# Patient Record
Sex: FEMALE | Race: WHITE | NOT HISPANIC OR LATINO | Employment: OTHER | URBAN - NONMETROPOLITAN AREA
[De-identification: names, ages, dates, MRNs, and addresses within clinical notes are randomized per-mention and may not be internally consistent; named-entity substitution may affect disease eponyms.]

---

## 2023-11-09 DIAGNOSIS — I48.0 PAROXYSMAL ATRIAL FIBRILLATION (MULTI): ICD-10-CM

## 2023-11-09 PROBLEM — E78.5 HYPERLIPIDEMIA: Status: ACTIVE | Noted: 2023-11-09

## 2023-11-09 PROBLEM — I42.9 CARDIOMYOPATHY (MULTI): Status: ACTIVE | Noted: 2023-11-09

## 2023-11-09 PROBLEM — E11.9 DIABETES MELLITUS (MULTI): Status: ACTIVE | Noted: 2023-11-09

## 2023-11-09 PROBLEM — N18.9 CHRONIC KIDNEY DISEASE: Status: ACTIVE | Noted: 2023-11-09

## 2023-11-09 PROBLEM — I10 BENIGN ESSENTIAL HYPERTENSION: Status: ACTIVE | Noted: 2023-11-09

## 2023-11-09 PROBLEM — I35.0 AORTIC STENOSIS, MODERATE: Status: ACTIVE | Noted: 2023-11-09

## 2023-11-09 RX ORDER — VIT C/E/ZN/COPPR/LUTEIN/ZEAXAN 250MG-90MG
25 CAPSULE ORAL DAILY
COMMUNITY

## 2023-11-09 RX ORDER — AMIODARONE HYDROCHLORIDE 200 MG/1
200 TABLET ORAL DAILY
Qty: 90 TABLET | Refills: 0 | Status: SHIPPED | OUTPATIENT
Start: 2023-11-09 | End: 2024-01-04

## 2023-11-09 RX ORDER — AMLODIPINE BESYLATE 5 MG/1
1 TABLET ORAL DAILY
COMMUNITY
Start: 2021-04-08

## 2023-11-09 RX ORDER — FERROUS SULFATE 325(65) MG
1 TABLET ORAL DAILY
COMMUNITY

## 2023-11-09 RX ORDER — AMIODARONE HYDROCHLORIDE 200 MG/1
1 TABLET ORAL DAILY
COMMUNITY
Start: 2021-06-08 | End: 2023-11-09 | Stop reason: SDUPTHER

## 2023-11-09 RX ORDER — ZOLPIDEM TARTRATE 10 MG/1
1 TABLET ORAL NIGHTLY PRN
COMMUNITY
Start: 2021-05-17

## 2023-11-09 RX ORDER — LEVOTHYROXINE SODIUM 75 UG/1
1 TABLET ORAL DAILY
COMMUNITY
Start: 2020-11-25

## 2023-11-09 RX ORDER — BUTALB/ACETAMINOPHEN/CAFFEINE 50-325-40
1 TABLET ORAL DAILY
COMMUNITY

## 2023-11-09 RX ORDER — FUROSEMIDE 20 MG/1
1 TABLET ORAL DAILY
COMMUNITY
Start: 2021-04-29

## 2023-11-09 RX ORDER — ASCORBIC ACID 500 MG
1 TABLET ORAL DAILY
COMMUNITY

## 2023-11-09 RX ORDER — PAROXETINE HYDROCHLORIDE 20 MG/1
1 TABLET, FILM COATED ORAL DAILY
COMMUNITY
Start: 2021-08-10

## 2023-11-09 RX ORDER — METFORMIN HYDROCHLORIDE 500 MG/1
1 TABLET ORAL 2 TIMES DAILY
COMMUNITY
End: 2024-01-08 | Stop reason: WASHOUT

## 2023-11-09 RX ORDER — HYDROGEN PEROXIDE 3 %
1 SOLUTION, NON-ORAL MISCELLANEOUS DAILY
COMMUNITY

## 2023-12-27 LAB
NON-UH HIE ALANINE AMINOTRANSFERASE: 10 U/L (ref 7–52)
NON-UH HIE ANION GAP: 11 (ref 6–15)
NON-UH HIE ASPARTATE AMINO TRANSFERASE: 21 U/L (ref 13–39)
NON-UH HIE BASOPHILS # (AUTO): 0.1 10*3/UL (ref 0–0.2)
NON-UH HIE BASOPHILS % (AUTO): 0.8 %
NON-UH HIE BLOOD UREA NITROGEN: 18 MG/DL (ref 7–25)
NON-UH HIE CALCIUM: 9 MG/DL (ref 8.6–10.3)
NON-UH HIE CARBON DIOXIDE: 27.1 MMOL/L (ref 21–31)
NON-UH HIE CHLORIDE: 104 MMOL/L (ref 98–107)
NON-UH HIE CHOL/HDL RATIO: 4.2
NON-UH HIE CHOLESTEROL: 255 MG/DL (ref 140–200)
NON-UH HIE CREATININE: 1.67 MG/DL (ref 0.6–1.2)
NON-UH HIE EOSINOPHILS # (AUTO): 0.2 10*3/UL (ref 0–0.45)
NON-UH HIE EOSINOPHILS % (AUTO): 2.3 %
NON-UH HIE ESTIMATED GFR: 30.59
NON-UH HIE GLUCOSE: 143 MG/DL (ref 70–100)
NON-UH HIE HDL CHOLESTEROL: 61 MG/DL (ref 23–92)
NON-UH HIE HEMATOCRIT: 33.7 % (ref 34–46.4)
NON-UH HIE HEMOGLOBIN: 11.1 G/DL (ref 11.8–15.4)
NON-UH HIE LDL CHOLESTEROL,CALCULATED: 160 MG/DL (ref 0–100)
NON-UH HIE LYMPHOCYTES # (AUTO): 1.9 10*3/UL (ref 1–4.8)
NON-UH HIE LYMPHOCYTES % (AUTO): 19.3 %
NON-UH HIE MEAN CORPUSCULAR HEMOGLOBIN: 28.4 PG (ref 24.7–34.3)
NON-UH HIE MEAN CORPUSCULAR HGB CONC: 32.8 G/DL (ref 32–35)
NON-UH HIE MEAN CORPUSCULAR VOLUME: 86.5 FL (ref 80–100)
NON-UH HIE MEAN PLATELET VOLUME: 8.1 FL (ref 6.3–10.7)
NON-UH HIE MONOCYTES # (AUTO): 0.6 10*3/UL (ref 0–0.8)
NON-UH HIE MONOCYTES % (AUTO): 5.7 %
NON-UH HIE NEUTROPHILS # (AUTO): 7.1 10*3/UL (ref 1.8–7.7)
NON-UH HIE NEUTROPHILS % (AUTO): 71.9 %
NON-UH HIE NRBC%: 0.1 /100{WBC} (ref 0–0.5)
NON-UH HIE PLATELET COUNT: 322 10*3/UL (ref 150–450)
NON-UH HIE POTASSIUM: 4.1 MMOL/L (ref 3.5–5.1)
NON-UH HIE RED BLOOD COUNT: 3.9 (ref 3.6–5)
NON-UH HIE RED CELL DISTRIBUTION WIDTH: 15.1 % (ref 11.9–15.3)
NON-UH HIE SODIUM: 138 MMOL/L (ref 136–145)
NON-UH HIE TRIGLYCERIDE W/REFLEX: 169 MG/DL (ref 0–149)
NON-UH HIE UNCORRECTED WBC: 9.9 10*3/UL (ref 3.8–11.6)
NON-UH HIE VLDL CHOLESTEROL: 33 MG/DL
NON-UH HIE WHITE BLOOD COUNT: 9.9 10*3/UL (ref 3.8–11.6)

## 2023-12-29 ENCOUNTER — TELEPHONE (OUTPATIENT)
Dept: CARDIOLOGY | Facility: CLINIC | Age: 83
End: 2023-12-29
Payer: MEDICARE

## 2023-12-29 DIAGNOSIS — I48.0 PAROXYSMAL ATRIAL FIBRILLATION (MULTI): ICD-10-CM

## 2023-12-29 DIAGNOSIS — Z79.899 HIGH RISK MEDICATION USE: ICD-10-CM

## 2023-12-29 NOTE — TELEPHONE ENCOUNTER
[place] [facility] [place]  PFT paper order also completed.

## 2024-01-08 ENCOUNTER — OFFICE VISIT (OUTPATIENT)
Dept: CARDIOLOGY | Facility: CLINIC | Age: 84
End: 2024-01-08
Payer: MEDICARE

## 2024-01-08 VITALS
SYSTOLIC BLOOD PRESSURE: 138 MMHG | BODY MASS INDEX: 28.93 KG/M2 | HEIGHT: 66 IN | WEIGHT: 180 LBS | DIASTOLIC BLOOD PRESSURE: 70 MMHG | HEART RATE: 50 BPM

## 2024-01-08 DIAGNOSIS — I48.0 PAROXYSMAL ATRIAL FIBRILLATION (MULTI): ICD-10-CM

## 2024-01-08 DIAGNOSIS — I10 BENIGN ESSENTIAL HYPERTENSION: ICD-10-CM

## 2024-01-08 DIAGNOSIS — I35.0 AORTIC STENOSIS, MODERATE: ICD-10-CM

## 2024-01-08 DIAGNOSIS — E78.5 HYPERLIPIDEMIA, UNSPECIFIED HYPERLIPIDEMIA TYPE: ICD-10-CM

## 2024-01-08 PROCEDURE — 1159F MED LIST DOCD IN RCRD: CPT | Performed by: INTERNAL MEDICINE

## 2024-01-08 PROCEDURE — 3075F SYST BP GE 130 - 139MM HG: CPT | Performed by: INTERNAL MEDICINE

## 2024-01-08 PROCEDURE — 1160F RVW MEDS BY RX/DR IN RCRD: CPT | Performed by: INTERNAL MEDICINE

## 2024-01-08 PROCEDURE — 99214 OFFICE O/P EST MOD 30 MIN: CPT | Performed by: INTERNAL MEDICINE

## 2024-01-08 PROCEDURE — 1036F TOBACCO NON-USER: CPT | Performed by: INTERNAL MEDICINE

## 2024-01-08 PROCEDURE — 3078F DIAST BP <80 MM HG: CPT | Performed by: INTERNAL MEDICINE

## 2024-01-08 PROCEDURE — 93000 ELECTROCARDIOGRAM COMPLETE: CPT | Performed by: INTERNAL MEDICINE

## 2024-01-08 RX ORDER — ATORVASTATIN CALCIUM 20 MG/1
20 TABLET, FILM COATED ORAL DAILY
Qty: 30 TABLET | Refills: 11 | Status: SHIPPED | OUTPATIENT
Start: 2024-01-08 | End: 2025-01-07

## 2024-01-08 RX ORDER — CARVEDILOL 12.5 MG/1
12.5 TABLET ORAL
COMMUNITY
End: 2024-03-22 | Stop reason: DRUGHIGH

## 2024-01-08 RX ORDER — GABAPENTIN 300 MG/1
300 CAPSULE ORAL DAILY
COMMUNITY

## 2024-01-08 RX ORDER — HYDROCODONE BITARTRATE AND ACETAMINOPHEN 5; 325 MG/1; MG/1
1 TABLET ORAL EVERY 4 HOURS PRN
COMMUNITY

## 2024-01-08 NOTE — LETTER
January 8, 2024     Bubba Guo MD  [address] [place] [id number]    Patient: Radha Torres   YOB: 1940   Date of Visit: 1/8/2024       Dear Dr. Bubba Guo MD:    Thank you for referring Radha Torres to me for evaluation. Below are my notes for this consultation.  If you have questions, please do not hesitate to call me. I look forward to following your patient along with you.       Sincerely,     Love Moreno MD      CC: No Recipients  ______________________________________________________________________________________    This is a patient with atrial fibrillation currently in normal sinus rhythm with at least moderate calcific aortic stenosis with a dimensionless aortic valve index of 0.3, [id number] score of 5, high risk for falls, high risk for injury related to fall, who is maintaining sinus rhythm on high risk medication amiodarone, surveillance per protocol. At this time no obvious evidence of amiodarone toxicity.  Accompanied by , patient has a sedentary lifestyle, and uses walker for ambulatory aid.  Has not had any major falls in the last 6 months.    Subjective :     Interval review of systems is negative for chest discomfort pressure tightness heaviness palpitations lightheadedness orthopnea paroxysmal nocturnal dyspnea dependent edema or claudication TIA or CVA type symptoms or bleeding diathesis      History so Far :      Objective      Wt Readings from Last 3 Encounters:   01/08/24 81.6 kg (180 lb)   08/07/23 80.7 kg (178 lb)   02/20/23 76.7 kg (169 lb)            Physical Exam:  Awake alert oriented x 3 answers simple questions.  Grossly nonfocal.  Grade 2/6 to 3/6 high-pitched crescendo decrescendo murmur of aortic stenosis along the left sternal border lungs are clear heart sounds are regular extremities show no edema    Meds:  Current Outpatient Medications   Medication Instructions   • amiodarone (PACERONE) 200 mg, oral, Daily   •  amLODIPine (Norvasc) 5 mg tablet 1 tablet, oral, Daily   • apixaban (Eliquis) 2.5 mg tablet 1 tablet, oral, 2 times daily   • ascorbic acid (Vitamin C) 500 mg tablet 1 tablet, oral, Daily   • atorvastatin (LIPITOR) 20 mg, oral, Daily   • calcium citrate-vitamin D3 (Citracal+D) 315 mg-5 mcg (200 unit) tablet 1 tablet, oral, Daily   • carvedilol (COREG) 12.5 mg, oral, 2 times daily with meals   • cholecalciferol (VITAMIN D-3) 25 mcg, oral, Daily   • esomeprazole (NexIUM) 20 mg DR capsule 1 capsule, oral, Daily   • ferrous sulfate 325 (65 Fe) MG tablet 1 tablet, oral, Daily   • furosemide (Lasix) 20 mg tablet 1 tablet, oral, Daily   • gabapentin (NEURONTIN) 300 mg, oral, Daily   • HYDROcodone-acetaminophen (Norco) 5-325 mg tablet 1 tablet, oral, Every 4 hours PRN   • levothyroxine (Synthroid, Levoxyl) 75 mcg tablet 1 tablet, oral, Daily   • metFORMIN (Glucophage) 500 mg tablet 1 tablet, oral, 2 times daily   • PARoxetine (Paxil) 20 mg tablet 1 tablet, oral, Daily   • zolpidem (Ambien) 10 mg tablet 1 tablet, oral, Nightly PRN      No Known Allergies    LABS:    Lab Results   Component Value Date    TSH 0.59 06/12/2019                   Problem List:    Patient Active Problem List    Diagnosis Date Noted   • Aortic stenosis, moderate 11/09/2023   • Benign essential hypertension 11/09/2023   • Cardiomyopathy (CMS/HCC) 11/09/2023   • Chronic kidney disease 11/09/2023   • Diabetes mellitus (CMS/HCC) 11/09/2023   • Hyperlipidemia 11/09/2023   • Paroxysmal atrial fibrillation (CMS/HCC) 11/09/2023                Assessment:    1. Benign essential hypertension  Follow Up In Cardiology    Follow Up In Cardiology    Lipid Panel    Aspartate Aminotransferase    Alanine Aminotransferase    Lipid Panel    Aspartate Aminotransferase    Alanine Aminotransferase      2. Paroxysmal atrial fibrillation (CMS/HCC)  Follow Up In Cardiology    ECG 12 Lead    Follow Up In Cardiology    Lipid Panel    Aspartate Aminotransferase    Alanine  Aminotransferase    Lipid Panel    Aspartate Aminotransferase    Alanine Aminotransferase      3. Aortic stenosis, moderate        4. Hyperlipidemia, unspecified hyperlipidemia type  atorvastatin (Lipitor) 20 mg tablet    Lipid Panel    Aspartate Aminotransferase    Alanine Aminotransferase    Lipid Panel    Aspartate Aminotransferase    Alanine Aminotransferase      Clinical discussion:  Patient with moderate to severe calcific aortic stenosis, no symptoms of angina or decompensated heart failure, remains on high risk medication amiodarone, has first-degree AV block, TSH is normal, liver enzymes are normal, lipid profile shows LDL of 150.  Blood pressure is at target, amlodipine was uptitrated at last visit  Sinus bradycardia and first-degree AV block.  Has diffuse ST-T abnormality which is unchanged compared to prior EKG.  She has chronic kidney disease stage III.  She remains on high risk medication Eliquis, I have discussed with her referral for Watchman device, but she has consistently wanted to do that.  Remains on low-dose diuretics, which are prescribed by Neophrology ?    Orders Placed This Encounter   Procedures   • Lipid Panel   • Aspartate Aminotransferase   • Alanine Aminotransferase   • ECG 12 Lead          Follow up : 6 months with Virgen Grimes NP, 1 year with me.  Scribe Attestation  By signing my name below, IDarleen LPN  , Nini   attest that this documentation has been prepared under the direction and in the presence of Love Moreno MD.   Provider Attestation - Scribe documentation    All medical record entries made by the Scribe were at my direction and personally dictated by me. I have reviewed the chart and agree that the record accurately reflects my personal performance of the history, physical exam, discussion and plan.

## 2024-01-08 NOTE — PROGRESS NOTES
This is a patient with atrial fibrillation currently in normal sinus rhythm with at least moderate calcific aortic stenosis with a dimensionless aortic valve index of 0.3, [id number] score of 5, high risk for falls, high risk for injury related to fall, who is maintaining sinus rhythm on high risk medication amiodarone, surveillance per protocol. At this time no obvious evidence of amiodarone toxicity.  Accompanied by , patient has a sedentary lifestyle, and uses walker for ambulatory aid.  Has not had any major falls in the last 6 months.    Subjective :     Interval review of systems is negative for chest discomfort pressure tightness heaviness palpitations lightheadedness orthopnea paroxysmal nocturnal dyspnea dependent edema or claudication TIA or CVA type symptoms or bleeding diathesis      History so Far :      Objective      Wt Readings from Last 3 Encounters:   01/08/24 81.6 kg (180 lb)   08/07/23 80.7 kg (178 lb)   02/20/23 76.7 kg (169 lb)            Physical Exam:  Awake alert oriented x 3 answers simple questions.  Grossly nonfocal.  Grade 2/6 to 3/6 high-pitched crescendo decrescendo murmur of aortic stenosis along the left sternal border lungs are clear heart sounds are regular extremities show no edema    Meds:  Current Outpatient Medications   Medication Instructions    amiodarone (PACERONE) 200 mg, oral, Daily    amLODIPine (Norvasc) 5 mg tablet 1 tablet, oral, Daily    apixaban (Eliquis) 2.5 mg tablet 1 tablet, oral, 2 times daily    ascorbic acid (Vitamin C) 500 mg tablet 1 tablet, oral, Daily    atorvastatin (LIPITOR) 20 mg, oral, Daily    calcium citrate-vitamin D3 (Citracal+D) 315 mg-5 mcg (200 unit) tablet 1 tablet, oral, Daily    carvedilol (COREG) 12.5 mg, oral, 2 times daily with meals    cholecalciferol (VITAMIN D-3) 25 mcg, oral, Daily    esomeprazole (NexIUM) 20 mg DR capsule 1 capsule, oral, Daily    ferrous sulfate 325 (65 Fe) MG tablet 1 tablet, oral, Daily    furosemide  (Lasix) 20 mg tablet 1 tablet, oral, Daily    gabapentin (NEURONTIN) 300 mg, oral, Daily    HYDROcodone-acetaminophen (Norco) 5-325 mg tablet 1 tablet, oral, Every 4 hours PRN    levothyroxine (Synthroid, Levoxyl) 75 mcg tablet 1 tablet, oral, Daily    metFORMIN (Glucophage) 500 mg tablet 1 tablet, oral, 2 times daily    PARoxetine (Paxil) 20 mg tablet 1 tablet, oral, Daily    zolpidem (Ambien) 10 mg tablet 1 tablet, oral, Nightly PRN      No Known Allergies    LABS:    Lab Results   Component Value Date    TSH 0.59 06/12/2019                   Problem List:    Patient Active Problem List    Diagnosis Date Noted    Aortic stenosis, moderate 11/09/2023    Benign essential hypertension 11/09/2023    Cardiomyopathy (CMS/[facility]) 11/09/2023    Chronic kidney disease 11/09/2023    Diabetes mellitus (CMS/[facility]) 11/09/2023    Hyperlipidemia 11/09/2023    Paroxysmal atrial fibrillation (CMS/[facility]) 11/09/2023                Assessment:    1. Benign essential hypertension  Follow Up In Cardiology    Follow Up In Cardiology    Lipid Panel    Aspartate Aminotransferase    Alanine Aminotransferase    Lipid Panel    Aspartate Aminotransferase    Alanine Aminotransferase      2. Paroxysmal atrial fibrillation (CMS/HCC)  Follow Up In Cardiology    ECG 12 Lead    Follow Up In Cardiology    Lipid Panel    Aspartate Aminotransferase    Alanine Aminotransferase    Lipid Panel    Aspartate Aminotransferase    Alanine Aminotransferase      3. Aortic stenosis, moderate        4. Hyperlipidemia, unspecified hyperlipidemia type  atorvastatin (Lipitor) 20 mg tablet    Lipid Panel    Aspartate Aminotransferase    Alanine Aminotransferase    Lipid Panel    Aspartate Aminotransferase    Alanine Aminotransferase      Clinical discussion:  Patient with moderate to severe calcific aortic stenosis, no symptoms of angina or decompensated heart failure, remains on high risk medication amiodarone, has first-degree AV block, TSH is normal, liver enzymes are  normal, lipid profile shows LDL of 150.  Blood pressure is at target, amlodipine was uptitrated at last visit  Sinus bradycardia and first-degree AV block.  Has diffuse ST-T abnormality which is unchanged compared to prior EKG.  She has chronic kidney disease stage III.  She remains on high risk medication Eliquis, I have discussed with her referral for Watchman device, but she has consistently wanted to do that.  Remains on low-dose diuretics, which are prescribed by Neophrology ?    Orders Placed This Encounter   Procedures    Lipid Panel    Aspartate Aminotransferase    Alanine Aminotransferase    ECG 12 Lead          Follow up : 6 months with Virgen Grimes NP, 1 year with me.  Scribe Attestation  By signing my name below, I, Nini Santos LPN   attest that this documentation has been prepared under the direction and in the presence of Love Moreno MD.   Provider Attestation - Scribe documentation    All medical record entries made by the Scribe were at my direction and personally dictated by me. I have reviewed the chart and agree that the record accurately reflects my personal performance of the history, physical exam, discussion and plan.

## 2024-01-08 NOTE — PATIENT INSTRUCTIONS
Please bring all medicines, vitamins, and herbal supplements with you when you come to the office.    Prescriptions will not be filled unless you are compliant with your follow up appointments or have a follow up appointment scheduled as per instruction of your physician. Refills should be requested at the time of your visit.     EKG done in office today     Amiodarone follow up per routine

## 2024-01-23 LAB
NON-UH HIE ANION GAP: 13.1 (ref 6–15)
NON-UH HIE ASPARTATE AMINO TRANSFERASE: 22 U/L (ref 13–39)
NON-UH HIE BLOOD UREA NITROGEN: 23 MG/DL (ref 7–25)
NON-UH HIE CALCIUM: 8.9 MG/DL (ref 8.6–10.3)
NON-UH HIE CARBON DIOXIDE: 29.1 MMOL/L (ref 21–31)
NON-UH HIE CHLORIDE: 98 MMOL/L (ref 98–107)
NON-UH HIE CREATININE: 1.94 MG/DL (ref 0.6–1.2)
NON-UH HIE ESTIMATED GFR: 25.39
NON-UH HIE GLUCOSE: 144 MG/DL (ref 70–100)
NON-UH HIE POTASSIUM: 4.2 MMOL/L (ref 3.5–5.1)
NON-UH HIE SODIUM: 136 MMOL/L (ref 136–145)
NON-UH HIE THYROID STIMULATING HORMONE: 2.26 U[IU]/ML (ref 0.45–5.33)

## 2024-01-26 ENCOUNTER — TELEPHONE (OUTPATIENT)
Dept: CARDIOLOGY | Facility: CLINIC | Age: 84
End: 2024-01-26
Payer: MEDICARE

## 2024-01-26 NOTE — TELEPHONE ENCOUNTER
----- Message from Love Moreno MD sent at 1/25/2024  6:13 PM EST -----  Compared to prior numbers, there has been a slight decline in her kidney function, she should address this with her primary care or her kidney doctor if she has one.

## 2024-01-26 NOTE — TELEPHONE ENCOUNTER
Result Communication    Resulted Orders   [facility] Basic Metabolic Panel   Result Value Ref Range    [facility] Glucose 144 (H) 70 - 100 mg/dL      Comment:         Random Glucose Reference Range is dependent on time and   content of last meal. Glucose of more than 200 mg/dL in a   nonstressed, ambulatory subject supports the diagnosis of   Diabetes Mellitus.   ADA recommended reference range    [facility] Blood Urea Nitrogen 23 7 - 25 mg/dL    [facility] Creatinine 1.94 (H) 0.60 - 1.20 mg/dL    [facility] ESTIMATED GFR 25.395     [facility] Sodium 136 136 - 145 mmol/L    [facility] Potassium 4.2 3.5 - 5.1 mmol/L    [facility] Chloride 98 98 - 107 mmol/L    [facility] Carbon Dioxide 29.1 21.0 - 31.0 mmol/L    [facility] Anion Gap 13.1 6.0 - 15.0    [facility] Calcium 8.9 8.6 - 10.3 mg/dL   [facility] Aspartate Amino Transferase   Result Value Ref Range    [facility] Aspartate Amino Transferase 22 13 - 39 U/L   [facility] Thyroid Stimulating Hormone   Result Value Ref Range    [facility] Thyroid Stimulating Hormone 2.26 0.45 - 5.33 u[iU]/mL      Comment:      PERFORMED BY:[facility] [place] [address] [place], [place] [id number] MEDICAL DIRECTORTERRY FLORES M.D.       10:31 AM    I attempted to phone patient, left detailed message to return call. Transferred to [facility] for follow up.

## 2024-01-30 NOTE — TELEPHONE ENCOUNTER
Attempted to phone patient. No answer.  Left detailed message on machine to call office. To [facility]

## 2024-01-31 NOTE — TELEPHONE ENCOUNTER
came into office, advised, verbalized understanding. States patient has been know for decreased kidney function she follows with Dr. Palmer, results forwarded.

## 2024-03-20 DIAGNOSIS — I10 BENIGN ESSENTIAL HYPERTENSION: ICD-10-CM

## 2024-03-20 DIAGNOSIS — I42.9 CARDIOMYOPATHY, UNSPECIFIED TYPE (MULTI): ICD-10-CM

## 2024-03-20 NOTE — TELEPHONE ENCOUNTER
Pharmacy requesting Carvedilol 25 mg BID. Current EHR states 12.5 mg BID.  Upon review of previous EHR patient medication was adjusted 8/7/2023 up to 25 mg BID HOWEVER when patient presented to office within this system list was updated to 12.5 BID.     I did attempt to contact patient to verify what she is taking at the home. No answer. Left detailed message on machine to call office back    To [facility]

## 2024-03-21 NOTE — TELEPHONE ENCOUNTER
Spouse called back into office to discuss. He reviewed her bottles and she has 25 mg BID. Thinks that at last visit in January she had an old list that listed 12.5 mg Bid because she is actively taking the 25 mg BId    To Dr. Love Moreno MD to verify RX so it can be updated.

## 2024-03-22 RX ORDER — CARVEDILOL 25 MG/1
25 TABLET ORAL 2 TIMES DAILY
Qty: 180 TABLET | Refills: 3 | Status: SHIPPED | OUTPATIENT
Start: 2024-03-22

## 2024-06-24 LAB
NON-UH HIE ALANINE AMINOTRANSFERASE: 11 U/L (ref 7–52)
NON-UH HIE ASPARTATE AMINO TRANSFERASE: 21 U/L (ref 13–39)
NON-UH HIE CHOL/HDL RATIO: 3
NON-UH HIE CHOLESTEROL: 158 MG/DL (ref 140–200)
NON-UH HIE HDL CHOLESTEROL: 52 MG/DL (ref 23–92)
NON-UH HIE LDL CHOLESTEROL,CALCULATED: 83 MG/DL (ref 0–100)
NON-UH HIE TRIGLYCERIDE W/REFLEX: 116 MG/DL (ref 0–149)
NON-UH HIE VLDL CHOLESTEROL: 23 MG/DL

## 2024-07-02 ENCOUNTER — TELEPHONE (OUTPATIENT)
Dept: CARDIOLOGY | Facility: CLINIC | Age: 84
End: 2024-07-02
Payer: MEDICARE

## 2024-07-02 NOTE — TELEPHONE ENCOUNTER
----- Message from LINDSEY Stewart-CNP sent at 7/1/2024  3:53 PM EDT -----  Lipids well controlled - no changes  ----- Message -----  From: Kanwal Kay - Lab Results In  Sent: 6/24/2024   1:53 PM EDT  To: Love Moreno MD

## 2024-07-08 ENCOUNTER — APPOINTMENT (OUTPATIENT)
Dept: CARDIOLOGY | Facility: CLINIC | Age: 84
End: 2024-07-08
Payer: MEDICARE

## 2024-07-15 ENCOUNTER — APPOINTMENT (OUTPATIENT)
Dept: CARDIOLOGY | Facility: CLINIC | Age: 84
End: 2024-07-15
Payer: MEDICARE

## 2024-07-19 ENCOUNTER — APPOINTMENT (OUTPATIENT)
Dept: CARDIOLOGY | Facility: CLINIC | Age: 84
End: 2024-07-19
Payer: MEDICARE

## 2024-08-12 ENCOUNTER — APPOINTMENT (OUTPATIENT)
Dept: CARDIOLOGY | Facility: CLINIC | Age: 84
End: 2024-08-12
Payer: MEDICARE

## 2024-08-12 VITALS
WEIGHT: 181 LBS | SYSTOLIC BLOOD PRESSURE: 154 MMHG | HEIGHT: 66 IN | BODY MASS INDEX: 29.09 KG/M2 | HEART RATE: 59 BPM | DIASTOLIC BLOOD PRESSURE: 62 MMHG

## 2024-08-12 DIAGNOSIS — Z79.01 LONG TERM CURRENT USE OF ANTICOAGULANT THERAPY: ICD-10-CM

## 2024-08-12 DIAGNOSIS — N18.32 STAGE 3B CHRONIC KIDNEY DISEASE (MULTI): ICD-10-CM

## 2024-08-12 DIAGNOSIS — Z79.899 HIGH RISK MEDICATION USE: ICD-10-CM

## 2024-08-12 DIAGNOSIS — Z91.81 AT MAXIMUM RISK FOR FALL: ICD-10-CM

## 2024-08-12 DIAGNOSIS — I10 BENIGN ESSENTIAL HYPERTENSION: ICD-10-CM

## 2024-08-12 DIAGNOSIS — R00.1 SINUS BRADYCARDIA: ICD-10-CM

## 2024-08-12 DIAGNOSIS — I48.0 PAROXYSMAL ATRIAL FIBRILLATION (MULTI): ICD-10-CM

## 2024-08-12 DIAGNOSIS — Z79.899 MEDICATION COURSE CHANGED: ICD-10-CM

## 2024-08-12 DIAGNOSIS — Z78.9 NEVER SMOKED CIGARETTES: ICD-10-CM

## 2024-08-12 DIAGNOSIS — I35.0 AORTIC STENOSIS, MODERATE: ICD-10-CM

## 2024-08-12 PROCEDURE — G2211 COMPLEX E/M VISIT ADD ON: HCPCS | Performed by: INTERNAL MEDICINE

## 2024-08-12 PROCEDURE — 3078F DIAST BP <80 MM HG: CPT | Performed by: INTERNAL MEDICINE

## 2024-08-12 PROCEDURE — 99214 OFFICE O/P EST MOD 30 MIN: CPT | Performed by: INTERNAL MEDICINE

## 2024-08-12 PROCEDURE — 1160F RVW MEDS BY RX/DR IN RCRD: CPT | Performed by: INTERNAL MEDICINE

## 2024-08-12 PROCEDURE — 3077F SYST BP >= 140 MM HG: CPT | Performed by: INTERNAL MEDICINE

## 2024-08-12 PROCEDURE — 1159F MED LIST DOCD IN RCRD: CPT | Performed by: INTERNAL MEDICINE

## 2024-08-12 PROCEDURE — 93000 ELECTROCARDIOGRAM COMPLETE: CPT | Performed by: INTERNAL MEDICINE

## 2024-08-12 RX ORDER — AMIODARONE HYDROCHLORIDE 200 MG/1
200 TABLET ORAL
Start: 2024-08-13

## 2024-08-12 NOTE — PROGRESS NOTES
"This is a patient with atrial fibrillation currently in normal sinus rhythm with at least moderate calcific aortic stenosis with a dimensionless aortic valve index of 0.3, [id number] score of 5, high risk for falls, high risk for injury related to fall, who is maintaining sinus rhythm on high risk medication amiodarone, surveillance per protocol. At this time no obvious evidence of amiodarone toxicity.  Accompanied by , patient has a sedentary lifestyle, and uses walker for ambulatory aid.  Has not had any major falls in the last 6 months.   6-month follow-up visit.  Subjective :     Denies chest pressure tightness heaviness palpitations lightheadedness, has had mechanical fall.  Quite frustrated at having multiple doctor appointments to include primary nephrology cardiology and pain management.  Quality of life is primarily affected by musculoskeletal pain.  No bleeding diathesis.  History so far :  1. Persistent atrial fibrillation-on high risk medication amiodarone, maintaining sinus rhythm  2. No evidence of amiodarone toxicity  3. Chronic kidney disease stage IIIb.  4. Diabetes mellitus with hypoglycemia  5. Carotid bruit nonflow-limiting disease  6. Mild to moderate calcific aortic stenosis. Valve area estimated at 1.3 cmÂ², dimensionless index reported at 0.3, peak and mean gradients 46 and 27 respectively. Compared to the echo report from October 2018, LVEF improved from 30% to 65%. Previously noted mitral regurgitation resolved  7.  Fall risk  Objective   Wt Readings from Last 3 Encounters:   08/12/24 82.1 kg (181 lb)   01/08/24 81.6 kg (180 lb)   08/07/23 80.7 kg (178 lb)            Vitals:    08/12/24 1031   BP: 154/62   BP Location: Left arm   Patient Position: Sitting   Pulse: 59   Weight: 82.1 kg (181 lb)   Height: 1.676 m (5' 6\")                Physical Exam:  Awake alert oriented x 3 answers simple questions.  Grossly nonfocal.  Grade 2/6 to 3/6 high-pitched crescendo decrescendo murmur of " aortic stenosis along the left sternal border lungs are clear heart sounds are regular extremities show no edema     Affect is flat.  Uses cane as ambulatory aid.  Meds:  Current Outpatient Medications   Medication Instructions    amiodarone (PACERONE) 200 mg, oral, Daily    amLODIPine (Norvasc) 5 mg tablet 1 tablet, oral, Daily    apixaban (ELIQUIS) 2.5 mg, oral, 2 times daily    ascorbic acid (Vitamin C) 500 mg tablet 1 tablet, oral, Daily    atorvastatin (LIPITOR) 20 mg, oral, Daily    calcium citrate-vitamin D3 (Citracal+D) 315 mg-5 mcg (200 unit) tablet 1 tablet, oral, Daily    carvedilol (COREG) 25 mg, oral, 2 times daily    cholecalciferol (VITAMIN D-3) 25 mcg, oral, Daily    esomeprazole (NexIUM) 20 mg DR capsule 1 capsule, oral, Daily    ferrous sulfate 325 (65 Fe) MG tablet 1 tablet, oral, Daily    furosemide (Lasix) 20 mg tablet 1 tablet, oral, Daily    gabapentin (NEURONTIN) 300 mg, oral, Daily    HYDROcodone-acetaminophen (Norco) 5-325 mg tablet 1 tablet, oral, Every 4 hours PRN    levothyroxine (Synthroid, Levoxyl) 75 mcg tablet 1 tablet, oral, Daily    PARoxetine (Paxil) 20 mg tablet 1 tablet, oral, Daily    zolpidem (Ambien) 10 mg tablet 1 tablet, oral, Nightly PRN          No Known Allergies          LABS:    Lab Results   Component Value Date    TSH 0.59 06/12/2019                 7/10/2024-hemoglobin 11.2 hematocrit 34.3 MCV 87 platelet count 308,000 sodium 134 potassium 4.2 BUN 17 creatinine 1.55 GFR 33 glucose 115 magnesium 1.8 iron 43 TIBC 312% saturation 13.8 ferritin 109.5 albumin 3.9    Patient Active Problem List    Diagnosis Date Noted    Never smoked cigarettes 08/12/2024    Long term current use of anticoagulant therapy 08/12/2024    High risk medication use 08/12/2024    At maximum risk for fall 08/12/2024    Sinus bradycardia 08/12/2024    Medication course changed 08/12/2024    Aortic stenosis, moderate 11/09/2023    Benign essential hypertension 11/09/2023    Cardiomyopathy (Multi)  11/09/2023    Chronic kidney disease 11/09/2023    Diabetes mellitus (Multi) 11/09/2023    Hyperlipidemia 11/09/2023    Paroxysmal atrial fibrillation (Multi) 11/09/2023                 Assessment:    1. Benign essential hypertension  Follow Up In Cardiology      2. Paroxysmal atrial fibrillation (Multi)  Follow Up In Cardiology      3. Never smoked cigarettes        4. Long term current use of anticoagulant therapy        5. High risk medication use        6. At maximum risk for fall        7. Stage 3b chronic kidney disease (Multi)        8. Sinus bradycardia        9. Medication course changed        10. Aortic stenosis, moderate           11.  Personal history of COVID May 2024  12.  Diuretics to be addressed by nephrology  13.   brought up that her heart rate stays in the 50s all the time.  She is quite sedentary.  She is not having any symptoms of bradycardia.  We can reduce her amiodarone to 200 mg 5 days a week.  14.  Extensive discussion about fall risk, she has had multiple rib fractures from falling, she had a fall since her last office visit, lost balance, most of the falls are mechanical, she had both shoulders, was seen in the emergency room and released.  Very hesitant to proceed with Watchman device.  Procedure risk benefits alternatives discussed.  Very hesitant to go off anticoagulation.  She understands her stroke risk, and says she will not quit blood thinners.  Towards the end of the visit she said she would think about Watchman device, and get back to us.  She will keep her previously scheduled appointment January 2024.  15.  CBC and basic metabolic profile prior to next visit  Follow up : 6 months        Provider Attestation - Scribe documentation    All medical record entries made by the Scribe were at my direction and personally dictated by me. I have reviewed the chart and agree that the record accurately reflects my personal performance of the history, physical exam, discussion and  plan.

## 2024-08-12 NOTE — LETTER
August 15, 2024     Bubba Guo MD  [address] [place] [id number]    Patient: Radha Torres   YOB: 1940   Date of Visit: 8/12/2024       Dear Dr. Bubba Guo MD:    Thank you for referring Radha Trores to me for evaluation. Below are my notes for this consultation.  If you have questions, please do not hesitate to call me. I look forward to following your patient along with you.       Sincerely,     Love Moreno MD      CC: No Recipients  ______________________________________________________________________________________    This is a patient with atrial fibrillation currently in normal sinus rhythm with at least moderate calcific aortic stenosis with a dimensionless aortic valve index of 0.3, [id number] score of 5, high risk for falls, high risk for injury related to fall, who is maintaining sinus rhythm on high risk medication amiodarone, surveillance per protocol. At this time no obvious evidence of amiodarone toxicity.  Accompanied by , patient has a sedentary lifestyle, and uses walker for ambulatory aid.  Has not had any major falls in the last 6 months.   6-month follow-up visit.  Subjective :     Denies chest pressure tightness heaviness palpitations lightheadedness, has had mechanical fall.  Quite frustrated at having multiple doctor appointments to include primary nephrology cardiology and pain management.  Quality of life is primarily affected by musculoskeletal pain.  No bleeding diathesis.  History so far :  1. Persistent atrial fibrillation-on high risk medication amiodarone, maintaining sinus rhythm  2. No evidence of amiodarone toxicity  3. Chronic kidney disease stage IIIb.  4. Diabetes mellitus with hypoglycemia  5. Carotid bruit nonflow-limiting disease  6. Mild to moderate calcific aortic stenosis. Valve area estimated at 1.3 cmÂ², dimensionless index reported at 0.3, peak and mean gradients 46 and 27 respectively. Compared to the echo report from  "October 2018, LVEF improved from 30% to 65%. Previously noted mitral regurgitation resolved  7.  Fall risk  Objective   Wt Readings from Last 3 Encounters:   08/12/24 82.1 kg (181 lb)   01/08/24 81.6 kg (180 lb)   08/07/23 80.7 kg (178 lb)            Vitals:    08/12/24 1031   BP: 154/62   BP Location: Left arm   Patient Position: Sitting   Pulse: 59   Weight: 82.1 kg (181 lb)   Height: 1.676 m (5' 6\")                Physical Exam:  Awake alert oriented x 3 answers simple questions.  Grossly nonfocal.  Grade 2/6 to 3/6 high-pitched crescendo decrescendo murmur of aortic stenosis along the left sternal border lungs are clear heart sounds are regular extremities show no edema     Affect is flat.  Uses cane as ambulatory aid.  Meds:  Current Outpatient Medications   Medication Instructions   • amiodarone (PACERONE) 200 mg, oral, Daily   • amLODIPine (Norvasc) 5 mg tablet 1 tablet, oral, Daily   • apixaban (ELIQUIS) 2.5 mg, oral, 2 times daily   • ascorbic acid (Vitamin C) 500 mg tablet 1 tablet, oral, Daily   • atorvastatin (LIPITOR) 20 mg, oral, Daily   • calcium citrate-vitamin D3 (Citracal+D) 315 mg-5 mcg (200 unit) tablet 1 tablet, oral, Daily   • carvedilol (COREG) 25 mg, oral, 2 times daily   • cholecalciferol (VITAMIN D-3) 25 mcg, oral, Daily   • esomeprazole (NexIUM) 20 mg DR capsule 1 capsule, oral, Daily   • ferrous sulfate 325 (65 Fe) MG tablet 1 tablet, oral, Daily   • furosemide (Lasix) 20 mg tablet 1 tablet, oral, Daily   • gabapentin (NEURONTIN) 300 mg, oral, Daily   • HYDROcodone-acetaminophen (Norco) 5-325 mg tablet 1 tablet, oral, Every 4 hours PRN   • levothyroxine (Synthroid, Levoxyl) 75 mcg tablet 1 tablet, oral, Daily   • PARoxetine (Paxil) 20 mg tablet 1 tablet, oral, Daily   • zolpidem (Ambien) 10 mg tablet 1 tablet, oral, Nightly PRN          No Known Allergies          LABS:    Lab Results   Component Value Date    TSH 0.59 06/12/2019                 7/10/2024-hemoglobin 11.2 hematocrit 34.3 " MCV 87 platelet count 308,000 sodium 134 potassium 4.2 BUN 17 creatinine 1.55 GFR 33 glucose 115 magnesium 1.8 iron 43 TIBC 312% saturation 13.8 ferritin 109.5 albumin 3.9    Patient Active Problem List    Diagnosis Date Noted   • Never smoked cigarettes 08/12/2024   • Long term current use of anticoagulant therapy 08/12/2024   • High risk medication use 08/12/2024   • At maximum risk for fall 08/12/2024   • Sinus bradycardia 08/12/2024   • Medication course changed 08/12/2024   • Aortic stenosis, moderate 11/09/2023   • Benign essential hypertension 11/09/2023   • Cardiomyopathy (Multi) 11/09/2023   • Chronic kidney disease 11/09/2023   • Diabetes mellitus (Multi) 11/09/2023   • Hyperlipidemia 11/09/2023   • Paroxysmal atrial fibrillation (Multi) 11/09/2023                 Assessment:    1. Benign essential hypertension  Follow Up In Cardiology      2. Paroxysmal atrial fibrillation (Multi)  Follow Up In Cardiology      3. Never smoked cigarettes        4. Long term current use of anticoagulant therapy        5. High risk medication use        6. At maximum risk for fall        7. Stage 3b chronic kidney disease (Multi)        8. Sinus bradycardia        9. Medication course changed        10. Aortic stenosis, moderate           11.  Personal history of COVID May 2024  12.  Diuretics to be addressed by nephrology  13.   brought up that her heart rate stays in the 50s all the time.  She is quite sedentary.  She is not having any symptoms of bradycardia.  We can reduce her amiodarone to 200 mg 5 days a week.  14.  Extensive discussion about fall risk, she has had multiple rib fractures from falling, she had a fall since her last office visit, lost balance, most of the falls are mechanical, she had both shoulders, was seen in the emergency room and released.  Very hesitant to proceed with Watchman device.  Procedure risk benefits alternatives discussed.  Very hesitant to go off anticoagulation.  She understands  her stroke risk, and says she will not quit blood thinners.  Towards the end of the visit she said she would think about Watchman device, and get back to us.  She will keep her previously scheduled appointment January 2024.  15.  CBC and basic metabolic profile prior to next visit  Follow up : 6 months        Provider Attestation - Scribe documentation    All medical record entries made by the Scribe were at my direction and personally dictated by me. I have reviewed the chart and agree that the record accurately reflects my personal performance of the history, physical exam, discussion and plan.

## 2024-09-09 ENCOUNTER — TELEPHONE (OUTPATIENT)
Dept: CARDIOLOGY | Facility: CLINIC | Age: 84
End: 2024-09-09
Payer: MEDICARE

## 2024-09-09 NOTE — TELEPHONE ENCOUNTER
Patient's daughter called in regards to patient's eyesight. Daughter wants to know if patient can take a different antiarrythmic. States she is not sure if it is the Amiodarone causing vision loss or a different medication patient is taking. Patient cannot be seen until next month for vision loss at [facility].   Tried to reach patient's daughter for more information. Left detailed message.    Daughter # [phone]    To clinical for follow up

## 2024-09-09 NOTE — TELEPHONE ENCOUNTER
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 Daughter is thinking that some of her eyesight issues could be related to amiodarone. She is wondering if there is a different anti-arrhythmic that she could try. She is waiting to get into an eye specialist at the [facility]. Please advise

## 2024-09-10 NOTE — TELEPHONE ENCOUNTER
"Phoned daughter back with recommendations. Verbalized understanding. She is very concerned with her mom losing her vision. States is seeing an eye specialist at [facility] 9/30/2024. States she had a recent \"eye stroke\" in her right eye and now having these issues with her left eye. Appt. Made with Dr. Love Moreno MD 10/25/2024.   "

## 2024-10-25 ENCOUNTER — TELEPHONE (OUTPATIENT)
Dept: CARDIOLOGY | Facility: CLINIC | Age: 84
End: 2024-10-25

## 2024-10-25 ENCOUNTER — APPOINTMENT (OUTPATIENT)
Dept: CARDIOLOGY | Facility: CLINIC | Age: 84
End: 2024-10-25
Payer: MEDICARE

## 2024-10-25 VITALS
DIASTOLIC BLOOD PRESSURE: 74 MMHG | HEART RATE: 64 BPM | SYSTOLIC BLOOD PRESSURE: 162 MMHG | HEIGHT: 66 IN | WEIGHT: 173 LBS | BODY MASS INDEX: 27.8 KG/M2

## 2024-10-25 DIAGNOSIS — I35.0 AORTIC STENOSIS, MODERATE: ICD-10-CM

## 2024-10-25 DIAGNOSIS — E11.69 TYPE 2 DIABETES MELLITUS WITH OTHER SPECIFIED COMPLICATION, UNSPECIFIED WHETHER LONG TERM INSULIN USE (MULTI): ICD-10-CM

## 2024-10-25 DIAGNOSIS — Z79.899 HIGH RISK MEDICATION USE: Primary | ICD-10-CM

## 2024-10-25 DIAGNOSIS — Z91.81 AT MAXIMUM RISK FOR FALL: ICD-10-CM

## 2024-10-25 DIAGNOSIS — Z79.01 LONG TERM CURRENT USE OF ANTICOAGULANT THERAPY: ICD-10-CM

## 2024-10-25 DIAGNOSIS — I10 BENIGN ESSENTIAL HYPERTENSION: ICD-10-CM

## 2024-10-25 DIAGNOSIS — Z78.9 NEVER SMOKED CIGARETTES: ICD-10-CM

## 2024-10-25 DIAGNOSIS — I48.0 PAROXYSMAL ATRIAL FIBRILLATION (MULTI): ICD-10-CM

## 2024-10-25 DIAGNOSIS — N18.32 STAGE 3B CHRONIC KIDNEY DISEASE (MULTI): ICD-10-CM

## 2024-10-25 DIAGNOSIS — E78.5 HYPERLIPIDEMIA, UNSPECIFIED HYPERLIPIDEMIA TYPE: ICD-10-CM

## 2024-10-25 DIAGNOSIS — Z91.81 AT HIGH RISK FOR INJURY RELATED TO FALL: ICD-10-CM

## 2024-10-25 PROCEDURE — 3078F DIAST BP <80 MM HG: CPT | Performed by: INTERNAL MEDICINE

## 2024-10-25 PROCEDURE — 3077F SYST BP >= 140 MM HG: CPT | Performed by: INTERNAL MEDICINE

## 2024-10-25 PROCEDURE — 93000 ELECTROCARDIOGRAM COMPLETE: CPT | Performed by: INTERNAL MEDICINE

## 2024-10-25 PROCEDURE — 1159F MED LIST DOCD IN RCRD: CPT | Performed by: INTERNAL MEDICINE

## 2024-10-25 PROCEDURE — G2211 COMPLEX E/M VISIT ADD ON: HCPCS | Performed by: INTERNAL MEDICINE

## 2024-10-25 PROCEDURE — 1160F RVW MEDS BY RX/DR IN RCRD: CPT | Performed by: INTERNAL MEDICINE

## 2024-10-25 PROCEDURE — 99214 OFFICE O/P EST MOD 30 MIN: CPT | Performed by: INTERNAL MEDICINE

## 2024-10-25 RX ORDER — FUROSEMIDE 20 MG/1
20 TABLET ORAL DAILY
Qty: 90 TABLET | Refills: 3 | Status: SHIPPED | OUTPATIENT
Start: 2024-10-25 | End: 2025-10-25

## 2024-10-25 RX ORDER — ATORVASTATIN CALCIUM 20 MG/1
20 TABLET, FILM COATED ORAL DAILY
Qty: 30 TABLET | Refills: 11 | Status: SHIPPED | OUTPATIENT
Start: 2024-10-25 | End: 2025-10-25

## 2024-10-25 RX ORDER — CARVEDILOL 25 MG/1
12.5 TABLET ORAL 2 TIMES DAILY
Qty: 90 TABLET | Refills: 3 | Status: SHIPPED | OUTPATIENT
Start: 2024-10-25 | End: 2025-10-25

## 2024-10-25 RX ORDER — AMLODIPINE BESYLATE 5 MG/1
5 TABLET ORAL DAILY
Qty: 90 TABLET | Refills: 0 | Status: SHIPPED | OUTPATIENT
Start: 2024-10-25

## 2024-10-25 NOTE — LETTER
October 27, 2024     Bubba Guo MD  [address] [place] [id number]    Patient: Radha Torres   YOB: 1940   Date of Visit: 10/25/2024       Dear Dr. Bubba Guo MD:    Thank you for referring Radha Torres to me for evaluation. Below are my notes for this consultation.  If you have questions, please do not hesitate to call me. I look forward to following your patient along with you.       Sincerely,     Love Moreno MD      CC: No Recipients  ______________________________________________________________________________________    Expand All Collapse All    This is a patient with atrial fibrillation currently in normal sinus rhythm with at least moderate calcific aortic stenosis with a dimensionless aortic valve index of 0.3, [id number] score of 5, high risk for falls, high risk for injury related to fall, who is maintaining sinus rhythm on high risk medication amiodarone, surveillance per protocol. At this time no obvious evidence of amiodarone toxicity.  Accompanied by , patient has a sedentary lifestyle, and uses walker for ambulatory aid.  Has not had any major falls in the last 6 months.    Last seen 8/2024 :    Subjective :   Accompanied by daughter from [place].  She is very involved in her care.  Had blurred vision, saw ophthalmology, reportedly a vessel had ruptured, did not think it was amiodarone related.  Multiple family members and daughter requesting the use of amiodarone with its side effects.  I explained to them that she has so many comorbidities and stage IV kidney disease that her choice of antiarrhythmic therapy is very limited.  She has not been taking amiodarone now for several weeks.  She has not noticed any palpitations.  She has not fallen.  No bleeding diathesis.  Blood pressure is elevated at 162/74.  Has fatigue.  Activity level is limited.  Has dysphagia.  Food gets stuck in the middle of the chest.  No nausea or vomiting.  Persistent  fatigue.  Limited activity level  High fall risk  12 point review of systems otherwise negative or noncontributory.    History so Far :  1. Persistent atrial fibrillation-previously on  amiodarone, maintaining sinus rhythm  2. No evidence of amiodarone toxicity  3. Chronic kidney disease stage IIIb/4.  4. Diabetes mellitus with hypoglycemia  5. Carotid bruit nonflow-limiting disease  6. Mild to moderate calcific aortic stenosis. Valve area estimated at 1.3 cmÂ², dimensionless index reported at 0.3, peak and mean gradients 46 and 27 respectively. Compared to the echo report from October 2018, LVEF improved from 30% to 65%. Previously noted mitral regurgitation resolved  7.  Fall risk  8  Personal history of COVID May 2024  9.  Diuretics to be addressed by nephrology     10.Extensive discussion about Watchman device:  she has had multiple rib fractures from falling, most of the falls are mechanical,   Very hesitant to proceed with Watchman device.  Procedure risk benefits alternatives discussed.  Very hesitant to go off anticoagulation.  She understands her stroke risk, and says she will not quit blood thinners.    11.  Pulmonary function test January 2024-evidence of moderately severe obstruction and mild restriction without clear air trapping moderately reduced diffusion capacity which normalizes when adjusted for alveolar volume and could represent ventilation/perfusion mismatch as the primary reason.  No significant change compared to PFTs of July 2023.  No clear evidence to suggest a trend of declining diffusion capacity dating back to October 2021.    Objective   Wt Readings from Last 3 Encounters:   10/25/24 78.5 kg (173 lb)   08/12/24 82.1 kg (181 lb)   01/08/24 81.6 kg (180 lb)            Vitals:    10/25/24 1019 10/25/24 1023 10/25/24 1048   BP: 162/84 160/78 162/74   BP Location: Left arm Right arm Right arm   Patient Position: Sitting Sitting Sitting   Pulse: 64     Weight: 78.5 kg (173 lb)     Height:  "1.676 m (5' 6\")                  Physical Exam:  Examined in the wheelchair.  GENERAL APPEARANCE: in no acute distress.  CHEST: Symmetric and non-tender.  INTEGUMENT: Skin warm and dry  HEENT: No gross abnormalities identified.No pallor or scleral icterus.  NECK: Supple, no JVD, no bruit.   NEURO/PSHCY: Alert and oriented x3; appropriate behavior and responses and responses  LUNGS: Clear to auscultation bilaterally; normal respiratory effort.  HEART: Rate and rhythm regular with grade 1/6 to 2/6 crescendo decrescendo murmur along the left sternal border; no gallop appreciated.   ABDOMEN: Soft, non tender.  EXTREMITIES: Warm  There is no edema noted.    Meds:  Current Outpatient Medications   Medication Instructions   • amLODIPine (NORVASC) 5 mg, oral, Daily   • apixaban (ELIQUIS) 2.5 mg, oral, 2 times daily   • ascorbic acid (Vitamin C) 500 mg tablet 1 tablet, Daily   • atorvastatin (LIPITOR) 20 mg, oral, Daily   • calcium citrate-vitamin D3 (Citracal+D) 315 mg-5 mcg (200 unit) tablet 1 tablet, Daily   • carvedilol (COREG) 12.5 mg, oral, 2 times daily   • cholecalciferol (VITAMIN D-3) 25 mcg, Daily   • esomeprazole (NexIUM) 20 mg DR capsule 1 capsule, Daily   • ferrous sulfate 325 (65 Fe) MG tablet 1 tablet, Daily   • furosemide (LASIX) 20 mg, oral, Daily   • gabapentin (NEURONTIN) 300 mg, Daily   • HYDROcodone-acetaminophen (Norco) 5-325 mg tablet 1 tablet, Every 4 hours PRN   • levothyroxine (Synthroid, Levoxyl) 75 mcg tablet 1 tablet, Daily   • PARoxetine (Paxil) 20 mg tablet 1 tablet, Daily   • zolpidem (Ambien) 10 mg tablet 1 tablet, Nightly PRN          No Known Allergies          LABS:    Lab Results   Component Value Date    TSH 0.59 06/12/2019            In June 2024 total cholesterol was 158 HDL 52 triglycerides 116 LDL 23 total cholesterol to HDL ratio 3.0.  Liver enzymes normal           Patient Active Problem List    Diagnosis Date Noted   • BMI 27.0-27.9,adult 10/25/2024   • Never smoked cigarettes " 08/12/2024   • Long term current use of anticoagulant therapy 08/12/2024   • High risk medication use 08/12/2024   • At maximum risk for fall 08/12/2024   • Sinus bradycardia 08/12/2024   • Medication course changed 08/12/2024   • Aortic stenosis, moderate 11/09/2023   • Benign essential hypertension 11/09/2023   • Cardiomyopathy 11/09/2023   • Chronic kidney disease 11/09/2023   • Diabetes mellitus (Multi) 11/09/2023   • Hyperlipidemia 11/09/2023   • Paroxysmal atrial fibrillation (Multi) 11/09/2023                 Assessment:    1. High risk medication use  Referral to Cardiac Electrophysiology      2. Paroxysmal atrial fibrillation (Multi)  ECG 12 Lead    apixaban (Eliquis) 2.5 mg tablet    Referral to Cardiac Electrophysiology    Follow Up In Cardiology      3. Hyperlipidemia, unspecified hyperlipidemia type  atorvastatin (Lipitor) 20 mg tablet      4. Benign essential hypertension  amLODIPine (Norvasc) 5 mg tablet    carvedilol (Coreg) 25 mg tablet    furosemide (Lasix) 20 mg tablet      5. Aortic stenosis, moderate        6. Long term current use of anticoagulant therapy        7. Type 2 diabetes mellitus with other specified complication, unspecified whether long term insulin use (Multi)        8. Stage 3b chronic kidney disease (Multi)        9. At maximum risk for fall        10. BMI 27.0-27.9,adult        11. Never smoked cigarettes        12. At high risk for injury related to fall        Clinical decision making:  High risk sedentary patient with history of multiple falls, [id number] score of  6, family very concerned about potential side effects of amiodarone, although currently patient does not have amiodarone toxicity.  Options are to reduce the dose of amiodarone 200 mg daily, or even every other day, understanding that atrial fibrillation may recur at the lower dose.  Currently she has been off amiodarone for a few weeks.  No clinical recurrence of atrial fibrillation at this time.    Discussed  the rationale for choosing amiodarone as her antiarrhythmic agent.    I do not see too many other choices for her with respect to antiarrhythmic therapy    She may benefit from electrophysiology input.  Consult placed, family in agreement.    Fall precautions were reiterated.    Follow up : 6 months        Provider Attestation - Scribe documentation    All medical record entries made by the Scribe were at my direction and personally dictated by me. I have reviewed the chart and agree that the record accurately reflects my personal performance of the history, physical exam, discussion and plan.   Scribe Attestation  By signing my name below, I, Veronica PINEDA LPN  , [employer]   attest that this documentation has been prepared under the direction and in the presence of Love Moreno MD.

## 2024-10-25 NOTE — TELEPHONE ENCOUNTER
Dr. Love Moreno MD is referring this patient over to Dr Ortiz. Can someone please reach out to schedule? Referral Order is in the chart.    You might have better luck contacting the daughter Stefanie at [phone]

## 2024-10-25 NOTE — PATIENT INSTRUCTIONS
Please bring all medicines, vitamins, and herbal supplements with you when you come to the office.    Prescriptions will not be filled unless you are compliant with your follow up appointments or have a follow up appointment scheduled as per instruction of your physician. Refills should be requested at the time of your visit.     Fall Prevention Education Given   BMI was above normal measurement. Current weight: 78.5 kg (173 lb)  Weight change since last visit (-) denotes wt loss -8 lbs   Weight loss needed to achieve BMI 25: 18.4 Lbs  Weight loss needed to achieve BMI 30: -12.5 Lbs  Provided instructions on dietary changes.

## 2024-10-25 NOTE — TELEPHONE ENCOUNTER
Called to schedule new pt appointment with  per the referral received and did not get an answer when I called the patient. Tried calling the daughter as well per message received with referral and did not get an answer. I left voicemail with the daughter informing her we had referral or her mother to see  and we were calling to schedule. I left the call back number in the voicemail asking her to call back and schedule.

## 2024-10-25 NOTE — PROGRESS NOTES
Expand All Collapse All    This is a patient with atrial fibrillation currently in normal sinus rhythm with at least moderate calcific aortic stenosis with a dimensionless aortic valve index of 0.3, [id number] score of 5, high risk for falls, high risk for injury related to fall, who is maintaining sinus rhythm on high risk medication amiodarone, surveillance per protocol. At this time no obvious evidence of amiodarone toxicity.  Accompanied by , patient has a sedentary lifestyle, and uses walker for ambulatory aid.  Has not had any major falls in the last 6 months.    Last seen 8/2024 :    Subjective :   Accompanied by daughter from [place].  She is very involved in her care.  Had blurred vision, saw ophthalmology, reportedly a vessel had ruptured, did not think it was amiodarone related.  Multiple family members and daughter requesting the use of amiodarone with its side effects.  I explained to them that she has so many comorbidities and stage IV kidney disease that her choice of antiarrhythmic therapy is very limited.  She has not been taking amiodarone now for several weeks.  She has not noticed any palpitations.  She has not fallen.  No bleeding diathesis.  Blood pressure is elevated at 162/74.  Has fatigue.  Activity level is limited.  Has dysphagia.  Food gets stuck in the middle of the chest.  No nausea or vomiting.  Persistent fatigue.  Limited activity level  High fall risk  12 point review of systems otherwise negative or noncontributory.    History so Far :  1. Persistent atrial fibrillation-previously on  amiodarone, maintaining sinus rhythm  2. No evidence of amiodarone toxicity  3. Chronic kidney disease stage IIIb/4.  4. Diabetes mellitus with hypoglycemia  5. Carotid bruit nonflow-limiting disease  6. Mild to moderate calcific aortic stenosis. Valve area estimated at 1.3 cmÂ², dimensionless index reported at 0.3, peak and mean gradients 46 and 27 respectively. Compared to the echo  "report from October 2018, LVEF improved from 30% to 65%. Previously noted mitral regurgitation resolved  7.  Fall risk  8  Personal history of COVID May 2024  9.  Diuretics to be addressed by nephrology     10.Extensive discussion about Watchman device:  she has had multiple rib fractures from falling, most of the falls are mechanical,   Very hesitant to proceed with Watchman device.  Procedure risk benefits alternatives discussed.  Very hesitant to go off anticoagulation.  She understands her stroke risk, and says she will not quit blood thinners.    11.  Pulmonary function test January 2024-evidence of moderately severe obstruction and mild restriction without clear air trapping moderately reduced diffusion capacity which normalizes when adjusted for alveolar volume and could represent ventilation/perfusion mismatch as the primary reason.  No significant change compared to PFTs of July 2023.  No clear evidence to suggest a trend of declining diffusion capacity dating back to October 2021.    Objective   Wt Readings from Last 3 Encounters:   10/25/24 78.5 kg (173 lb)   08/12/24 82.1 kg (181 lb)   01/08/24 81.6 kg (180 lb)            Vitals:    10/25/24 1019 10/25/24 1023 10/25/24 1048   BP: 162/84 160/78 162/74   BP Location: Left arm Right arm Right arm   Patient Position: Sitting Sitting Sitting   Pulse: 64     Weight: 78.5 kg (173 lb)     Height: 1.676 m (5' 6\")                  Physical Exam:  Examined in the wheelchair.  GENERAL APPEARANCE: in no acute distress.  CHEST: Symmetric and non-tender.  INTEGUMENT: Skin warm and dry  HEENT: No gross abnormalities identified.No pallor or scleral icterus.  NECK: Supple, no JVD, no bruit.   NEURO/PSHCY: Alert and oriented x3; appropriate behavior and responses and responses  LUNGS: Clear to auscultation bilaterally; normal respiratory effort.  HEART: Rate and rhythm regular with grade 1/6 to 2/6 crescendo decrescendo murmur along the left sternal border; no gallop " appreciated.   ABDOMEN: Soft, non tender.  EXTREMITIES: Warm  There is no edema noted.    Meds:  Current Outpatient Medications   Medication Instructions    amLODIPine (NORVASC) 5 mg, oral, Daily    apixaban (ELIQUIS) 2.5 mg, oral, 2 times daily    ascorbic acid (Vitamin C) 500 mg tablet 1 tablet, Daily    atorvastatin (LIPITOR) 20 mg, oral, Daily    calcium citrate-vitamin D3 (Citracal+D) 315 mg-5 mcg (200 unit) tablet 1 tablet, Daily    carvedilol (COREG) 12.5 mg, oral, 2 times daily    cholecalciferol (VITAMIN D-3) 25 mcg, Daily    esomeprazole (NexIUM) 20 mg DR capsule 1 capsule, Daily    ferrous sulfate 325 (65 Fe) MG tablet 1 tablet, Daily    furosemide (LASIX) 20 mg, oral, Daily    gabapentin (NEURONTIN) 300 mg, Daily    HYDROcodone-acetaminophen (Norco) 5-325 mg tablet 1 tablet, Every 4 hours PRN    levothyroxine (Synthroid, Levoxyl) 75 mcg tablet 1 tablet, Daily    PARoxetine (Paxil) 20 mg tablet 1 tablet, Daily    zolpidem (Ambien) 10 mg tablet 1 tablet, Nightly PRN          No Known Allergies          LABS:    Lab Results   Component Value Date    TSH 0.59 06/12/2019            In June 2024 total cholesterol was 158 HDL 52 triglycerides 116 LDL 23 total cholesterol to HDL ratio 3.0.  Liver enzymes normal           Patient Active Problem List    Diagnosis Date Noted    BMI 27.0-27.9,adult 10/25/2024    Never smoked cigarettes 08/12/2024    Long term current use of anticoagulant therapy 08/12/2024    High risk medication use 08/12/2024    At maximum risk for fall 08/12/2024    Sinus bradycardia 08/12/2024    Medication course changed 08/12/2024    Aortic stenosis, moderate 11/09/2023    Benign essential hypertension 11/09/2023    Cardiomyopathy 11/09/2023    Chronic kidney disease 11/09/2023    Diabetes mellitus (Multi) 11/09/2023    Hyperlipidemia 11/09/2023    Paroxysmal atrial fibrillation (Multi) 11/09/2023                 Assessment:    1. High risk medication use  Referral to Cardiac Electrophysiology       2. Paroxysmal atrial fibrillation (Multi)  ECG 12 Lead    apixaban (Eliquis) 2.5 mg tablet    Referral to Cardiac Electrophysiology    Follow Up In Cardiology      3. Hyperlipidemia, unspecified hyperlipidemia type  atorvastatin (Lipitor) 20 mg tablet      4. Benign essential hypertension  amLODIPine (Norvasc) 5 mg tablet    carvedilol (Coreg) 25 mg tablet    furosemide (Lasix) 20 mg tablet      5. Aortic stenosis, moderate        6. Long term current use of anticoagulant therapy        7. Type 2 diabetes mellitus with other specified complication, unspecified whether long term insulin use (Multi)        8. Stage 3b chronic kidney disease (Multi)        9. At maximum risk for fall        10. BMI 27.0-27.9,adult        11. Never smoked cigarettes        12. At high risk for injury related to fall        Clinical decision making:  High risk sedentary patient with history of multiple falls, [id number] score of  6, family very concerned about potential side effects of amiodarone, although currently patient does not have amiodarone toxicity.  Options are to reduce the dose of amiodarone 200 mg daily, or even every other day, understanding that atrial fibrillation may recur at the lower dose.  Currently she has been off amiodarone for a few weeks.  No clinical recurrence of atrial fibrillation at this time.    Discussed the rationale for choosing amiodarone as her antiarrhythmic agent.    I do not see too many other choices for her with respect to antiarrhythmic therapy    She may benefit from electrophysiology input.  Consult placed, family in agreement.    Fall precautions were reiterated.    Follow up : 6 months        Provider Attestation - Scribe documentation    All medical record entries made by the Scribe were at my direction and personally dictated by me. I have reviewed the chart and agree that the record accurately reflects my personal performance of the history, physical exam, discussion and plan.    Scribe Attestation  By signing my name below, I, Veronica PINEDA LPN  , Scribe   attest that this documentation has been prepared under the direction and in the presence of Love Moreno MD.

## 2024-12-03 ENCOUNTER — APPOINTMENT (OUTPATIENT)
Dept: CARDIOLOGY | Facility: CLINIC | Age: 84
End: 2024-12-03
Payer: MEDICARE

## 2024-12-18 ENCOUNTER — OFFICE VISIT (OUTPATIENT)
Dept: CARDIOLOGY | Facility: CLINIC | Age: 84
End: 2024-12-18
Payer: MEDICARE

## 2024-12-18 VITALS
SYSTOLIC BLOOD PRESSURE: 159 MMHG | OXYGEN SATURATION: 98 % | BODY MASS INDEX: 27.99 KG/M2 | TEMPERATURE: 96.4 F | HEIGHT: 65 IN | HEART RATE: 73 BPM | WEIGHT: 168 LBS | DIASTOLIC BLOOD PRESSURE: 77 MMHG

## 2024-12-18 DIAGNOSIS — Z79.899 HIGH RISK MEDICATION USE: ICD-10-CM

## 2024-12-18 DIAGNOSIS — I48.0 PAROXYSMAL ATRIAL FIBRILLATION (MULTI): ICD-10-CM

## 2024-12-18 PROCEDURE — 3077F SYST BP >= 140 MM HG: CPT | Performed by: INTERNAL MEDICINE

## 2024-12-18 PROCEDURE — 99215 OFFICE O/P EST HI 40 MIN: CPT | Mod: 25 | Performed by: INTERNAL MEDICINE

## 2024-12-18 PROCEDURE — 1159F MED LIST DOCD IN RCRD: CPT | Performed by: INTERNAL MEDICINE

## 2024-12-18 PROCEDURE — 1036F TOBACCO NON-USER: CPT | Performed by: INTERNAL MEDICINE

## 2024-12-18 PROCEDURE — 93010 ELECTROCARDIOGRAM REPORT: CPT | Performed by: INTERNAL MEDICINE

## 2024-12-18 PROCEDURE — 93005 ELECTROCARDIOGRAM TRACING: CPT | Performed by: INTERNAL MEDICINE

## 2024-12-18 PROCEDURE — 99205 OFFICE O/P NEW HI 60 MIN: CPT | Performed by: INTERNAL MEDICINE

## 2024-12-18 PROCEDURE — 3078F DIAST BP <80 MM HG: CPT | Performed by: INTERNAL MEDICINE

## 2024-12-18 PROCEDURE — G2211 COMPLEX E/M VISIT ADD ON: HCPCS | Performed by: INTERNAL MEDICINE

## 2024-12-18 ASSESSMENT — ENCOUNTER SYMPTOMS
LOSS OF SENSATION IN FEET: 1
DEPRESSION: 1
OCCASIONAL FEELINGS OF UNSTEADINESS: 1

## 2024-12-18 ASSESSMENT — PATIENT HEALTH QUESTIONNAIRE - PHQ9
1. LITTLE INTEREST OR PLEASURE IN DOING THINGS: NOT AT ALL
SUM OF ALL RESPONSES TO PHQ9 QUESTIONS 1 AND 2: 0
2. FEELING DOWN, DEPRESSED OR HOPELESS: NOT AT ALL

## 2024-12-18 NOTE — PROGRESS NOTES
Referring Provider: Love Moreno MD  Reason for Consult: Atrial fibrillation    History of Present Illness:      Radha Torres is a 84 y.o. year old female patient with a history significant for persistent atrial fibrillation, tachycardia induced cardiomyopathy s/p recovery, moderate aortic stenosis, type 2 diabetes, CKD stage IIIb, and frequent falls who is referred by Dr. Moreno for A-fib management.    She was originally diagnosed with atrial fibrillation in 2018, when she presented with congestive heart failure in the setting of atrial fibrillation with rapid ventricular response.  Her ejection fraction was depressed in the 25 to 30% range.  She was rhythm controlled with amiodarone and eventually had recovery of her ejection fraction.  She then remained on amiodarone.  While on amiodarone, she developed multiple side effects including ocular issues, thyroid issues, and a host of other issues.  Recently in September she stopped amiodarone, and states she feels much better off medication.  She has not had recurrent atrial fibrillation since stopping the amiodarone.    In terms of her symptoms, she denies palpitations, chest pain, lightheadedness, dizziness.  She does have shortness of breath with exertion.  She is accompanied by her daughter and .  They note that she has frequent falls.  Left atrial appendage occlusion has been brought up to her but she has declined.    Focused Cardiovascular Problem List:  Persistent atrial fibrillation: On apixaban and carvedilol. [id number] = 5.   Previous tachcyardia induced cardiomyopathy, s/p recovery  Type 2 DM  Moderate aortic stenosis  Hypertension  CKD stage IIIB  Frequent falls  Moderately severe obstruction and mild restriction on PFTs      Past Medical and Surgical History:  Ms. Torres  has no past medical history on file.    has a past surgical history that includes Other surgical history (09/13/2021); Other surgical history (09/13/2021); Other  "surgical history (09/13/2021); Other surgical history (09/13/2021); and Other surgical history (09/13/2021).    Social History:  Social History     Tobacco Use    Smoking status: Never    Smokeless tobacco: Never   Substance Use Topics    Alcohol use: Never      Tobacco: Denies  Alcohol: Denies  Drug use:  Denies      Relevant Family History:   Family History   Problem Relation Name Age of Onset    No Known Problems Mother      No Known Problems Father          Allergies:  No Known Allergies     Medications:  Current Outpatient Medications   Medication Instructions    amLODIPine (NORVASC) 5 mg, oral, Daily    apixaban (ELIQUIS) 2.5 mg, oral, 2 times daily    ascorbic acid (Vitamin C) 500 mg tablet 1 tablet, Daily    atorvastatin (LIPITOR) 20 mg, oral, Daily    calcium citrate-vitamin D3 (Citracal+D) 315 mg-5 mcg (200 unit) tablet 1 tablet, Daily    carvedilol (COREG) 12.5 mg, oral, 2 times daily    cholecalciferol (VITAMIN D-3) 25 mcg, Daily    esomeprazole (NexIUM) 20 mg DR capsule 1 capsule, Daily    ferrous sulfate 325 (65 Fe) MG tablet 1 tablet, Daily    furosemide (LASIX) 20 mg, oral, Daily    gabapentin (NEURONTIN) 300 mg, Daily    HYDROcodone-acetaminophen (Norco) 5-325 mg tablet 1 tablet, Every 4 hours PRN    levothyroxine (Synthroid, Levoxyl) 75 mcg tablet 1 tablet, Daily    PARoxetine (Paxil) 20 mg tablet 1 tablet, Daily    zolpidem (Ambien) 10 mg tablet 1 tablet, Nightly PRN         Objective   Physical Exam:  Last Recorded Vitals:      8/7/2023     1:43 PM 1/8/2024     2:02 PM 8/12/2024    10:31 AM 10/25/2024    10:19 AM 10/25/2024    10:23 AM 10/25/2024    10:48 AM 12/18/2024    11:11 AM   Vitals   Systolic 160 138 154 162 160 162 159   Diastolic 70 70 62 84 78 74 77   BP Location  Left arm Left arm Left arm Right arm Right arm    Heart Rate 61 50 59 64   73   Temp       35.8 °C (96.4 °F)   Height 1.676 m (5' 6\") 1.676 m (5' 6\") 1.676 m (5' 6\") 1.676 m (5' 6\")   1.651 m (5' 5\")   Weight (lb) 178 180 181 " "173   168   BMI 28.73 kg/m2 29.05 kg/m2 29.21 kg/m2 27.92 kg/m2   27.96 kg/m2   BSA (m2) 1.94 m2 1.95 m2 1.96 m2 1.91 m2   1.87 m2   Visit Report  Report Report Report Report Report Report    Visit Vitals  /77   Pulse 73   Temp 35.8 °C (96.4 °F)   Ht 1.651 m (5' 5\")   Wt 76.2 kg (168 lb)   SpO2 98%   BMI 27.96 kg/m²   Smoking Status Never   BSA 1.87 m²      Gen: NAD, sitting comfortably  HEENT: NC/AT  Card: RRR, 3 out of 6 systolic ejection murmur  Pulm: Clear to auscultation bilaterally  Ext: No LE edema  Neuro: No focal deficits    Diagnostic Results      My Interpretation of Reviewed Study(s):  Prior ECGs (reviewed and my interpretation):   12/18/2024: Normal sinus rhythm, possible left atrial enlargement, LVH, heart rate 65 bpm    Echocardiography:  12/10/2022   1. Left ventricular systolic function is normal with a 65% estimated ejection fraction.   2. Mild concentric left ventricular hypertrophy.   3. Spectral Doppler shows an impaired relaxation pattern of left ventricular diastolic filling.   4. There is an elevated mean left atrial pressure.   5. Peak velocity across the aortic valve measured 340 cm/s corresponding to a peak pressure gradient 46 mmHg and a mean pressure gradient 27 mmHg. The aortic valve area measured 1.2 cm2 consistent with moderate aortic stenosis.   6. There is moderate to severe aortic valve cusp calcification.   7. When compared to a study from 10/5/2018 ejection fraction has improved from 30% up to 65%. The aortic stenosis has progressed from mild to moderate, the moderate mitral regurgitation reported previously has nearly resolved, The pleural effusion has resolved.      Relevant Labs:  No results found for: \"CREATININE\", \"CCL\", \"K\", \"HGBA1C\", \"HGB\", \"INR\", \"AST\", \"ALT\"    Assessment/Plan   Assessment and Plan:  Radha Torres is a 84 y.o. year old female patient who is referred for management and evaluation of persistent atrial fibrillation.  She has had many side effects to " amiodarone and feels much better off the medication.  I think the remaining off the medication is likely for the best given her intolerance of it and desire to not be on it.    Clearly, she does not tolerate rapid ventricular response well, as evidenced by the fact that she developed tachycardia induced cardiomyopathy in the setting of A-fib with RVR in 2018.  I think she probably has some amiodarone effect still lingering.  She has remained in normal sinus rhythm since coming off the amiodarone.  If atrial fibrillation does recur, I discussed the possible treatment options with the family.  1 option would be to move forward with catheter ablation for her atrial fibrillation.  This could be done in combination with left atrial appendage occlusion which would also address her frequent falls and risk for intracranial hemorrhage.  However, the patient is not interested in any invasive therapy.    Since catheter ablation is not an option per the patient, I think waiting until she has recurrence of atrial fibrillation would be the best step.  She is already on a decent dose of Coreg.  Hopefully this will be enough to rate control her and avoid any tachycardia induced cardiomyopathy.  We can always go up on the rate control agents if needed.  If rate control agents do not suffice, then AV node ablation and pacemaker placement could always be considered.    Recommendations:  # Persistent atrial fibrillation  -Patient declined catheter ablation and concomitant LAAO  - Continue apixaban 2.5 mg twice daily  - Continue Coreg 12.5 mg twice daily  - Continue off amiodarone  - Repeat TTE, last 1 was in 2022, does have a significant murmur of aortic stenosis  - Follow-up in 4 months.  If recurrent atrial fibrillation would uptitrate rate control agents, and could always consider AV node ablation plus pacemaker         Return to Clinic: Patient should return to the EP Clinic 4 months     Thank you very much for allowing me to  participate in the care of this patient. Please do not hesitate to contact me with any further questions or concerns.    Marija Ortiz MD  Clinical Cardiac Electrophysiologist, [facility] [place]    of Medicine, [facility] [employer]  Director of Atrial Fibrillation Ablation, [facility]  Director of Ventricular Arrhythmias Research, [facility]  Office Phone Number: [phone]

## 2025-01-09 ENCOUNTER — APPOINTMENT (OUTPATIENT)
Dept: CARDIOLOGY | Facility: CLINIC | Age: 85
End: 2025-01-09
Payer: MEDICARE

## 2025-01-17 PROCEDURE — 93306 TTE W/DOPPLER COMPLETE: CPT | Performed by: INTERNAL MEDICINE

## 2025-02-03 DIAGNOSIS — I10 BENIGN ESSENTIAL HYPERTENSION: ICD-10-CM

## 2025-02-04 RX ORDER — AMLODIPINE BESYLATE 5 MG/1
5 TABLET ORAL DAILY
Qty: 60 TABLET | Refills: 0 | Status: SHIPPED | OUTPATIENT
Start: 2025-02-04 | End: 2025-04-05

## 2025-03-10 DIAGNOSIS — I10 BENIGN ESSENTIAL HYPERTENSION: ICD-10-CM

## 2025-03-12 RX ORDER — AMLODIPINE BESYLATE 5 MG/1
5 TABLET ORAL DAILY
Qty: 90 TABLET | Refills: 0 | Status: SHIPPED | OUTPATIENT
Start: 2025-03-12 | End: 2025-06-10

## 2025-03-26 ENCOUNTER — TELEPHONE (OUTPATIENT)
Dept: CARDIOLOGY | Facility: CLINIC | Age: 85
End: 2025-03-26
Payer: MEDICARE

## 2025-03-26 LAB
NON-UH HIE ANION GAP: 15 MEQ/L (ref 6–15)
NON-UH HIE BASOPHILS # (AUTO): 0 10*3/UL (ref 0–0.2)
NON-UH HIE BASOPHILS % (AUTO): 0.4 %
NON-UH HIE BLOOD UREA NITROGEN: 14 MG/DL (ref 7–25)
NON-UH HIE CALCIUM: 9.5 MG/DL (ref 8.6–10.3)
NON-UH HIE CARBON DIOXIDE: 25.9 MMOL/L (ref 21–31)
NON-UH HIE CHLORIDE: 94 MMOL/L (ref 98–107)
NON-UH HIE CREATININE: 1.51 MG/DL (ref 0.6–1.2)
NON-UH HIE EOSINOPHILS # (AUTO): 0.1 10*3/UL (ref 0–0.45)
NON-UH HIE EOSINOPHILS % (AUTO): 1 %
NON-UH HIE ESTIMATED GFR: 34.09 ML/MIN
NON-UH HIE GLUCOSE: 145 MG/DL (ref 70–100)
NON-UH HIE HEMATOCRIT: 32.9 % (ref 34–46.4)
NON-UH HIE HEMOGLOBIN: 10.9 G/DL (ref 11.8–15.4)
NON-UH HIE LYMPHOCYTES # (AUTO): 2.3 10*3/UL (ref 1–4.8)
NON-UH HIE LYMPHOCYTES % (AUTO): 19.3 %
NON-UH HIE MEAN CORPUSCULAR HEMOGLOBIN: 28.8 PG (ref 24.7–34.3)
NON-UH HIE MEAN CORPUSCULAR HGB CONC: 33.1 G/DL (ref 32–35)
NON-UH HIE MEAN CORPUSCULAR VOLUME: 86.9 FL (ref 80–100)
NON-UH HIE MEAN PLATELET VOLUME: 8.2 FL (ref 6.3–10.7)
NON-UH HIE MONOCYTES # (AUTO): 0.8 10*3/UL (ref 0–0.8)
NON-UH HIE MONOCYTES % (AUTO): 6.7 %
NON-UH HIE NEUTROPHILS # (AUTO): 8.7 10*3/UL (ref 1.8–7.7)
NON-UH HIE NEUTROPHILS % (AUTO): 72.6 %
NON-UH HIE NRBC%: 0 /100{WBC} (ref 0–0.5)
NON-UH HIE PLATELET COUNT: 395 10*3/UL (ref 150–450)
NON-UH HIE POTASSIUM: 3.9 MMOL/L (ref 3.5–5.1)
NON-UH HIE RED BLOOD COUNT: 3.78 10*6/UL (ref 3.6–5)
NON-UH HIE RED CELL DISTRIBUTION WIDTH: 14.5 % (ref 11.9–15.3)
NON-UH HIE SODIUM: 131 MMOL/L (ref 136–145)
NON-UH HIE UNCORRECTED WBC: 12 10*3/UL (ref 3.8–11.6)
NON-UH HIE WHITE BLOOD COUNT: 12 10*3/UL (ref 3.8–11.6)

## 2025-03-26 NOTE — TELEPHONE ENCOUNTER
Result Communication    Resulted Orders   [facility] Basic Metabolic Panel   Result Value Ref Range    [facility] Glucose 145 (H) 70 - 100 mg/dL      Comment:         Random Glucose Reference Range is dependent on time and   content of last meal. Glucose of more than 200 mg/dL in a   nonstressed, ambulatory subject supports the diagnosis of   Diabetes Mellitus.   ADA recommended reference range    [facility] Blood Urea Nitrogen 14 7 - 25 mg/dL    [facility] Creatinine 1.51 (H) 0.60 - 1.20 mg/dL    [facility] ESTIMATED GFR 34.090 mL/Min    [facility] Sodium 131 (L) 136 - 145 mmol/L    [facility] Potassium 3.9 3.5 - 5.1 mmol/L    [facility] Chloride 94 (L) 98 - 107 mmol/L    [facility] Carbon Dioxide 25.9 21.0 - 31.0 mmol/L    [facility] Anion Gap 15.0 6.0 - 15.0 meq/L    [facility] Calcium 9.5 8.6 - 10.3 mg/dL      Comment:      PERFORMED BY:[facility] [id number] [address] [place], [place] [id number] MEDICAL DIRECTORMICHELE BARNES M.D.   [facility] Complete Blood Count Auto Diff   Result Value Ref Range    [facility] White Blood Count 12.0 (H) 3.8 - 11.6 10*3/uL    [facility] Uncorrected WBC 12.0 (H) 3.8 - 11.6 10*3/uL    [facility] Red Blood Count 3.78 3.60 - 5.00 10*6/uL    [facility] Hemoglobin 10.9 (L) 11.8 - 15.4 g/dL    [facility] Hematocrit 32.9 (L) 34.0 - 46.4 %    [facility] Mean Corpuscular Volume 86.9 80 - 100 fL    [facility] Mean Corpuscular Hemoglobin 28.8 24.7 - 34.3 pg    [facility] Mean Corpuscular HGB Conc 33.1 32.0 - 35.0 g/dL    [facility] Red Cell Distribution Width 14.5 11.9 - 15.3 %    [facility] Platelet Count 395 150 - 450 10*3/uL    [facility] Mean Platelet Volume 8.2 6.3 - 10.7 fL    [facility] Neutrophils % (Auto) 72.6 . %    [facility] Lymphocytes % (Auto) 19.3 . %    [facility] Monocytes % (Auto) 6.7 . %    [facility] Eosinophils % (Auto) 1.0 . %    [facility] Basophils % (Auto) 0.4 . %    [facility] NRBC% 0.0 0 - 0.5 /100[WBC]    [facility]  Neutrophils # (Auto) 8.7 (H) 1.8 - 7.7 10*3/uL    [facility] Lymphocytes # (Auto) 2.3 1.00 - 4.8 10*3/uL    [facility] Monocytes # (Auto) 0.8 0.0 - 0.8 10*3/uL    [facility] Eosinophils # (Auto) 0.1 0.0 - 0.45 10*3/uL    [facility] Basophils # (Auto) 0.0 0.0 - 0.2 10*3/uL      Comment:      PERFORMED BY:[facility] [place], [place] [id number] MEDICAL DIRECTORMICHELE BARNES M.D.       2:30 PM      Detailed VM left that these will be discussed at [facility] 3/31/2025.

## 2025-03-31 ENCOUNTER — APPOINTMENT (OUTPATIENT)
Dept: CARDIOLOGY | Facility: CLINIC | Age: 85
End: 2025-03-31
Payer: MEDICARE

## 2025-03-31 VITALS
HEIGHT: 65 IN | DIASTOLIC BLOOD PRESSURE: 60 MMHG | SYSTOLIC BLOOD PRESSURE: 140 MMHG | WEIGHT: 171 LBS | BODY MASS INDEX: 28.49 KG/M2 | HEART RATE: 60 BPM

## 2025-03-31 DIAGNOSIS — Z79.899 MEDICATION COURSE CHANGED: ICD-10-CM

## 2025-03-31 DIAGNOSIS — Z91.81 AT MAXIMUM RISK FOR FALL: ICD-10-CM

## 2025-03-31 DIAGNOSIS — I35.0 AORTIC STENOSIS, SEVERE: ICD-10-CM

## 2025-03-31 DIAGNOSIS — N18.32 STAGE 3B CHRONIC KIDNEY DISEASE (MULTI): ICD-10-CM

## 2025-03-31 DIAGNOSIS — I10 BENIGN ESSENTIAL HYPERTENSION: ICD-10-CM

## 2025-03-31 DIAGNOSIS — E78.5 HYPERLIPIDEMIA, UNSPECIFIED HYPERLIPIDEMIA TYPE: ICD-10-CM

## 2025-03-31 DIAGNOSIS — Z79.01 LONG TERM CURRENT USE OF ANTICOAGULANT THERAPY: ICD-10-CM

## 2025-03-31 DIAGNOSIS — Z91.81 AT HIGH RISK FOR INJURY RELATED TO FALL: Primary | ICD-10-CM

## 2025-03-31 DIAGNOSIS — E11.69 TYPE 2 DIABETES MELLITUS WITH OTHER SPECIFIED COMPLICATION, UNSPECIFIED WHETHER LONG TERM INSULIN USE (MULTI): ICD-10-CM

## 2025-03-31 DIAGNOSIS — I48.0 PAROXYSMAL ATRIAL FIBRILLATION (MULTI): ICD-10-CM

## 2025-03-31 DIAGNOSIS — Z78.9 NEVER SMOKED CIGARETTES: ICD-10-CM

## 2025-03-31 PROBLEM — T46.2X5A ADVERSE EFFECT OF AMIODARONE: Status: RESOLVED | Noted: 2025-03-31 | Resolved: 2025-03-31

## 2025-03-31 PROBLEM — G62.0: Status: ACTIVE | Noted: 2025-03-31

## 2025-03-31 PROBLEM — T46.2X5A ADVERSE EFFECT OF AMIODARONE: Status: ACTIVE | Noted: 2025-03-31

## 2025-03-31 PROBLEM — N18.9 CHRONIC KIDNEY DISEASE: Status: RESOLVED | Noted: 2023-11-09 | Resolved: 2025-03-31

## 2025-03-31 PROBLEM — T46.2X5A: Status: ACTIVE | Noted: 2025-03-31

## 2025-03-31 PROCEDURE — 1036F TOBACCO NON-USER: CPT | Performed by: INTERNAL MEDICINE

## 2025-03-31 PROCEDURE — 3078F DIAST BP <80 MM HG: CPT | Performed by: INTERNAL MEDICINE

## 2025-03-31 PROCEDURE — 99214 OFFICE O/P EST MOD 30 MIN: CPT | Performed by: INTERNAL MEDICINE

## 2025-03-31 PROCEDURE — 1159F MED LIST DOCD IN RCRD: CPT | Performed by: INTERNAL MEDICINE

## 2025-03-31 PROCEDURE — 3077F SYST BP >= 140 MM HG: CPT | Performed by: INTERNAL MEDICINE

## 2025-03-31 PROCEDURE — 1160F RVW MEDS BY RX/DR IN RCRD: CPT | Performed by: INTERNAL MEDICINE

## 2025-03-31 PROCEDURE — 93000 ELECTROCARDIOGRAM COMPLETE: CPT | Performed by: INTERNAL MEDICINE

## 2025-03-31 RX ORDER — ATORVASTATIN CALCIUM 20 MG/1
20 TABLET, FILM COATED ORAL DAILY
Qty: 90 TABLET | Refills: 1 | Status: SHIPPED | OUTPATIENT
Start: 2025-03-31 | End: 2026-03-31

## 2025-03-31 RX ORDER — METOPROLOL TARTRATE 50 MG/1
50 TABLET ORAL 2 TIMES DAILY
Qty: 180 TABLET | Refills: 1 | Status: SHIPPED | OUTPATIENT
Start: 2025-03-31 | End: 2026-03-31

## 2025-03-31 RX ORDER — AMLODIPINE BESYLATE 2.5 MG/1
2.5 TABLET ORAL DAILY
Qty: 90 TABLET | Refills: 1 | Status: SHIPPED | OUTPATIENT
Start: 2025-03-31 | End: 2026-03-31

## 2025-03-31 RX ORDER — FUROSEMIDE 20 MG/1
20 TABLET ORAL DAILY
Qty: 90 TABLET | Refills: 1 | Status: SHIPPED | OUTPATIENT
Start: 2025-03-31 | End: 2026-03-31

## 2025-03-31 NOTE — PROGRESS NOTES
Chief Complaint:    Chief Complaint   Patient presents with    Follow-up     4 month Follow up for Atrial Fibrillation     Reviewed EP recommendations, reviewed recent echo.  We discussed fall precaution and Watchman device  This is a patient with atrial fibrillation currently in normal sinus rhythm with at least moderate calcific aortic stenosis with a dimensionless aortic valve index of 0.3, [id number] score of 5, high risk for falls, high risk for injury related to fall, who is maintaining sinus rhythm on high risk medication amiodarone, surveillance per protocol. At this time no obvious evidence of amiodarone toxicity.  Accompanied by , patient has a sedentary lifestyle, and uses walker for ambulatory aid.  Has not had any major falls in the last 6 months.    Last seen 10/2024 :    Patient has seen Dr. Ortiz on 12/18/2024 and I have reviewed his notes.  Patient remains off amiodarone at this time.  Options were to reassess when atrial fibrillation recurs, and based on heart rate control and symptoms may need AV emiliano ablation and permanent pacemaker.  Patient very reluctant to have any type of invasive procedures.  She is very afraid of complications.      Subjective :   Accompanied by  to the office  Review of Systems interval review of systems is negative for chest discomfort pressure tightness heaviness palpitations lightheadedness orthopnea paroxysmal nocturnal dyspnea dependent edema or claudication TIA or CVA type symptoms or bleeding diathesis    Continues to remain at fall risk.  Since last visit had another fall, slipped out of bed, hit chin, there was excessive bruising    History so Far :    1. Persistent atrial fibrillation-previously on  amiodarone, maintaining sinus rhythm  2. No evidence of amiodarone toxicity  3. Chronic kidney disease stage IIIb/4.  4. Diabetes mellitus with hypoglycemia  5. Carotid bruit nonflow-limiting disease  6. Mild to moderate calcific aortic  stenosis. Valve area estimated at 1.3 cmÂ², dimensionless index reported at 0.3, peak and mean gradients 46 and 27 respectively. Compared to the echo report from October 2018, LVEF improved from 30% to 65%. Previously noted mitral regurgitation resolved  Echocardiogram January 2025-LVEF 60 to 65% grade 1 diastolic dysfunction peak velocity across aortic valve 3.68 m/s peak and mean gradients of 54 and 36 respectively aortic valve area 0.98 cm² trace tricuspid regurgitation no mitral regurgitation left atrial diameter 3.8 cm trivial pericardial effusion normal aortic root size RV systolic pressure 24 mmHg aortic valve area is now estimated at 0.8 cm² did not see dimensionless index of the aortic valve reported    7.  Fall risk  8  Personal history of COVID May 2024  9.  Diuretics to be addressed by nephrology     10.Extensive discussion about Watchman device:  she has had multiple rib fractures from falling, most of the falls are mechanical,   Very hesitant to proceed with Watchman device.  Procedure risk benefits alternatives discussed.  Very hesitant to go off anticoagulation.  She understands her stroke risk, and says she will not quit blood thinners.     11.  Pulmonary function test January 2024-evidence of moderately severe obstruction and mild restriction without clear air trapping moderately reduced diffusion capacity which normalizes when adjusted for alveolar volume and could represent ventilation/perfusion mismatch as the primary reason.  No significant change compared to PFTs of July 2023.  No clear evidence to suggest a trend of declining diffusion capacity dating back to October 2021.           Objective   Wt Readings from Last 3 Encounters:   03/31/25 77.6 kg (171 lb)   12/18/24 76.2 kg (168 lb)   10/25/24 78.5 kg (173 lb)        Vitals:    03/31/25 1000 03/31/25 1031   BP: 148/62 140/60   BP Location: Left arm Left arm   Patient Position: Sitting Sitting   Pulse: 60    Weight: 77.6 kg (171 lb)   "  Height: 1.651 m (5' 5\")         Physical Exam:  Examined in the wheelchair.  GENERAL APPEARANCE: in no acute distress.  CHEST: Symmetric and non-tender.  INTEGUMENT: Skin warm and dry  HEENT: No gross abnormalities identified.No pallor or scleral icterus.  NECK: Supple, no JVD, no bruit.   NEURO/PSHCY: Alert and oriented x3; appropriate behavior and responses and responses  LUNGS: Clear to auscultation bilaterally; normal respiratory effort.  HEART: Rate and rhythm regular with grade 1/6 to 2/6 crescendo decrescendo murmur along the left sternal border; no gallop appreciated.   ABDOMEN: Soft, non tender.  EXTREMITIES: Warm  There is no edema noted.  Meds:  Current Outpatient Medications   Medication Instructions    amLODIPine (NORVASC) 2.5 mg, oral, Daily    apixaban (ELIQUIS) 2.5 mg, oral, 2 times daily    ascorbic acid (Vitamin C) 500 mg tablet 1 tablet, Daily    atorvastatin (LIPITOR) 20 mg, oral, Daily    calcium citrate-vitamin D3 (Citracal+D) 315 mg-5 mcg (200 unit) tablet 1 tablet, Daily    cholecalciferol (VITAMIN D-3) 25 mcg, Daily    esomeprazole (NexIUM) 20 mg DR capsule 1 capsule, Daily    ferrous sulfate 325 (65 Fe) MG tablet 1 tablet, Daily    furosemide (LASIX) 20 mg, oral, Daily    gabapentin (NEURONTIN) 300 mg, Daily    HYDROcodone-acetaminophen (Norco) 5-325 mg tablet 1 tablet, Every 4 hours PRN    levothyroxine (Synthroid, Levoxyl) 75 mcg tablet 1 tablet, Daily    metoprolol tartrate (LOPRESSOR) 50 mg, oral, 2 times daily    PARoxetine (Paxil) 20 mg tablet 1 tablet, Daily    zolpidem (Ambien) 10 mg tablet 1 tablet, Nightly PRN          No Known Allergies      LABS: March 2025-hemoglobin 10.9 hematocrit 32.9 platelets 3 95,000 sodium 131 potassium 3.9 BUN 14 creatinine 1.5 GFR 34    Testing Reviewed  Labs      Reviewed all available pertinent laboratory data and diagnostic testing results that occurred after the last office visit with me                Assessment:    1. At high risk for injury " related to fall        2. Paroxysmal atrial fibrillation (Multi)  Follow Up In Cardiology    Follow Up In Cardiology    apixaban (Eliquis) 2.5 mg tablet    metoprolol tartrate (Lopressor) 50 mg tablet    ECG 12 Lead      3. Aortic stenosis, severe        4. At maximum risk for fall        5. Stage 3b chronic kidney disease (Multi)        6. Long term current use of anticoagulant therapy        7. Benign essential hypertension  furosemide (Lasix) 20 mg tablet    amLODIPine (Norvasc) 2.5 mg tablet      8. Hyperlipidemia, unspecified hyperlipidemia type  atorvastatin (Lipitor) 20 mg tablet      9. Type 2 diabetes mellitus with other specified complication, unspecified whether long term insulin use (Multi)        10. BMI 28.0-28.9,adult        11. Never smoked cigarettes        12. Medication course changed             Clinical Decision Making:  Multiple comorbidities as noted above.  There is progression of aortic stenosis, currently moderate to severe.  Continues to fall  Really discussed Watchman device, she has to think about it, most likely she will not do it because she is very afraid of invasive procedures  Echo results reviewed  Has upcoming appointment with EP service, Dr. Ortiz  For now, we will discontinue her carvedilol and switch to metoprolol to tartrate 50 mg p.o. twice daily, will be better for rate control.  Decrease amlodipine to 2.5 mg daily  High risk of decompensation needs close follow-up.    Follow up : 3 months    IVeronica LPN  am scribing for, and in the presence of Dr. Love Moreno MD, FACC.     I, Dr. Love Moreno MD, FACC, personally performed the services described in the documentation as scribed by Veronica PINEDA LPN  in my presence, and confirm it is both accurate and complete.

## 2025-03-31 NOTE — PATIENT INSTRUCTIONS
Please bring all medicines, vitamins, and herbal supplements with you when you come to the office.    Prescriptions will not be filled unless you are compliant with your follow up appointments or have a follow up appointment scheduled as per instruction of your physician. Refills should be requested at the time of your visit.     Fall Prevention Education Given   BMI was above normal measurement. Current weight: 77.6 kg (171 lb)  Weight change since last visit (-) denotes wt loss 3 lbs   Weight loss needed to achieve BMI 25: 21.1 Lbs  Weight loss needed to achieve BMI 30: -8.9 Lbs  Provided instructions on dietary changes  Provided instructions on exercise.

## 2025-03-31 NOTE — LETTER
March 31, 2025     Bubba Guo MD  [address] [place] [id number]    Patient: Radha Torres   YOB: 1940   Date of Visit: 3/31/2025       Dear Dr. Bubba Guo MD:    Thank you for referring Radha Torres to me for evaluation. Below are my notes for this consultation.  If you have questions, please do not hesitate to call me. I look forward to following your patient along with you.       Sincerely,     Love Moreno MD      CC: No Recipients  ______________________________________________________________________________________        Chief Complaint:    Chief Complaint   Patient presents with   • Follow-up     4 month Follow up for Atrial Fibrillation     Reviewed EP recommendations, reviewed recent echo.  We discussed fall precaution and Watchman device  This is a patient with atrial fibrillation currently in normal sinus rhythm with at least moderate calcific aortic stenosis with a dimensionless aortic valve index of 0.3, [id number] score of 5, high risk for falls, high risk for injury related to fall, who is maintaining sinus rhythm on high risk medication amiodarone, surveillance per protocol. At this time no obvious evidence of amiodarone toxicity.  Accompanied by , patient has a sedentary lifestyle, and uses walker for ambulatory aid.  Has not had any major falls in the last 6 months.    Last seen 10/2024 :    Patient has seen Dr. Ortiz on 12/18/2024 and I have reviewed his notes.  Patient remains off amiodarone at this time.  Options were to reassess when atrial fibrillation recurs, and based on heart rate control and symptoms may need AV emiliano ablation and permanent pacemaker.  Patient very reluctant to have any type of invasive procedures.  She is very afraid of complications.      Subjective :   Accompanied by  to the office  Review of Systems interval review of systems is negative for chest discomfort pressure tightness heaviness palpitations lightheadedness  orthopnea paroxysmal nocturnal dyspnea dependent edema or claudication TIA or CVA type symptoms or bleeding diathesis    Continues to remain at fall risk.  Since last visit had another fall, slipped out of bed, hit chin, there was excessive bruising    History so Far :    1. Persistent atrial fibrillation-previously on  amiodarone, maintaining sinus rhythm  2. No evidence of amiodarone toxicity  3. Chronic kidney disease stage IIIb/4.  4. Diabetes mellitus with hypoglycemia  5. Carotid bruit nonflow-limiting disease  6. Mild to moderate calcific aortic stenosis. Valve area estimated at 1.3 cmÂ², dimensionless index reported at 0.3, peak and mean gradients 46 and 27 respectively. Compared to the echo report from October 2018, LVEF improved from 30% to 65%. Previously noted mitral regurgitation resolved  Echocardiogram January 2025-LVEF 60 to 65% grade 1 diastolic dysfunction peak velocity across aortic valve 3.68 m/s peak and mean gradients of 54 and 36 respectively aortic valve area 0.98 cm² trace tricuspid regurgitation no mitral regurgitation left atrial diameter 3.8 cm trivial pericardial effusion normal aortic root size RV systolic pressure 24 mmHg aortic valve area is now estimated at 0.8 cm² did not see dimensionless index of the aortic valve reported    7.  Fall risk  8  Personal history of COVID May 2024  9.  Diuretics to be addressed by nephrology     10.Extensive discussion about Watchman device:  she has had multiple rib fractures from falling, most of the falls are mechanical,   Very hesitant to proceed with Watchman device.  Procedure risk benefits alternatives discussed.  Very hesitant to go off anticoagulation.  She understands her stroke risk, and says she will not quit blood thinners.     11.  Pulmonary function test January 2024-evidence of moderately severe obstruction and mild restriction without clear air trapping moderately reduced diffusion capacity which normalizes when adjusted for alveolar  "volume and could represent ventilation/perfusion mismatch as the primary reason.  No significant change compared to PFTs of July 2023.  No clear evidence to suggest a trend of declining diffusion capacity dating back to October 2021.           Objective   Wt Readings from Last 3 Encounters:   03/31/25 77.6 kg (171 lb)   12/18/24 76.2 kg (168 lb)   10/25/24 78.5 kg (173 lb)        Vitals:    03/31/25 1000 03/31/25 1031   BP: 148/62 140/60   BP Location: Left arm Left arm   Patient Position: Sitting Sitting   Pulse: 60    Weight: 77.6 kg (171 lb)    Height: 1.651 m (5' 5\")         Physical Exam:  Examined in the wheelchair.  GENERAL APPEARANCE: in no acute distress.  CHEST: Symmetric and non-tender.  INTEGUMENT: Skin warm and dry  HEENT: No gross abnormalities identified.No pallor or scleral icterus.  NECK: Supple, no JVD, no bruit.   NEURO/PSHCY: Alert and oriented x3; appropriate behavior and responses and responses  LUNGS: Clear to auscultation bilaterally; normal respiratory effort.  HEART: Rate and rhythm regular with grade 1/6 to 2/6 crescendo decrescendo murmur along the left sternal border; no gallop appreciated.   ABDOMEN: Soft, non tender.  EXTREMITIES: Warm  There is no edema noted.  Meds:  Current Outpatient Medications   Medication Instructions   • amLODIPine (NORVASC) 2.5 mg, oral, Daily   • apixaban (ELIQUIS) 2.5 mg, oral, 2 times daily   • ascorbic acid (Vitamin C) 500 mg tablet 1 tablet, Daily   • atorvastatin (LIPITOR) 20 mg, oral, Daily   • calcium citrate-vitamin D3 (Citracal+D) 315 mg-5 mcg (200 unit) tablet 1 tablet, Daily   • cholecalciferol (VITAMIN D-3) 25 mcg, Daily   • esomeprazole (NexIUM) 20 mg DR capsule 1 capsule, Daily   • ferrous sulfate 325 (65 Fe) MG tablet 1 tablet, Daily   • furosemide (LASIX) 20 mg, oral, Daily   • gabapentin (NEURONTIN) 300 mg, Daily   • HYDROcodone-acetaminophen (Norco) 5-325 mg tablet 1 tablet, Every 4 hours PRN   • levothyroxine (Synthroid, Levoxyl) 75 mcg " tablet 1 tablet, Daily   • metoprolol tartrate (LOPRESSOR) 50 mg, oral, 2 times daily   • PARoxetine (Paxil) 20 mg tablet 1 tablet, Daily   • zolpidem (Ambien) 10 mg tablet 1 tablet, Nightly PRN          No Known Allergies      LABS: March 2025-hemoglobin 10.9 hematocrit 32.9 platelets 3 95,000 sodium 131 potassium 3.9 BUN 14 creatinine 1.5 GFR 34    Testing Reviewed  Labs      Reviewed all available pertinent laboratory data and diagnostic testing results that occurred after the last office visit with me                Assessment:    1. At high risk for injury related to fall        2. Paroxysmal atrial fibrillation (Multi)  Follow Up In Cardiology    Follow Up In Cardiology    apixaban (Eliquis) 2.5 mg tablet    metoprolol tartrate (Lopressor) 50 mg tablet    ECG 12 Lead      3. Aortic stenosis, severe        4. At maximum risk for fall        5. Stage 3b chronic kidney disease (Multi)        6. Long term current use of anticoagulant therapy        7. Benign essential hypertension  furosemide (Lasix) 20 mg tablet    amLODIPine (Norvasc) 2.5 mg tablet      8. Hyperlipidemia, unspecified hyperlipidemia type  atorvastatin (Lipitor) 20 mg tablet      9. Type 2 diabetes mellitus with other specified complication, unspecified whether long term insulin use (Multi)        10. BMI 28.0-28.9,adult        11. Never smoked cigarettes        12. Medication course changed             Clinical Decision Making:  Multiple comorbidities as noted above.  There is progression of aortic stenosis, currently moderate to severe.  Continues to fall  Really discussed Watchman device, she has to think about it, most likely she will not do it because she is very afraid of invasive procedures  Echo results reviewed  Has upcoming appointment with EP service, Dr. Ortiz  For now, we will discontinue her carvedilol and switch to metoprolol to tartrate 50 mg p.o. twice daily, will be better for rate control.  Decrease amlodipine to 2.5 mg  daily  High risk of decompensation needs close follow-up.    Follow up : 3 months    IVeronica LPN  am scribing for, and in the presence of Dr. Love Moreno MD, FACC.     I, Dr. Love Moreno MD, FACC, personally performed the services described in the documentation as scribed by Veronica PINEDA LPN  in my presence, and confirm it is both accurate and complete.

## 2025-04-16 ENCOUNTER — APPOINTMENT (OUTPATIENT)
Dept: CARDIOLOGY | Facility: CLINIC | Age: 85
End: 2025-04-16
Payer: MEDICARE

## 2025-04-16 VITALS
HEART RATE: 105 BPM | SYSTOLIC BLOOD PRESSURE: 130 MMHG | WEIGHT: 170 LBS | DIASTOLIC BLOOD PRESSURE: 80 MMHG | BODY MASS INDEX: 28.32 KG/M2 | HEIGHT: 65 IN

## 2025-04-16 DIAGNOSIS — I48.0 PAROXYSMAL ATRIAL FIBRILLATION (MULTI): ICD-10-CM

## 2025-04-16 PROCEDURE — 93000 ELECTROCARDIOGRAM COMPLETE: CPT | Performed by: INTERNAL MEDICINE

## 2025-04-16 PROCEDURE — G2211 COMPLEX E/M VISIT ADD ON: HCPCS | Performed by: INTERNAL MEDICINE

## 2025-04-16 PROCEDURE — 3079F DIAST BP 80-89 MM HG: CPT | Performed by: INTERNAL MEDICINE

## 2025-04-16 PROCEDURE — 1159F MED LIST DOCD IN RCRD: CPT | Performed by: INTERNAL MEDICINE

## 2025-04-16 PROCEDURE — 3075F SYST BP GE 130 - 139MM HG: CPT | Performed by: INTERNAL MEDICINE

## 2025-04-16 PROCEDURE — 99215 OFFICE O/P EST HI 40 MIN: CPT | Performed by: INTERNAL MEDICINE

## 2025-04-16 NOTE — H&P (VIEW-ONLY)
Referring Provider: Marija Ortiz MD  Reason for Consult: Atrial fibrillation    History of Present Illness:      Radha Torres is a 85 y.o. year old female patient with a history significant for persistent atrial fibrillation, tachycardia induced cardiomyopathy s/p recovery, moderate aortic stenosis, type 2 diabetes, CKD stage IIIb, and frequent falls who is referred by Dr. Moreno for A-fib management.    She was originally diagnosed with atrial fibrillation in 2018, when she presented with congestive heart failure in the setting of atrial fibrillation with rapid ventricular response.  Her ejection fraction was depressed in the 25 to 30% range.  She was rhythm controlled with amiodarone and eventually had recovery of her ejection fraction.  She then remained on amiodarone.  While on amiodarone, she developed multiple side effects including ocular issues, thyroid issues, and a host of other issues.  Recently in September she stopped amiodarone, and states she feels much better off medication.  She has not had recurrent atrial fibrillation since stopping the amiodarone.    In terms of her symptoms, she denies palpitations, chest pain, lightheadedness, dizziness.  She does have shortness of breath with exertion.  She is accompanied by her daughter and .  They note that she has frequent falls.  Left atrial appendage occlusion has been brought up to her but she has declined.    After last visit she declined any therapies. However, she went back into atrial fibrillation and now continues to have rapid ventricular rates and associated symptoms of AF with RVR, as well as falls. She has had previous tachycardia induced cardiomyopathy.     Focused Cardiovascular Problem List:  Persistent atrial fibrillation: On apixaban and carvedilol. [id number] = 5. Significant side effects to amiodarone  Previous tachcyardia induced cardiomyopathy, s/p recovery  Type 2 DM  Moderate to severe aortic stenosis  Hypertension  CKD  stage IIIB  Frequent falls  Moderately severe obstruction and mild restriction on PFTs      Past Medical and Surgical History:  Ms. Torres  has no past medical history on file.    has a past surgical history that includes Other surgical history (09/13/2021); Other surgical history (09/13/2021); Other surgical history (09/13/2021); Other surgical history (09/13/2021); and Other surgical history (09/13/2021).    Social History:  Social History     Tobacco Use    Smoking status: Never    Smokeless tobacco: Never   Substance Use Topics    Alcohol use: Never      Tobacco: Denies  Alcohol: Denies  Drug use:  Denies      Relevant Family History:   Family History   Problem Relation Name Age of Onset    No Known Problems Mother      No Known Problems Father          Allergies:  No Known Allergies     Medications:  Current Outpatient Medications   Medication Instructions    amLODIPine (NORVASC) 2.5 mg, oral, Daily    apixaban (ELIQUIS) 2.5 mg, oral, 2 times daily    ascorbic acid (Vitamin C) 500 mg tablet 1 tablet, Daily    atorvastatin (LIPITOR) 20 mg, oral, Daily    calcium citrate-vitamin D3 (Citracal+D) 315 mg-5 mcg (200 unit) tablet 1 tablet, Daily    cholecalciferol (VITAMIN D-3) 25 mcg, Daily    esomeprazole (NexIUM) 20 mg DR capsule 1 capsule, Daily    ferrous sulfate 325 (65 Fe) MG tablet 1 tablet, Daily    furosemide (LASIX) 20 mg, oral, Daily    gabapentin (NEURONTIN) 300 mg, Daily    HYDROcodone-acetaminophen (Norco) 5-325 mg tablet 1 tablet, Every 4 hours PRN    levothyroxine (Synthroid, Levoxyl) 75 mcg tablet 1 tablet, Daily    metoprolol tartrate (LOPRESSOR) 50 mg, oral, 2 times daily    PARoxetine (Paxil) 20 mg tablet 1 tablet, Daily    zolpidem (Ambien) 10 mg tablet 1 tablet, Nightly PRN         Objective   Physical Exam:  Last Recorded Vitals:      10/25/2024    10:19 AM 10/25/2024    10:23 AM 10/25/2024    10:48 AM 12/18/2024    11:11 AM 3/31/2025    10:00 AM 3/31/2025    10:31 AM 4/16/2025     "10:14 AM   Vitals   Systolic 162 160 162 159 148 140 130   Diastolic 84 78 74 77 62 60 80   BP Location Left arm Right arm Right arm  Left arm Left arm Right arm   Heart Rate 64   73 60  105   Temp    35.8 °C (96.4 °F)      Height 1.676 m (5' 6\")   1.651 m (5' 5\") 1.651 m (5' 5\")  1.651 m (5' 5\")   Weight (lb) 173   168 171  170   BMI 27.92 kg/m2   27.96 kg/m2 28.46 kg/m2  28.29 kg/m2   BSA (m2) 1.91 m2   1.87 m2 1.89 m2  1.88 m2   Visit Report Report Report Report Report Report Report Report    Visit Vitals  /80 (BP Location: Right arm, Patient Position: Sitting)   Pulse 105   Ht 1.651 m (5' 5\")   Wt 77.1 kg (170 lb)   BMI 28.29 kg/m²   Smoking Status Never   BSA 1.88 m²      Gen: NAD, sitting comfortably  HEENT: NC/AT  Card: RRR, 3 out of 6 systolic ejection murmur  Pulm: Clear to auscultation bilaterally  Ext: No LE edema  Neuro: No focal deficits    Diagnostic Results      My Interpretation of Reviewed Study(s):  Prior ECGs (reviewed and my interpretation):   4/16/2025: Atrial fibrillation with rapid ventricular response, heart rate 105 bpm  12/18/2024: Normal sinus rhythm, possible left atrial enlargement, LVH, heart rate 65 bpm    Echocardiography:  1/17/2025: LVEF 60-65%, moderate to severe aortic stenosis, normal left atrial size  12/10/2022   1. Left ventricular systolic function is normal with a 65% estimated ejection fraction.   2. Mild concentric left ventricular hypertrophy.   3. Spectral Doppler shows an impaired relaxation pattern of left ventricular diastolic filling.   4. There is an elevated mean left atrial pressure.   5. Peak velocity across the aortic valve measured 340 cm/s corresponding to a peak pressure gradient 46 mmHg and a mean pressure gradient 27 mmHg. The aortic valve area measured 1.2 cm2 consistent with moderate aortic stenosis.   6. There is moderate to severe aortic valve cusp calcification.   7. When compared to a study from 10/5/2018 ejection fraction has improved from 30% " "up to 65%. The aortic stenosis has progressed from mild to moderate, the moderate mitral regurgitation reported previously has nearly resolved, The pleural effusion has resolved.      Relevant Labs:  No results found for: \"CREATININE\", \"CCL\", \"K\", \"HGBA1C\", \"HGB\", \"INR\", \"AST\", \"ALT\"    Assessment/Plan   Assessment and Plan:  Radha Torres is a 85 y.o. year old female patient who is referred for management and evaluation of persistent atrial fibrillation.  She has had many side effects to amiodarone and feels much better off the medication. However, off amiodarone, she has had recurrence of atrial fibrillation.     She is in atrial fibrillation with rapid ventricular response today. She continues to have shortness of breath with exertion. Her aortic stenosis is worsening. I am afraid she is not tolerating AF with RVR well. Further titration of rate control agents is limited by her orthostatic hypotension and frequent falls.     At this point, I think AV node ablation along with pacemaker implantation is ideal. She does not want a traditional dual chamber pacemaker, but is interested in a leadless pacemaker. Therefore, we will perform a leadless pacemaker implantation followed by AV node ablation. The risks of the procedure were discussed in detail, including but not limited to bleeding, infection, lead dislodgement,   cardiac perforation, need for cardiac or vascular surgery, myocardial infarction, stroke, and a small chance of death. Additionally, we discussed possible long-term complications including -initiated device recalls, lead integrity issues, and late infections. The patient understands these risks and wishes to proceed with device implantation.       Recommendations:  # Persistent atrial fibrillation  -Patient declined catheter ablation and concomitant LAAO  - Continue apixaban 2.5 mg twice daily  - Continue metoprolol 50mg twice daily for now, stop after AV node ablation  - Plan for AV emiliano " ablation and leadless pacemaker implantation (Aveir VR)    Name: Radha Torres  MRN: [id number]  Attending: Marija Ortiz MD  Procedure: AV n ode ablation, leadless  pacemaker implant  Date desired: ASAP  Diagnosis: Persistent atrial fibrillation, frequent falls  Vendor if applicable: Abbott for device and Ensite for ablation  Expected anesthesia: RN sedation   Isolation/precautions?: None  Other comments/med instructions: Hold apixaban for 3 days prior to the procedure. Hold all medications on the morning of the procedure.            Return to Clinic: Patient should return to the EP Clinic 4 months     Thank you very much for allowing me to participate in the care of this patient. Please do not hesitate to contact me with any further questions or concerns.    Marija Ortiz MD  Clinical Cardiac Electrophysiologist, [facility] [place]    of Medicine, [facility] [employer]  Director of Atrial Fibrillation Ablation, [facility]  Director of Ventricular Arrhythmias Research, [facility]  Office Phone Number: [phone]

## 2025-04-16 NOTE — PROGRESS NOTES
"  Referring Provider: Marija Ortiz MD  Reason for Consult: ***    History of Present Illness:      Radha Torres is a 85 y.o. year old female patient with a history significant for ***  who is referred by *** for ***.    Focused Cardiovascular Problem List:  ***  ***  ***      Past Medical and Surgical History:  Ms. Torres  has no past medical history on file.    has a past surgical history that includes Other surgical history (09/13/2021); Other surgical history (09/13/2021); Other surgical history (09/13/2021); Other surgical history (09/13/2021); and Other surgical history (09/13/2021).    Social History:  Social History     Tobacco Use    Smoking status: Never    Smokeless tobacco: Never   Substance Use Topics    Alcohol use: Never      Tobacco: {SJTobacco:05717::\"Denies\"}  Alcohol: {ESAlcohol:15249}  Drug use:  {SJDRUGuse:36976::\"Denies\"}  Occupational History: ***  Other: Lives at ***    Relevant Family History:   Family History[1]  Family History of Sudden Death: {Yes/No:84230}***  Family History of Arrhythmia: {Yes/No:71015}***     Allergies:  RX Allergies[2]     Medications:  Current Outpatient Medications   Medication Instructions    amLODIPine (NORVASC) 2.5 mg, oral, Daily    apixaban (ELIQUIS) 2.5 mg, oral, 2 times daily    ascorbic acid (Vitamin C) 500 mg tablet 1 tablet, Daily    atorvastatin (LIPITOR) 20 mg, oral, Daily    calcium citrate-vitamin D3 (Citracal+D) 315 mg-5 mcg (200 unit) tablet 1 tablet, Daily    cholecalciferol (VITAMIN D-3) 25 mcg, Daily    esomeprazole (NexIUM) 20 mg DR capsule 1 capsule, Daily    ferrous sulfate 325 (65 Fe) MG tablet 1 tablet, Daily    furosemide (LASIX) 20 mg, oral, Daily    gabapentin (NEURONTIN) 300 mg, Daily    HYDROcodone-acetaminophen (Norco) 5-325 mg tablet 1 tablet, Every 4 hours PRN    levothyroxine (Synthroid, Levoxyl) 75 mcg tablet 1 tablet, Daily    metoprolol tartrate (LOPRESSOR) 50 mg, oral, 2 times daily    PARoxetine (Paxil) 20 mg tablet 1 " "tablet, Daily    zolpidem (Ambien) 10 mg tablet 1 tablet, Nightly PRN         Objective   Physical Exam:  Last Recorded Vitals:      10/25/2024    10:19 AM 10/25/2024    10:23 AM 10/25/2024    10:48 AM 12/18/2024    11:11 AM 3/31/2025    10:00 AM 3/31/2025    10:31 AM 4/16/2025    10:14 AM   Vitals   Systolic 162 160 162 159 148 140 130   Diastolic 84 78 74 77 62 60 80   BP Location Left arm Right arm Right arm  Left arm Left arm Right arm   Heart Rate 64   73 60  105   Temp    35.8 °C (96.4 °F)      Height 1.676 m (5' 6\")   1.651 m (5' 5\") 1.651 m (5' 5\")  1.651 m (5' 5\")   Weight (lb) 173   168 171  170   BMI 27.92 kg/m2   27.96 kg/m2 28.46 kg/m2  28.29 kg/m2   BSA (m2) 1.91 m2   1.87 m2 1.89 m2  1.88 m2   Visit Report Report Report Report Report Report Report Report    Visit Vitals  /80 (BP Location: Right arm, Patient Position: Sitting)   Pulse 105   Ht 1.651 m (5' 5\")   Wt 77.1 kg (170 lb)   BMI 28.29 kg/m²   Smoking Status Never   BSA 1.88 m²      Gen: NAD, sitting comfortably  HEENT: NC/AT  Chest: ***Device in situ in left upper chest, no overlying erythema or tenderness  Card: RRR, no m/r/g  Pulm: Clear to auscultation bilaterally  Ext: No LE edema  Neuro: No focal deficits    Diagnostic Results      My Independent nterpretation of Reviewed Study(s):  Prior ECGs (reviewed and my interpretation):   ***: ***    Cardiac Rhythm Monitors:  ***    Prior EP studies:  ***    Echocardiography:  ***  No echocardiogram results found for the past 12 months     Cardiac Catheterization:   ***    Stress Test:   ***    Other Relevant Imaging:  ***      Relevant Labs:  No results found for: \"CREATININE\", \"CCL\", \"K\", \"HGBA1C\", \"HGB\", \"INR\", \"AST\", \"ALT\"    Assessment/Plan   Assessment and Plan:  Radha Torres is a 85 y.o. year old female patient who is referred for management and evaluation of ***.     #***  - ***    #***  -***  -***    Name: Radha Torres  MRN: [id number]  Attending: Marija Ortiz MD  Procedure: ***  Date " desired: ***  Diagnosis: ***  Vendor if applicable: ***  Expected anesthesia: ***  Isolation/precautions?: ***  Other comments/med instructions: Hold blood thinners for 48 hours prior to the procedure. On the mornign of the procedure, hold all medications except metoprolol         Return to Clinic: {[facility]:75893}    Thank you very much for allowing me to participate in the care of this patient. Please do not hesitate to contact me with any further questions or concerns.    Marija Ortiz MD  Clinical Cardiac Electrophysiologist, [facility] [place]    of Medicine, [facility] [employer]  Director of Atrial Fibrillation Ablation, [facility]  Director of Ventricular Arrhythmias Research, [facility]  Office Phone Number: [phone]              [1]   Family History  Problem Relation Name Age of Onset    No Known Problems Mother      No Known Problems Father     [2] No Known Allergies     "up to 65%. The aortic stenosis has progressed from mild to moderate, the moderate mitral regurgitation reported previously has nearly resolved, The pleural effusion has resolved.      Relevant Labs:  No results found for: \"CREATININE\", \"CCL\", \"K\", \"HGBA1C\", \"HGB\", \"INR\", \"AST\", \"ALT\"    Assessment/Plan   Assessment and Plan:  Radha Torres is a 85 y.o. year old female patient who is referred for management and evaluation of persistent atrial fibrillation.  She has had many side effects to amiodarone and feels much better off the medication. However, off amiodarone, she has had recurrence of atrial fibrillation.     She is in atrial fibrillation with rapid ventricular response today. She continues to have shortness of breath with exertion. Her aortic stenosis is worsening. I am afraid she is not tolerating AF with RVR well. Further titration of rate control agents is limited by her orthostatic hypotension and frequent falls.     At this point, I think AV node ablation along with pacemaker implantation is ideal. She does not want a traditional dual chamber pacemaker, but is interested in a leadless pacemaker. Therefore, we will perform a leadless pacemaker implantation followed by AV node ablation. The risks of the procedure were discussed in detail, including but not limited to bleeding, infection, lead dislodgement,   cardiac perforation, need for cardiac or vascular surgery, myocardial infarction, stroke, and a small chance of death. Additionally, we discussed possible long-term complications including -initiated device recalls, lead integrity issues, and late infections. The patient understands these risks and wishes to proceed with device implantation.       Recommendations:  # Persistent atrial fibrillation  -Patient declined catheter ablation and concomitant LAAO  - Continue apixaban 2.5 mg twice daily  - Continue metoprolol 50mg twice daily for now, stop after AV node ablation  - Plan for AV emiliano " ablation and leadless pacemaker implantation (Aveir VR)    Name: Radha Torres  MRN: [id number]  Attending: Marija rOtiz MD  Procedure: AV n ode ablation, leadless  pacemaker implant  Date desired: ASAP  Diagnosis: Persistent atrial fibrillation, frequent falls  Vendor if applicable: Abbott for device and Ensite for ablation  Expected anesthesia: RN sedation   Isolation/precautions?: None  Other comments/med instructions: Hold apixaban for 3 days prior to the procedure. Hold all medications on the morning of the procedure.            Return to Clinic: Patient should return to the EP Clinic 4 months     Thank you very much for allowing me to participate in the care of this patient. Please do not hesitate to contact me with any further questions or concerns.    Marija Ortiz MD  Clinical Cardiac Electrophysiologist, [facility] [place]    of Medicine, [facility] [employer]  Director of Atrial Fibrillation Ablation, [facility]  Director of Ventricular Arrhythmias Research, [facility]  Office Phone Number: [phone]

## 2025-05-06 ENCOUNTER — SURGERY (OUTPATIENT)
Age: 85
End: 2025-05-06
Payer: MEDICARE

## 2025-05-06 ENCOUNTER — HOSPITAL ENCOUNTER (OUTPATIENT)
Facility: HOSPITAL | Age: 85
Discharge: HOME | End: 2025-05-07
Attending: INTERNAL MEDICINE | Admitting: INTERNAL MEDICINE
Payer: MEDICARE

## 2025-05-06 ENCOUNTER — APPOINTMENT (OUTPATIENT)
Dept: CARDIOLOGY | Facility: HOSPITAL | Age: 85
End: 2025-05-06
Payer: MEDICARE

## 2025-05-06 ENCOUNTER — ANESTHESIA (OUTPATIENT)
Dept: CARDIOLOGY | Facility: HOSPITAL | Age: 85
End: 2025-05-06
Payer: MEDICARE

## 2025-05-06 ENCOUNTER — ANESTHESIA EVENT (OUTPATIENT)
Dept: CARDIOLOGY | Facility: HOSPITAL | Age: 85
End: 2025-05-06
Payer: MEDICARE

## 2025-05-06 DIAGNOSIS — Z95.0 PACEMAKER: ICD-10-CM

## 2025-05-06 DIAGNOSIS — I48.0 PAROXYSMAL ATRIAL FIBRILLATION (MULTI): Primary | ICD-10-CM

## 2025-05-06 DIAGNOSIS — I10 BENIGN ESSENTIAL HYPERTENSION: ICD-10-CM

## 2025-05-06 PROBLEM — D64.9 ANEMIA: Status: ACTIVE | Noted: 2025-05-06

## 2025-05-06 PROBLEM — I48.19 ATRIAL FIBRILLATION, PERSISTENT (MULTI): Status: ACTIVE | Noted: 2025-05-06

## 2025-05-06 LAB
ABO GROUP (TYPE) IN BLOOD: NORMAL
ABO GROUP (TYPE) IN BLOOD: NORMAL
ANION GAP SERPL CALC-SCNC: 12 MMOL/L (ref 10–20)
ANTIBODY SCREEN: NORMAL
APTT PPP: 31 SECONDS (ref 26–36)
ATRIAL RATE: 57 BPM
BUN SERPL-MCNC: 18 MG/DL (ref 6–23)
CALCIUM SERPL-MCNC: 9.3 MG/DL (ref 8.6–10.3)
CHLORIDE SERPL-SCNC: 100 MMOL/L (ref 98–107)
CO2 SERPL-SCNC: 28 MMOL/L (ref 21–32)
CREAT SERPL-MCNC: 1.28 MG/DL (ref 0.5–1.05)
EGFRCR SERPLBLD CKD-EPI 2021: 42 ML/MIN/1.73M*2
ERYTHROCYTE [DISTWIDTH] IN BLOOD BY AUTOMATED COUNT: 14.1 % (ref 11.5–14.5)
GLUCOSE SERPL-MCNC: 151 MG/DL (ref 74–99)
HCT VFR BLD AUTO: 33.1 % (ref 36–46)
HGB BLD-MCNC: 10.8 G/DL (ref 12–16)
INR PPP: 1 (ref 0.9–1.1)
MCH RBC QN AUTO: 28.6 PG (ref 26–34)
MCHC RBC AUTO-ENTMCNC: 32.6 G/DL (ref 32–36)
MCV RBC AUTO: 88 FL (ref 80–100)
NRBC BLD-RTO: 0 /100 WBCS (ref 0–0)
P AXIS: 73 DEGREES
P OFFSET: 191 MS
P ONSET: 125 MS
PLATELET # BLD AUTO: 308 X10*3/UL (ref 150–450)
POTASSIUM SERPL-SCNC: 3.9 MMOL/L (ref 3.5–5.3)
PR INTERVAL: 190 MS
PROTHROMBIN TIME: 11 SECONDS (ref 9.8–12.4)
Q ONSET: 220 MS
QRS COUNT: 9 BEATS
QRS DURATION: 96 MS
QT INTERVAL: 450 MS
QTC CALCULATION(BAZETT): 438 MS
QTC FREDERICIA: 442 MS
R AXIS: 25 DEGREES
RBC # BLD AUTO: 3.77 X10*6/UL (ref 4–5.2)
RH FACTOR (ANTIGEN D): NORMAL
RH FACTOR (ANTIGEN D): NORMAL
SODIUM SERPL-SCNC: 136 MMOL/L (ref 136–145)
T AXIS: 24 DEGREES
T OFFSET: 445 MS
VENTRICULAR RATE: 57 BPM
WBC # BLD AUTO: 11.6 X10*3/UL (ref 4.4–11.3)

## 2025-05-06 PROCEDURE — C1769 GUIDE WIRE: HCPCS | Performed by: INTERNAL MEDICINE

## 2025-05-06 PROCEDURE — 2750000001 HC OR 275 NO HCPCS: Performed by: INTERNAL MEDICINE

## 2025-05-06 PROCEDURE — 0795T TCAT INS 2CHMBR LDLS PM CMPL: CPT | Performed by: INTERNAL MEDICINE

## 2025-05-06 PROCEDURE — 86850 RBC ANTIBODY SCREEN: CPT | Performed by: NURSE PRACTITIONER

## 2025-05-06 PROCEDURE — 2500000004 HC RX 250 GENERAL PHARMACY W/ HCPCS (ALT 636 FOR OP/ED): Performed by: INTERNAL MEDICINE

## 2025-05-06 PROCEDURE — 99152 MOD SED SAME PHYS/QHP 5/>YRS: CPT | Performed by: INTERNAL MEDICINE

## 2025-05-06 PROCEDURE — C1605 HC OR 275 NO HCPCS: HCPCS | Performed by: INTERNAL MEDICINE

## 2025-05-06 PROCEDURE — 2550000001 HC RX 255 CONTRASTS: Performed by: INTERNAL MEDICINE

## 2025-05-06 PROCEDURE — 85027 COMPLETE CBC AUTOMATED: CPT | Performed by: NURSE PRACTITIONER

## 2025-05-06 PROCEDURE — C1730 CATH, EP, 19 OR FEW ELECT: HCPCS | Performed by: INTERNAL MEDICINE

## 2025-05-06 PROCEDURE — 2500000004 HC RX 250 GENERAL PHARMACY W/ HCPCS (ALT 636 FOR OP/ED): Mod: JZ | Performed by: NURSE ANESTHETIST, CERTIFIED REGISTERED

## 2025-05-06 PROCEDURE — 2780000003 HC OR 278 NO HCPCS: Performed by: INTERNAL MEDICINE

## 2025-05-06 PROCEDURE — 93005 ELECTROCARDIOGRAM TRACING: CPT | Mod: 59

## 2025-05-06 PROCEDURE — 2500000001 HC RX 250 WO HCPCS SELF ADMINISTERED DRUGS (ALT 637 FOR MEDICARE OP): Performed by: NURSE PRACTITIONER

## 2025-05-06 PROCEDURE — 86900 BLOOD TYPING SEROLOGIC ABO: CPT | Performed by: NURSE PRACTITIONER

## 2025-05-06 PROCEDURE — 2720000007 HC OR 272 NO HCPCS: Performed by: INTERNAL MEDICINE

## 2025-05-06 PROCEDURE — 2500000004 HC RX 250 GENERAL PHARMACY W/ HCPCS (ALT 636 FOR OP/ED): Mod: JZ | Performed by: NURSE PRACTITIONER

## 2025-05-06 PROCEDURE — 7100000010 HC PHASE TWO TIME - EACH INCREMENTAL 1 MINUTE: Performed by: INTERNAL MEDICINE

## 2025-05-06 PROCEDURE — 7100000002 HC RECOVERY ROOM TIME - EACH INCREMENTAL 1 MINUTE: Performed by: INTERNAL MEDICINE

## 2025-05-06 PROCEDURE — 2500000005 HC RX 250 GENERAL PHARMACY W/O HCPCS: Performed by: NURSE PRACTITIONER

## 2025-05-06 PROCEDURE — 7100000011 HC EXTENDED STAY RECOVERY HOURLY - NURSING UNIT

## 2025-05-06 PROCEDURE — 2500000001 HC RX 250 WO HCPCS SELF ADMINISTERED DRUGS (ALT 637 FOR MEDICARE OP): Performed by: INTERNAL MEDICINE

## 2025-05-06 PROCEDURE — 86923 COMPATIBILITY TEST ELECTRIC: CPT

## 2025-05-06 PROCEDURE — 7100000009 HC PHASE TWO TIME - INITIAL BASE CHARGE: Performed by: INTERNAL MEDICINE

## 2025-05-06 PROCEDURE — C1760 CLOSURE DEV, VASC: HCPCS | Performed by: INTERNAL MEDICINE

## 2025-05-06 PROCEDURE — 93010 ELECTROCARDIOGRAM REPORT: CPT | Performed by: INTERNAL MEDICINE

## 2025-05-06 PROCEDURE — 80048 BASIC METABOLIC PNL TOTAL CA: CPT | Performed by: NURSE PRACTITIONER

## 2025-05-06 PROCEDURE — 99153 MOD SED SAME PHYS/QHP EA: CPT | Performed by: INTERNAL MEDICINE

## 2025-05-06 PROCEDURE — G0269 OCCLUSIVE DEVICE IN VEIN ART: HCPCS | Performed by: INTERNAL MEDICINE

## 2025-05-06 PROCEDURE — 93650 ICAR CATH ABLTJ AV NODE FUNC: CPT | Performed by: INTERNAL MEDICINE

## 2025-05-06 PROCEDURE — C1786 PMKR, SINGLE, RATE-RESP: HCPCS | Performed by: INTERNAL MEDICINE

## 2025-05-06 PROCEDURE — 3700000013 HC SEDATION LEVEL 5+ TIME - EACH ADDITIONAL 15 MINUTES: Performed by: INTERNAL MEDICINE

## 2025-05-06 PROCEDURE — 76937 US GUIDE VASCULAR ACCESS: CPT | Performed by: INTERNAL MEDICINE

## 2025-05-06 PROCEDURE — C1893 INTRO/SHEATH, FIXED,NON-PEEL: HCPCS | Performed by: INTERNAL MEDICINE

## 2025-05-06 PROCEDURE — 36415 COLL VENOUS BLD VENIPUNCTURE: CPT | Performed by: NURSE PRACTITIONER

## 2025-05-06 PROCEDURE — 85610 PROTHROMBIN TIME: CPT | Performed by: NURSE PRACTITIONER

## 2025-05-06 PROCEDURE — 7100000001 HC RECOVERY ROOM TIME - INITIAL BASE CHARGE: Performed by: INTERNAL MEDICINE

## 2025-05-06 PROCEDURE — 36415 COLL VENOUS BLD VENIPUNCTURE: CPT | Performed by: INTERNAL MEDICINE

## 2025-05-06 PROCEDURE — 3700000002 HC GENERAL ANESTHESIA TIME - EACH INCREMENTAL 1 MINUTE: Performed by: INTERNAL MEDICINE

## 2025-05-06 PROCEDURE — 3700000012 HC SEDATION LEVEL 5+ TIME - INITIAL 15 MINUTES 5/> YEARS: Performed by: INTERNAL MEDICINE

## 2025-05-06 PROCEDURE — C1894 INTRO/SHEATH, NON-LASER: HCPCS | Performed by: INTERNAL MEDICINE

## 2025-05-06 PROCEDURE — C1732 CATH, EP, DIAG/ABL, 3D/VECT: HCPCS | Performed by: INTERNAL MEDICINE

## 2025-05-06 PROCEDURE — 93005 ELECTROCARDIOGRAM TRACING: CPT

## 2025-05-06 PROCEDURE — 3700000001 HC GENERAL ANESTHESIA TIME - INITIAL BASE CHARGE: Performed by: INTERNAL MEDICINE

## 2025-05-06 DEVICE — DELIVERY SYSTEM, PACEMAKER LEADLESS, AVEIR DR: Type: IMPLANTABLE DEVICE | Site: HEART | Status: FUNCTIONAL

## 2025-05-06 DEVICE — PACEMAKER, AVEIR DR PM LEADLESS, 19.5F 38.0MM RV: Type: IMPLANTABLE DEVICE | Site: HEART | Status: FUNCTIONAL

## 2025-05-06 RX ORDER — ACETAMINOPHEN 325 MG/1
650 TABLET ORAL EVERY 6 HOURS PRN
Status: DISCONTINUED | OUTPATIENT
Start: 2025-05-06 | End: 2025-05-06

## 2025-05-06 RX ORDER — ONDANSETRON HYDROCHLORIDE 2 MG/ML
4 INJECTION, SOLUTION INTRAVENOUS EVERY 8 HOURS PRN
Status: DISCONTINUED | OUTPATIENT
Start: 2025-05-06 | End: 2025-05-07 | Stop reason: HOSPADM

## 2025-05-06 RX ORDER — ONDANSETRON 4 MG/1
4 TABLET, FILM COATED ORAL EVERY 8 HOURS PRN
Status: DISCONTINUED | OUTPATIENT
Start: 2025-05-06 | End: 2025-05-07 | Stop reason: HOSPADM

## 2025-05-06 RX ORDER — CEFAZOLIN SODIUM 1 G/50ML
1 SOLUTION INTRAVENOUS ONCE
Status: COMPLETED | OUTPATIENT
Start: 2025-05-06 | End: 2025-05-06

## 2025-05-06 RX ORDER — METOPROLOL SUCCINATE 25 MG/1
25 TABLET, EXTENDED RELEASE ORAL NIGHTLY
Status: DISCONTINUED | OUTPATIENT
Start: 2025-05-06 | End: 2025-05-07

## 2025-05-06 RX ORDER — HYDRALAZINE HYDROCHLORIDE 20 MG/ML
INJECTION INTRAMUSCULAR; INTRAVENOUS AS NEEDED
Status: DISCONTINUED | OUTPATIENT
Start: 2025-05-06 | End: 2025-05-06 | Stop reason: HOSPADM

## 2025-05-06 RX ORDER — CHLORHEXIDINE GLUCONATE 40 MG/ML
SOLUTION TOPICAL ONCE
Status: COMPLETED | OUTPATIENT
Start: 2025-05-06 | End: 2025-05-06

## 2025-05-06 RX ORDER — CHLORHEXIDINE GLUCONATE 40 MG/ML
SOLUTION TOPICAL ONCE
Status: DISCONTINUED | OUTPATIENT
Start: 2025-05-06 | End: 2025-05-06 | Stop reason: SDUPTHER

## 2025-05-06 RX ORDER — PAROXETINE HYDROCHLORIDE 20 MG/1
20 TABLET, FILM COATED ORAL DAILY
Status: DISCONTINUED | OUTPATIENT
Start: 2025-05-07 | End: 2025-05-07 | Stop reason: HOSPADM

## 2025-05-06 RX ORDER — AMLODIPINE BESYLATE 5 MG/1
2.5 TABLET ORAL DAILY
Status: DISCONTINUED | OUTPATIENT
Start: 2025-05-07 | End: 2025-05-07 | Stop reason: HOSPADM

## 2025-05-06 RX ORDER — ACETAMINOPHEN 325 MG/1
650 TABLET ORAL EVERY 4 HOURS PRN
Status: DISCONTINUED | OUTPATIENT
Start: 2025-05-06 | End: 2025-05-07 | Stop reason: HOSPADM

## 2025-05-06 RX ORDER — ACETAMINOPHEN 650 MG/1
650 SUPPOSITORY RECTAL EVERY 4 HOURS PRN
Status: DISCONTINUED | OUTPATIENT
Start: 2025-05-06 | End: 2025-05-07 | Stop reason: HOSPADM

## 2025-05-06 RX ORDER — GABAPENTIN 300 MG/1
300 CAPSULE ORAL NIGHTLY
Status: DISCONTINUED | OUTPATIENT
Start: 2025-05-06 | End: 2025-05-07 | Stop reason: HOSPADM

## 2025-05-06 RX ORDER — ACETAMINOPHEN 160 MG/5ML
650 SOLUTION ORAL EVERY 4 HOURS PRN
Status: DISCONTINUED | OUTPATIENT
Start: 2025-05-06 | End: 2025-05-07 | Stop reason: HOSPADM

## 2025-05-06 RX ORDER — ATORVASTATIN CALCIUM 20 MG/1
20 TABLET, FILM COATED ORAL DAILY
Status: DISCONTINUED | OUTPATIENT
Start: 2025-05-06 | End: 2025-05-06

## 2025-05-06 RX ORDER — LEVOTHYROXINE SODIUM 75 UG/1
75 TABLET ORAL DAILY
Status: DISCONTINUED | OUTPATIENT
Start: 2025-05-07 | End: 2025-05-07 | Stop reason: HOSPADM

## 2025-05-06 RX ORDER — HYDROCODONE BITARTRATE AND ACETAMINOPHEN 5; 325 MG/1; MG/1
1 TABLET ORAL EVERY 4 HOURS PRN
Status: DISCONTINUED | OUTPATIENT
Start: 2025-05-06 | End: 2025-05-07 | Stop reason: HOSPADM

## 2025-05-06 RX ORDER — IODIXANOL 320 MG/ML
INJECTION, SOLUTION INTRAVASCULAR AS NEEDED
Status: DISCONTINUED | OUTPATIENT
Start: 2025-05-06 | End: 2025-05-06 | Stop reason: HOSPADM

## 2025-05-06 RX ORDER — MUPIROCIN 20 MG/G
1 OINTMENT TOPICAL ONCE
Status: COMPLETED | OUTPATIENT
Start: 2025-05-06 | End: 2025-05-06

## 2025-05-06 RX ORDER — AMLODIPINE BESYLATE 5 MG/1
5 TABLET ORAL DAILY
Status: DISCONTINUED | OUTPATIENT
Start: 2025-05-06 | End: 2025-05-06

## 2025-05-06 RX ORDER — FUROSEMIDE 40 MG/1
20 TABLET ORAL DAILY
Status: DISCONTINUED | OUTPATIENT
Start: 2025-05-07 | End: 2025-05-07 | Stop reason: HOSPADM

## 2025-05-06 RX ORDER — MIDAZOLAM HYDROCHLORIDE 1 MG/ML
INJECTION, SOLUTION INTRAMUSCULAR; INTRAVENOUS AS NEEDED
Status: DISCONTINUED | OUTPATIENT
Start: 2025-05-06 | End: 2025-05-06 | Stop reason: HOSPADM

## 2025-05-06 RX ORDER — LIDOCAINE HYDROCHLORIDE AND EPINEPHRINE 10; 10 UG/ML; MG/ML
INJECTION, SOLUTION INFILTRATION; PERINEURAL AS NEEDED
Status: DISCONTINUED | OUTPATIENT
Start: 2025-05-06 | End: 2025-05-06 | Stop reason: HOSPADM

## 2025-05-06 RX ORDER — HYDRALAZINE HYDROCHLORIDE 20 MG/ML
10 INJECTION INTRAMUSCULAR; INTRAVENOUS EVERY 6 HOURS PRN
Status: DISCONTINUED | OUTPATIENT
Start: 2025-05-06 | End: 2025-05-07 | Stop reason: HOSPADM

## 2025-05-06 RX ORDER — ZOLPIDEM TARTRATE 5 MG/1
10 TABLET ORAL NIGHTLY PRN
Status: DISCONTINUED | OUTPATIENT
Start: 2025-05-06 | End: 2025-05-07 | Stop reason: HOSPADM

## 2025-05-06 RX ORDER — PROPOFOL 10 MG/ML
INJECTION, EMULSION INTRAVENOUS AS NEEDED
Status: DISCONTINUED | OUTPATIENT
Start: 2025-05-06 | End: 2025-05-06

## 2025-05-06 RX ORDER — HEPARIN SODIUM 1000 [USP'U]/ML
INJECTION, SOLUTION INTRAVENOUS; SUBCUTANEOUS AS NEEDED
Status: DISCONTINUED | OUTPATIENT
Start: 2025-05-06 | End: 2025-05-06 | Stop reason: HOSPADM

## 2025-05-06 RX ORDER — ATORVASTATIN CALCIUM 20 MG/1
20 TABLET, FILM COATED ORAL EVERY EVENING
Status: DISCONTINUED | OUTPATIENT
Start: 2025-05-06 | End: 2025-05-07 | Stop reason: HOSPADM

## 2025-05-06 RX ORDER — FENTANYL CITRATE 50 UG/ML
INJECTION, SOLUTION INTRAMUSCULAR; INTRAVENOUS AS NEEDED
Status: DISCONTINUED | OUTPATIENT
Start: 2025-05-06 | End: 2025-05-06 | Stop reason: HOSPADM

## 2025-05-06 RX ORDER — DIPHENHYDRAMINE HYDROCHLORIDE 50 MG/ML
INJECTION, SOLUTION INTRAMUSCULAR; INTRAVENOUS AS NEEDED
Status: DISCONTINUED | OUTPATIENT
Start: 2025-05-06 | End: 2025-05-06 | Stop reason: HOSPADM

## 2025-05-06 RX ADMIN — PROPOFOL 50 MG: 10 INJECTION, EMULSION INTRAVENOUS at 15:55

## 2025-05-06 RX ADMIN — PROPOFOL 100 MG: 10 INJECTION, EMULSION INTRAVENOUS at 15:27

## 2025-05-06 RX ADMIN — FENTANYL CITRATE 25 MCG: 50 INJECTION, SOLUTION INTRAMUSCULAR; INTRAVENOUS at 14:11

## 2025-05-06 RX ADMIN — GABAPENTIN 300 MG: 300 CAPSULE ORAL at 21:29

## 2025-05-06 RX ADMIN — DIPHENHYDRAMINE HYDROCHLORIDE 25 MG: 50 INJECTION INTRAMUSCULAR; INTRAVENOUS at 15:08

## 2025-05-06 RX ADMIN — FENTANYL CITRATE 25 MCG: 50 INJECTION, SOLUTION INTRAMUSCULAR; INTRAVENOUS at 14:50

## 2025-05-06 RX ADMIN — FENTANYL CITRATE 25 MCG: 50 INJECTION, SOLUTION INTRAMUSCULAR; INTRAVENOUS at 14:23

## 2025-05-06 RX ADMIN — IODIXANOL 10 ML: 320 INJECTION, SOLUTION INTRAVASCULAR at 14:57

## 2025-05-06 RX ADMIN — HEPARIN SODIUM 3000 UNITS: 1000 INJECTION INTRAVENOUS; SUBCUTANEOUS at 14:31

## 2025-05-06 RX ADMIN — FENTANYL CITRATE 25 MCG: 50 INJECTION, SOLUTION INTRAMUSCULAR; INTRAVENOUS at 14:04

## 2025-05-06 RX ADMIN — PROPOFOL 50 MG: 10 INJECTION, EMULSION INTRAVENOUS at 15:34

## 2025-05-06 RX ADMIN — PROPOFOL 50 MG: 10 INJECTION, EMULSION INTRAVENOUS at 15:45

## 2025-05-06 RX ADMIN — LIDOCAINE HYDROCHLORIDE,EPINEPHRINE BITARTRATE 9 ML: 10; .01 INJECTION, SOLUTION INFILTRATION; PERINEURAL at 14:12

## 2025-05-06 RX ADMIN — HYDROCODONE BITARTRATE AND ACETAMINOPHEN 1 TABLET: 5; 325 TABLET ORAL at 21:54

## 2025-05-06 RX ADMIN — AMLODIPINE BESYLATE 5 MG: 5 TABLET ORAL at 13:21

## 2025-05-06 RX ADMIN — MIDAZOLAM HYDROCHLORIDE 1 MG: 1 INJECTION, SOLUTION INTRAMUSCULAR; INTRAVENOUS at 15:04

## 2025-05-06 RX ADMIN — FENTANYL CITRATE 25 MCG: 50 INJECTION, SOLUTION INTRAMUSCULAR; INTRAVENOUS at 15:04

## 2025-05-06 RX ADMIN — HYDRALAZINE HYDROCHLORIDE 10 MG: 20 INJECTION INTRAMUSCULAR; INTRAVENOUS at 14:25

## 2025-05-06 RX ADMIN — APIXABAN 2.5 MG: 5 TABLET, FILM COATED ORAL at 21:29

## 2025-05-06 RX ADMIN — Medication: at 11:43

## 2025-05-06 RX ADMIN — MIDAZOLAM HYDROCHLORIDE 1 MG: 1 INJECTION, SOLUTION INTRAMUSCULAR; INTRAVENOUS at 14:36

## 2025-05-06 RX ADMIN — IODIXANOL 7 ML: 320 INJECTION, SOLUTION INTRAVASCULAR at 14:59

## 2025-05-06 RX ADMIN — IODIXANOL 10 ML: 320 INJECTION, SOLUTION INTRAVASCULAR at 15:39

## 2025-05-06 RX ADMIN — ACETAMINOPHEN 650 MG: 325 TABLET ORAL at 12:18

## 2025-05-06 RX ADMIN — MUPIROCIN 1 APPLICATION: 20 OINTMENT TOPICAL at 11:43

## 2025-05-06 RX ADMIN — HYDROCODONE BITARTRATE AND ACETAMINOPHEN 1 TABLET: 5; 325 TABLET ORAL at 17:47

## 2025-05-06 RX ADMIN — FENTANYL CITRATE 25 MCG: 50 INJECTION, SOLUTION INTRAMUSCULAR; INTRAVENOUS at 14:01

## 2025-05-06 RX ADMIN — METOPROLOL SUCCINATE 25 MG: 25 TABLET, EXTENDED RELEASE ORAL at 21:29

## 2025-05-06 RX ADMIN — FENTANYL CITRATE 25 MCG: 50 INJECTION, SOLUTION INTRAMUSCULAR; INTRAVENOUS at 14:07

## 2025-05-06 RX ADMIN — ATORVASTATIN CALCIUM 20 MG: 20 TABLET, FILM COATED ORAL at 21:29

## 2025-05-06 RX ADMIN — PROPOFOL 50 MG: 10 INJECTION, EMULSION INTRAVENOUS at 15:43

## 2025-05-06 RX ADMIN — HYDRALAZINE HYDROCHLORIDE 10 MG: 20 INJECTION INTRAMUSCULAR; INTRAVENOUS at 12:14

## 2025-05-06 RX ADMIN — CEFAZOLIN SODIUM 1 G: 1 SOLUTION INTRAVENOUS at 13:53

## 2025-05-06 RX ADMIN — PROPOFOL 50 MG: 10 INJECTION, EMULSION INTRAVENOUS at 15:51

## 2025-05-06 RX ADMIN — LIDOCAINE HYDROCHLORIDE,EPINEPHRINE BITARTRATE 10 ML: 10; .01 INJECTION, SOLUTION INFILTRATION; PERINEURAL at 14:10

## 2025-05-06 RX ADMIN — FENTANYL CITRATE 25 MCG: 50 INJECTION, SOLUTION INTRAMUSCULAR; INTRAVENOUS at 14:46

## 2025-05-06 RX ADMIN — MIDAZOLAM HYDROCHLORIDE 1 MG: 1 INJECTION, SOLUTION INTRAMUSCULAR; INTRAVENOUS at 14:04

## 2025-05-06 RX ADMIN — IODIXANOL 10 ML: 320 INJECTION, SOLUTION INTRAVASCULAR at 15:26

## 2025-05-06 RX ADMIN — MIDAZOLAM HYDROCHLORIDE 1 MG: 1 INJECTION, SOLUTION INTRAMUSCULAR; INTRAVENOUS at 14:22

## 2025-05-06 RX ADMIN — FENTANYL CITRATE 25 MCG: 50 INJECTION, SOLUTION INTRAMUSCULAR; INTRAVENOUS at 14:35

## 2025-05-06 RX ADMIN — MIDAZOLAM HYDROCHLORIDE 1 MG: 1 INJECTION, SOLUTION INTRAMUSCULAR; INTRAVENOUS at 14:01

## 2025-05-06 RX ADMIN — PROPOFOL 50 MG: 10 INJECTION, EMULSION INTRAVENOUS at 15:38

## 2025-05-06 ASSESSMENT — PAIN - FUNCTIONAL ASSESSMENT
PAIN_FUNCTIONAL_ASSESSMENT: 0-10

## 2025-05-06 ASSESSMENT — PAIN SCALES - GENERAL
PAINLEVEL_OUTOF10: 5 - MODERATE PAIN
PAINLEVEL_OUTOF10: 0 - NO PAIN
PAINLEVEL_OUTOF10: 4
PAINLEVEL_OUTOF10: 10 - WORST POSSIBLE PAIN
PAINLEVEL_OUTOF10: 3
PAIN_LEVEL: 0

## 2025-05-06 ASSESSMENT — COLUMBIA-SUICIDE SEVERITY RATING SCALE - C-SSRS
1. IN THE PAST MONTH, HAVE YOU WISHED YOU WERE DEAD OR WISHED YOU COULD GO TO SLEEP AND NOT WAKE UP?: NO
6. HAVE YOU EVER DONE ANYTHING, STARTED TO DO ANYTHING, OR PREPARED TO DO ANYTHING TO END YOUR LIFE?: NO
2. HAVE YOU ACTUALLY HAD ANY THOUGHTS OF KILLING YOURSELF?: NO

## 2025-05-06 ASSESSMENT — PAIN DESCRIPTION - LOCATION
LOCATION: HEAD
LOCATION: BACK

## 2025-05-06 ASSESSMENT — PAIN DESCRIPTION - DESCRIPTORS
DESCRIPTORS: ACHING
DESCRIPTORS: ACHING

## 2025-05-06 NOTE — Clinical Note
Dr Ortiz spoke to patient's daughter Stefanie to update on the case. Cheiloplasty (Less Than 50%) Text: A decision was made to reconstruct the defect with a  cheiloplasty.  The defect was undermined extensively.  Additional orbicularis oris muscle was excised with a 15 blade scalpel.  The defect was converted into a full thickness wedge, of less than 50% of the vertical height of the lip, to facilite a better cosmetic result.  Small vessels were then tied off with 5-0 monocyrl. The orbicularis oris, superficial fascia, adipose and dermis were then reapproximated.  After the deeper layers were approximated the epidermis was reapproximated with particular care given to realign the vermilion border.

## 2025-05-06 NOTE — ANESTHESIA POSTPROCEDURE EVALUATION
Patient: Radha Torres    Procedure Summary       Date: 05/06/25 Room / Location: [facility] / Virtual [facility]    Anesthesia Start: 1526 Anesthesia Stop:     Procedures:       Ablation AV Node (Right: Groin)      PPM Leadless Implant (Right: Groin) Diagnosis: Paroxysmal atrial fibrillation (Multi)    Providers: Marija Ortiz MD Responsible Provider: Eugenia Zaidi MD    Anesthesia Type: MAC ASA Status: 3 - Emergent            Anesthesia Type: MAC    Vitals Value Taken Time   BP 99/53 05/06/25 16:03   Temp 36.5 05/06/25 16:03   Pulse 60 05/06/25 16:03   Resp 18 05/06/25 16:03   SpO2 97 05/06/25 16:03       Anesthesia Post Evaluation    Patient location during evaluation: bedside  Patient participation: complete - patient participated  Level of consciousness: awake and alert  Pain score: 0  Pain management: adequate  Airway patency: patent  Cardiovascular status: acceptable and stable  Respiratory status: acceptable and nasal cannula  Hydration status: acceptable  Postoperative Nausea and Vomiting: none        No notable events documented.

## 2025-05-06 NOTE — POST-PROCEDURE NOTE
Physician Transition of Care Summary  Invasive Cardiovascular Lab    Procedure Date: 5/6/2025  Attending:    * Marija Ortiz - Primary  Resident/Fellow/Other Assistant: Surgeons and Role:  * No surgeons found with a matching role *    Indications:   Pre-op Diagnosis      * Paroxysmal atrial fibrillation (Multi) [I48.0]    Post-procedure diagnosis:   Post-op Diagnosis     * Paroxysmal atrial fibrillation (Multi) [I48.0]    Procedure(s):   Ablation AV Node  28782 - SC ICAR CATHETER ABLATION ATRIOVENTR NODE FUNCTION    PPM Leadless Implant  0795T - SC TCAT INSJ PERM DUAL CHAMBER LDLS PM COMPL SYS      Leadless pacemaker implantation    Procedures:  Leadless dual chamber PPM Implant: Joycelyn TERAN   (2531T). Analysis and reprogramming of pacemaker. AV emiliano ablation (48885), HIS bundle recording (25116), 3D mapping (20917), Ultrasound Guided vascular access (78909), Pre/Post procedure device programming,    Patient history:  Please refer to the detailed history and physical on the patient's medical chart.  The patient presented today in normal sinus rhythm off all antiarrhythmic drugs.  Therefore, a dual-chamber leadless pacemaker was placed along with AV node ablation.    Procedure narrative:  The risks, benefits, and alternatives to the procedure and sedation were explained to the patient, and informed consent was obtained. The patient was in the fasting state. A baseline ECG was recorded. RF grounding pads were placed. Self-adhesive anterior-posterior defibrillation pads were applied. A defibrillator was used for monitoring and the defibrillator waveform was set to biphasic. The patient was set up for continuous monitoring of surface 12 lead ECG and pulse oximetry. Blood pressure was monitored with automatic cuff measurements. The procedure was performed under monitored anesthesia care (administered and monitored by anesthesia attending and CRNA). Bilateral groins were clipped, prepped, and draped in the usual sterile  fashion.  The bilateral femoral vein was accessed using the Seldinger technique with 18G Cook needle under ultrasound and fluoroscopic guidance. A 8F sheath was advanced into the vein. This access site was Preclosed with 2 Perclose devices. A long stiff Amplatz wire was advanced through the existing sheath upto the SVC. The existing sheath was removed. Using a milady dilator, the venotomy was dilated upto 22F.   The 27Fr Aveir cathter sheath was advanced over a stiff Amplatz wire.  The sheath was then connected to a flush line of heparinized saline running at 60 gtt/min.   Through the other femoral vein, a 6 Bermudian sheath was placed in the left femoral vein.  A 5 Bermudian Clint pacing catheter was placed in the RV and advance for temporary pacing.  A ST/SF RF catheter was advanced to the heart. The catheter was advanced over the TV. The His bundle recording was obtained, and the His bundle area was mapped with 3D mapping eletroanatomical mapping. Ablation was performed. Ventricular escape rhythm with complete heart block occurred.  Ventricular pacing was then performed through the Clint catheter.  The ablation catheter was then removed from the body.  The Aveir leadless pacemaker was then loaded onto the Aveir delivery system. The tethers were locked onto the docking port of the device. The device was then brought to the patient. Flush lines B, C, and D were hooked onto their respective ports and set to a rate of 60 gtt/min with heparinized saline.   3000 units of IV heparin were administered  Under fluoroscopic guidance, the leadless pacemaker was advanced to the heart. Telemetry connection was established with the device by slowly withdrawing the protective sleeve, exposing the pacemaker to the blood pool to allow for conductive telemetry. Once telemetry was established, the protective sleeve was re-advanced over the device. With SUE and FERDINAND fluoroscopy, the pacemaker was advanced to the apical septum.  "  Contrast venography was also perfromed through the sheath with 75/25 contrast to confirm SUE location and enough \"space\" to ensure the pacemaker was not against the anterior or inferior walls. This image was then saved and stored as a reference.   Similarly, contrast venography was performed in steep Armenian to ensure septal placement.   Once adequate positioning of the device was confirmed, electrical parameters were tested. This showed good passive threshold and a small current of injury.   The device was then advanced a few millimeters, and the protective sleeve was withdrawn. The device was then deployed by rotating the deployment knob until one and a half turns were achieved.   Once the device was fully deployed, repeat electrical measurements were assessed in \"tether mode\", showing a large current of injury with a rise in impedance and threshold, indicating excellent tissue contact.   The delivery system was then aligned coaxillay with the pacemaker in \"tether mode\",  the tethers were purposefully misaligned. With gentle manipulation, the tethers were detached from the device.   The delivery system was gently removed from the body.   A pigtail was advanced to the RA over an 0.35 J wire.  A contrast injection performed in the RA in the Armenian position.  The Aveir delivery sheath was advanced to the RA and the Aveir AR positioned in the RA at the bases of the RAA appendage .  Injury was assessed as well as sensing.  The Aveir AR was affixed.  The patient went into atrial fibrillation and thus atrial thresholds could not be tested.  Pacing and sensing parameters were assessed.  A deflection test was performed to verify fixation.   I-2-I communication was established. The Aveir AR was released.   Cine documented appropriate deployment of the both components of the pacing system.  Electrical measurements were assessed and were appropriate.  The 27Fr sheath was then also removed. Pre-Closes were deployed at the sheath " site and at the left 6 Turkmen femoral sheath site.         Summary:  Successful leadless pacemaker implant Abbot Joycelyn TERAN device  The patient is participating in a CMS-approved GRACY study JOYCELYN TERAN: [id number]  Successful AV node ablation resulting in complete heart block with ventricular escape rhythm    Recommendations:  Bedrest for 2 hours post-sheath removal  A PA-lateral chest X-ray should be performed and telemetry monitoring continued for 24 hours.   A 12 lead ECG should be performed prior to discharge from the hospital.   The patient should continue with the present medications.     Discharge:   The patient was in stable condition after procedure completed    Follow up:   Tentatively patient will be discharged Tomorrow after chest xray and device interrogation are done and provided the recovery parameters are appropriate.   The patient should be alert for bleeding, swelling, or signs of infection. The patient should call the electrophysiologist immediately if symptoms recur, or for any problems.   The patient and family with HIPAA consent have been instructed accordingly.      See complete procedural log and parameters.          Electronically signed by: Marija Ortiz MD, 5/6/2025 6:11 PM

## 2025-05-06 NOTE — NURSING NOTE
Patient returned from EP lab, s/p AVN ablation and dual chamber leadless implant. Bilateral groin sites closed with a pre/perclose, dressings are CDI and soft with palpable pedal pulses. Patient is drowsy but arousable. Tele monitor applied and VSS.  Patient to remain flat post procedure. Family at bedside, will continue to monitor.

## 2025-05-06 NOTE — Clinical Note
Patient ID band present and verified. Patient contact is in the lobby. Contact(s) present: daughter. Contact name: Cass

## 2025-05-06 NOTE — ANESTHESIA PREPROCEDURE EVALUATION
Patient: Radha Torres    Procedure Information       Anesthesia Start Date/Time: 05/06/25 1526    Procedures:       Ablation AV Node (Right: Groin)      PPM Leadless Implant (Right: Groin) - Abbot Joycelyn    [facility]    Providers: Marija Ortiz MD            Relevant Problems   Cardiac   (+) Aortic stenosis, severe   (+) Benign essential hypertension   (+) Hyperlipidemia   (+) Paroxysmal atrial fibrillation (Multi)   (+) Sinus bradycardia      Neuro   (+) Amiodarone induced neuropathy (Multi)      Endocrine   (+) Diabetes mellitus (Multi)      Hematology   (+) Anemia   (+) Long term current use of anticoagulant therapy      Circulatory   (+) Cardiomyopathy      Genitourinary   (+) Stage 3b chronic kidney disease (Multi)       Clinical information reviewed:   Tobacco  Allergies  Meds   Med Hx  Surg Hx   Fam Hx  Soc Hx        NPO Detail:  NPO/Void Status  Carbohydrate Drink Given Prior to Surgery? : N  Date of Last Liquid: 05/05/25  Time of Last Liquid: 0800  Date of Last Solid: 05/05/25  Time of Last Solid: 1700  Last Intake Type: Clear fluids  Time of Last Void: 0800         Physical Exam    Airway  Mallampati: unable to assess     Cardiovascular   Rhythm: regular  Rate: normal     Dental    Pulmonary - normal exam   Abdominal - normal exam           Anesthesia Plan    History of general anesthesia?: unknown/emergency  History of complications of general anesthesia?: unknown/emergency    ASA 3 - emergent     MAC   (Anesthesia called to assist with procedure due to patient being uncooperative under moderate sedation given by cardiologist. Further sedation required. )  intravenous induction     Plan discussed with CRNA.

## 2025-05-07 ENCOUNTER — APPOINTMENT (OUTPATIENT)
Dept: CARDIOLOGY | Facility: HOSPITAL | Age: 85
End: 2025-05-07
Payer: MEDICARE

## 2025-05-07 ENCOUNTER — APPOINTMENT (OUTPATIENT)
Dept: RADIOLOGY | Facility: HOSPITAL | Age: 85
End: 2025-05-07
Payer: MEDICARE

## 2025-05-07 VITALS
SYSTOLIC BLOOD PRESSURE: 122 MMHG | DIASTOLIC BLOOD PRESSURE: 61 MMHG | HEIGHT: 65 IN | HEART RATE: 71 BPM | TEMPERATURE: 96.6 F | OXYGEN SATURATION: 94 % | WEIGHT: 163.58 LBS | BODY MASS INDEX: 27.25 KG/M2 | RESPIRATION RATE: 18 BRPM

## 2025-05-07 PROCEDURE — 2500000001 HC RX 250 WO HCPCS SELF ADMINISTERED DRUGS (ALT 637 FOR MEDICARE OP): Performed by: NURSE PRACTITIONER

## 2025-05-07 PROCEDURE — 7100000011 HC EXTENDED STAY RECOVERY HOURLY - NURSING UNIT

## 2025-05-07 PROCEDURE — 99239 HOSP IP/OBS DSCHRG MGMT >30: CPT | Performed by: NURSE PRACTITIONER

## 2025-05-07 PROCEDURE — 71046 X-RAY EXAM CHEST 2 VIEWS: CPT

## 2025-05-07 PROCEDURE — 2500000002 HC RX 250 W HCPCS SELF ADMINISTERED DRUGS (ALT 637 FOR MEDICARE OP, ALT 636 FOR OP/ED): Performed by: NURSE PRACTITIONER

## 2025-05-07 PROCEDURE — 93280 PM DEVICE PROGR EVAL DUAL: CPT | Performed by: INTERNAL MEDICINE

## 2025-05-07 PROCEDURE — 71046 X-RAY EXAM CHEST 2 VIEWS: CPT | Performed by: RADIOLOGY

## 2025-05-07 PROCEDURE — 93280 PM DEVICE PROGR EVAL DUAL: CPT

## 2025-05-07 PROCEDURE — 2500000004 HC RX 250 GENERAL PHARMACY W/ HCPCS (ALT 636 FOR OP/ED): Mod: JZ | Performed by: NURSE PRACTITIONER

## 2025-05-07 RX ORDER — CARVEDILOL 6.25 MG/1
6.25 TABLET ORAL ONCE
Status: DISCONTINUED | OUTPATIENT
Start: 2025-05-07 | End: 2025-05-07

## 2025-05-07 RX ORDER — CARVEDILOL 6.25 MG/1
6.25 TABLET ORAL 2 TIMES DAILY
Qty: 60 TABLET | Refills: 5 | Status: SHIPPED | OUTPATIENT
Start: 2025-05-07 | End: 2025-11-03

## 2025-05-07 RX ORDER — CARVEDILOL 6.25 MG/1
6.25 TABLET ORAL ONCE
Status: DISCONTINUED | OUTPATIENT
Start: 2025-05-07 | End: 2025-05-07 | Stop reason: HOSPADM

## 2025-05-07 RX ADMIN — PAROXETINE HYDROCHLORIDE 20 MG: 20 TABLET, FILM COATED ORAL at 09:19

## 2025-05-07 RX ADMIN — HYDROCODONE BITARTRATE AND ACETAMINOPHEN 1 TABLET: 5; 325 TABLET ORAL at 04:22

## 2025-05-07 RX ADMIN — AMLODIPINE BESYLATE 2.5 MG: 5 TABLET ORAL at 09:19

## 2025-05-07 RX ADMIN — HYDRALAZINE HYDROCHLORIDE 10 MG: 20 INJECTION INTRAMUSCULAR; INTRAVENOUS at 11:56

## 2025-05-07 RX ADMIN — HYDRALAZINE HYDROCHLORIDE 10 MG: 20 INJECTION INTRAMUSCULAR; INTRAVENOUS at 04:22

## 2025-05-07 RX ADMIN — LEVOTHYROXINE SODIUM 75 MCG: 75 TABLET ORAL at 09:19

## 2025-05-07 RX ADMIN — HYDROCODONE BITARTRATE AND ACETAMINOPHEN 1 TABLET: 5; 325 TABLET ORAL at 14:30

## 2025-05-07 RX ADMIN — HYDROCODONE BITARTRATE AND ACETAMINOPHEN 1 TABLET: 5; 325 TABLET ORAL at 09:18

## 2025-05-07 RX ADMIN — FUROSEMIDE 20 MG: 40 TABLET ORAL at 09:19

## 2025-05-07 RX ADMIN — APIXABAN 2.5 MG: 5 TABLET, FILM COATED ORAL at 09:19

## 2025-05-07 ASSESSMENT — PAIN DESCRIPTION - DESCRIPTORS
DESCRIPTORS: ACHING
DESCRIPTORS: ACHING

## 2025-05-07 ASSESSMENT — PAIN SCALES - GENERAL
PAINLEVEL_OUTOF10: 7
PAINLEVEL_OUTOF10: 8
PAINLEVEL_OUTOF10: 2
PAINLEVEL_OUTOF10: 3
PAINLEVEL_OUTOF10: 7

## 2025-05-07 ASSESSMENT — COGNITIVE AND FUNCTIONAL STATUS - GENERAL
MOBILITY SCORE: 19
MOVING TO AND FROM BED TO CHAIR: A LITTLE
STANDING UP FROM CHAIR USING ARMS: A LITTLE
WALKING IN HOSPITAL ROOM: A LITTLE
DRESSING REGULAR LOWER BODY CLOTHING: A LITTLE
CLIMB 3 TO 5 STEPS WITH RAILING: A LOT
TOILETING: A LITTLE
DAILY ACTIVITIY SCORE: 21
HELP NEEDED FOR BATHING: A LITTLE

## 2025-05-07 ASSESSMENT — PAIN - FUNCTIONAL ASSESSMENT
PAIN_FUNCTIONAL_ASSESSMENT: 0-10

## 2025-05-07 ASSESSMENT — ACTIVITIES OF DAILY LIVING (ADL): LACK_OF_TRANSPORTATION: NO

## 2025-05-07 ASSESSMENT — PAIN DESCRIPTION - LOCATION
LOCATION: BACK
LOCATION: LEG
LOCATION: BACK

## 2025-05-07 ASSESSMENT — PAIN DESCRIPTION - ORIENTATION: ORIENTATION: RIGHT;LEFT

## 2025-05-07 NOTE — DISCHARGE INSTRUCTIONS
Home going instructions after a leadless Pacemaker Implant    After a procedure using sedation  You should return home and rest for the remainder of the day and evening.   It is recommended a responsible adult be with you for the first 24 hours after the procedure.  Do not make any legal decisions for 24 hours after your procedure.  Do not drink alcoholic beverages for 24 hours after your procedure.    Remote monitoring/ Device ID card  You have been instructed by the device company representative regarding remote home monitoring.   There are multiple types of home monitoring units, please follow the instructions given  If you have questions please contact the device clinic for further instruction  After implant you will receive a temporary card.    Permanent card will come in the mail in the next few weeks.  It is important that you carry your pacemaker ID card with you at all times.    Inform all doctors and healthcare providers that you have a pacemaker.      Electromagnetic Interference:    Microwave ovens are safe to use.    Please inform any physicians of your pacemaker implant prior to MRI for appropriate scheduling.    You should be prepared to show your pacemaker identification card to the security guards at metal detectors.    Hand wand detector should be kept away from the pacemaker.    Read the patient booklet for more information.      Call 911 or seek medical attention for:  Severe chest pain  Change in mental status or weakness in extremities.  Dizziness, light headedness, or feeling faint.  If there is a large amount of bleeding or spurting of blood.  DO NOT drive yourself to the hospital.    Activity/ After care  Avoid heavy lifting (over 10 pounds), strenuous activity/exercise, squatting or excessive bending for 7 days.  Avoid sexual activity for 3 days and until any groin discomfort has ceased.  No driving for 48 hours after procedure.    Groin site care  The transparent dressing should be removed  from the site 24 hours after the procedure.    It is ok to shower after transparent dressing is removed  Wash the site gently with soap and water.   Rinse well and pat dry.  You may apply a Band-Aid to the site.   Avoid lotions, ointments, or powders until fully healed.  No submerged bathing, swimming, or hot tubs for the next 7 days, or until fully healed.  You may take acetaminophen (Tylenol) as directed for discomfort.      Notify your provider  If you develop a large area of bruising or a large lump.  It is normal to notice a small bruise around the puncture site and/or a small lump.   If bleeding should occur, lay down and apply pressure to the affected area for 15 minutes.    If groin pain is not relieved with acetaminophen (Tylenol).  If you develop tingling, numbness, pain, or coolness in the leg/arm beyond the site where the procedure was performed.  If post procedure chest discomfort unrelieved with acetaminophen (Tylenol)    Follow up appointments  Incision (wound) check in 1 week.  Appointment for  device check, and provider in 3 months.  These appointments will be scheduled and appear on your After Visit Summary.     Monitor Blood pressure at home and recorder - report to primary physician Dr Guo if blood pressure consistently above 160.

## 2025-05-07 NOTE — PROGRESS NOTES
05/07/25 1252   Discharge Planning   Living Arrangements Spouse/significant other   Support Systems Spouse/significant other   Assistance Needed pt uses Rollator or wheelchair at times for assistance.  Spouse assists with showering patient.   Type of Residence Private residence   Who is requesting discharge planning? Provider   Home or Post Acute Services None   Expected Discharge Disposition Home   Housing Stability   In the last 12 months, was there a time when you were not able to pay the mortgage or rent on time? N   At any time in the past 12 months, were you homeless or living in a shelter (including now)? N   Transportation Needs   In the past 12 months, has lack of transportation kept you from medical appointments or from getting medications? no   In the past 12 months, has lack of transportation kept you from meetings, work, or from getting things needed for daily living? No   Patient Choice   Patient / Family choosing to utilize agency / facility established prior to hospitalization No   Stroke Family Assessment   Stroke Family Assessment Needed No   Intensity of Service   Intensity of Service 0-30 min     Per review,  pt underwent elective Av node Ablation and PPM- leadless implant- Dr Ortiz.   Met with patient and family at bedside.  No discharge needs identified.  Plan is home with family support.  Will continue to follow if needs should arise.

## 2025-05-07 NOTE — CARE PLAN
"The patient's goals for the shift include  \"To go home\"     The clinical goals for the shift include Patient will remain free of falls throughout shift        "

## 2025-05-07 NOTE — DISCHARGE SUMMARY
Discharge Diagnosis  Paroxysmal atrial fibrillation (Multi)    Issues Requiring Follow-Up  AV leadless PPM placement and AV node ablation    Test Results Pending At Discharge  Pending Labs       No current pending labs.            Hospital Course   85-year-old female with history of medication refractory persistent atrial fibrillation with RVR for elective leadless pacemaker implant and AV node ablation.  On admission patient was noted to be in sinus bradycardia with recommendations for atrial/ventricular leadless pacemaker placement and AV node ablation.  See full operative report per Dr. Ortiz.  During procedure patient had episodes of atrial fibrillation as well as sinus rhythm.  Currently patient is in atrial fibrillation V paced with heart rate in the 70s.  She denies palpitations, dizziness, lightheadedness.  She has been out of bed with assist of 1 person and her walker.  Bilateral femoral vein sites dressings removed.  Both areas are well-approximated with no hematoma or bleeding.  Device interrogation shows normal lead and device function.  Chest x-ray shows normal device placement.  Post procedure potential complication, activity restriction, medication, and follow-up reviewed with patient.  Before and after procedure patient is noted to have blood pressure readings up to 200 systolic.  Patient has been treated with hydralazine IV and change of medication with discontinuation of metoprolol and changed to carvedilol 6.25 mg twice daily.  Patient to monitor blood pressure intermittently as outpatient and report to primary service if blood pressure readings are consistently above 160 systolic.  Patient to follow-up with primary service in the next 1 to 2 weeks, wound check in 1 week with PA in the office, then 3-month device check and follow-up with Dr. Ortiz.  Patient to continue all current medications and discharged plus carvedilol as above.    Pertinent Physical Exam At Time of Discharge  Physical  Exam  Constitutional:       Appearance: Normal appearance.   HENT:      Head: Normocephalic and atraumatic.      Nose: Nose normal.      Mouth/Throat:      Mouth: Mucous membranes are moist.   Eyes:      Conjunctiva/sclera: Conjunctivae normal.   Cardiovascular:      Rate and Rhythm: Normal rate and regular rhythm.      Pulses: Normal pulses.      Heart sounds: Normal heart sounds.   Pulmonary:      Effort: Pulmonary effort is normal.      Breath sounds: Normal breath sounds.   Musculoskeletal:         General: Normal range of motion.   Skin:     General: Skin is warm and dry.   Neurological:      General: No focal deficit present.      Mental Status: She is alert and oriented to person, place, and time.   Psychiatric:         Mood and Affect: Mood normal.         Behavior: Behavior normal.         Home Medications     Medication List      START taking these medications     carvedilol 6.25 mg tablet; Commonly known as: Coreg; Take 1 tablet (6.25   mg) by mouth 2 times a day.     CONTINUE taking these medications     amLODIPine 2.5 mg tablet; Commonly known as: Norvasc; Take 1 tablet (2.5   mg) by mouth once daily.   apixaban 2.5 mg tablet; Commonly known as: Eliquis; Take 1 tablet (2.5   mg) by mouth 2 times a day.   ascorbic acid 500 mg tablet; Commonly known as: Vitamin C   atorvastatin 20 mg tablet; Commonly known as: Lipitor; Take 1 tablet (20   mg) by mouth once daily.   calcium citrate-vitamin D3 315 mg-5 mcg (200 unit) tablet; Commonly   known as: Citracal+D   cholecalciferol 25 mcg (1,000 units) capsule; Commonly known as: Vitamin   D-3   ferrous sulfate 325 mg (65 mg elemental) tablet   furosemide 20 mg tablet; Commonly known as: Lasix; Take 1 tablet (20 mg)   by mouth once daily.   gabapentin 300 mg capsule; Commonly known as: Neurontin   HYDROcodone-acetaminophen 5-325 mg tablet; Commonly known as: Norco   levothyroxine 75 mcg tablet; Commonly known as: Synthroid, Levoxyl   NexIUM 20 mg DR capsule;  Generic drug: esomeprazole   PARoxetine 20 mg tablet; Commonly known as: Paxil   zolpidem 10 mg tablet; Commonly known as: Ambien     STOP taking these medications     metoprolol tartrate 50 mg tablet; Commonly known as: Lopressor       Outpatient Follow-Up  Future Appointments   Date Time Provider Department Center   5/13/2025 11:30 AM Rema Ruiz PA-C [id number] [place]   6/30/2025  2:15 PM Love Moreno MD [id number] [place]   8/12/2025 10:20 AM [facility] [id number] [place]   8/12/2025 11:15 AM Marija Ortiz MD [id number] [place]   Total for discharge time 45 minutes    Angle Zapata, APRN-CNP

## 2025-05-08 LAB
ATRIAL RATE: 70 BPM
BLOOD EXPIRATION DATE: NORMAL
BLOOD EXPIRATION DATE: NORMAL
DISPENSE STATUS: NORMAL
DISPENSE STATUS: NORMAL
PRODUCT BLOOD TYPE: 6200
PRODUCT BLOOD TYPE: 6200
PRODUCT CODE: NORMAL
PRODUCT CODE: NORMAL
Q ONSET: 198 MS
QRS COUNT: 12 BEATS
QRS DURATION: 184 MS
QT INTERVAL: 498 MS
QTC CALCULATION(BAZETT): 537 MS
QTC FREDERICIA: 524 MS
R AXIS: 263 DEGREES
T AXIS: 50 DEGREES
T OFFSET: 447 MS
UNIT ABO: NORMAL
UNIT ABO: NORMAL
UNIT NUMBER: NORMAL
UNIT NUMBER: NORMAL
UNIT RH: NORMAL
UNIT RH: NORMAL
UNIT VOLUME: 350
UNIT VOLUME: 350
VENTRICULAR RATE: 70 BPM
XM INTEP: NORMAL
XM INTEP: NORMAL

## 2025-05-13 ENCOUNTER — APPOINTMENT (OUTPATIENT)
Dept: CARDIOLOGY | Facility: CLINIC | Age: 85
End: 2025-05-13
Payer: MEDICARE

## 2025-05-13 VITALS
HEIGHT: 65 IN | WEIGHT: 168 LBS | BODY MASS INDEX: 27.99 KG/M2 | DIASTOLIC BLOOD PRESSURE: 64 MMHG | SYSTOLIC BLOOD PRESSURE: 116 MMHG | HEART RATE: 64 BPM

## 2025-05-13 DIAGNOSIS — I48.19 ATRIAL FIBRILLATION, PERSISTENT (MULTI): ICD-10-CM

## 2025-05-13 DIAGNOSIS — Z48.89 ENCOUNTER FOR POSTOPERATIVE WOUND CHECK: Primary | ICD-10-CM

## 2025-05-13 DIAGNOSIS — Z98.890 HX OF ATRIOVENTRICULAR NODE ABLATION: ICD-10-CM

## 2025-05-13 DIAGNOSIS — Z79.01 CHRONIC ANTICOAGULATION: ICD-10-CM

## 2025-05-13 DIAGNOSIS — Z95.0 PRESENCE OF LEADLESS CARDIAC PACEMAKER: ICD-10-CM

## 2025-05-13 PROCEDURE — 1036F TOBACCO NON-USER: CPT | Performed by: PHYSICIAN ASSISTANT

## 2025-05-13 PROCEDURE — 99024 POSTOP FOLLOW-UP VISIT: CPT | Performed by: PHYSICIAN ASSISTANT

## 2025-05-13 PROCEDURE — 3078F DIAST BP <80 MM HG: CPT | Performed by: PHYSICIAN ASSISTANT

## 2025-05-13 PROCEDURE — 3074F SYST BP LT 130 MM HG: CPT | Performed by: PHYSICIAN ASSISTANT

## 2025-05-13 PROCEDURE — 1159F MED LIST DOCD IN RCRD: CPT | Performed by: PHYSICIAN ASSISTANT

## 2025-05-13 NOTE — PROGRESS NOTES
Electrophysiology Office Visit     CHIEF COMPLAINT  Chief Complaint   Patient presents with    Follow-up     1 week wound check s/p ablation      Primary EP doctor: Dr Ortiz  Primary Cardiologist: Dr Moreno    EP History: AF (dx 2018). 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 Dx w/ AF and HF in the setting of AV w/ RVR and pleural effusion s/p thoracentesis. EGD w/ superficial ulcerations. Incidental R renal mass, suspect to be malignant. Started on Amiodarone and OAC.  Later: EF improved. 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 Amiodarone discontinued in Sept 2024, however had recurrent AF, referred to Dr Ortiz by Dr Moreno.   5/6/2025 - 5/7/2025 Admit w/ persistent AF w/ RVR. Found to be bradycardic. Underwent elective leadless PPM and AV node ablation. Pt declines LAAO closure.   Maintained on carvedilol 6.25 mg twice daily, apixaban 2.5 mg twice daily    HPI: 5/13/2025  85 year old female is s/p AV node ablation w/ leadless PPM. She was admitted May 6th - 7th for persistent AF. She then became bradycardic requiring a PPM after her ablation for her AF.   She reports doing well overall.     1 week wound check of B/L groin:   Any patient's complaints? B/L lower breast/chest pain goes from right to the left. No leg soreness  Constitutional signs of infection such as fever, chills, body aches? NO  Local signs of infection such as cellulitis or drainage?  NO  Signs of hematoma?  NO  Severity of ecchymosis? NO, very trace  Pulses present?  Yes, bilaterally  Bruit present?  NO    Next appt w/ Dr Ortiz is 8/12. Includes device check.   Next appt w/ Dr Moreno 6/30.     VITALS  Vitals:    05/13/25 1217   BP: 116/64   Pulse: 64     Wt Readings from Last 4 Encounters:    05/13/25 76.2 kg (168 lb)   05/06/25 74.2 kg (163 lb 9.3 oz)   04/16/25 77.1 kg (170 lb)   03/31/25 77.6 kg (171 lb)     PHYSICAL EXAM:  GENERAL:  Well developed, well nourished, in no acute distress.  NEURO/PSYCH:  No focal deficits. A&O x 3   LUNGS:  Clear to auscultation bilaterally. No wheezing, rhonci, or crackles  HEART:  RRR, no M/R/G.   EXTREMITIES:  WELL HEALING GROIN BL GROIN INCISIONS. NO DRAINAGE. NO SIGNS OF INFECTION. + PULSES BILATERALLY. NO FEMORAL BRUITS. TRACE BRUISING.     Medical History[1]  Surgical History[2]  Social History[3]  Family History[4]  RX Allergies[5]   Current Outpatient Medications   Medication Instructions    amLODIPine (NORVASC) 2.5 mg, oral, Daily    apixaban (ELIQUIS) 2.5 mg, oral, 2 times daily    ascorbic acid (Vitamin C) 500 mg tablet 1 tablet, Daily    atorvastatin (LIPITOR) 20 mg, oral, Daily    calcium citrate-vitamin D3 (Citracal+D) 315 mg-5 mcg (200 unit) tablet 1 tablet, Daily    carvedilol (COREG) 6.25 mg, oral, 2 times daily    cholecalciferol (VITAMIN D-3) 25 mcg, Daily    esomeprazole (NexIUM) 20 mg DR capsule 1 capsule, Daily    ferrous sulfate 325 (65 Fe) MG tablet 1 tablet, Daily    furosemide (LASIX) 20 mg, oral, Daily    gabapentin (NEURONTIN) 300 mg, Daily    HYDROcodone-acetaminophen (Norco) 5-325 mg tablet 1 tablet, Every 4 hours PRN    levothyroxine (Synthroid, Levoxyl) 75 mcg tablet 1 tablet, Daily    PARoxetine (Paxil) 20 mg tablet 1 tablet, Daily    zolpidem (Ambien) 10 mg tablet 1 tablet, Nightly PRN      Relevant reports:   1/17/2025 ECHO  Moderate concentric left ventricular hypertrophy, EF 60-65%  Grade 1/4 or mild diastolic dysfunction  Moderate to severe valvular aortic stenosis    12/8/2022 ECHO  1. Left ventricular systolic function is normal with a 65% estimated ejection fraction.   2. Mild concentric left ventricular hypertrophy.   3. Spectral Doppler shows an impaired relaxation pattern of left ventricular diastolic filling.   4. There is  an elevated mean left atrial pressure.   5. Peak velocity across the aortic valve measured 340 cm/s corresponding to a peak pressure gradient 46 mmHg and a mean pressure gradient 27 mmHg. The aortic valve area measured 1.2 cm2 consistent with moderate aortic stenosis.   6. There is moderate to severe aortic valve cusp calcification.   7. When compared to a study from 10/5/2018 ejection fraction has improved from 30% up to 65%. The aortic stenosis has progressed from mild to moderate, the moderate mitral regurgitation reported previously has nearly resolved, The pleural effusion has resolved.    10/5/2018 ECHO  Mild concentric LVH  EF 25-30%  Moderate to severe global hypokinesis of the left ventricle  Left atrium mildly dilated  Mild valvular aortic stenosis  Moderate MR  Mild TR  Mild pulmonary hypertension  Moderate size right pleural effusion    2/23/2017 ECHO  EF 55-60%  Mild MR and TR    4/13/2023 Carotid duplex: BL ICA <50%    5/6/2025 EP procedure w/ Leadless PPM implant and AV node Ablation  Successful leadless pacemaker implant [employer] Joycelyn TERAN device  The patient is participating in a CMS-approved GRACY study JOYCELYN TERAN: [id number]  Successful AV node ablation resulting in complete heart block with ventricular escape rhythm    1/23/2024 PFT-evidence of moderately severe obstruction and mild restriction without clear air trapping moderately reduced diffusion capacity which normalizes when adjusted for alveolar volume and could represent ventilation/perfusion mismatch as the primary reason.  No significant change compared to PFTs of July 2023.  No clear evidence to suggest a trend of declining diffusion capacity dating back to October 2021.    Problem List[6]     ASSESSMENT AND PLAN  Problem List Items Addressed This Visit       Chronic anticoagulation for AF  Continue reduced dose Eliquis 2.5 mg twice daily    Atrial fibrillation, persistent (Multi)  Patient cardiac exam is regular rate and rhythm    Hx of  atrioventricular node ablation  Performed on 5/6/2025 for persistent atrial fibrillation    Encounter for postoperative wound check - for AV node ablation  Bilateral leg groin incisions healing nicely    Leadless PPM for bradycardia/sinus node dysfunction  You have an in clinic device check in August with an office visit with Dr. Ortiz.  Continue with these office appointments        JILL Pederson PA-C             [1]   Past Medical History:  Diagnosis Date    Arrhythmia     afib    Cardiomyopathy     CHF (congestive heart failure)     Chronic kidney disease     Hyperlipidemia     Hypertension    [2]   Past Surgical History:  Procedure Laterality Date    CARDIAC ELECTROPHYSIOLOGY PROCEDURE Right 5/6/2025    Procedure: Ablation AV Node;  Surgeon: Marija Ortiz MD;  Location: [facility];  Service: Electrophysiology;  Laterality: Right;    CARDIAC ELECTROPHYSIOLOGY PROCEDURE Right 5/6/2025    Procedure: PPM Leadless Implant;  Surgeon: Marija Ortiz MD;  Location: [facility];  Service: Electrophysiology;  Laterality: Right;  Aveir, Abbot    OTHER SURGICAL HISTORY  09/13/2021    Hysterectomy    OTHER SURGICAL HISTORY  09/13/2021    Cholecystectomy    OTHER SURGICAL HISTORY  09/13/2021    Complete colonoscopy    OTHER SURGICAL HISTORY  09/13/2021    Kidney surgery    OTHER SURGICAL HISTORY  09/13/2021    Leg surgery   [3]   Social History  Tobacco Use    Smoking status: Never    Smokeless tobacco: Never   Vaping Use    Vaping status: Never Used   Substance Use Topics    Alcohol use: Never    Drug use: Never   [4]   Family History  Problem Relation Name Age of Onset    No Known Problems Mother      No Known Problems Father     [5] No Known Allergies  [6]   Patient Active Problem List  Diagnosis    Aortic stenosis, severe    Benign essential hypertension    Cardiomyopathy    Diabetes mellitus (Multi)    Hyperlipidemia    Paroxysmal atrial fibrillation (Multi)    Never smoked cigarettes     Chronic anticoagulation    High risk medication use    At high risk for injury related to fall    Sinus bradycardia    Medication course changed    BMI 28.0-28.9,adult    Stage 3b chronic kidney disease (Multi)    Amiodarone induced neuropathy (Multi)    Anemia    Atrial fibrillation, persistent (Multi)    Hx of atrioventricular node ablation    Encounter for postoperative wound check    Presence of leadless cardiac pacemaker

## 2025-05-13 NOTE — PATIENT INSTRUCTIONS
Great to see you today.   Please take your medications and or do life style behavior modifications as discussed.   Follow up with Dr Ortiz in Aug. Your appointment is already made. You also have an in-clinic pacemaker device check scheduled that same day. Please go to the device clinic first and then head to Dr Ortiz's office.  Please call if you have any questions or concerns.  Please go to emergency department if you have abrupt onset of chest, shortness of breath, light headedness or dizziness.

## 2025-06-09 ENCOUNTER — APPOINTMENT (OUTPATIENT)
Dept: CARDIOLOGY | Facility: HOSPITAL | Age: 85
End: 2025-06-09
Payer: MEDICARE

## 2025-06-12 ENCOUNTER — APPOINTMENT (OUTPATIENT)
Dept: CARDIOLOGY | Facility: CLINIC | Age: 85
End: 2025-06-12
Payer: MEDICARE

## 2025-06-30 ENCOUNTER — APPOINTMENT (OUTPATIENT)
Dept: CARDIOLOGY | Facility: CLINIC | Age: 85
End: 2025-06-30
Payer: MEDICARE

## 2025-06-30 VITALS
SYSTOLIC BLOOD PRESSURE: 148 MMHG | HEIGHT: 65 IN | WEIGHT: 165.6 LBS | DIASTOLIC BLOOD PRESSURE: 74 MMHG | HEART RATE: 96 BPM | BODY MASS INDEX: 27.59 KG/M2

## 2025-06-30 DIAGNOSIS — I35.0 AORTIC STENOSIS, SEVERE: ICD-10-CM

## 2025-06-30 DIAGNOSIS — E78.5 HYPERLIPIDEMIA, UNSPECIFIED HYPERLIPIDEMIA TYPE: ICD-10-CM

## 2025-06-30 DIAGNOSIS — Z95.0 PRESENCE OF LEADLESS CARDIAC PACEMAKER: ICD-10-CM

## 2025-06-30 DIAGNOSIS — I10 BENIGN ESSENTIAL HYPERTENSION: ICD-10-CM

## 2025-06-30 DIAGNOSIS — I48.19 ATRIAL FIBRILLATION, PERSISTENT (MULTI): ICD-10-CM

## 2025-06-30 DIAGNOSIS — N18.32 STAGE 3B CHRONIC KIDNEY DISEASE (MULTI): ICD-10-CM

## 2025-06-30 DIAGNOSIS — E11.69 TYPE 2 DIABETES MELLITUS WITH OTHER SPECIFIED COMPLICATION, UNSPECIFIED WHETHER LONG TERM INSULIN USE (MULTI): ICD-10-CM

## 2025-06-30 DIAGNOSIS — Z78.9 NEVER SMOKED CIGARETTES: ICD-10-CM

## 2025-06-30 DIAGNOSIS — I48.0 PAROXYSMAL ATRIAL FIBRILLATION (MULTI): ICD-10-CM

## 2025-06-30 DIAGNOSIS — Z95.0 PACEMAKER: ICD-10-CM

## 2025-06-30 PROCEDURE — 1159F MED LIST DOCD IN RCRD: CPT | Performed by: INTERNAL MEDICINE

## 2025-06-30 PROCEDURE — 3077F SYST BP >= 140 MM HG: CPT | Performed by: INTERNAL MEDICINE

## 2025-06-30 PROCEDURE — 99214 OFFICE O/P EST MOD 30 MIN: CPT | Performed by: INTERNAL MEDICINE

## 2025-06-30 PROCEDURE — 3078F DIAST BP <80 MM HG: CPT | Performed by: INTERNAL MEDICINE

## 2025-06-30 PROCEDURE — G2211 COMPLEX E/M VISIT ADD ON: HCPCS | Performed by: INTERNAL MEDICINE

## 2025-06-30 RX ORDER — CARVEDILOL 6.25 MG/1
6.25 TABLET ORAL 2 TIMES DAILY
Qty: 60 TABLET | Refills: 5 | Status: SHIPPED | OUTPATIENT
Start: 2025-06-30 | End: 2025-12-27

## 2025-06-30 RX ORDER — ATORVASTATIN CALCIUM 20 MG/1
20 TABLET, FILM COATED ORAL DAILY
Qty: 90 TABLET | Refills: 1 | Status: SHIPPED | OUTPATIENT
Start: 2025-06-30 | End: 2026-06-30

## 2025-06-30 RX ORDER — FUROSEMIDE 20 MG/1
20 TABLET ORAL DAILY
Qty: 90 TABLET | Refills: 1 | Status: CANCELLED | OUTPATIENT
Start: 2025-06-30 | End: 2026-06-30

## 2025-06-30 RX ORDER — AMLODIPINE BESYLATE 2.5 MG/1
2.5 TABLET ORAL DAILY
Qty: 90 TABLET | Refills: 1 | Status: SHIPPED | OUTPATIENT
Start: 2025-06-30 | End: 2026-06-30

## 2025-06-30 NOTE — LETTER
July 1, 2025     Bubba Guo MD  [address] [place] [id number]    Patient: Radha Torres   YOB: 1940   Date of Visit: 6/30/2025       Dear Dr. Bubba Guo MD:    Thank you for referring Radha Torres to me for evaluation. Below are my notes for this consultation.  If you have questions, please do not hesitate to call me. I look forward to following your patient along with you.       Sincerely,     Love Moreno MD      CC: No Recipients  ______________________________________________________________________________________        Chief Complaint:    Chief Complaint   Patient presents with   • Follow-up     3 month, paroxysmal atrial fibrillation     May 2025, patient underwent AV emiliano ablation and leadless permanent pacemaker implantation.  It is documented that she declined Watchman device.  She is accompanied by her daughter and her  to the office.  Subjective :     Review of Systems family reports that she is sleeping much better, that she is much less short of breath and she has no palpitations or lightheadedness.  She denies chest pressure tightness or heaviness.  Activity level still less than 4 METS.  Has not had any falls.    History so Far :    1. Persistent atrial fibrillation-previously on  amiodarone, maintaining sinus rhythm  2. No evidence of amiodarone toxicity  3. Chronic kidney disease stage IIIb/4.  4. Diabetes mellitus with hypoglycemia  5. Carotid bruit nonflow-limiting disease  6. Mild to moderate calcific aortic stenosis. Valve area estimated at 1.3 cmÂ², dimensionless index reported at 0.3, peak and mean gradients 46 and 27 respectively. Compared to the echo report from October 2018, LVEF improved from 30% to 65%. Previously noted mitral regurgitation resolved  7.  Fall risk  8  Personal history of COVID May 2024  9.  Diuretics to be addressed by nephrology     10.Extensive discussion about Watchman device:  she has had multiple rib fractures from  "falling, most of the falls are mechanical,   Very hesitant to proceed with Watchman device.  Procedure risk benefits alternatives discussed.  Very hesitant to go off anticoagulation.  She understands her stroke risk, and says she will not quit blood thinners.     11.  Pulmonary function test January 2024-evidence of moderately severe obstruction and mild restriction without clear air trapping moderately reduced diffusion capacity which normalizes when adjusted for alveolar volume and could represent ventilation/perfusion mismatch as the primary reason.  No significant change compared to PFTs of July 2023.  No clear evidence to suggest a trend of declining diffusion capacity dating back to October 2021.    12.5/6/2025 EP procedure w/ Leadless PPM implant and AV node Ablation  Successful leadless pacemaker implant [employer] Joycelyn TERAN device  The patient is participating in a CMS-approved GRACY study JOYCELYN TERAN: [id number]  Successful AV node ablation resulting in complete heart block with ventricular escape rhythm       Objective   Wt Readings from Last 3 Encounters:   06/30/25 75.1 kg (165 lb 9.6 oz)   05/13/25 76.2 kg (168 lb)   05/06/25 74.2 kg (163 lb 9.3 oz)        Vitals:    06/30/25 1409 06/30/25 1528   BP: 146/86 148/74   BP Location: Left arm Left arm   Patient Position: Sitting Standing   Pulse: 96    Weight: 75.1 kg (165 lb 9.6 oz)    Height: 1.651 m (5' 5\")            Physical Exam:    GENERAL APPEARANCE: in no acute distress.  CHEST: Symmetric and non-tender.  INTEGUMENT: Skin warm and dry  HEENT: No gross abnormalities identified.No pallor or scleral icterus.  NECK: Supple, no JVD, no bruit.   NEURO/PSHCY: Alert and oriented x3; appropriate behavior and responses and responses  LUNGS: Clear to auscultation bilaterally; normal respiratory effort.  HEART: Rate and rhythm regular with grade 1/6 to 2/6 crescendo decrescendo murmur along the left sternal border no gallop appreciated.   ABDOMEN: Soft, non " tender.  MUSCULOSKELETAL: No gross deformities.  EXTREMITIES: Warm  There is no edema noted.    Meds:  Current Outpatient Medications   Medication Instructions   • amLODIPine (NORVASC) 2.5 mg, oral, Daily   • apixaban (ELIQUIS) 2.5 mg, oral, 2 times daily   • ascorbic acid (Vitamin C) 500 mg tablet 1 tablet, Daily   • atorvastatin (LIPITOR) 20 mg, oral, Daily   • calcium citrate-vitamin D3 (Citracal+D) 315 mg-5 mcg (200 unit) tablet 1 tablet, Daily   • carvedilol (COREG) 6.25 mg, oral, 2 times daily   • cholecalciferol (VITAMIN D-3) 25 mcg, Daily   • esomeprazole (NexIUM) 20 mg DR capsule 1 capsule, Daily   • ferrous sulfate 325 (65 Fe) MG tablet 1 tablet, Daily   • furosemide (LASIX) 20 mg, oral, Daily   • gabapentin (NEURONTIN) 300 mg, Daily   • HYDROcodone-acetaminophen (Norco) 5-325 mg tablet 1 tablet, Every 4 hours PRN   • levothyroxine (Synthroid, Levoxyl) 75 mcg tablet 1 tablet, Daily   • PARoxetine (Paxil) 20 mg tablet 1 tablet, Daily   • zolpidem (Ambien) 10 mg tablet 1 tablet, Nightly PRN        Allergies:  Patient has no known allergies.      LABS:      Testing Reviewed  Labs    CBC:   Lab Results   Component Value Date    WBC 11.6 (H) 05/06/2025    RBC 3.77 (L) 05/06/2025    HGB 10.8 (L) 05/06/2025    HCT 33.1 (L) 05/06/2025     05/06/2025        CMP:    Lab Results   Component Value Date     05/06/2025    K 3.9 05/06/2025     05/06/2025    CO2 28 05/06/2025    BUN 18 05/06/2025    CREATININE 1.28 (H) 05/06/2025    GLUCOSE 151 (H) 05/06/2025    CALCIUM 9.3 05/06/2025   GFR 42  FREE T4:    Lab Results   Component Value Date    FREET4 1.50 (H) 06/12/2019       TSH:    Lab Results   Component Value Date    TSH 0.59 06/12/2019     Device interrogation 5/7/2025-atrial fibrillation ventricular paced with appropriate mode switching.    Reviewed all available pertinent laboratory data and diagnostic testing results that occurred after the last office visit with me                 Assessment:    1. Aortic stenosis, severe  Follow Up In Cardiology    Follow Up In Cardiology    Transthoracic Echo Complete      2. Paroxysmal atrial fibrillation (Multi)  apixaban (Eliquis) 2.5 mg tablet    Follow Up In Cardiology    Transthoracic Echo Complete      3. Benign essential hypertension  amLODIPine (Norvasc) 2.5 mg tablet    carvedilol (Coreg) 6.25 mg tablet    Follow Up In Cardiology    Transthoracic Echo Complete      4. Hyperlipidemia, unspecified hyperlipidemia type  atorvastatin (Lipitor) 20 mg tablet    Follow Up In Cardiology    Transthoracic Echo Complete      5. Pacemaker  carvedilol (Coreg) 6.25 mg tablet    Follow Up In Cardiology    Transthoracic Echo Complete      6. Atrial fibrillation, persistent (Multi)  Follow Up In Cardiology    Transthoracic Echo Complete      7. Presence of leadless cardiac pacemaker  Follow Up In Cardiology    Transthoracic Echo Complete      8. Stage 3b chronic kidney disease (Multi)  Follow Up In Cardiology    Transthoracic Echo Complete      9. Type 2 diabetes mellitus with other specified complication, unspecified whether long term insulin use (Multi)  Follow Up In Cardiology    Transthoracic Echo Complete      10. Body mass index (BMI) of 27.0 to 27.9 in adult  Follow Up In Cardiology    Transthoracic Echo Complete      11. Never smoked cigarettes  Follow Up In Cardiology    Transthoracic Echo Complete           Clinical Decision Making:    After the AV emiliano ablation and permanent pacemaker implantation, patient has been able to come off of the metoprolol.  She reports significant benefit and that she is no longer cold all the time, previously she could not get comfortable.  My understanding is that she is not in atrial fibrillation all the time but is intermittently paced in the atrium and ventricle?  Primary goal is patient comfort.  She does have aortic stenosis but it is not critical.  Her LV function is preserved.  Primary goal here is managing  symptoms.  Having said that, I do believe that a Watchman device would be the next step if she agrees.  As far as the aortic stenosis goes, we will recheck another echo prior to next visit.  Blood pressure medications are to be titrated to a prior blood pressure.  Will accept today's blood pressure.    Follow up : 6 months        ITaya LPN am scribing for, and in the presence of Dr. Love Moreno MD, FACC.       I, Dr. Love Moreno MD, FACC, personally performed the services described in the documentation as scribed by Taya GOODEN LPN in my presence, and confirm it is both accurate and complete.

## 2025-06-30 NOTE — PATIENT INSTRUCTIONS
Please bring all medicines, vitamins, and herbal supplements with you when you come to the office.    Prescriptions will not be filled unless you are compliant with your follow up appointments or have a follow up appointment scheduled as per instruction of your physician. Refills should be requested at the time of your visit.     BMI was above normal measurement. Current weight: 75.1 kg (165 lb 9.6 oz)  Weight change since last visit (-) denotes wt loss -2.4 lbs   Weight loss needed to achieve BMI 25: 15.7 Lbs  Weight loss needed to achieve BMI 30: -14.3 Lbs  Provided instructions on dietary changes  Provided instructions on exercise.    Pacemaker/Defibrillator follow up per routine

## 2025-06-30 NOTE — PROGRESS NOTES
Chief Complaint:    Chief Complaint   Patient presents with    Follow-up     3 month, paroxysmal atrial fibrillation     May 2025, patient underwent AV emiliano ablation and leadless permanent pacemaker implantation.  It is documented that she declined Watchman device.  She is accompanied by her daughter and her  to the office.  Subjective :     Review of Systems family reports that she is sleeping much better, that she is much less short of breath and she has no palpitations or lightheadedness.  She denies chest pressure tightness or heaviness.  Activity level still less than 4 METS.  Has not had any falls.    History so Far :    1. Persistent atrial fibrillation-previously on  amiodarone, maintaining sinus rhythm  2. No evidence of amiodarone toxicity  3. Chronic kidney disease stage IIIb/4.  4. Diabetes mellitus with hypoglycemia  5. Carotid bruit nonflow-limiting disease  6. Mild to moderate calcific aortic stenosis. Valve area estimated at 1.3 cmÂ², dimensionless index reported at 0.3, peak and mean gradients 46 and 27 respectively. Compared to the echo report from October 2018, LVEF improved from 30% to 65%. Previously noted mitral regurgitation resolved  7.  Fall risk  8  Personal history of COVID May 2024  9.  Diuretics to be addressed by nephrology     10.Extensive discussion about Watchman device:  she has had multiple rib fractures from falling, most of the falls are mechanical,   Very hesitant to proceed with Watchman device.  Procedure risk benefits alternatives discussed.  Very hesitant to go off anticoagulation.  She understands her stroke risk, and says she will not quit blood thinners.     11.  Pulmonary function test January 2024-evidence of moderately severe obstruction and mild restriction without clear air trapping moderately reduced diffusion capacity which normalizes when adjusted for alveolar volume and could represent ventilation/perfusion mismatch as the primary reason.  No  "significant change compared to PFTs of July 2023.  No clear evidence to suggest a trend of declining diffusion capacity dating back to October 2021.    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  CHEST: Symmetric and non-tender.  INTEGUMENT: Skin warm and dry  HEENT: No gross abnormalities identified.No pallor or scleral icterus.  NECK: Supple, no JVD, no bruit.   NEURO/PSHCY: Alert and oriented x3; appropriate behavior and responses and responses  LUNGS: Clear to auscultation bilaterally; normal respiratory effort.  HEART: Rate and rhythm regular with grade 1/6 to 2/6 crescendo decrescendo murmur along the left sternal border no gallop appreciated.   ABDOMEN: Soft, non tender.  MUSCULOSKELETAL: No gross deformities.  EXTREMITIES: Warm  There is no edema noted.    Meds:  Current Outpatient Medications   Medication Instructions    amLODIPine (NORVASC) 2.5 mg, oral, Daily    apixaban (ELIQUIS) 2.5 mg, oral, 2 times daily    ascorbic acid (Vitamin C) 500 mg tablet 1 tablet, Daily    atorvastatin (LIPITOR) 20 mg, oral, Daily    calcium citrate-vitamin D3 (Citracal+D) 315 mg-5 mcg (200 unit) tablet 1 tablet, Daily    carvedilol (COREG) 6.25 mg, oral, 2 times daily    cholecalciferol (VITAMIN D-3) 25 mcg, Daily    esomeprazole (NexIUM) 20 mg " DR capsule 1 capsule, Daily    ferrous sulfate 325 (65 Fe) MG tablet 1 tablet, Daily    furosemide (LASIX) 20 mg, oral, Daily    gabapentin (NEURONTIN) 300 mg, Daily    HYDROcodone-acetaminophen (Norco) 5-325 mg tablet 1 tablet, Every 4 hours PRN    levothyroxine (Synthroid, Levoxyl) 75 mcg tablet 1 tablet, Daily    PARoxetine (Paxil) 20 mg tablet 1 tablet, Daily    zolpidem (Ambien) 10 mg tablet 1 tablet, Nightly PRN        Allergies:  Patient has no known allergies.      LABS:      Testing Reviewed  Labs    CBC:   Lab Results   Component Value Date    WBC 11.6 (H) 05/06/2025    RBC 3.77 (L) 05/06/2025    HGB 10.8 (L) 05/06/2025    HCT 33.1 (L) 05/06/2025     05/06/2025        CMP:    Lab Results   Component Value Date     05/06/2025    K 3.9 05/06/2025     05/06/2025    CO2 28 05/06/2025    BUN 18 05/06/2025    CREATININE 1.28 (H) 05/06/2025    GLUCOSE 151 (H) 05/06/2025    CALCIUM 9.3 05/06/2025   GFR 42  FREE T4:    Lab Results   Component Value Date    FREET4 1.50 (H) 06/12/2019       TSH:    Lab Results   Component Value Date    TSH 0.59 06/12/2019     Device interrogation 5/7/2025-atrial fibrillation ventricular paced with appropriate mode switching.    Reviewed all available pertinent laboratory data and diagnostic testing results that occurred after the last office visit with me                Assessment:    1. Aortic stenosis, severe  Follow Up In Cardiology    Follow Up In Cardiology    Transthoracic Echo Complete      2. Paroxysmal atrial fibrillation (Multi)  apixaban (Eliquis) 2.5 mg tablet    Follow Up In Cardiology    Transthoracic Echo Complete      3. Benign essential hypertension  amLODIPine (Norvasc) 2.5 mg tablet    carvedilol (Coreg) 6.25 mg tablet    Follow Up In Cardiology    Transthoracic Echo Complete      4. Hyperlipidemia, unspecified hyperlipidemia type  atorvastatin (Lipitor) 20 mg tablet    Follow Up In Cardiology    Transthoracic Echo Complete      5. Pacemaker   carvedilol (Coreg) 6.25 mg tablet    Follow Up In Cardiology    Transthoracic Echo Complete      6. Atrial fibrillation, persistent (Multi)  Follow Up In Cardiology    Transthoracic Echo Complete      7. Presence of leadless cardiac pacemaker  Follow Up In Cardiology    Transthoracic Echo Complete      8. Stage 3b chronic kidney disease (Multi)  Follow Up In Cardiology    Transthoracic Echo Complete      9. Type 2 diabetes mellitus with other specified complication, unspecified whether long term insulin use (Multi)  Follow Up In Cardiology    Transthoracic Echo Complete      10. Body mass index (BMI) of 27.0 to 27.9 in adult  Follow Up In Cardiology    Transthoracic Echo Complete      11. Never smoked cigarettes  Follow Up In Cardiology    Transthoracic Echo Complete           Clinical Decision Making:    After the AV emiliano ablation and permanent pacemaker implantation, patient has been able to come off of the metoprolol.  She reports significant benefit and that she is no longer cold all the time, previously she could not get comfortable.  My understanding is that she is not in atrial fibrillation all the time but is intermittently paced in the atrium and ventricle?  Primary goal is patient comfort.  She does have aortic stenosis but it is not critical.  Her LV function is preserved.  Primary goal here is managing symptoms.  Having said that, I do believe that a Watchman device would be the next step if she agrees.  As far as the aortic stenosis goes, we will recheck another echo prior to next visit.  Blood pressure medications are to be titrated to a prior blood pressure.  Will accept today's blood pressure.    Follow up : 6 months        Taya CHACON LPN am scribing for, and in the presence of Dr. Love Moreno MD, FACC.       I, Dr. Love Moreno MD, FACC, personally performed the services described in the documentation as scribed by Taya GOODEN LPN in my presence, and confirm it is both accurate and  complete.

## 2025-07-10 ENCOUNTER — APPOINTMENT (OUTPATIENT)
Dept: CARDIOLOGY | Facility: HOSPITAL | Age: 85
End: 2025-07-10
Payer: MEDICARE

## 2025-07-11 ENCOUNTER — TELEPHONE (OUTPATIENT)
Dept: CARDIOLOGY | Facility: CLINIC | Age: 85
End: 2025-07-11

## 2025-07-11 DIAGNOSIS — I35.0 NONRHEUMATIC AORTIC (VALVE) STENOSIS: Primary | ICD-10-CM

## 2025-07-11 NOTE — TELEPHONE ENCOUNTER
Patient seen on Cardiac Consult at [facility] on 7/9/2025; Reason for Consult:   Shortness of breath and congestive heart failure.    Assessment    1.  Acute heart failure.  Patient had a previous history of tachycardia induced myopathy that improved her last echocardiogram showed preserved LV systolic function.  Contributing factors to her presentation is recurrence of her atrial fibrillation.  Patient discontinued amiodarone recently because of concern about side effect.  She has declined ablation.  In addition she does have moderate-severe aortic stenosis.  2.  Persistent atrial fibrillation with recurrence.  She is off amiodarone because of side effect  3.  Moderate-severe aortic stenosis  4.  Previous history of tachycardia induced myopathy  5.  Borderline probably stage III chronic kidney disease    Plan    1.  I discussed with patient treatment option at great length.  Will gently diurese the patient  2.  We discussed restoring and maintaining sinus rhythm.  The patient has no interest in ablation.  Consideration to try different antiarrhythmic like dofetilide.  Patient seem to be open to the idea.  Will initiate dofetilide 250 mg twice daily with close monitoring for her EKG with consideration for cardioversion after adequate loading dose  3.  Will recheck her echocardiogram to reassess LV systolic function and the severity of her aortic stenosis  4.  I discussed with the patient the possibility of TAVR down the road she seem to be hesitant at the moment.    7/10/2025  Tikosyn was not given because computer interpretation of EKG showed prolonged QTc interval which was not corrected for paced rhythm.  Discussed with patient and  both seem to be reluctant to consider antiarrhythmic    Plan  1.  Agree with switching to oral diuretics and increasing Eliquis to 5 mg twice daily  2.  Patient and  declined antiarrhythmic.  I encouraged him to discuss ablation with her electrophysiologist   Angel  3.  Awaiting repeat echocardiogram to reassess LV systolic function and the severity of her aortic stenosis  4.  Patient was counseled regarding salt restriction  5.  Hopefully discharge after echo result available with follow-up with her primary cardiologist and electrophysiologist    7/11/2025  Echocardiogram demonstrates drop in LVEF.  Appearance of her aortic valve highly suspicious of severe low gradient aortic stenosis  Plan    1.  We discussed treatment option with the patient and her  at great length.  I recommended proceeding with further evaluation of the aortic valve with transesophageal echocardiogram and if it is clearly suggestive of severe AS cardiac catheterization will need to be done prior to referral for TAVR.  Risk, benefits alternative of MAKSIM and heart cath discussed with patient she understood and agreed  2.  Patient and  declined antiarrhythmic.  I encouraged him to discuss ablation with her electrophysiologist Dr. Ortiz  3.  Patient was counseled regarding salt restriction    Cath Results:  IMPRESSION  1.  No hemodynamically significant epicardial coronary artery disease  2.  Transesophageal echocardiogram demonstrated LV systolic dysfunction and severe range aortic stenosis  RECOMMENDATIONS  1.  Aggressive bone risk factor modification  2.  Resume Eliquis in 24 hours  3.  Referral for aortic valve replacement

## 2025-07-15 ENCOUNTER — TELEPHONE (OUTPATIENT)
Dept: CARDIOLOGY | Facility: HOSPITAL | Age: 85
End: 2025-07-15
Payer: MEDICARE

## 2025-07-15 NOTE — TELEPHONE ENCOUNTER
Nurse navigator called patient to schedule consult with valve & structural team; patient's daughter answered, verified patient by name and . Preference to schedule in [facility] vs [facility]. Daughter states recent labwork is obtained, will reach out to [facility] to retrieve. Recent MAKSIM and TTE uploaded. Will await labwork prior to scheduling CT TAVR due to dx of CKD III. Daughter aware and in agreement of plan. Additionally, states patient has no dental issues and follows up with dentist regularly. Valve clinic consult scheduled for 25. Contact information provided for any additional questions.

## 2025-07-16 ENCOUNTER — TELEPHONE (OUTPATIENT)
Dept: CARDIOLOGY | Facility: HOSPITAL | Age: 85
End: 2025-07-16
Payer: MEDICARE

## 2025-07-16 ENCOUNTER — TELEPHONE (OUTPATIENT)
Dept: CARDIOLOGY | Facility: CLINIC | Age: 85
End: 2025-07-16
Payer: MEDICARE

## 2025-07-16 DIAGNOSIS — N18.32 STAGE 3B CHRONIC KIDNEY DISEASE (MULTI): ICD-10-CM

## 2025-07-16 DIAGNOSIS — I35.0 AORTIC STENOSIS, SEVERE: Primary | ICD-10-CM

## 2025-07-16 NOTE — TELEPHONE ENCOUNTER
Reached out to patient a second time to re-schedule as another  got the call back from Pt's daughter. No answer, left another vm with my direct line.

## 2025-07-16 NOTE — TELEPHONE ENCOUNTER
Nurse navigator reached out to patient to review the plan for valve work up. Spoke to patient's daughter, verified by name and . Labwork received from [facility], indicating patient will need low con with TAVR. This must be done at [facility] per Dr. Howell. Patient's daughter aware and in agreement. Due to previous labs being done on 25 which was her cath date, we will request a repeat RFP be done prior to the CT TAVR and amend to no con if needed based on GFR per protocol. Contact information provided to patient's daughter. She prefers any instructions be emailed to [email or web address].

## 2025-07-16 NOTE — TELEPHONE ENCOUNTER
Called pt to bisi appointment with Dr. Howell in [address] to [address] soonest available. Left call back number.

## 2025-07-16 NOTE — TELEPHONE ENCOUNTER
Nurse navigator left voicemail with patient's daughter to relay the patient will be seen with Dr. Howell on 7/22/25 and to clear up any confusion about the recent schedule change. Contact information provided to answer any questions.

## 2025-07-22 ENCOUNTER — OFFICE VISIT (OUTPATIENT)
Dept: CARDIAC SURGERY | Facility: CLINIC | Age: 85
End: 2025-07-22
Payer: MEDICARE

## 2025-07-22 ENCOUNTER — APPOINTMENT (OUTPATIENT)
Dept: CARDIOLOGY | Facility: CLINIC | Age: 85
End: 2025-07-22
Payer: MEDICARE

## 2025-07-22 ENCOUNTER — APPOINTMENT (OUTPATIENT)
Dept: CARDIAC SURGERY | Facility: CLINIC | Age: 85
End: 2025-07-22
Payer: MEDICARE

## 2025-07-22 VITALS
SYSTOLIC BLOOD PRESSURE: 125 MMHG | HEIGHT: 65 IN | DIASTOLIC BLOOD PRESSURE: 68 MMHG | TEMPERATURE: 96.6 F | HEART RATE: 72 BPM | OXYGEN SATURATION: 98 % | WEIGHT: 164 LBS | BODY MASS INDEX: 27.32 KG/M2

## 2025-07-22 DIAGNOSIS — I35.0 NONRHEUMATIC AORTIC VALVE STENOSIS: Primary | ICD-10-CM

## 2025-07-22 DIAGNOSIS — I35.0 SEVERE AORTIC STENOSIS: Primary | ICD-10-CM

## 2025-07-22 PROCEDURE — 99202 OFFICE O/P NEW SF 15 MIN: CPT | Performed by: STUDENT IN AN ORGANIZED HEALTH CARE EDUCATION/TRAINING PROGRAM

## 2025-07-22 PROCEDURE — 99212 OFFICE O/P EST SF 10 MIN: CPT

## 2025-07-22 PROCEDURE — 3074F SYST BP LT 130 MM HG: CPT

## 2025-07-22 PROCEDURE — 99215 OFFICE O/P EST HI 40 MIN: CPT

## 2025-07-22 PROCEDURE — 1159F MED LIST DOCD IN RCRD: CPT

## 2025-07-22 PROCEDURE — 3078F DIAST BP <80 MM HG: CPT

## 2025-07-22 RX ORDER — POTASSIUM CHLORIDE 750 MG/1
1 TABLET, EXTENDED RELEASE ORAL
COMMUNITY
Start: 2025-07-11

## 2025-07-22 RX ORDER — TEMAZEPAM 7.5 MG/1
7.5 CAPSULE ORAL NIGHTLY PRN
COMMUNITY
Start: 2025-07-17

## 2025-07-22 ASSESSMENT — PATIENT HEALTH QUESTIONNAIRE - PHQ9
2. FEELING DOWN, DEPRESSED OR HOPELESS: NOT AT ALL
SUM OF ALL RESPONSES TO PHQ9 QUESTIONS 1 AND 2: 0
1. LITTLE INTEREST OR PLEASURE IN DOING THINGS: NOT AT ALL

## 2025-07-22 NOTE — PROGRESS NOTES
Referred by Awais Vital MD  PCP Bubba Guo MD   Cardiologist: Dr. Moreno  Chief complaint: Evaluation of aortic valve stenosis       [facility]    Interventional Cardiologist: Nilesh Howell MD    Cardiothoracic Surgeon: Maximus Douglas MD    ROS:    84 y/o female presents for evaluation of symptomatic aortic stenosis.  Patient relates a 1 year history of fatigue, SOB, SORENSON which has worsened over the last month. She was hospitalized recently with heart failure.  Denies increased abdominal girth, edema.  Gradually doing less and less activity secondary to symptoms.      No constitutional symptoms, no weight loss, fever, chills night sweats.  No difficulties eating, no bowel or bladder issues.   Full 14 point ROS complete and negative except as noted above.     PMH: Medical History[1]   PSH: Surgical History[2]     Outpatient Medications:  Current Outpatient Medications   Medication Instructions    amLODIPine (NORVASC) 2.5 mg, oral, Daily    apixaban (ELIQUIS) 2.5 mg, oral, 2 times daily    ascorbic acid (Vitamin C) 500 mg tablet 1 tablet, Daily    atorvastatin (LIPITOR) 20 mg, oral, Daily    calcium citrate-vitamin D3 (Citracal+D) 315 mg-5 mcg (200 unit) tablet 1 tablet, Daily    carvedilol (COREG) 6.25 mg, oral, 2 times daily    cholecalciferol (VITAMIN D-3) 25 mcg, Daily    esomeprazole (NexIUM) 20 mg DR capsule 1 capsule, Daily    ferrous sulfate 325 (65 Fe) MG tablet 1 tablet, Daily    furosemide (LASIX) 20 mg, oral, Daily    gabapentin (NEURONTIN) 300 mg, Daily    HYDROcodone-acetaminophen (Norco) 5-325 mg tablet 1 tablet, Every 4 hours PRN    levothyroxine (Synthroid, Levoxyl) 75 mcg tablet 1 tablet, Daily    PARoxetine (Paxil) 20 mg tablet 1 tablet, Daily    zolpidem (Ambien) 10 mg tablet 1 tablet, Nightly PRN         Vitals:  Vitals:    07/22/25 1410   BP: 125/68   Pulse: 72   Temp: 35.9 °C (96.6 °F)   SpO2: 98%        Physical Exam:  General:  NAD, well-appearing  HEENT: moist mucous membranes, no jaundice  Cardiac: paced  Skin: warm, dry,   Neurologic: A&Ox3,  no focal deficits    VALVE & STRUCTURAL HEART WORK-UP    - NYHA: III  - Frailty: 3/5  - EKG: V-paced  - Echo: TTE Completed 7/9/25, EF 30-35%, Peak Timo 3.28. MAKSIM Completed 7/11/25  EF 25-30 %, severe aortic stenosis  MG/PG 52/25, m/s BOB 0.8 cm/2   - LHC: Completed 7/11/25  - CT TAVR: Scheduled for low con at [facility] on 7/31/25  - Dental clearance: pending  - STS:  Procedure Type: Isolated AVR Perioperative Outcome Estimate % Operative Mortality 9.68%  Morbidity & Mortality 16.9%  Stroke 1.69%  Renal Failure 3.46%  Reoperation 3.1%  Prolonged Ventilation 13%  Deep Sternal Wound Infection 0.05%  Long Hospital Stay (>14 days) 14.8%  Short Hospital Stay (<6 days)* 15.7%    -  KCCQ Questionnaire  1  Heart failure affects different people in different ways. Some feel shortness of breath while others feel fatigue. Please indicate how much you are limited by heart failure (shortness of breath or fatigue) in your ability to do the following activities over the past 2 weeks.     A.) Showering/bathing  2. Quite a bit  B.) Walking 1 block on level ground 2. Quite a bit  C.) Hurrying or Jogging   1. Extremely    2.  Over the past 2 weeks, how many times did you have swelling in your feet, ankles or legs when you woke up in the morning? 1. Every morning    3.  Over the past 2 weeks, on average, how many times has fatigue limited your ability to do what you wanted? 1. All the time    4.  Over the past 2 weeks, on average, how many times has shortness of breath limited your ability to do what you wanted? 1. All the time    5.  Over the past 2 weeks, on average, how many times have you been forced to sleep sitting up in a chair or with at least 3 pillows to prop you up because of shortness of breath? Every night    6. Over the past 2 weeks, how much has your heart failure limited your enjoyment of life? It has  extremely limited my enjoyment of life    7. If you had to spend the rest of your life with your heart failure the way it is right now, how would you feel about this? Completely dissatisfied    8. How much does your heart failure affect your lifestyle? Please indicate how your heart failure may have limited your participation in the following activities over the past 2 weeks    A.)  Hobbies, recreational activities  1. Severely limited    B.) Working or doing household chores  1. Severely limited    C.) Visiting family or friends out of your home  1. Severely limited     Walk: 8.86 seconds    Assessment/Plan     We had an extensive discussion with the patient, her , and her daughter to address the issue of aortic stenosis, especially in light of the recent worsening of symptoms and the need for hospitalization. All questions were addressed. Although they still have many concerns and doubts regarding whether or not to proceed with the intervention, they understood the risks of not treating the aortic valve and the potential progression of the disease. They agreed to move forward with further evaluation, and we will therefore proceed with the WILBUR work-up.    The overall decision regarding the best treatment for this condition is complex.  We discussed options of both surgical aortic valve replacement and transcatheter aortic valve replacement along with risks and benefits involved with both of them in detail.    We discussed all the risks associated with the procedure, including but not limited to stroke, MI, pericardial tamponade, vascular complications, infection and death were discussed with the patient. The patient verbalized understanding and decided to proceed with the procedure.     We will discuss this patient's case at our Valve Team meeting with representatives from Structural Heart and Cardiac Surgery. Our nurse navigators will contact patient with further diagnostic needs and formal plan.      Nilesh Howell MD, Ph,D, [facility]  Interventional Cardiologist - [facility] [place]  Director of Structural and Valvular Heart Intervention - [facility]     I,Sara Medina RN am scribing for, and in the presence of Dr. Dante MD.    I, Dr. Dante MD, personally performed the services described in the documentation as scribed by Sara Medina RN in my presence, and confirm it is both accurate and complete.     **Disclaimer: This note was dictated by speech recognition, and every effort has been made to prevent any error in transcription, however minor errors may be present**           [1]   Past Medical History:  Diagnosis Date    Abnormal ECG 2018    Arrhythmia 2018    afib    Atrial fibrillation (Multi) 2018    Cancer (Multi) 2018    Cardiomyopathy     CHF (congestive heart failure)     Chronic kidney disease     Hyperlipidemia 2018    Hypertension Years ago   [2]   Past Surgical History:  Procedure Laterality Date    ABLATION OF DYSRHYTHMIC FOCUS  05/06/2025    CARDIAC ELECTROPHYSIOLOGY PROCEDURE Right 5/6/2025    Procedure: Ablation AV Node;  Surgeon: Marija Ortiz MD;  Location: [facility];  Service: Electrophysiology;  Laterality: Right;    CARDIAC ELECTROPHYSIOLOGY PROCEDURE Right 5/6/2025    Procedure: PPM Leadless Implant;  Surgeon: Marija Ortiz MD;  Location: [facility];  Service: Electrophysiology;  Laterality: Right;  Abbot Joycelyn    INSERT / REPLACE / REMOVE PACEMAKER  05/06/2025    OTHER SURGICAL HISTORY  09/13/2021    Hysterectomy    OTHER SURGICAL HISTORY  09/13/2021    Cholecystectomy    OTHER SURGICAL HISTORY  09/13/2021    Complete colonoscopy    OTHER SURGICAL HISTORY  09/13/2021    Kidney surgery    OTHER SURGICAL HISTORY  09/13/2021    Leg surgery

## 2025-07-31 ENCOUNTER — HOSPITAL ENCOUNTER (OUTPATIENT)
Dept: RADIOLOGY | Facility: HOSPITAL | Age: 85
Discharge: HOME | End: 2025-07-31
Payer: MEDICARE

## 2025-07-31 DIAGNOSIS — I35.0 AORTIC STENOSIS, SEVERE: ICD-10-CM

## 2025-07-31 PROCEDURE — 2550000001 HC RX 255 CONTRASTS

## 2025-07-31 PROCEDURE — 74174 CTA ABD&PLVS W/CONTRAST: CPT

## 2025-07-31 RX ADMIN — IOHEXOL 50 ML: 350 INJECTION, SOLUTION INTRAVENOUS at 11:05

## 2025-08-01 ENCOUNTER — APPOINTMENT (OUTPATIENT)
Dept: CARDIOLOGY | Facility: CLINIC | Age: 85
End: 2025-08-01
Payer: MEDICARE

## 2025-08-04 ENCOUNTER — CARDIOLOGY CONFERENCE (OUTPATIENT)
Dept: CARDIOLOGY | Facility: HOSPITAL | Age: 85
End: 2025-08-04
Payer: MEDICARE

## 2025-08-06 ENCOUNTER — TELEPHONE (OUTPATIENT)
Dept: CARDIOLOGY | Facility: CLINIC | Age: 85
End: 2025-08-06
Payer: MEDICARE

## 2025-08-06 ENCOUNTER — TELEPHONE (OUTPATIENT)
Dept: CARDIOLOGY | Facility: HOSPITAL | Age: 85
End: 2025-08-06
Payer: MEDICARE

## 2025-08-06 DIAGNOSIS — Z79.899 HIGH RISK MEDICATION USE: Primary | ICD-10-CM

## 2025-08-06 RX ORDER — POTASSIUM CHLORIDE 750 MG/1
10 TABLET, EXTENDED RELEASE ORAL
Qty: 90 TABLET | Refills: 0 | Status: ON HOLD | OUTPATIENT
Start: 2025-08-06 | End: 2026-08-06

## 2025-08-06 NOTE — TELEPHONE ENCOUNTER
Returned call to patient's daughter Stefanie to discuss plan for TAVR procedure. Received clarification that patient has not seen a dentist in over 2 years with an upcoming dental appointment scheduled for 8/20/25. Spoke with [facility] ([phone]) who states they are able to move her appointment to a sooner date. Dental clearance form faxed to [phone]. Will follow up.

## 2025-08-06 NOTE — PROGRESS NOTES
Referred by Awais Vital MD  PCP Bubba Guo MD   Cardiologist: Dr. Moreno  Chief complaint: Evaluation of aortic valve stenosis        [facility]     Interventional Cardiologist: Nilesh Howell MD     Cardiothoracic Surgeon: Maximus Douglas MD     ROS:     86 y/o female presents for evaluation of symptomatic aortic stenosis.  Patient relates a 1 year history of fatigue, SOB, SORENSON which has worsened over the last month. She was hospitalized recently with heart failure.  Denies increased abdominal girth, edema.  Gradually doing less and less activity secondary to symptoms.       No constitutional symptoms, no weight loss, fever, chills night sweats.  No difficulties eating, no bowel or bladder issues.   Full 14 point ROS complete and negative except as noted above.      PMH: [Medical History]     [Medical History]  Past Medical History       Diagnosis Date    Abnormal ECG 2018    Arrhythmia 2018     afib    Atrial fibrillation (Multi) 2018    Cancer (Multi) 2018    Cardiomyopathy      CHF (congestive heart failure)      Chronic kidney disease      Hyperlipidemia 2018    Hypertension Years ago     PSH: [Surgical History]     [Surgical History]  Past Surgical History        Procedure Laterality Date    ABLATION OF DYSRHYTHMIC FOCUS   05/06/2025    CARDIAC ELECTROPHYSIOLOGY PROCEDURE Right 5/6/2025     Procedure: Ablation AV Node;  Surgeon: Marija Ortiz MD;  Location: [facility];  Service: Electrophysiology;  Laterality: Right;    CARDIAC ELECTROPHYSIOLOGY PROCEDURE Right 5/6/2025     Procedure: PPM Leadless Implant;  Surgeon: Marija Ortiz MD;  Location: [facility];  Service: Electrophysiology;  Laterality: Right;  Abbot Joycelyn    INSERT / REPLACE / REMOVE PACEMAKER   05/06/2025    OTHER SURGICAL HISTORY   09/13/2021     Hysterectomy    OTHER SURGICAL HISTORY   09/13/2021     Cholecystectomy    OTHER SURGICAL HISTORY   09/13/2021      Complete colonoscopy    OTHER SURGICAL HISTORY   09/13/2021     Kidney surgery    OTHER SURGICAL HISTORY   09/13/2021     Leg surgery        Outpatient Medications:       Current Outpatient Medications   Medication Instructions    amLODIPine (NORVASC) 2.5 mg, oral, Daily    apixaban (ELIQUIS) 2.5 mg, oral, 2 times daily    ascorbic acid (Vitamin C) 500 mg tablet 1 tablet, Daily    atorvastatin (LIPITOR) 20 mg, oral, Daily    calcium citrate-vitamin D3 (Citracal+D) 315 mg-5 mcg (200 unit) tablet 1 tablet, Daily    carvedilol (COREG) 6.25 mg, oral, 2 times daily    cholecalciferol (VITAMIN D-3) 25 mcg, Daily    esomeprazole (NexIUM) 20 mg DR capsule 1 capsule, Daily    ferrous sulfate 325 (65 Fe) MG tablet 1 tablet, Daily    furosemide (LASIX) 20 mg, oral, Daily    gabapentin (NEURONTIN) 300 mg, Daily    HYDROcodone-acetaminophen (Norco) 5-325 mg tablet 1 tablet, Every 4 hours PRN    levothyroxine (Synthroid, Levoxyl) 75 mcg tablet 1 tablet, Daily    PARoxetine (Paxil) 20 mg tablet 1 tablet, Daily    zolpidem (Ambien) 10 mg tablet 1 tablet, Nightly PRN         Vitals:      Vitals:     07/22/25 1410   BP: 125/68   Pulse: 72   Temp: 35.9 °C (96.6 °F)   SpO2: 98%         Physical Exam:  General: NAD, well-appearing  HEENT: moist mucous membranes, no jaundice  Cardiac: paced  Skin: warm, dry,   Neurologic: A&Ox3,  no focal deficits     VALVE & STRUCTURAL HEART WORK-UP     - NYHA: III  - Frailty: 3/5  - EKG: V-paced  - Echo: TTE Completed 7/9/25, EF 30-35%, Peak Timo 3.28. MAKSIM Completed 7/11/25  EF 25-30 %, severe aortic stenosis  MG/PG 52/25, m/s BOB 0.8 cm/2   - LHC: Completed 7/11/25  - CT TAVR: Scheduled for low [facility] on 7/31/25  - Dental clearance: pending  - STS:  Procedure Type: Isolated AVR Perioperative OutcomeEstimate % Operative Mortality9.68%  Morbidity & Hmosqwtef97.9%  Stroke1.69%  Renal Failure3.46%  Reoperation3.1%  Prolonged Utdnddxpzwr25%  Deep Sternal Wound Infection0.05%  Long Hospital Stay (>14  days)14.8%  Short Hospital Stay (<6 days)*15.7%     -  KCCQ Questionnaire  1  Heart failure affects different people in different ways. Some feel shortness of breath while others feel fatigue. Please indicate how much you are limited by heart failure (shortness of breath or fatigue) in your ability to do the following activities over the past 2 weeks.      A.) Showering/bathing  2. Quite a bit  B.) Walking 1 block on level ground 2. Quite a bit  C.) Hurrying or Jogging   1. Extremely     2.  Over the past 2 weeks, how many times did you have swelling in your feet, ankles or legs when you woke up in the morning? 1. Every morning     3.  Over the past 2 weeks, on average, how many times has fatigue limited your ability to do what you wanted? 1. All the time     4.  Over the past 2 weeks, on average, how many times has shortness of breath limited your ability to do what you wanted? 1. All the time     5.  Over the past 2 weeks, on average, how many times have you been forced to sleep sitting up in a chair or with at least 3 pillows to prop you up because of shortness of breath? Every night     6. Over the past 2 weeks, how much has your heart failure limited your enjoyment of life? It has extremely limited my enjoyment of life     7. If you had to spend the rest of your life with your heart failure the way it is right now, how would you feel about this? Completely dissatisfied     8. How much does your heart failure affect your lifestyle? Please indicate how your heart failure may have limited your participation in the following activities over the past 2 weeks     A.)  Hobbies, recreational activities  1. Severely limited     B.) Working or doing household chores  1. Severely limited     C.) Visiting family or friends out of your home  1. Severely limited      Walk: 8.86 seconds     Assessment/Plan      We had an extensive discussion with the patient, her , and her daughter to address the issue of aortic  stenosis, especially in light of the recent worsening of symptoms and the need for hospitalization. All questions were addressed. Although they still have many concerns and doubts regarding whether or not to proceed with the intervention, they understood the risks of not treating the aortic valve and the potential progression of the disease. They agreed to move forward with further evaluation, and we will therefore proceed with the WILBUR work-up.     The overall decision regarding the best treatment for this condition is complex.  We discussed options of both surgical aortic valve replacement and transcatheter aortic valve replacement along with risks and benefits involved with both of them in detail.     We discussed all the risks associated with the procedure, including but not limited to stroke, MI, pericardial tamponade, vascular complications, infection and death were discussed with the patient. The patient verbalized understanding and decided to proceed with the procedure.      Following discussion at our Valve Team meeting with representatives from Structural Heart and Cardiac Surgery, we plan to proceed with TAVR. Our nurse navigators will contact the patient for procedural planning.     Sara Greene MD  Cardiac Surgeon  Division of Cardiac Surgery  [facility] [place]

## 2025-08-06 NOTE — TELEPHONE ENCOUNTER
Request refill to [facility]. Patient is in process of getting her valve replaced. Dental apt pending this week. Possible replacement on the 19th, no confirmed.  Do you still want her apt scheduled 8/18.

## 2025-08-07 DIAGNOSIS — N18.32 STAGE 3B CHRONIC KIDNEY DISEASE (MULTI): ICD-10-CM

## 2025-08-07 DIAGNOSIS — I35.0 NONRHEUMATIC AORTIC (VALVE) STENOSIS: Primary | ICD-10-CM

## 2025-08-07 NOTE — PROGRESS NOTES
Valve and Structural Heart Multidisciplinary Meeting   This note reflects a summary of a case conference discussion between a multidisciplinary team of interventional cardiologists, cardiac surgeons, APPs, nurse navigators, and research team.  The suggestions and impressions in this note reflect group consensus opinion but do not substitute bedside evaluation and management by the primary team.     Attendees:  Dr. Hartman, Dr. Howell, Dr. Mondragon, Angle Archer, Dr. Ortega, Dr. Douglas, Dr. Will, Heron Jordan, Shane Hill, Dr. Vale, Dipika Riojas, Dr. Enriquez, Dr. Richardson, Marco Angela    Radha Torres is a 85 y.o. year old female  Medical record number: [id number]    Referring Provider Dr. Martin    Brief Clinical Summary - clinical presentation/comorbidities:  86 y/o female presents for evaluation of symptomatic aortic stenosis.  Patient relates a 1 year history of fatigue, SOB, SORENSON which has worsened over the last month. She was hospitalized recently with heart failure.  Valve and Structural Heart Work Up  - NYHA: III  - Frailty: 3/5  - ECG: V-paced  - Echo: TTE Completed 7/9/25, EF 30-35%, Peak Timo 3.28.   MAKSIM Completed 7/11/25  EF 25-30 %, severe aortic stenosis  MG/PG 52/25, m/s BOB 0.8 cm/2  - LHC: Completed 7/11/25  - CT TAVR: Completed 7/31/25  - Dental clearance:  follows up with dentist regularly, no issues  - STS:  Procedure Type: Isolated AVR Perioperative OutcomeEstimate % Operative Mortality 9.68%  Morbidity & Mortality 16.9%  Stroke 1.69%  Renal Failure 3.46%  Reoperation 3.1%  Prolonged Ventilation 13%  Deep Sternal Wound Infection 0.05%  Long Hospital Stay (>14 days) 14.8%  Short Hospital Stay (<6 days)* 15.7%  KCCQ: Completed, walk: 8.86 seconds  Group consensus recommendation:   CT READ: ruben 85 area 539 sinus 37 STJ 32 Evolut 34 FX plus.  Access good.   Heavily calcified valve.  Patient has had recent admissions.  Valve area 0.8. Patient is decompensated HF d/t aortic stenosis.  Would  plan to treat. AVCS 1200.    To contact the  [facility] contact  Transfer Center or the office [phone].

## 2025-08-08 ENCOUNTER — TELEPHONE (OUTPATIENT)
Dept: CARDIOLOGY | Facility: CLINIC | Age: 85
End: 2025-08-08
Payer: MEDICARE

## 2025-08-08 NOTE — TELEPHONE ENCOUNTER
Called patient's daughter to reschedule appointment. Left voicemail informing her id she would still like to reschedule to call back. Included call back number for our office in voicemail.

## 2025-08-09 ENCOUNTER — APPOINTMENT (OUTPATIENT)
Dept: CARDIOLOGY | Facility: HOSPITAL | Age: 85
DRG: 306 | End: 2025-08-09
Payer: MEDICARE

## 2025-08-09 ENCOUNTER — HOSPITAL ENCOUNTER (INPATIENT)
Facility: HOSPITAL | Age: 85
End: 2025-08-09
Attending: INTERNAL MEDICINE | Admitting: INTERNAL MEDICINE
Payer: MEDICARE

## 2025-08-09 ENCOUNTER — APPOINTMENT (OUTPATIENT)
Dept: RADIOLOGY | Facility: HOSPITAL | Age: 85
DRG: 306 | End: 2025-08-09
Payer: MEDICARE

## 2025-08-09 DIAGNOSIS — I42.9 CARDIOMYOPATHY, UNSPECIFIED TYPE (MULTI): Primary | ICD-10-CM

## 2025-08-09 DIAGNOSIS — I10 BENIGN ESSENTIAL HYPERTENSION: ICD-10-CM

## 2025-08-09 DIAGNOSIS — R07.9 CHEST PAIN: Primary | ICD-10-CM

## 2025-08-09 DIAGNOSIS — I48.19 ATRIAL FIBRILLATION, PERSISTENT (MULTI): ICD-10-CM

## 2025-08-09 DIAGNOSIS — I35.0 AORTIC STENOSIS, SEVERE: ICD-10-CM

## 2025-08-09 DIAGNOSIS — J81.0 FLASH PULMONARY EDEMA: ICD-10-CM

## 2025-08-09 DIAGNOSIS — I35.0 NONRHEUMATIC AORTIC (VALVE) STENOSIS: ICD-10-CM

## 2025-08-09 LAB
ALBUMIN SERPL BCP-MCNC: 3.9 G/DL (ref 3.4–5)
ALBUMIN SERPL BCP-MCNC: 3.9 G/DL (ref 3.4–5)
ANION GAP SERPL CALC-SCNC: 14 MMOL/L
ANION GAP SERPL CALC-SCNC: 17 MMOL/L (ref 10–20)
ATRIAL RATE: 288 BPM
BNP SERPL-MCNC: 2422 PG/ML (ref 0–99)
BUN SERPL-MCNC: 16 MG/DL (ref 6–23)
BUN SERPL-MCNC: 18 MG/DL (ref 6–23)
CALCIUM SERPL-MCNC: 8.9 MG/DL (ref 8.6–10.6)
CALCIUM SERPL-MCNC: 9.5 MG/DL (ref 8.6–10.6)
CARDIAC TROPONIN I PNL SERPL HS: 37 NG/L (ref 0–34)
CARDIAC TROPONIN I PNL SERPL HS: 38 NG/L (ref 0–34)
CHLORIDE SERPL-SCNC: 88 MMOL/L (ref 98–107)
CHLORIDE SERPL-SCNC: 90 MMOL/L (ref 98–107)
CO2 SERPL-SCNC: 27 MMOL/L (ref 21–32)
CO2 SERPL-SCNC: 28 MMOL/L (ref 21–32)
CREAT SERPL-MCNC: 1.46 MG/DL (ref 0.5–1.05)
CREAT SERPL-MCNC: 1.58 MG/DL (ref 0.5–1.05)
CRP SERPL-MCNC: 0.87 MG/DL
EGFRCR SERPLBLD CKD-EPI 2021: 32 ML/MIN/1.73M*2
EGFRCR SERPLBLD CKD-EPI 2021: 36 ML/MIN/1.73M*2
ERYTHROCYTE [DISTWIDTH] IN BLOOD BY AUTOMATED COUNT: 14.5 % (ref 11.5–14.5)
GLUCOSE SERPL-MCNC: 156 MG/DL (ref 74–99)
GLUCOSE SERPL-MCNC: 174 MG/DL (ref 74–99)
HCT VFR BLD AUTO: 35.2 % (ref 36–46)
HGB BLD-MCNC: 10.9 G/DL (ref 12–16)
MAGNESIUM SERPL-MCNC: 1.69 MG/DL (ref 1.6–2.4)
MAGNESIUM SERPL-MCNC: 1.81 MG/DL (ref 1.6–2.4)
MCH RBC QN AUTO: 27.9 PG (ref 26–34)
MCHC RBC AUTO-ENTMCNC: 31 G/DL (ref 32–36)
MCV RBC AUTO: 90 FL (ref 80–100)
NRBC BLD-RTO: 0 /100 WBCS (ref 0–0)
PHOSPHATE SERPL-MCNC: 3.5 MG/DL (ref 2.5–4.9)
PHOSPHATE SERPL-MCNC: 4 MG/DL (ref 2.5–4.9)
PLATELET # BLD AUTO: 290 X10*3/UL (ref 150–450)
POTASSIUM SERPL-SCNC: 3.3 MMOL/L (ref 3.5–5.3)
POTASSIUM SERPL-SCNC: 3.9 MMOL/L (ref 3.5–5.3)
Q ONSET: 189 MS
QRS COUNT: 12 BEATS
QRS DURATION: 198 MS
QT INTERVAL: 506 MS
QTC CALCULATION(BAZETT): 546 MS
QTC FREDERICIA: 532 MS
R AXIS: -56 DEGREES
RBC # BLD AUTO: 3.9 X10*6/UL (ref 4–5.2)
SODIUM SERPL-SCNC: 126 MMOL/L (ref 136–145)
SODIUM SERPL-SCNC: 131 MMOL/L (ref 136–145)
T AXIS: 110 DEGREES
T OFFSET: 442 MS
VENTRICULAR RATE: 70 BPM
WBC # BLD AUTO: 11.9 X10*3/UL (ref 4.4–11.3)

## 2025-08-09 PROCEDURE — 2500000002 HC RX 250 W HCPCS SELF ADMINISTERED DRUGS (ALT 637 FOR MEDICARE OP, ALT 636 FOR OP/ED)

## 2025-08-09 PROCEDURE — 83735 ASSAY OF MAGNESIUM: CPT

## 2025-08-09 PROCEDURE — 2500000001 HC RX 250 WO HCPCS SELF ADMINISTERED DRUGS (ALT 637 FOR MEDICARE OP)

## 2025-08-09 PROCEDURE — 93005 ELECTROCARDIOGRAM TRACING: CPT

## 2025-08-09 PROCEDURE — 36415 COLL VENOUS BLD VENIPUNCTURE: CPT

## 2025-08-09 PROCEDURE — 80069 RENAL FUNCTION PANEL: CPT

## 2025-08-09 PROCEDURE — 2500000004 HC RX 250 GENERAL PHARMACY W/ HCPCS (ALT 636 FOR OP/ED)

## 2025-08-09 PROCEDURE — 93010 ELECTROCARDIOGRAM REPORT: CPT | Performed by: INTERNAL MEDICINE

## 2025-08-09 PROCEDURE — 71046 X-RAY EXAM CHEST 2 VIEWS: CPT

## 2025-08-09 PROCEDURE — 85027 COMPLETE CBC AUTOMATED: CPT

## 2025-08-09 PROCEDURE — 84484 ASSAY OF TROPONIN QUANT: CPT

## 2025-08-09 PROCEDURE — 99223 1ST HOSP IP/OBS HIGH 75: CPT

## 2025-08-09 PROCEDURE — 83880 ASSAY OF NATRIURETIC PEPTIDE: CPT

## 2025-08-09 PROCEDURE — 86140 C-REACTIVE PROTEIN: CPT

## 2025-08-09 PROCEDURE — 71046 X-RAY EXAM CHEST 2 VIEWS: CPT | Performed by: RADIOLOGY

## 2025-08-09 PROCEDURE — 1200000002 HC GENERAL ROOM WITH TELEMETRY DAILY

## 2025-08-09 RX ORDER — TEMAZEPAM 7.5 MG/1
7.5 CAPSULE ORAL NIGHTLY PRN
Status: DISPENSED | OUTPATIENT
Start: 2025-08-09

## 2025-08-09 RX ORDER — PANTOPRAZOLE SODIUM 40 MG/1
40 TABLET, DELAYED RELEASE ORAL
Status: DISPENSED | OUTPATIENT
Start: 2025-08-09

## 2025-08-09 RX ORDER — PAROXETINE 20 MG/1
20 TABLET, FILM COATED ORAL DAILY
Status: DISPENSED | OUTPATIENT
Start: 2025-08-09

## 2025-08-09 RX ORDER — ATORVASTATIN CALCIUM 20 MG/1
20 TABLET, FILM COATED ORAL DAILY
Status: DISPENSED | OUTPATIENT
Start: 2025-08-09

## 2025-08-09 RX ORDER — AMLODIPINE BESYLATE 5 MG/1
2.5 TABLET ORAL DAILY
Status: DISCONTINUED | OUTPATIENT
Start: 2025-08-09 | End: 2025-08-10

## 2025-08-09 RX ORDER — HYDROCODONE BITARTRATE AND ACETAMINOPHEN 5; 325 MG/1; MG/1
1 TABLET ORAL EVERY 4 HOURS PRN
Refills: 0 | Status: DISPENSED | OUTPATIENT
Start: 2025-08-09

## 2025-08-09 RX ORDER — FERROUS SULFATE 325(65) MG
1 TABLET ORAL DAILY
Status: DISPENSED | OUTPATIENT
Start: 2025-08-09

## 2025-08-09 RX ORDER — LEVOTHYROXINE SODIUM 75 UG/1
75 TABLET ORAL DAILY
Status: DISPENSED | OUTPATIENT
Start: 2025-08-09

## 2025-08-09 RX ORDER — ASCORBIC ACID 500 MG
500 TABLET ORAL DAILY
Status: DISPENSED | OUTPATIENT
Start: 2025-08-09

## 2025-08-09 RX ORDER — OXYCODONE AND ACETAMINOPHEN 5; 325 MG/1; MG/1
1 TABLET ORAL 2 TIMES DAILY PRN
Status: ON HOLD | COMMUNITY

## 2025-08-09 RX ORDER — FUROSEMIDE 40 MG/1
20 TABLET ORAL DAILY
Status: DISCONTINUED | OUTPATIENT
Start: 2025-08-09 | End: 2025-08-09

## 2025-08-09 RX ORDER — FUROSEMIDE 40 MG/1
40 TABLET ORAL DAILY
Status: CANCELLED | OUTPATIENT
Start: 2025-08-09

## 2025-08-09 RX ORDER — POLYETHYLENE GLYCOL 3350 17 G/17G
17 POWDER, FOR SOLUTION ORAL DAILY
Status: DISPENSED | OUTPATIENT
Start: 2025-08-09

## 2025-08-09 RX ORDER — ONDANSETRON HYDROCHLORIDE 2 MG/ML
4 INJECTION, SOLUTION INTRAVENOUS ONCE
Status: COMPLETED | OUTPATIENT
Start: 2025-08-09 | End: 2025-08-09

## 2025-08-09 RX ORDER — CARVEDILOL 3.12 MG/1
6.25 TABLET ORAL 2 TIMES DAILY
Status: DISPENSED | OUTPATIENT
Start: 2025-08-09

## 2025-08-09 RX ORDER — FUROSEMIDE 40 MG/1
40 TABLET ORAL DAILY
Status: DISCONTINUED | OUTPATIENT
Start: 2025-08-09 | End: 2025-08-10

## 2025-08-09 RX ORDER — ACETAMINOPHEN 325 MG/1
650 TABLET ORAL EVERY 6 HOURS PRN
Status: ACTIVE | OUTPATIENT
Start: 2025-08-09

## 2025-08-09 RX ORDER — CHOLECALCIFEROL (VITAMIN D3) 25 MCG
25 TABLET ORAL DAILY
Status: DISPENSED | OUTPATIENT
Start: 2025-08-09

## 2025-08-09 RX ORDER — PANTOPRAZOLE SODIUM 20 MG/1
20 TABLET, DELAYED RELEASE ORAL
Status: DISCONTINUED | OUTPATIENT
Start: 2025-08-09 | End: 2025-08-09

## 2025-08-09 RX ORDER — GABAPENTIN 300 MG/1
300 CAPSULE ORAL DAILY
Status: DISPENSED | OUTPATIENT
Start: 2025-08-09

## 2025-08-09 RX ORDER — CALCIUM CARBONATE 200(500)MG
500 TABLET,CHEWABLE ORAL 3 TIMES DAILY PRN
Status: DISPENSED | OUTPATIENT
Start: 2025-08-09

## 2025-08-09 RX ORDER — FUROSEMIDE 10 MG/ML
40 INJECTION INTRAMUSCULAR; INTRAVENOUS ONCE
Status: COMPLETED | OUTPATIENT
Start: 2025-08-09 | End: 2025-08-09

## 2025-08-09 RX ORDER — ENOXAPARIN SODIUM 100 MG/ML
40 INJECTION SUBCUTANEOUS EVERY 24 HOURS
Status: DISCONTINUED | OUTPATIENT
Start: 2025-08-09 | End: 2025-08-09

## 2025-08-09 RX ADMIN — LEVOTHYROXINE SODIUM 75 MCG: 0.07 TABLET ORAL at 08:41

## 2025-08-09 RX ADMIN — OXYCODONE HYDROCHLORIDE AND ACETAMINOPHEN 500 MG: 500 TABLET ORAL at 08:41

## 2025-08-09 RX ADMIN — ONDANSETRON 4 MG: 2 INJECTION INTRAMUSCULAR; INTRAVENOUS at 03:38

## 2025-08-09 RX ADMIN — FERROUS SULFATE TAB 325 MG (65 MG ELEMENTAL FE) 1 TABLET: 325 (65 FE) TAB at 08:41

## 2025-08-09 RX ADMIN — CARVEDILOL 6.25 MG: 3.12 TABLET, FILM COATED ORAL at 20:45

## 2025-08-09 RX ADMIN — Medication 25 MCG: at 08:41

## 2025-08-09 RX ADMIN — PAROXETINE HYDROCHLORIDE 20 MG: 20 TABLET, FILM COATED ORAL at 08:41

## 2025-08-09 RX ADMIN — APIXABAN 5 MG: 5 TABLET, FILM COATED ORAL at 20:45

## 2025-08-09 RX ADMIN — GABAPENTIN 300 MG: 300 CAPSULE ORAL at 08:41

## 2025-08-09 RX ADMIN — FUROSEMIDE 40 MG: 40 TABLET ORAL at 08:40

## 2025-08-09 RX ADMIN — TEMAZEPAM 7.5 MG: 7.5 CAPSULE ORAL at 21:55

## 2025-08-09 RX ADMIN — FUROSEMIDE 40 MG: 10 INJECTION, SOLUTION INTRAMUSCULAR; INTRAVENOUS at 14:39

## 2025-08-09 RX ADMIN — APIXABAN 5 MG: 5 TABLET, FILM COATED ORAL at 08:41

## 2025-08-09 RX ADMIN — CARVEDILOL 6.25 MG: 3.12 TABLET, FILM COATED ORAL at 08:40

## 2025-08-09 RX ADMIN — AMLODIPINE BESYLATE 2.5 MG: 5 TABLET ORAL at 08:41

## 2025-08-09 RX ADMIN — ATORVASTATIN CALCIUM 20 MG: 20 TABLET, FILM COATED ORAL at 08:41

## 2025-08-09 RX ADMIN — CALCIUM CARBONATE (ANTACID) CHEW TAB 500 MG 1 TABLET: 500 CHEW TAB at 17:50

## 2025-08-09 RX ADMIN — PANTOPRAZOLE SODIUM 40 MG: 40 TABLET, DELAYED RELEASE ORAL at 08:40

## 2025-08-09 RX ADMIN — HYDROCODONE BITARTRATE AND ACETAMINOPHEN 1 TABLET: 5; 325 TABLET ORAL at 22:07

## 2025-08-09 RX ADMIN — HYDROCODONE BITARTRATE AND ACETAMINOPHEN 1 TABLET: 5; 325 TABLET ORAL at 17:07

## 2025-08-09 SDOH — ECONOMIC STABILITY: INCOME INSECURITY: IN THE PAST 12 MONTHS HAS THE ELECTRIC, GAS, OIL, OR WATER COMPANY THREATENED TO SHUT OFF SERVICES IN YOUR HOME?: NO

## 2025-08-09 SDOH — ECONOMIC STABILITY: HOUSING INSECURITY: IN THE LAST 12 MONTHS, WAS THERE A TIME WHEN YOU WERE NOT ABLE TO PAY THE MORTGAGE OR RENT ON TIME?: NO

## 2025-08-09 SDOH — SOCIAL STABILITY: SOCIAL INSECURITY: ABUSE: ADULT

## 2025-08-09 SDOH — SOCIAL STABILITY: SOCIAL INSECURITY: ARE THERE ANY APPARENT SIGNS OF INJURIES/BEHAVIORS THAT COULD BE RELATED TO ABUSE/NEGLECT?: NO

## 2025-08-09 SDOH — HEALTH STABILITY: PHYSICAL HEALTH: ON AVERAGE, HOW MANY MINUTES DO YOU ENGAGE IN EXERCISE AT THIS LEVEL?: 30 MIN

## 2025-08-09 SDOH — SOCIAL STABILITY: SOCIAL INSECURITY: ARE YOU OR HAVE YOU BEEN THREATENED OR ABUSED PHYSICALLY, EMOTIONALLY, OR SEXUALLY BY ANYONE?: NO

## 2025-08-09 SDOH — ECONOMIC STABILITY: HOUSING INSECURITY: AT ANY TIME IN THE PAST 12 MONTHS, WERE YOU HOMELESS OR LIVING IN A SHELTER (INCLUDING NOW)?: NO

## 2025-08-09 SDOH — SOCIAL STABILITY: SOCIAL INSECURITY
WITHIN THE LAST YEAR, HAVE YOU BEEN KICKED, HIT, SLAPPED, OR OTHERWISE PHYSICALLY HURT BY YOUR PARTNER OR EX-PARTNER?: NO

## 2025-08-09 SDOH — SOCIAL STABILITY: SOCIAL INSECURITY: HAVE YOU HAD ANY THOUGHTS OF HARMING ANYONE ELSE?: NO

## 2025-08-09 SDOH — SOCIAL STABILITY: SOCIAL INSECURITY
WITHIN THE LAST YEAR, HAVE YOU BEEN RAPED OR FORCED TO HAVE ANY KIND OF SEXUAL ACTIVITY BY YOUR PARTNER OR EX-PARTNER?: NO

## 2025-08-09 SDOH — ECONOMIC STABILITY: FOOD INSECURITY: WITHIN THE PAST 12 MONTHS, THE FOOD YOU BOUGHT JUST DIDN'T LAST AND YOU DIDN'T HAVE MONEY TO GET MORE.: NEVER TRUE

## 2025-08-09 SDOH — SOCIAL STABILITY: SOCIAL INSECURITY: WITHIN THE LAST YEAR, HAVE YOU BEEN AFRAID OF YOUR PARTNER OR EX-PARTNER?: NO

## 2025-08-09 SDOH — ECONOMIC STABILITY: FOOD INSECURITY: WITHIN THE PAST 12 MONTHS, YOU WORRIED THAT YOUR FOOD WOULD RUN OUT BEFORE YOU GOT THE MONEY TO BUY MORE.: NEVER TRUE

## 2025-08-09 SDOH — SOCIAL STABILITY: SOCIAL INSECURITY: WITHIN THE LAST YEAR, HAVE YOU BEEN HUMILIATED OR EMOTIONALLY ABUSED IN OTHER WAYS BY YOUR PARTNER OR EX-PARTNER?: NO

## 2025-08-09 SDOH — HEALTH STABILITY: PHYSICAL HEALTH
ON AVERAGE, HOW MANY DAYS PER WEEK DO YOU ENGAGE IN MODERATE TO STRENUOUS EXERCISE (LIKE A BRISK WALK)?: PATIENT DECLINED

## 2025-08-09 SDOH — SOCIAL STABILITY: SOCIAL INSECURITY: WERE YOU ABLE TO COMPLETE ALL THE BEHAVIORAL HEALTH SCREENINGS?: YES

## 2025-08-09 SDOH — ECONOMIC STABILITY: FOOD INSECURITY: HOW HARD IS IT FOR YOU TO PAY FOR THE VERY BASICS LIKE FOOD, HOUSING, MEDICAL CARE, AND HEATING?: PATIENT DECLINED

## 2025-08-09 SDOH — SOCIAL STABILITY: SOCIAL INSECURITY: HAS ANYONE EVER THREATENED TO HURT YOUR FAMILY OR YOUR PETS?: NO

## 2025-08-09 SDOH — SOCIAL STABILITY: SOCIAL INSECURITY: DO YOU FEEL ANYONE HAS EXPLOITED OR TAKEN ADVANTAGE OF YOU FINANCIALLY OR OF YOUR PERSONAL PROPERTY?: NO

## 2025-08-09 SDOH — SOCIAL STABILITY: SOCIAL INSECURITY: DOES ANYONE TRY TO KEEP YOU FROM HAVING/CONTACTING OTHER FRIENDS OR DOING THINGS OUTSIDE YOUR HOME?: NO

## 2025-08-09 SDOH — ECONOMIC STABILITY: TRANSPORTATION INSECURITY: IN THE PAST 12 MONTHS, HAS LACK OF TRANSPORTATION KEPT YOU FROM MEDICAL APPOINTMENTS OR FROM GETTING MEDICATIONS?: NO

## 2025-08-09 SDOH — SOCIAL STABILITY: SOCIAL INSECURITY: DO YOU FEEL UNSAFE GOING BACK TO THE PLACE WHERE YOU ARE LIVING?: NO

## 2025-08-09 SDOH — SOCIAL STABILITY: SOCIAL INSECURITY: HAVE YOU HAD THOUGHTS OF HARMING ANYONE ELSE?: NO

## 2025-08-09 ASSESSMENT — LIFESTYLE VARIABLES
AUDIT-C TOTAL SCORE: 0
PRESCIPTION_ABUSE_PAST_12_MONTHS: NO
AUDIT-C TOTAL SCORE: 0
HOW OFTEN DO YOU HAVE A DRINK CONTAINING ALCOHOL: NEVER
SKIP TO QUESTIONS 9-10: 1
HOW OFTEN DO YOU HAVE 6 OR MORE DRINKS ON ONE OCCASION: NEVER
HOW MANY STANDARD DRINKS CONTAINING ALCOHOL DO YOU HAVE ON A TYPICAL DAY: PATIENT DOES NOT DRINK
SUBSTANCE_ABUSE_PAST_12_MONTHS: NO

## 2025-08-09 ASSESSMENT — COGNITIVE AND FUNCTIONAL STATUS - GENERAL
DRESSING REGULAR LOWER BODY CLOTHING: A LITTLE
MOVING FROM LYING ON BACK TO SITTING ON SIDE OF FLAT BED WITH BEDRAILS: A LITTLE
CLIMB 3 TO 5 STEPS WITH RAILING: A LOT
MOVING TO AND FROM BED TO CHAIR: A LITTLE
TURNING FROM BACK TO SIDE WHILE IN FLAT BAD: A LITTLE
TOILETING: A LITTLE
WALKING IN HOSPITAL ROOM: A LITTLE
HELP NEEDED FOR BATHING: A LITTLE
CLIMB 3 TO 5 STEPS WITH RAILING: A LOT
MOBILITY SCORE: 17
DAILY ACTIVITIY SCORE: 19
STANDING UP FROM CHAIR USING ARMS: A LITTLE
TOILETING: A LITTLE
MOVING FROM LYING ON BACK TO SITTING ON SIDE OF FLAT BED WITH BEDRAILS: A LITTLE
TURNING FROM BACK TO SIDE WHILE IN FLAT BAD: A LITTLE
MOVING TO AND FROM BED TO CHAIR: A LITTLE
STANDING UP FROM CHAIR USING ARMS: A LITTLE
PERSONAL GROOMING: A LITTLE
WALKING IN HOSPITAL ROOM: A LITTLE
PERSONAL GROOMING: A LITTLE
MOBILITY SCORE: 17
DRESSING REGULAR UPPER BODY CLOTHING: A LITTLE
DRESSING REGULAR LOWER BODY CLOTHING: A LITTLE
DAILY ACTIVITIY SCORE: 19
DRESSING REGULAR UPPER BODY CLOTHING: A LITTLE
HELP NEEDED FOR BATHING: A LITTLE

## 2025-08-09 ASSESSMENT — PAIN SCALES - GENERAL
PAINLEVEL_OUTOF10: 7
PAINLEVEL_OUTOF10: 7
PAINLEVEL_OUTOF10: 3
PAINLEVEL_OUTOF10: 10 - WORST POSSIBLE PAIN

## 2025-08-09 ASSESSMENT — ACTIVITIES OF DAILY LIVING (ADL)
WALKS IN HOME: NEEDS ASSISTANCE
PATIENT'S MEMORY ADEQUATE TO SAFELY COMPLETE DAILY ACTIVITIES?: YES
LACK_OF_TRANSPORTATION: NO
GROOMING: NEEDS ASSISTANCE
ADEQUATE_TO_COMPLETE_ADL: NO
HEARING - RIGHT EAR: FUNCTIONAL
JUDGMENT_ADEQUATE_SAFELY_COMPLETE_DAILY_ACTIVITIES: YES
DRESSING YOURSELF: NEEDS ASSISTANCE
TOILETING: NEEDS ASSISTANCE
HEARING - LEFT EAR: FUNCTIONAL
BATHING: NEEDS ASSISTANCE
LACK_OF_TRANSPORTATION: NO
FEEDING YOURSELF: NEEDS ASSISTANCE
ASSISTIVE_DEVICE: OXYGEN;WALKER

## 2025-08-09 ASSESSMENT — PAIN DESCRIPTION - ORIENTATION: ORIENTATION: LOWER

## 2025-08-09 ASSESSMENT — PATIENT HEALTH QUESTIONNAIRE - PHQ9
2. FEELING DOWN, DEPRESSED OR HOPELESS: NOT AT ALL
1. LITTLE INTEREST OR PLEASURE IN DOING THINGS: NOT AT ALL
SUM OF ALL RESPONSES TO PHQ9 QUESTIONS 1 & 2: 0

## 2025-08-09 ASSESSMENT — PAIN - FUNCTIONAL ASSESSMENT
PAIN_FUNCTIONAL_ASSESSMENT: 0-10

## 2025-08-09 ASSESSMENT — PAIN DESCRIPTION - DESCRIPTORS: DESCRIPTORS: ACHING;SORE

## 2025-08-09 ASSESSMENT — PAIN DESCRIPTION - LOCATION: LOCATION: CHEST

## 2025-08-09 NOTE — PROGRESS NOTES
Pharmacy Medication History Review    Radha Torres is a 85 y.o. female admitted for Chest pain. Pharmacy reviewed the patient's [id number] medications and allergies for accuracy.    Medications ADDED:  None  Medications CHANGED:  None  Medications REMOVED:   None     The list below reflects the updated PTA list.   Prior to Admission Medications   Prescriptions Last Dose Informant   HYDROcodone-acetaminophen (Norco) 5-325 mg tablet  Child   Sig: Take 1 tablet by mouth every 4 hours if needed for severe pain (7 - 10).   PARoxetine (Paxil) 20 mg tablet  Child   Sig: Take 1 tablet (20 mg) by mouth once daily.   amLODIPine (Norvasc) 2.5 mg tablet  Child   Sig: Take 1 tablet (2.5 mg) by mouth once daily.   apixaban (Eliquis) 2.5 mg tablet  Child   Sig: Take 1 tablet (2.5 mg) by mouth 2 times a day.   Patient taking differently: Take 2 tablets (5 mg) by mouth 2 times a day.   ascorbic acid (Vitamin C) 500 mg tablet  Child   Sig: Take 1 tablet (500 mg) by mouth once daily.   atorvastatin (Lipitor) 20 mg tablet  Child   Sig: Take 1 tablet (20 mg) by mouth once daily.   calcium citrate-vitamin D3 (Citracal+D) 315 mg-5 mcg (200 unit) tablet  Child   Sig: Take 1 tablet by mouth once daily.   carvedilol (Coreg) 6.25 mg tablet  Child   Sig: Take 1 tablet (6.25 mg) by mouth 2 times a day.   cholecalciferol (Vitamin D-3) 25 MCG (1000 UT) capsule  Child   Sig: Take 1 capsule (25 mcg) by mouth once daily.   esomeprazole (NexIUM) 20 mg DR capsule  Child   Sig: Take 1 capsule (20 mg) by mouth once daily.   ferrous sulfate 325 (65 Fe) MG tablet  Child   Sig: Take 1 tablet by mouth once daily.   furosemide (Lasix) 20 mg tablet  Child   Sig: Take 1 tablet (20 mg) by mouth once daily.   Patient taking differently: Take 2 tablets (40 mg) by mouth once daily.   gabapentin (Neurontin) 300 mg capsule  Child   Sig: Take 1 capsule (300 mg) by mouth once daily.   levothyroxine (Synthroid, Levoxyl) 75 mcg tablet  Child   Sig: Take 1 tablet  "(75 mcg) by mouth once daily.   potassium chloride CR 10 mEq ER tablet  Child   Sig: Take 1 tablet (10 mEq) by mouth early in the morning..   temazepam (Restoril) 7.5 mg capsule  Child   Sig: Take 1 capsule (7.5 mg) by mouth as needed at bedtime.      Facility-Administered Medications: None        The list below reflects the updated allergy list. Please review each documented allergy for additional clarification and justification.  Allergies  Reviewed by Trip Mendoza on 8/9/2025   No Known Allergies         Patient declines M2B at discharge.     Sources:   Pharmacy dispense history  Child Daughter Good historian  Chart Review  Care Everywhere  7/11/25 Discharge Summary     Additional Comments:  Patient's daughter was able to recall all medications by name as well as frequency and dose   Most current and updated medication list provided at bedside       TRIP MENDOZA  Pharmacy Technician  08/09/25     Secure Chat preferred   If no response call [phone] or [employer] \"Med Rec\"    "

## 2025-08-09 NOTE — PROGRESS NOTES
"Radha Torres is a 85 y.o. female on day 0 of admission presenting with Chest pain.    Subjective   Patient was seen sitting in chair. Patient had no complain aside from epigastric tenderness with activity. Patient had no chest pain or dyspnea. Patient had no complaints.    Patient's daughter was in the room and shared that the patient has had frequent falls leading to fractures, and can perform her ADLs with assistance of her . She was hospitalized for pulmonary edema previously and she was brought to the hospital after having epigastric pain out of fear of a recurrence.       Objective     Physical Exam  Vitals reviewed.   Constitutional:       Appearance: Normal appearance.     Cardiovascular:      Rate and Rhythm: Normal rate and regular rhythm.      Heart sounds: Normal heart sounds.   Pulmonary:      Effort: Pulmonary effort is normal. No respiratory distress.      Breath sounds: Normal breath sounds.   Abdominal:      Palpations: Abdomen is soft.      Tenderness: There is no abdominal tenderness.     Musculoskeletal:      Right lower leg: No edema.      Left lower leg: No edema.     Neurological:      General: No focal deficit present.      Mental Status: She is alert. Mental status is at baseline.     Psychiatric:         Mood and Affect: Mood normal.         Behavior: Behavior normal.         Last Recorded Vitals  Blood pressure 132/88, pulse 86, temperature 36.4 °C (97.5 °F), temperature source Temporal, resp. rate 17, height 1.65 m (5' 4.96\"), weight 79.4 kg (175 lb), SpO2 94%.  Intake/Output last 3 Shifts:  No intake/output data recorded.    Relevant Results             Scheduled medications  Scheduled Medications[1]  Continuous medications  Continuous Medications[2]  PRN medications  PRN Medications[3]    Results for orders placed or performed during the hospital encounter of 08/09/25 (from the past 24 hours)   ECG 12 lead   Result Value Ref Range    Ventricular Rate 70 BPM    Atrial Rate 288 BPM "    QRS Duration 198 ms    QT Interval 506 ms    QTC Calculation(Bazett) 546 ms    R Axis -56 degrees    T Axis 110 degrees    QRS Count 12 beats    Q Onset 189 ms    T Offset 442 ms    QTC Fredericia 532 ms   CBC   Result Value Ref Range    WBC 11.9 (H) 4.4 - 11.3 x10*3/uL    nRBC 0.0 0.0 - 0.0 /100 WBCs    RBC 3.90 (L) 4.00 - 5.20 x10*6/uL    Hemoglobin 10.9 (L) 12.0 - 16.0 g/dL    Hematocrit 35.2 (L) 36.0 - 46.0 %    MCV 90 80 - 100 fL    MCH 27.9 26.0 - 34.0 pg    MCHC 31.0 (L) 32.0 - 36.0 g/dL    RDW 14.5 11.5 - 14.5 %    Platelets 290 150 - 450 x10*3/uL   Magnesium   Result Value Ref Range    Magnesium 1.81 1.60 - 2.40 mg/dL   Renal function panel   Result Value Ref Range    Glucose 156 (H) 74 - 99 mg/dL    Sodium 131 (L) 136 - 145 mmol/L    Potassium 3.9 3.5 - 5.3 mmol/L    Chloride 90 (L) 98 - 107 mmol/L    Bicarbonate 28 21 - 32 mmol/L    Anion Gap 17 10 - 20 mmol/L    Urea Nitrogen 16 6 - 23 mg/dL    Creatinine 1.46 (H) 0.50 - 1.05 mg/dL    eGFR 36 (L) >60 mL/min/1.73m*2    Calcium 9.5 8.6 - 10.6 mg/dL    Phosphorus 4.0 2.5 - 4.9 mg/dL    Albumin 3.9 3.4 - 5.0 g/dL   Troponin I, High Sensitivity   Result Value Ref Range    Troponin I, High Sensitivity (CMC) 38 (H) 0 - 34 ng/L   B-type natriuretic peptide   Result Value Ref Range    BNP 2,422 (H) 0 - 99 pg/mL   C-reactive protein   Result Value Ref Range    C-Reactive Protein 0.87 <1.00 mg/dL     ECG 12 lead  Result Date: 8/9/2025  Ventricular-paced rhythm Abnormal ECG When compared with ECG of 06-MAY-2025 17:15, No significant change was found    XR chest 2 views  Result Date: 8/9/2025  Interpreted By:  Yamil Fraser, STUDY: XR CHEST 2 VIEWS; 8/9/2025 10:54 am   INDICATION: Signs/Symptoms:vol status.   COMPARISON: 05/07/2025.   ACCESSION NUMBER(S): [id number]   ORDERING CLINICIAN: JULIANO SPENCER   FINDINGS:     CARDIOMEDIASTINAL SILHOUETTE: Cardiomegaly versus pericardial effusion. Right atrial and ventricular monitoring device in place.   LUNGS:  Interval increase in bilateral pleural effusions.   ABDOMEN: No remarkable upper abdominal findings.   BONES: Remote right-sided rib fractures. No pneumothorax.       1.  Interval increase in bilateral pleural effusions with basilar edema. Correlate with cardiac and fluid status.     Signed by: Yamil Fraser 8/9/2025 1:30 PM Dictation workstation:   [id number]                   Assessment & Plan  Chest pain    85 y.o. female with history significant for HFrEF (EF 25-30%), aortic stenosis, Afib, s/p AV node ablation and leadless pacemaker in 2025, HTN, renal cell carcinoma who presented on 8/9/25 as a transfer from [facility] for epigastric pain, nonradiating, not associated with eating. Hemodynamically stable, afebrile on arrival in no acute distress with some mild epigastric burning. Trop 23>27, lipase within normal limits. EKG with no ischemic changes. CTAP negative for inflammatory process or bowel obstruction. She is scheduled for upcoming TAVR on 8/19/25 with Dr. Howell.    Update 8/9/25:  Repeat troponin  Device interrogation  CRP negative  IV furosemide 40mg today. Will F/U RFP, Mg @ 1900  Observation. May be discharged tomorrow    #Epigastric pain  #HFrEF (EF 25-30%)  #Severe aortic stenosis  :: Echo 7/11/25 - EF 25-30%, left ventricle mildly dilated, mild concentric LV hypertrophy, leadless pacemaker in right atria and RV, aortic valve is heavily calcified with appearance of severe aortic stenosis (peak gradient 52mmHg, mean gradient 25mmHg, aortic valve area 0.8cm^2), trace AR, mild MR and TR, RVSP 50-60mmHg,   ::Scheduled for TAVR 8/19/25 with Dr. Howell  ::EKG with no ischemic changes  ::Troponin 23>27  ::Lipase, CTAP negative for intra abdominal process  Plan:  - Repeat EKG if any chest pain   - Telemetry  - Continue home Lasix 40mg   - Pantoprazole 40mg    #Atrial fibrillation  #S/p AV emiliano ablation and leadless pacemaker placement 5/6/25  #HTN  :: Home regimen - coreg 6.25 BID, amlodipine 2.5  mg, Eliquis 5mg BID  Plan:  - Continue home medication    #CKD 3b  :: Baseline Cr appears to be around 1.2  ::Cr 1.22 in ED  Plan:  - Daily RFP  - Avoid nephrotoxic agents    #HLD  - Continue atorvastatin     #Hypothyroidism  - Continue levothyroxine 75 mcg    =======================================    Fluids: PRN  Electrolytes: PRN, keep K>4. Mg>2, PO4>3  Nutrition: regular    GI ppx: pantoprazole 40mg  DVT ppx: apixaban  Bowel care: PEG qD    Abx: none  O2: RA    Code Status: full code  NOK: Stefanie Roblero, [phone]    Patient seen and discussed with attending.        Kevin Hook MD  Neurology PGY-1         [1] amLODIPine, 2.5 mg, oral, Daily  apixaban, 5 mg, oral, BID  ascorbic acid, 500 mg, oral, Daily  atorvastatin, 20 mg, oral, Daily  carvedilol, 6.25 mg, oral, BID  cholecalciferol, 25 mcg, oral, Daily  ferrous sulfate, 1 tablet, oral, Daily  furosemide, 40 mg, oral, Daily  gabapentin, 300 mg, oral, Daily  levothyroxine, 75 mcg, oral, Daily  pantoprazole, 40 mg, oral, Daily before breakfast  PARoxetine, 20 mg, oral, Daily  polyethylene glycol, 17 g, oral, Daily  [2]    [3] PRN medications: acetaminophen, HYDROcodone-acetaminophen, temazepam

## 2025-08-09 NOTE — H&P
"    HPI:  Radha Torres is a 85 y.o. female with history significant for HFrEF (EF 25-30%), aortic stenosis, Afib, s/p AV emiliano ablation and leadless pacemaker 2025, HTN, renal cell carcinoma who presented on 8/9/2025 as a transfer from [facility] for epigastric pain.     Patient initially presented to the ED for epigastric abdominal pain. Notes a one to two month history of abdominal pain however it was worsening over the last several days so she decided to go to the ER. She had one episodes of vomiting. She was afebrile, hemodynamically stable in no acute distress. Some epigastric pain on palpation. /89, saturating appropriately on room air. Labs notable for WBC 9.9, Hgb 10.9, plts 267. PT 23.9/INR 2.1. Cr 1.22 (unknown baseline). Trop 23>27 Lipase 12, lactate 1.3. UA negative. Liver enzymes and alk phos within normal limits. CTAP done with no acute inflammatory process or bowel obstruction.     Patient completed TAVR workup as outpatient. Scheduled for TAVR on 8/19/25 with Dr. Howell. She follows with Dr. Martin as her Cardiologist in [place].    Upon arrival after transfer, patient reporting some nausea. She reports mid epigastric pain which she describes as a burning sensation. It has been persistent for her over the last few days and is not relieved by anything. She denies radiation to the shoulder, neck or back. Otherwise she denies fever, chills, lightheadedness, shortness of breath, abdominal pain, N/V/C/D, lower extremity pain/swelling.    Vitals:   /82   Pulse 77   Temp 36.3 °C (97.3 °F) (Temporal)   Resp 18   Ht 1.65 m (5' 4.96\")   Wt 79.4 kg (175 lb)   SpO2 92%   BMI 29.16 kg/m²    - Labs:   Pertinent labs from OSH listed above in H&P     - Imaging:  CTAP 8/8/25  FINDINGS:  Lung Bases: [Effusions and airspace disease left greater than right. Cardiomegaly.]  Organs:Cholecystectomy. Otherwise the liver, spleen, adrenals, kidneys, pancreas unremarkable. No hydronephrosis.[  GI: Mild " "retained stool. No bowel obstruction. Colonic diverticulosis notably involving the  sigmoid colon. The appendix is not identified. There are no pericecal inflammatory change.[  Pelvis:[Bladder unremarkable. Uterus absent. No adnexal mass.]  Peritoneum/Retroperitoneum:. Minimal fluid along the liver.. No free air.[Moderate plaque involving the nonaneurysmal aorta. Mild-to-moderate plaque involving the iliac and mesenteric vessels.  Abd wall/Bones:Multilevel degenerative and postsurgical changes involving the lumbar spine. Posterior changes left femur noted.[    IMPRESSION:  Negative acute inflammatory process or bowel obstruction.    Allergies:  RX Allergies[1]    Past medical history:  Medical History[2]    Surgical history:  Surgical History[3]    Family history:  Family History[4]    Social history:   reports that she has never smoked. She has never used smokeless tobacco. She reports that she does not drink alcohol and does not use drugs.     Objective   Vitals:  /82   Pulse 77   Temp 36.3 °C (97.3 °F) (Temporal)   Resp 18   Ht 1.65 m (5' 4.96\")   Wt 79.4 kg (175 lb)   SpO2 92%   BMI 29.16 kg/m²   24 Hour Vitals  Temp:  [36.1 °C (97 °F)-36.8 °C (98.2 °F)] 36.3 °C (97.3 °F)  Heart Rate:  [70-94] 77  Resp:  [18] 18  BP: (147-182)/() 150/82  SpO2:  [92 %-94 %] 92 %    Temp (24hrs), Av.4 °C (97.5 °F), Min:36.1 °C (97 °F), Max:36.8 °C (98.2 °F)     24 hour Intake/Output  No intake or output data in the 24 hours ending 25 0600     Physical exam:  Constitutional: Appears stated age, normal hygiene, in no acute distress.  HEENT: NCAT, EOMI, PERRL, sclera anicteric, MMM.   Respiratory: CTAB. No wheezes, crackles, or rhonchi. Normal respiratory effort on RA.   Cardiovascular: RRR, normal S1/S2, No murmurs, rubs, or gallops.   Abdominal: +BS, soft, nondistended, tenderness over epigastric region to deep palpation. No rebound, masses, or guarding.   Neuro: A&Ox4. CN II-XII grossly intact. Moving " all extremities with no focal deficits.   MSK: WWP, no peripheral edema, cap refill<3 seconds, no joint swelling.   Skin: No lesions, wounds, or ecchymoses.    Psych: Appropriate mood and affect, linear thought process and judgement intact.       Medications:   Scheduled Medications  Scheduled Medications[5] Continuous Medications  Continuous Medications[6] PRN Medications  PRN Medications[7]     Labs:  CBC:      RFP:      HFP:      Cardiac:      Coag:      ABG/VBG:            UA:        Cultures:   No results found for the last 90 days.      Imaging:     === 07/31/25 ===    CT TAVR LOW CONTRAST CHEST ABDOMEN PELVIS    - Impression -  1. Moderate atherosclerotic changes involving the thoracoabdominal  aorta and its branches. Access vessels are patent.  2. Severe calcifications of the aortic valve correlating with history  of aortic stenosis.  3. Left ventricular and left atrial enlargement and correlate with  recent echocardiogram.  4. Mildly dilated pulmonary artery and correlate with concern for  pulmonary hypertension.  5. Severe coronary artery calcifications. Please note that, the  present study is not tailored for evaluation of coronary arteries and  correlate with patient's symptoms and cardiovascular risk factors.  6. Small bilateral pleural effusions, left larger than right.  7. Right-sided chest wall deformity with adjacent pleural thickening  and atelectasis, also seen on prior imaging.  8. Status post L3-L5 spinal fusion. Retrolisthesis of L1-L2 and L2-L3.    I personally reviewed the images/study and I agree with the findings  as stated by Rocky Ye D.O. (Radiology resident). This study was  interpreted at [facility],  [place], [place].    MACRO:  None    Signed by: Bairon Katz 7/31/2025 4:27 PM  Dictation workstation:   [id number]      Assessment/Plan   Assessment and Plan:  Radha Torres is a 85 y.o. female with history significant for HFrEF (EF 25-30%), aortic  stenosis, Afib, s/p AV emiliano ablation and leadless pacemaker 2025, HTN, renal cell carcinoma who presented on 8/9/2025 as a transfer from [facility] for epigastric pain, nonradiating. Hemodynamically stable, afebrile on arrival in no acute distress with some mild epigastric burning. Trop 23>27, lipase within normal limits. EKG with no ischemic changes. CTAP negative for inflammatory process or bowel obstruction. She is scheduled for upcoming TAVR on 8/19/25 with Dr. Howell.     #Epigastric pain  #HFrEF (EF 25-30%)  #Severe aortic stenosis  :: Echo 7/11/25 - EF 25-30%, left ventricle mildly dilated, mild concentric LV hypertrophy, leadless pacemaker in right atria and RV, aortic valve is heavily calcified with appearance of severe aortic stenosis (peak gradient 52mmHg, mean gradient 25mmHg, aortic valve area 0.8cm^2), trace AR, mild MR and TR, RVSP 50-60mmHg,   ::Scheduled for TAVR 8/19/25 with Dr. Howell  ::EKG with no ischemic changes  ::Troponin 23>27  ::Lipase, CTAP negative for intra abdominal process  Plan:  - Admit to Cardiology team  - Trend trops to peak  - Repeat EKG if any chest pain   - Telemetry  - Continue home Lasix 40mg   - Pantoprazole 40mg    #Atrial fibrillation  #S/p AV emiliano ablation and leadless pacemaker placement 5/6/25  #HTN  :: Home regimen - coreg 6.25 BID, amlodipine 2.5 mg, Eliquis 5mg BID  Plan:  - Continue home medications coreg 6.25 BID, amlodipine 2.5 mg, Eliquis 5mg BID    #CKD 3b  :: Baseline Cr appears to be around 1.2  ::Cr 1.22 in ED  Plan:  - Daily RFP  - Avoid nephrotoxic agents    #HLD  - Continue atorvastatin    #Hypothyroidism  - Continue levothyroxine 75 mcg    F: Replete PRN  E: Replete PRN  N: Adult diet Regular  A: PIV     DVT prophylaxis: Eliquis  GI Prophylaxis: PPI   Antimicrobials: not indicated    Code Status: Full Code (confirmed on admission)   Surrogate Decision Maker:  Primary Emergency Contact: AidanbelloStefanie [phone]     Joselyn Dao MD -  08/09/25  Internal Medicine PGY-2         [1] No Known Allergies  [2]   Past Medical History:  Diagnosis Date    Abnormal ECG 2018    Arrhythmia 2018    afib    Atrial fibrillation (Multi) 2018    Cancer (Multi) 2018    Cardiomyopathy     CHF (congestive heart failure)     Chronic kidney disease     Hyperlipidemia 2018    Hypertension Years ago   [3]   Past Surgical History:  Procedure Laterality Date    ABLATION OF DYSRHYTHMIC FOCUS  05/06/2025    CARDIAC ELECTROPHYSIOLOGY PROCEDURE Right 5/6/2025    Procedure: Ablation AV Node;  Surgeon: Marija Ortiz MD;  Location: [facility];  Service: Electrophysiology;  Laterality: Right;    CARDIAC ELECTROPHYSIOLOGY PROCEDURE Right 5/6/2025    Procedure: PPM Leadless Implant;  Surgeon: Mariaj Ortiz MD;  Location: [facility];  Service: Electrophysiology;  Laterality: Right;  [place], [place]    INSERT / REPLACE / REMOVE PACEMAKER  05/06/2025    OTHER SURGICAL HISTORY  09/13/2021    Hysterectomy    OTHER SURGICAL HISTORY  09/13/2021    Cholecystectomy    OTHER SURGICAL HISTORY  09/13/2021    Complete colonoscopy    OTHER SURGICAL HISTORY  09/13/2021    Kidney surgery    OTHER SURGICAL HISTORY  09/13/2021    Leg surgery   [4]   Family History  Problem Relation Name Age of Onset    Cancer Mother Radha Groves     Stroke Father Hung Groves     Hypertension Mother Radha Groves    [5] amLODIPine, 2.5 mg, oral, Daily  apixaban, 5 mg, oral, BID  ascorbic acid, 500 mg, oral, Daily  atorvastatin, 20 mg, oral, Daily  carvedilol, 6.25 mg, oral, BID  cholecalciferol, 25 mcg, oral, Daily  ferrous sulfate, 1 tablet, oral, Daily  furosemide, 40 mg, oral, Daily  gabapentin, 300 mg, oral, Daily  levothyroxine, 75 mcg, oral, Daily  pantoprazole, 40 mg, oral, Daily before breakfast  PARoxetine, 20 mg, oral, Daily  polyethylene glycol, 17 g, oral, Daily     [6]    [7] PRN medications: temazepam

## 2025-08-10 VITALS
BODY MASS INDEX: 29.16 KG/M2 | HEIGHT: 65 IN | RESPIRATION RATE: 17 BRPM | OXYGEN SATURATION: 94 % | HEART RATE: 82 BPM | SYSTOLIC BLOOD PRESSURE: 128 MMHG | WEIGHT: 175 LBS | TEMPERATURE: 96.8 F | DIASTOLIC BLOOD PRESSURE: 85 MMHG

## 2025-08-10 LAB
ALBUMIN SERPL BCP-MCNC: 4 G/DL (ref 3.4–5)
ALBUMIN SERPL BCP-MCNC: 4 G/DL (ref 3.4–5)
ANION GAP SERPL CALC-SCNC: 14 MMOL/L
ANION GAP SERPL CALC-SCNC: 14 MMOL/L (ref 10–20)
BASOPHILS # BLD AUTO: 0.07 X10*3/UL (ref 0–0.1)
BASOPHILS NFR BLD AUTO: 0.6 %
BUN SERPL-MCNC: 18 MG/DL (ref 6–23)
BUN SERPL-MCNC: 19 MG/DL (ref 6–23)
CALCIUM SERPL-MCNC: 9 MG/DL (ref 8.6–10.6)
CALCIUM SERPL-MCNC: 9.1 MG/DL (ref 8.6–10.6)
CHLORIDE SERPL-SCNC: 88 MMOL/L (ref 98–107)
CHLORIDE SERPL-SCNC: 89 MMOL/L (ref 98–107)
CO2 SERPL-SCNC: 27 MMOL/L (ref 21–32)
CO2 SERPL-SCNC: 28 MMOL/L (ref 21–32)
CREAT SERPL-MCNC: 1.37 MG/DL (ref 0.5–1.05)
CREAT SERPL-MCNC: 1.42 MG/DL (ref 0.5–1.05)
EGFRCR SERPLBLD CKD-EPI 2021: 37 ML/MIN/1.73M*2
EGFRCR SERPLBLD CKD-EPI 2021: 38 ML/MIN/1.73M*2
EOSINOPHIL # BLD AUTO: 0.04 X10*3/UL (ref 0–0.4)
EOSINOPHIL NFR BLD AUTO: 0.4 %
ERYTHROCYTE [DISTWIDTH] IN BLOOD BY AUTOMATED COUNT: 14.2 % (ref 11.5–14.5)
GLUCOSE SERPL-MCNC: 185 MG/DL (ref 74–99)
GLUCOSE SERPL-MCNC: 208 MG/DL (ref 74–99)
HCT VFR BLD AUTO: 35.7 % (ref 36–46)
HGB BLD-MCNC: 11.2 G/DL (ref 12–16)
IMM GRANULOCYTES # BLD AUTO: 0.06 X10*3/UL (ref 0–0.5)
IMM GRANULOCYTES NFR BLD AUTO: 0.5 % (ref 0–0.9)
LYMPHOCYTES # BLD AUTO: 0.94 X10*3/UL (ref 0.8–3)
LYMPHOCYTES NFR BLD AUTO: 8.5 %
MAGNESIUM SERPL-MCNC: 1.93 MG/DL (ref 1.6–2.4)
MAGNESIUM SERPL-MCNC: 1.97 MG/DL (ref 1.6–2.4)
MCH RBC QN AUTO: 28.4 PG (ref 26–34)
MCHC RBC AUTO-ENTMCNC: 31.4 G/DL (ref 32–36)
MCV RBC AUTO: 91 FL (ref 80–100)
MONOCYTES # BLD AUTO: 0.62 X10*3/UL (ref 0.05–0.8)
MONOCYTES NFR BLD AUTO: 5.6 %
NEUTROPHILS # BLD AUTO: 9.32 X10*3/UL (ref 1.6–5.5)
NEUTROPHILS NFR BLD AUTO: 84.4 %
NRBC BLD-RTO: 0 /100 WBCS (ref 0–0)
PHOSPHATE SERPL-MCNC: 3.4 MG/DL (ref 2.5–4.9)
PHOSPHATE SERPL-MCNC: 3.5 MG/DL (ref 2.5–4.9)
PLATELET # BLD AUTO: 280 X10*3/UL (ref 150–450)
POTASSIUM SERPL-SCNC: 4 MMOL/L (ref 3.5–5.3)
POTASSIUM SERPL-SCNC: 4.5 MMOL/L (ref 3.5–5.3)
RBC # BLD AUTO: 3.94 X10*6/UL (ref 4–5.2)
SODIUM SERPL-SCNC: 124 MMOL/L (ref 136–145)
SODIUM SERPL-SCNC: 127 MMOL/L (ref 136–145)
WBC # BLD AUTO: 11.1 X10*3/UL (ref 4.4–11.3)

## 2025-08-10 PROCEDURE — 80069 RENAL FUNCTION PANEL: CPT

## 2025-08-10 PROCEDURE — 36415 COLL VENOUS BLD VENIPUNCTURE: CPT

## 2025-08-10 PROCEDURE — 85025 COMPLETE CBC W/AUTO DIFF WBC: CPT

## 2025-08-10 PROCEDURE — 2500000002 HC RX 250 W HCPCS SELF ADMINISTERED DRUGS (ALT 637 FOR MEDICARE OP, ALT 636 FOR OP/ED)

## 2025-08-10 PROCEDURE — 83735 ASSAY OF MAGNESIUM: CPT

## 2025-08-10 PROCEDURE — 2500000004 HC RX 250 GENERAL PHARMACY W/ HCPCS (ALT 636 FOR OP/ED)

## 2025-08-10 PROCEDURE — 2500000001 HC RX 250 WO HCPCS SELF ADMINISTERED DRUGS (ALT 637 FOR MEDICARE OP)

## 2025-08-10 PROCEDURE — 1200000002 HC GENERAL ROOM WITH TELEMETRY DAILY

## 2025-08-10 PROCEDURE — 99232 SBSQ HOSP IP/OBS MODERATE 35: CPT

## 2025-08-10 RX ORDER — AMLODIPINE BESYLATE 5 MG/1
2.5 TABLET ORAL ONCE
Status: COMPLETED | OUTPATIENT
Start: 2025-08-10 | End: 2025-08-10

## 2025-08-10 RX ORDER — MAGNESIUM SULFATE 1 G/100ML
1 INJECTION INTRAVENOUS ONCE
Status: COMPLETED | OUTPATIENT
Start: 2025-08-10 | End: 2025-08-10

## 2025-08-10 RX ORDER — AMLODIPINE BESYLATE 5 MG/1
5 TABLET ORAL DAILY
Qty: 30 TABLET | Refills: 11 | Status: SHIPPED | OUTPATIENT
Start: 2025-08-11 | End: 2026-08-11

## 2025-08-10 RX ORDER — POTASSIUM CHLORIDE 20 MEQ/1
20 TABLET, EXTENDED RELEASE ORAL ONCE
Status: COMPLETED | OUTPATIENT
Start: 2025-08-10 | End: 2025-08-10

## 2025-08-10 RX ORDER — POTASSIUM CHLORIDE 14.9 MG/ML
20 INJECTION INTRAVENOUS
Status: COMPLETED | OUTPATIENT
Start: 2025-08-10 | End: 2025-08-10

## 2025-08-10 RX ORDER — AMLODIPINE BESYLATE 5 MG/1
5 TABLET ORAL DAILY
Status: ACTIVE | OUTPATIENT
Start: 2025-08-11

## 2025-08-10 RX ADMIN — TEMAZEPAM 7.5 MG: 7.5 CAPSULE ORAL at 22:53

## 2025-08-10 RX ADMIN — CARVEDILOL 6.25 MG: 3.12 TABLET, FILM COATED ORAL at 22:26

## 2025-08-10 RX ADMIN — POTASSIUM CHLORIDE 20 MEQ: 1500 TABLET, EXTENDED RELEASE ORAL at 02:11

## 2025-08-10 RX ADMIN — HYDROCODONE BITARTRATE AND ACETAMINOPHEN 1 TABLET: 5; 325 TABLET ORAL at 02:09

## 2025-08-10 RX ADMIN — PANTOPRAZOLE SODIUM 40 MG: 40 TABLET, DELAYED RELEASE ORAL at 09:51

## 2025-08-10 RX ADMIN — APIXABAN 5 MG: 5 TABLET, FILM COATED ORAL at 09:51

## 2025-08-10 RX ADMIN — OXYCODONE HYDROCHLORIDE AND ACETAMINOPHEN 500 MG: 500 TABLET ORAL at 09:52

## 2025-08-10 RX ADMIN — POTASSIUM CHLORIDE 20 MEQ: 14.9 INJECTION, SOLUTION INTRAVENOUS at 09:59

## 2025-08-10 RX ADMIN — AMLODIPINE BESYLATE 2.5 MG: 5 TABLET ORAL at 13:13

## 2025-08-10 RX ADMIN — LEVOTHYROXINE SODIUM 75 MCG: 0.07 TABLET ORAL at 09:51

## 2025-08-10 RX ADMIN — HYDROCODONE BITARTRATE AND ACETAMINOPHEN 1 TABLET: 5; 325 TABLET ORAL at 17:35

## 2025-08-10 RX ADMIN — HYDROCODONE BITARTRATE AND ACETAMINOPHEN 1 TABLET: 5; 325 TABLET ORAL at 13:10

## 2025-08-10 RX ADMIN — GABAPENTIN 300 MG: 300 CAPSULE ORAL at 09:51

## 2025-08-10 RX ADMIN — FUROSEMIDE 40 MG: 40 TABLET ORAL at 09:51

## 2025-08-10 RX ADMIN — POTASSIUM CHLORIDE 20 MEQ: 14.9 INJECTION, SOLUTION INTRAVENOUS at 12:00

## 2025-08-10 RX ADMIN — Medication 25 MCG: at 09:50

## 2025-08-10 RX ADMIN — MAGNESIUM SULFATE HEPTAHYDRATE 1 G: 1 INJECTION, SOLUTION INTRAVENOUS at 04:36

## 2025-08-10 RX ADMIN — HYDROCODONE BITARTRATE AND ACETAMINOPHEN 1 TABLET: 5; 325 TABLET ORAL at 22:26

## 2025-08-10 RX ADMIN — PAROXETINE HYDROCHLORIDE 20 MG: 20 TABLET, FILM COATED ORAL at 09:51

## 2025-08-10 RX ADMIN — CALCIUM CARBONATE (ANTACID) CHEW TAB 500 MG 1 TABLET: 500 CHEW TAB at 21:00

## 2025-08-10 RX ADMIN — ATORVASTATIN CALCIUM 20 MG: 20 TABLET, FILM COATED ORAL at 09:51

## 2025-08-10 RX ADMIN — CARVEDILOL 6.25 MG: 3.12 TABLET, FILM COATED ORAL at 09:51

## 2025-08-10 RX ADMIN — CALCIUM CARBONATE (ANTACID) CHEW TAB 500 MG 1 TABLET: 500 CHEW TAB at 17:31

## 2025-08-10 RX ADMIN — APIXABAN 5 MG: 5 TABLET, FILM COATED ORAL at 22:26

## 2025-08-10 RX ADMIN — FERROUS SULFATE TAB 325 MG (65 MG ELEMENTAL FE) 1 TABLET: 325 (65 FE) TAB at 09:51

## 2025-08-10 RX ADMIN — AMLODIPINE BESYLATE 2.5 MG: 5 TABLET ORAL at 09:51

## 2025-08-10 ASSESSMENT — PAIN - FUNCTIONAL ASSESSMENT
PAIN_FUNCTIONAL_ASSESSMENT: 0-10
PAIN_FUNCTIONAL_ASSESSMENT: UNABLE TO SELF-REPORT
PAIN_FUNCTIONAL_ASSESSMENT: 0-10

## 2025-08-10 ASSESSMENT — PAIN SCALES - GENERAL
PAINLEVEL_OUTOF10: 10 - WORST POSSIBLE PAIN
PAINLEVEL_OUTOF10: 7
PAINLEVEL_OUTOF10: 7
PAINLEVEL_OUTOF10: 8
PAINLEVEL_OUTOF10: 0 - NO PAIN
PAINLEVEL_OUTOF10: 7
PAINLEVEL_OUTOF10: 4

## 2025-08-10 ASSESSMENT — PAIN DESCRIPTION - LOCATION
LOCATION: ABDOMEN
LOCATION: CHEST

## 2025-08-10 ASSESSMENT — PAIN DESCRIPTION - ORIENTATION
ORIENTATION: RIGHT;LEFT
ORIENTATION: LOWER

## 2025-08-10 ASSESSMENT — PAIN DESCRIPTION - DESCRIPTORS: DESCRIPTORS: ACHING;SORE

## 2025-08-10 NOTE — CARE PLAN
The patient's goals for the shift include      Problem: Pain - Adult  Goal: Verbalizes/displays adequate comfort level or baseline comfort level  Outcome: Progressing     Problem: Safety - Adult  Goal: Free from fall injury  Outcome: Progressing     Problem: Nutrition  Goal: Nutrient intake appropriate for maintaining nutritional needs  Outcome: Progressing

## 2025-08-10 NOTE — PROGRESS NOTES
"Radha Torres is a 85 y.o. female on day 1 of admission presenting with Chest pain.    Subjective   No acute events overnight (electrolytes repleted only). On 2L O2 via NC. EKG shows paced rhythm. Patient was seen sitting in chair. Patient had no complain aside from epigastric tenderness with activity. Patient had no chest pain or dyspnea. Patient had no complaints.       Objective     Physical Exam  Vitals reviewed.   Constitutional:       Appearance: Normal appearance.     Cardiovascular:      Rate and Rhythm: Normal rate and regular rhythm.      Heart sounds: Normal heart sounds.   Pulmonary:      Effort: Pulmonary effort is normal. No respiratory distress.      Breath sounds: Normal breath sounds.   Abdominal:      Palpations: Abdomen is soft.      Tenderness: There is no abdominal tenderness.     Musculoskeletal:      Right lower leg: No edema.      Left lower leg: No edema.     Neurological:      General: No focal deficit present.      Mental Status: She is alert. Mental status is at baseline.     Psychiatric:         Mood and Affect: Mood normal.         Behavior: Behavior normal.         Last Recorded Vitals  Blood pressure (!) 153/97, pulse 78, temperature 35.9 °C (96.6 °F), temperature source Temporal, resp. rate 17, height 1.65 m (5' 4.96\"), weight 79.4 kg (175 lb), SpO2 98%.  Intake/Output last 3 Shifts:  I/O last 3 completed shifts:  In: 360 (4.5 mL/kg) [P.O.:360]  Out: - (0 mL/kg)   Weight: 79.4 kg     Relevant Results             Scheduled medications  Scheduled Medications[1]  Continuous medications  Continuous Medications[2]  PRN medications  PRN Medications[3]    Results for orders placed or performed during the hospital encounter of 08/09/25 (from the past 24 hours)   Renal function panel   Result Value Ref Range    Glucose 174 (H) 74 - 99 mg/dL    Sodium 126 (L) 136 - 145 mmol/L    Potassium 3.3 (L) 3.5 - 5.3 mmol/L    Chloride 88 (L) 98 - 107 mmol/L    Bicarbonate 27 21 - 32 mmol/L    Anion Gap " 14 mmol/L    Urea Nitrogen 18 6 - 23 mg/dL    Creatinine 1.58 (H) 0.50 - 1.05 mg/dL    eGFR 32 (L) >60 mL/min/1.73m*2    Calcium 8.9 8.6 - 10.6 mg/dL    Phosphorus 3.5 2.5 - 4.9 mg/dL    Albumin 3.9 3.4 - 5.0 g/dL   Magnesium   Result Value Ref Range    Magnesium 1.69 1.60 - 2.40 mg/dL   Troponin I, High Sensitivity   Result Value Ref Range    Troponin I, High Sensitivity (CMC) 37 (H) 0 - 34 ng/L   CBC and Auto Differential   Result Value Ref Range    WBC 11.1 4.4 - 11.3 x10*3/uL    nRBC 0.0 0.0 - 0.0 /100 WBCs    RBC 3.94 (L) 4.00 - 5.20 x10*6/uL    Hemoglobin 11.2 (L) 12.0 - 16.0 g/dL    Hematocrit 35.7 (L) 36.0 - 46.0 %    MCV 91 80 - 100 fL    MCH 28.4 26.0 - 34.0 pg    MCHC 31.4 (L) 32.0 - 36.0 g/dL    RDW 14.2 11.5 - 14.5 %    Platelets 280 150 - 450 x10*3/uL    Neutrophils % 84.4 40.0 - 80.0 %    Immature Granulocytes %, Automated 0.5 0.0 - 0.9 %    Lymphocytes % 8.5 13.0 - 44.0 %    Monocytes % 5.6 2.0 - 10.0 %    Eosinophils % 0.4 0.0 - 6.0 %    Basophils % 0.6 0.0 - 2.0 %    Neutrophils Absolute 9.32 (H) 1.60 - 5.50 x10*3/uL    Immature Granulocytes Absolute, Automated 0.06 0.00 - 0.50 x10*3/uL    Lymphocytes Absolute 0.94 0.80 - 3.00 x10*3/uL    Monocytes Absolute 0.62 0.05 - 0.80 x10*3/uL    Eosinophils Absolute 0.04 0.00 - 0.40 x10*3/uL    Basophils Absolute 0.07 0.00 - 0.10 x10*3/uL   Magnesium   Result Value Ref Range    Magnesium 1.97 1.60 - 2.40 mg/dL   Renal Function Panel   Result Value Ref Range    Glucose 185 (H) 74 - 99 mg/dL    Sodium 127 (L) 136 - 145 mmol/L    Potassium 4.0 3.5 - 5.3 mmol/L    Chloride 89 (L) 98 - 107 mmol/L    Bicarbonate 28 21 - 32 mmol/L    Anion Gap 14 10 - 20 mmol/L    Urea Nitrogen 18 6 - 23 mg/dL    Creatinine 1.37 (H) 0.50 - 1.05 mg/dL    eGFR 38 (L) >60 mL/min/1.73m*2    Calcium 9.1 8.6 - 10.6 mg/dL    Phosphorus 3.5 2.5 - 4.9 mg/dL    Albumin 4.0 3.4 - 5.0 g/dL     ECG 12 lead  Result Date: 8/9/2025  Ventricular-paced rhythm Abnormal ECG When compared with ECG of  06-MAY-2025 17:15, No significant change was found    XR chest 2 views  Result Date: 8/9/2025  Interpreted By:  Yamil Fraser, STUDY: XR CHEST 2 VIEWS; 8/9/2025 10:54 am   INDICATION: Signs/Symptoms:vol status.   COMPARISON: 05/07/2025.   ACCESSION NUMBER(S): [id number]   ORDERING CLINICIAN: JULIANO SPENCER   FINDINGS:     CARDIOMEDIASTINAL SILHOUETTE: Cardiomegaly versus pericardial effusion. Right atrial and ventricular monitoring device in place.   LUNGS: Interval increase in bilateral pleural effusions.   ABDOMEN: No remarkable upper abdominal findings.   BONES: Remote right-sided rib fractures. No pneumothorax.       1.  Interval increase in bilateral pleural effusions with basilar edema. Correlate with cardiac and fluid status.     Signed by: Yamil Fraser 8/9/2025 1:30 PM Dictation workstation:   [id number]                   Assessment & Plan  Chest pain    85 y.o. female with history significant for HFrEF (EF 25-30%), aortic stenosis, Afib, s/p AV node ablation and leadless pacemaker in 2025, HTN, renal cell carcinoma who presented on 8/9/25 as a transfer from [facility] for epigastric pain, nonradiating, not associated with eating. Hemodynamically stable, afebrile on arrival in no acute distress with some mild epigastric burning. Trop 23>27, lipase within normal limits. EKG with no ischemic changes. CTAP negative for inflammatory process or bowel obstruction. Plan is to diurese and discharge afterwards. She is scheduled for upcoming TAVR on 8/19/25 with Dr. Howell.    Update 8/10/25:  Follow up device interrogation  Attempt to wean off O2  Increase amlodipine to 5mg every day  Continue PO furosemide  Will F/U I/O and RFP, Mg @ 1900. Goal is net negative 1L. If less, can give furosemide 40mg IV  Potential for discharge tomorrow  Will discuss procedure with primary team tomorrow    #Epigastric pain  #HFrEF (EF 25-30%)  #Severe aortic stenosis  :: Echo 7/11/25 - EF 25-30%, left ventricle mildly  dilated, mild concentric LV hypertrophy, leadless pacemaker in right atria and RV, aortic valve is heavily calcified with appearance of severe aortic stenosis (peak gradient 52mmHg, mean gradient 25mmHg, aortic valve area 0.8cm^2), trace AR, mild MR and TR, RVSP 50-60mmHg,   ::Scheduled for TAVR 8/19/25 with Dr. Howell  ::EKG with no ischemic changes  ::Troponin 23>27  ::Lipase, CTAP negative for intra abdominal process  Plan:  - Repeat EKG if any chest pain   - Telemetry  - Continue home furosemide 40mg   - Pantoprazole 40mg    #Atrial fibrillation  #S/p AV emiliano ablation and leadless pacemaker placement 5/6/25  #HTN  :: Home regimen - coreg 6.25 BID, amlodipine 2.5 mg, Eliquis 5mg BID  Plan:  - Amlodipine increased to 5mg on 8/10/25    #CKD 3b  :: Baseline Cr appears to be around 1.2  ::Cr 1.22 in ED  Plan:  - Daily RFP  - Avoid nephrotoxic agents    #HLD  - Continue atorvastatin     #Hypothyroidism  - Continue levothyroxine 75 mcg    =======================================    Fluids: PRN  Electrolytes: PRN, keep K>4. Mg>2, PO4>3  Nutrition: regular    GI ppx: pantoprazole 40mg  DVT ppx: apixaban  Bowel care: PEG qD    Abx: none  O2: RA    Code Status: full code  NOK: Stefanie Roblero, [phone]    Patient seen and discussed with attending.        Kevin Hook MD  Neurology PGY-1         [1] amLODIPine, 2.5 mg, oral, Once  [START ON 8/11/2025] amLODIPine, 5 mg, oral, Daily  apixaban, 5 mg, oral, BID  ascorbic acid, 500 mg, oral, Daily  atorvastatin, 20 mg, oral, Daily  carvedilol, 6.25 mg, oral, BID  cholecalciferol, 25 mcg, oral, Daily  ferrous sulfate, 1 tablet, oral, Daily  gabapentin, 300 mg, oral, Daily  levothyroxine, 75 mcg, oral, Daily  pantoprazole, 40 mg, oral, Daily before breakfast  PARoxetine, 20 mg, oral, Daily  polyethylene glycol, 17 g, oral, Daily     [2]    [3] PRN medications: acetaminophen, calcium carbonate, HYDROcodone-acetaminophen, temazepam

## 2025-08-10 NOTE — HOSPITAL COURSE
85 y.o. female with history significant for HFrEF (EF 25-30%), aortic stenosis, Afib, s/p AV node ablation and leadless pacemaker in 2025, HTN, renal cell carcinoma who presented on 8/9/25 as a transfer from [facility] for epigastric pain, nonradiating, not associated with eating. Hemodynamically stable, afebrile on arrival in no acute distress with some mild epigastric burning. Trop 23>27, lipase within normal limits. EKG with no ischemic changes. CTAP negative for inflammatory process or bowel obstruction. She is scheduled for upcoming TAVR on 8/19/25 with Dr. Howell.    Labs were significant for BNP of 2422 on 8/9/25, and as such inpatient diuresis has been planned. Patient continues to be stable and in no acute distress, and have been receiving PO furosemide daily. Will continue to assess volume status.    To-do:  - Follow up with primary team on Monday 8/11/25 regarding possibility to have TAVR performed at an earlier date  - Diuresed with IV furosemide on 8/10/25. F/U I/Os, kidney function   - Assess volume status and diurese/stop diuresis as necessary  - Can be discharged if deemed euvolemic and stable   8/19 WBC vonda and mental status not improved, with UA pending and CXR concerning for atelectasis vs. Pneumonia, started on empiric IV Ceftriaxone 1mg q24h. Concern for aspiration so holding PO meds, started IV Hydralazine 5mg TID. Infectious workup for AMS pending.   8/20 blood cx positive for staph aureus. Started on Vanc/Zosyn. CT chest abdomen and pelvis obtained and shows large left sided pleural effusion concerning for fluid overload with superimposed pneumonia vs. Atelectasis. Starting Dopamine gtt at 2.5mcg/kg/min and additional diuresis with Lasix 100mg and Diamox 1000mg.   8/21- Thoracentesis performed early in AM. Exchanged Vanc/Zosyn for IV Cefazolin. Additional diuresis with 160mg Lasix. 8/22-8/23 - diuresis to goal net -2L, continuing feeds through NG tube, managing back pain. 8/24- repeat blood cx, diuresis, pain medication for back pain    Patient transferred to floor after becoming DNR DNI No ICU. Given patient's frailty, recent bacteremia with current IV antibiotics, and altered mental status, patient not a good candidate for TAVR at this time, could possibly reevaluate after IV abx complete.  Based on structural heart, there are multiple risk factors. For BAV risks also outweigh the benefits. Patient's family do not believe a valve procedure that does not bring patient completely back to baseline is aligned with what the patient would want. Instead, given patient's current condition and family's concerns of pain, discomfort, and suffering associated, patient was changed after goals of care discussion to DNR Comfort Measures Only. However, patient's family did want to continue IV cefazolin (six weeks, 8/21 - 10/1) as treating infection would be more comfortable to patient. Hospice was consulted and palliative was following. Family wanted patient to transition to hospice and elected for a hospice facility in [place] close to family. Transport now planned for PM 8/27 as per hospice.

## 2025-08-11 ENCOUNTER — APPOINTMENT (OUTPATIENT)
Dept: CARDIOLOGY | Facility: HOSPITAL | Age: 85
DRG: 306 | End: 2025-08-11
Payer: MEDICARE

## 2025-08-11 LAB
ALBUMIN SERPL BCP-MCNC: 3.8 G/DL (ref 3.4–5)
ANION GAP SERPL CALC-SCNC: 13 MMOL/L (ref 10–20)
BASOPHILS # BLD AUTO: 0.06 X10*3/UL (ref 0–0.1)
BASOPHILS NFR BLD AUTO: 0.5 %
BODY SURFACE AREA: 1.91 M2
BUN SERPL-MCNC: 18 MG/DL (ref 6–23)
CALCIUM SERPL-MCNC: 8.8 MG/DL (ref 8.6–10.6)
CHLORIDE SERPL-SCNC: 87 MMOL/L (ref 98–107)
CHLORIDE UR-SCNC: 39 MMOL/L
CHLORIDE/CREATININE (MMOL/G) IN URINE: 80 MMOL/G CREAT (ref 38–318)
CO2 SERPL-SCNC: 29 MMOL/L (ref 21–32)
CREAT SERPL-MCNC: 1.23 MG/DL (ref 0.5–1.05)
CREAT UR-MCNC: 48.8 MG/DL (ref 20–320)
EGFRCR SERPLBLD CKD-EPI 2021: 44 ML/MIN/1.73M*2
EOSINOPHIL # BLD AUTO: 0.14 X10*3/UL (ref 0–0.4)
EOSINOPHIL NFR BLD AUTO: 1.2 %
ERYTHROCYTE [DISTWIDTH] IN BLOOD BY AUTOMATED COUNT: 14.1 % (ref 11.5–14.5)
GLUCOSE SERPL-MCNC: 142 MG/DL (ref 74–99)
HCT VFR BLD AUTO: 32 % (ref 36–46)
HGB BLD-MCNC: 10.2 G/DL (ref 12–16)
IMM GRANULOCYTES # BLD AUTO: 0.05 X10*3/UL (ref 0–0.5)
IMM GRANULOCYTES NFR BLD AUTO: 0.4 % (ref 0–0.9)
LYMPHOCYTES # BLD AUTO: 1.12 X10*3/UL (ref 0.8–3)
LYMPHOCYTES NFR BLD AUTO: 9.6 %
MAGNESIUM SERPL-MCNC: 1.82 MG/DL (ref 1.6–2.4)
MCH RBC QN AUTO: 28.3 PG (ref 26–34)
MCHC RBC AUTO-ENTMCNC: 31.9 G/DL (ref 32–36)
MCV RBC AUTO: 89 FL (ref 80–100)
MONOCYTES # BLD AUTO: 0.87 X10*3/UL (ref 0.05–0.8)
MONOCYTES NFR BLD AUTO: 7.4 %
NEUTROPHILS # BLD AUTO: 9.46 X10*3/UL (ref 1.6–5.5)
NEUTROPHILS NFR BLD AUTO: 80.9 %
NRBC BLD-RTO: 0 /100 WBCS (ref 0–0)
OSMOLALITY UR: 250 MOSM/KG (ref 200–1200)
PHOSPHATE SERPL-MCNC: 3.2 MG/DL (ref 2.5–4.9)
PLATELET # BLD AUTO: 277 X10*3/UL (ref 150–450)
POTASSIUM SERPL-SCNC: 3.7 MMOL/L (ref 3.5–5.3)
POTASSIUM UR-SCNC: 20 MMOL/L
POTASSIUM/CREAT UR-RTO: 41 MMOL/G CREAT
RBC # BLD AUTO: 3.61 X10*6/UL (ref 4–5.2)
SODIUM SERPL-SCNC: 125 MMOL/L (ref 136–145)
SODIUM UR-SCNC: 29 MMOL/L
SODIUM/CREAT UR-RTO: 59 MMOL/G CREAT
WBC # BLD AUTO: 11.7 X10*3/UL (ref 4.4–11.3)

## 2025-08-11 PROCEDURE — 80069 RENAL FUNCTION PANEL: CPT

## 2025-08-11 PROCEDURE — 83735 ASSAY OF MAGNESIUM: CPT

## 2025-08-11 PROCEDURE — 2500000004 HC RX 250 GENERAL PHARMACY W/ HCPCS (ALT 636 FOR OP/ED)

## 2025-08-11 PROCEDURE — 82436 ASSAY OF URINE CHLORIDE: CPT

## 2025-08-11 PROCEDURE — 1200000002 HC GENERAL ROOM WITH TELEMETRY DAILY

## 2025-08-11 PROCEDURE — 99232 SBSQ HOSP IP/OBS MODERATE 35: CPT

## 2025-08-11 PROCEDURE — 2500000001 HC RX 250 WO HCPCS SELF ADMINISTERED DRUGS (ALT 637 FOR MEDICARE OP)

## 2025-08-11 PROCEDURE — 85025 COMPLETE CBC W/AUTO DIFF WBC: CPT

## 2025-08-11 PROCEDURE — 36415 COLL VENOUS BLD VENIPUNCTURE: CPT

## 2025-08-11 PROCEDURE — 83935 ASSAY OF URINE OSMOLALITY: CPT

## 2025-08-11 PROCEDURE — 93280 PM DEVICE PROGR EVAL DUAL: CPT

## 2025-08-11 PROCEDURE — 2500000002 HC RX 250 W HCPCS SELF ADMINISTERED DRUGS (ALT 637 FOR MEDICARE OP, ALT 636 FOR OP/ED)

## 2025-08-11 RX ORDER — FUROSEMIDE 10 MG/ML
40 INJECTION INTRAMUSCULAR; INTRAVENOUS ONCE
Status: COMPLETED | OUTPATIENT
Start: 2025-08-11 | End: 2025-08-11

## 2025-08-11 RX ORDER — CETIRIZINE HYDROCHLORIDE 10 MG/1
10 TABLET ORAL ONCE
Status: COMPLETED | OUTPATIENT
Start: 2025-08-11 | End: 2025-08-11

## 2025-08-11 RX ORDER — CLOTRIMAZOLE 1 %
CREAM (GRAM) TOPICAL 2 TIMES DAILY
Status: DISCONTINUED | OUTPATIENT
Start: 2025-08-11 | End: 2025-08-11

## 2025-08-11 RX ADMIN — TEMAZEPAM 7.5 MG: 7.5 CAPSULE ORAL at 21:37

## 2025-08-11 RX ADMIN — Medication 25 MCG: at 09:13

## 2025-08-11 RX ADMIN — APIXABAN 5 MG: 5 TABLET, FILM COATED ORAL at 09:13

## 2025-08-11 RX ADMIN — CETIRIZINE HYDROCHLORIDE 10 MG: 10 TABLET, FILM COATED ORAL at 05:00

## 2025-08-11 RX ADMIN — CARVEDILOL 6.25 MG: 6.25 TABLET, FILM COATED ORAL at 21:39

## 2025-08-11 RX ADMIN — CARVEDILOL 6.25 MG: 6.25 TABLET, FILM COATED ORAL at 09:13

## 2025-08-11 RX ADMIN — PANTOPRAZOLE SODIUM 40 MG: 40 TABLET, DELAYED RELEASE ORAL at 09:13

## 2025-08-11 RX ADMIN — FERROUS SULFATE TAB 325 MG (65 MG ELEMENTAL FE) 1 TABLET: 325 (65 FE) TAB at 09:13

## 2025-08-11 RX ADMIN — FUROSEMIDE 40 MG: 10 INJECTION, SOLUTION INTRAMUSCULAR; INTRAVENOUS at 07:01

## 2025-08-11 RX ADMIN — ATORVASTATIN CALCIUM 20 MG: 20 TABLET, FILM COATED ORAL at 09:13

## 2025-08-11 RX ADMIN — PAROXETINE 20 MG: 20 TABLET, FILM COATED ORAL at 09:13

## 2025-08-11 RX ADMIN — GABAPENTIN 300 MG: 300 CAPSULE ORAL at 09:13

## 2025-08-11 RX ADMIN — LEVOTHYROXINE SODIUM 75 MCG: 0.07 TABLET ORAL at 09:13

## 2025-08-11 RX ADMIN — OXYCODONE HYDROCHLORIDE AND ACETAMINOPHEN 500 MG: 500 TABLET ORAL at 09:13

## 2025-08-11 RX ADMIN — APIXABAN 5 MG: 5 TABLET, FILM COATED ORAL at 21:37

## 2025-08-11 RX ADMIN — AMLODIPINE BESYLATE 5 MG: 5 TABLET ORAL at 09:13

## 2025-08-11 ASSESSMENT — PAIN SCALES - GENERAL: PAINLEVEL_OUTOF10: 0 - NO PAIN

## 2025-08-11 NOTE — CARE PLAN
The patient's goals for the shift include      T  Problem: Pain - Adult  Goal: Verbalizes/displays adequate comfort level or baseline comfort level  Outcome: Progressing     Problem: Safety - Adult  Goal: Free from fall injury  Outcome: Progressing     Problem: Nutrition  Goal: Nutrient intake appropriate for maintaining nutritional needs  Outcome: Progressing

## 2025-08-11 NOTE — PROGRESS NOTES
Attempted to see patient several times today to complete discharge assessment but she was soundly sleeping. Per medical team TAVR is planned 8/19. Arianna Baker RN

## 2025-08-11 NOTE — PROGRESS NOTES
"Radha Torres is a 85 y.o. female on day 2 of admission presenting with Chest pain.    Subjective   Overnight, patient complained of diffuse itchiness. Zyrtec was ordered, and this improved symptoms. She was seen laying in bed on 2 L NC. Patient denies chest pain, dyspnea, or syncopal events.        Objective     Physical Exam  Vitals reviewed.   Constitutional:       Appearance: Normal appearance.     Cardiovascular:      Rate and Rhythm: Normal rate and regular rhythm.      Heart sounds: Normal heart sounds.   Pulmonary:      Effort: Pulmonary effort is normal. No respiratory distress.      Breath sounds: Normal breath sounds.   Abdominal:      Palpations: Abdomen is soft.      Tenderness: There is no abdominal tenderness.     Musculoskeletal:      Right lower leg: No edema.      Left lower leg: No edema.     Neurological:      General: No focal deficit present.      Mental Status: She is alert. Mental status is at baseline.     Psychiatric:         Mood and Affect: Mood normal.         Behavior: Behavior normal.         Last Recorded Vitals  Blood pressure 150/86, pulse 75, temperature 36 °C (96.8 °F), temperature source Temporal, resp. rate 18, height 1.65 m (5' 4.96\"), weight 79.4 kg (175 lb), SpO2 95%.  Intake/Output last 3 Shifts:  I/O last 3 completed shifts:  In: 250 (3.1 mL/kg) [P.O.:250]  Out: 650 (8.2 mL/kg) [Urine:650 (0.2 mL/kg/hr)]  Weight: 79.4 kg     Relevant Results             Scheduled medications  Scheduled Medications[1]  Continuous medications  Continuous Medications[2]  PRN medications  PRN Medications[3]    Results for orders placed or performed during the hospital encounter of 08/09/25 (from the past 24 hours)   Magnesium   Result Value Ref Range    Magnesium 1.93 1.60 - 2.40 mg/dL   Renal Function Panel   Result Value Ref Range    Glucose 208 (H) 74 - 99 mg/dL    Sodium 124 (L) 136 - 145 mmol/L    Potassium 4.5 3.5 - 5.3 mmol/L    Chloride 88 (L) 98 - 107 mmol/L    Bicarbonate 27 21 - 32 " mmol/L    Anion Gap 14 mmol/L    Urea Nitrogen 19 6 - 23 mg/dL    Creatinine 1.42 (H) 0.50 - 1.05 mg/dL    eGFR 37 (L) >60 mL/min/1.73m*2    Calcium 9.0 8.6 - 10.6 mg/dL    Phosphorus 3.4 2.5 - 4.9 mg/dL    Albumin 4.0 3.4 - 5.0 g/dL   CBC and Auto Differential   Result Value Ref Range    WBC 11.7 (H) 4.4 - 11.3 x10*3/uL    nRBC 0.0 0.0 - 0.0 /100 WBCs    RBC 3.61 (L) 4.00 - 5.20 x10*6/uL    Hemoglobin 10.2 (L) 12.0 - 16.0 g/dL    Hematocrit 32.0 (L) 36.0 - 46.0 %    MCV 89 80 - 100 fL    MCH 28.3 26.0 - 34.0 pg    MCHC 31.9 (L) 32.0 - 36.0 g/dL    RDW 14.1 11.5 - 14.5 %    Platelets 277 150 - 450 x10*3/uL    Neutrophils % 80.9 40.0 - 80.0 %    Immature Granulocytes %, Automated 0.4 0.0 - 0.9 %    Lymphocytes % 9.6 13.0 - 44.0 %    Monocytes % 7.4 2.0 - 10.0 %    Eosinophils % 1.2 0.0 - 6.0 %    Basophils % 0.5 0.0 - 2.0 %    Neutrophils Absolute 9.46 (H) 1.60 - 5.50 x10*3/uL    Immature Granulocytes Absolute, Automated 0.05 0.00 - 0.50 x10*3/uL    Lymphocytes Absolute 1.12 0.80 - 3.00 x10*3/uL    Monocytes Absolute 0.87 (H) 0.05 - 0.80 x10*3/uL    Eosinophils Absolute 0.14 0.00 - 0.40 x10*3/uL    Basophils Absolute 0.06 0.00 - 0.10 x10*3/uL   Magnesium   Result Value Ref Range    Magnesium 1.82 1.60 - 2.40 mg/dL   Renal Function Panel   Result Value Ref Range    Glucose 142 (H) 74 - 99 mg/dL    Sodium 125 (L) 136 - 145 mmol/L    Potassium 3.7 3.5 - 5.3 mmol/L    Chloride 87 (L) 98 - 107 mmol/L    Bicarbonate 29 21 - 32 mmol/L    Anion Gap 13 10 - 20 mmol/L    Urea Nitrogen 18 6 - 23 mg/dL    Creatinine 1.23 (H) 0.50 - 1.05 mg/dL    eGFR 44 (L) >60 mL/min/1.73m*2    Calcium 8.8 8.6 - 10.6 mg/dL    Phosphorus 3.2 2.5 - 4.9 mg/dL    Albumin 3.8 3.4 - 5.0 g/dL   Cardiac device check - Inpatient   Result Value Ref Range    BSA 1.91 m2     No results found.           Assessment & Plan  Chest pain    85 y.o. female with history significant for HFrEF (EF 25-30%), aortic stenosis, Afib, s/p AV node ablation and leadless  pacemaker in 2025, HTN, renal cell carcinoma who presented on 8/9/25 as a transfer from [facility] for epigastric pain, nonradiating, not associated with eating. Hemodynamically stable, afebrile on arrival in no acute distress with some mild epigastric burning. Trop 23>27, lipase within normal limits. EKG with no ischemic changes. CTAP negative for inflammatory process or bowel obstruction. Plan is to diurese and discharge afterwards. She is scheduled for upcoming TAVR on 8/19/25 with Dr. Howell.    Update 8/11/25:  Communicated with structural heart to determine if TAVR can be performed sooner (currently scheduled 8/19)  Sodium 125, urine lytes and urine osm ordered for hyponatremia workup   Holding diuresis today 8/11   Device interrogation pending  Attempt to wean off O2, currently on 2 LPM    #Epigastric pain  #HFrEF (EF 25-30%)  #Severe aortic stenosis  :: Echo 7/11/25 - EF 25-30%, left ventricle mildly dilated, mild concentric LV hypertrophy, leadless pacemaker in right atria and RV, aortic valve is heavily calcified with appearance of severe aortic stenosis (peak gradient 52mmHg, mean gradient 25mmHg, aortic valve area 0.8cm^2), trace AR, mild MR and TR, RVSP 50-60mmHg,   ::Scheduled for TAVR 8/19/25 with Dr. Howell  ::EKG with no ischemic changes  ::Troponin 23>27  ::Lipase, CTAP negative for intra abdominal process  Plan:  - Repeat EKG if any chest pain   - Telemetry  - Holding home furosemide 40 mg daily  - Pantoprazole 40mg    #Hyponatremia   :: Na 125, was 124 on most recent PM RFP  :: Na on admission was 131   - Urine lytes, urine osm ordered   - Holding home lasix 40 mg daily    #Atrial fibrillation  #S/p AV emiliano ablation and leadless pacemaker placement 5/6/25  #HTN  :: Home regimen - coreg 6.25 BID, amlodipine 2.5 mg, Eliquis 5mg BID  Plan:  - Amlodipine increased to 5mg on 8/10/25    #CKD 3b  :: Baseline Cr appears to be around 1.2  ::Cr 1.22 in ED  Plan:  - Daily RFP  - Avoid nephrotoxic  agents    #HLD  - Continue atorvastatin     #Hypothyroidism  - Continue levothyroxine 75 mcg    =======================================    Fluids: PRN  Electrolytes: PRN, keep K>4. Mg>2, PO4>3  Nutrition: regular    GI ppx: pantoprazole 40mg  DVT ppx: apixaban  Bowel care: PEG qD    Abx: none  O2: 2 LPM    Code Status: full code  NOK: Stefanie Roblero, [phone]    Patient seen and discussed with attending.        Debby Hudson MD  Internal Medicine PGY-1            [1] amLODIPine, 5 mg, oral, Daily  apixaban, 5 mg, oral, BID  ascorbic acid, 500 mg, oral, Daily  atorvastatin, 20 mg, oral, Daily  carvedilol, 6.25 mg, oral, BID  cholecalciferol, 25 mcg, oral, Daily  ferrous sulfate, 1 tablet, oral, Daily  gabapentin, 300 mg, oral, Daily  levothyroxine, 75 mcg, oral, Daily  pantoprazole, 40 mg, oral, Daily before breakfast  PARoxetine, 20 mg, oral, Daily  polyethylene glycol, 17 g, oral, Daily     [2]    [3] PRN medications: acetaminophen, calcium carbonate, HYDROcodone-acetaminophen, temazepam

## 2025-08-12 ENCOUNTER — APPOINTMENT (OUTPATIENT)
Dept: RADIOLOGY | Facility: HOSPITAL | Age: 85
DRG: 306 | End: 2025-08-12
Payer: MEDICARE

## 2025-08-12 ENCOUNTER — APPOINTMENT (OUTPATIENT)
Dept: CARDIOLOGY | Facility: HOSPITAL | Age: 85
DRG: 306 | End: 2025-08-12
Payer: MEDICARE

## 2025-08-12 ENCOUNTER — APPOINTMENT (OUTPATIENT)
Dept: CARDIOLOGY | Facility: CLINIC | Age: 85
End: 2025-08-12
Payer: MEDICARE

## 2025-08-12 LAB
ALBUMIN SERPL BCP-MCNC: 3.6 G/DL (ref 3.4–5)
ANION GAP BLDV CALCULATED.4IONS-SCNC: 8 MMOL/L (ref 10–25)
ANION GAP SERPL CALC-SCNC: 13 MMOL/L (ref 10–20)
BASE EXCESS BLDV CALC-SCNC: 3.9 MMOL/L (ref -2–3)
BASOPHILS # BLD AUTO: 0.04 X10*3/UL (ref 0–0.1)
BASOPHILS NFR BLD AUTO: 0.4 %
BODY TEMPERATURE: 37 DEGREES CELSIUS
BUN SERPL-MCNC: 16 MG/DL (ref 6–23)
CA-I BLDV-SCNC: 1.19 MMOL/L (ref 1.1–1.33)
CALCIUM SERPL-MCNC: 8.8 MG/DL (ref 8.6–10.6)
CARDIAC TROPONIN I PNL SERPL HS: 26 NG/L (ref 0–34)
CHLORIDE BLDV-SCNC: 87 MMOL/L (ref 98–107)
CHLORIDE SERPL-SCNC: 88 MMOL/L (ref 98–107)
CO2 SERPL-SCNC: 30 MMOL/L (ref 21–32)
CREAT SERPL-MCNC: 1.06 MG/DL (ref 0.5–1.05)
EGFRCR SERPLBLD CKD-EPI 2021: 52 ML/MIN/1.73M*2
EOSINOPHIL # BLD AUTO: 0.11 X10*3/UL (ref 0–0.4)
EOSINOPHIL NFR BLD AUTO: 1.1 %
ERYTHROCYTE [DISTWIDTH] IN BLOOD BY AUTOMATED COUNT: 14.5 % (ref 11.5–14.5)
GLUCOSE BLDV-MCNC: 205 MG/DL (ref 74–99)
GLUCOSE SERPL-MCNC: 131 MG/DL (ref 74–99)
HCO3 BLDV-SCNC: 29.7 MMOL/L (ref 22–26)
HCT VFR BLD AUTO: 32.8 % (ref 36–46)
HCT VFR BLD EST: 35 % (ref 36–46)
HGB BLD-MCNC: 10.3 G/DL (ref 12–16)
HGB BLDV-MCNC: 11.8 G/DL (ref 12–16)
IMM GRANULOCYTES # BLD AUTO: 0.04 X10*3/UL (ref 0–0.5)
IMM GRANULOCYTES NFR BLD AUTO: 0.4 % (ref 0–0.9)
INHALED O2 CONCENTRATION: 21 %
LACTATE BLDV-SCNC: 1.9 MMOL/L (ref 0.4–2)
LIPASE SERPL-CCNC: 11 U/L (ref 9–82)
LYMPHOCYTES # BLD AUTO: 0.89 X10*3/UL (ref 0.8–3)
LYMPHOCYTES NFR BLD AUTO: 9 %
MAGNESIUM SERPL-MCNC: 1.83 MG/DL (ref 1.6–2.4)
MCH RBC QN AUTO: 28.8 PG (ref 26–34)
MCHC RBC AUTO-ENTMCNC: 31.4 G/DL (ref 32–36)
MCV RBC AUTO: 92 FL (ref 80–100)
MONOCYTES # BLD AUTO: 0.61 X10*3/UL (ref 0.05–0.8)
MONOCYTES NFR BLD AUTO: 6.2 %
NEUTROPHILS # BLD AUTO: 8.2 X10*3/UL (ref 1.6–5.5)
NEUTROPHILS NFR BLD AUTO: 82.9 %
NRBC BLD-RTO: 0 /100 WBCS (ref 0–0)
OXYHGB MFR BLDV: 55.8 % (ref 45–75)
PCO2 BLDV: 49 MM HG (ref 41–51)
PH BLDV: 7.39 PH (ref 7.33–7.43)
PHOSPHATE SERPL-MCNC: 3.3 MG/DL (ref 2.5–4.9)
PLATELET # BLD AUTO: 256 X10*3/UL (ref 150–450)
PO2 BLDV: 37 MM HG (ref 35–45)
POTASSIUM BLDV-SCNC: 4.1 MMOL/L (ref 3.5–5.3)
POTASSIUM SERPL-SCNC: 3.5 MMOL/L (ref 3.5–5.3)
RBC # BLD AUTO: 3.58 X10*6/UL (ref 4–5.2)
SAO2 % BLDV: 57 % (ref 45–75)
SODIUM BLDV-SCNC: 121 MMOL/L (ref 136–145)
SODIUM SERPL-SCNC: 127 MMOL/L (ref 136–145)
WBC # BLD AUTO: 9.9 X10*3/UL (ref 4.4–11.3)

## 2025-08-12 PROCEDURE — 2500000001 HC RX 250 WO HCPCS SELF ADMINISTERED DRUGS (ALT 637 FOR MEDICARE OP)

## 2025-08-12 PROCEDURE — 85025 COMPLETE CBC W/AUTO DIFF WBC: CPT

## 2025-08-12 PROCEDURE — 2500000002 HC RX 250 W HCPCS SELF ADMINISTERED DRUGS (ALT 637 FOR MEDICARE OP, ALT 636 FOR OP/ED)

## 2025-08-12 PROCEDURE — 2500000004 HC RX 250 GENERAL PHARMACY W/ HCPCS (ALT 636 FOR OP/ED)

## 2025-08-12 PROCEDURE — 84484 ASSAY OF TROPONIN QUANT: CPT

## 2025-08-12 PROCEDURE — 80069 RENAL FUNCTION PANEL: CPT

## 2025-08-12 PROCEDURE — 71045 X-RAY EXAM CHEST 1 VIEW: CPT | Performed by: RADIOLOGY

## 2025-08-12 PROCEDURE — 93005 ELECTROCARDIOGRAM TRACING: CPT

## 2025-08-12 PROCEDURE — 71045 X-RAY EXAM CHEST 1 VIEW: CPT

## 2025-08-12 PROCEDURE — 36415 COLL VENOUS BLD VENIPUNCTURE: CPT

## 2025-08-12 PROCEDURE — 74018 RADEX ABDOMEN 1 VIEW: CPT

## 2025-08-12 PROCEDURE — 83690 ASSAY OF LIPASE: CPT

## 2025-08-12 PROCEDURE — 84132 ASSAY OF SERUM POTASSIUM: CPT

## 2025-08-12 PROCEDURE — 74018 RADEX ABDOMEN 1 VIEW: CPT | Performed by: RADIOLOGY

## 2025-08-12 PROCEDURE — 83735 ASSAY OF MAGNESIUM: CPT

## 2025-08-12 PROCEDURE — 99232 SBSQ HOSP IP/OBS MODERATE 35: CPT

## 2025-08-12 PROCEDURE — 1200000002 HC GENERAL ROOM WITH TELEMETRY DAILY

## 2025-08-12 RX ORDER — HYDROCODONE BITARTRATE AND ACETAMINOPHEN 5; 325 MG/1; MG/1
1 TABLET ORAL ONCE
Refills: 0 | Status: COMPLETED | OUTPATIENT
Start: 2025-08-12 | End: 2025-08-12

## 2025-08-12 RX ORDER — NYSTATIN 100000 [USP'U]/G
1 POWDER TOPICAL 2 TIMES DAILY
Status: COMPLETED | OUTPATIENT
Start: 2025-08-12 | End: 2025-08-16

## 2025-08-12 RX ORDER — CALCIUM CARBONATE 200(500)MG
500 TABLET,CHEWABLE ORAL 4 TIMES DAILY PRN
Status: DISCONTINUED | OUTPATIENT
Start: 2025-08-12 | End: 2025-08-26

## 2025-08-12 RX ADMIN — HYDROCODONE BITARTRATE AND ACETAMINOPHEN 1 TABLET: 5; 325 TABLET ORAL at 22:02

## 2025-08-12 RX ADMIN — PANTOPRAZOLE SODIUM 40 MG: 40 TABLET, DELAYED RELEASE ORAL at 09:22

## 2025-08-12 RX ADMIN — ATORVASTATIN CALCIUM 20 MG: 20 TABLET, FILM COATED ORAL at 09:22

## 2025-08-12 RX ADMIN — TRIMETHOBENZAMIDE HYDROCHLORIDE 200 MG: 100 INJECTION INTRAMUSCULAR at 19:49

## 2025-08-12 RX ADMIN — CALCIUM CARBONATE (ANTACID) CHEW TAB 500 MG 1 TABLET: 500 CHEW TAB at 09:19

## 2025-08-12 RX ADMIN — APIXABAN 5 MG: 5 TABLET, FILM COATED ORAL at 19:57

## 2025-08-12 RX ADMIN — POLYETHYLENE GLYCOL 3350 17 G: 17 POWDER, FOR SOLUTION ORAL at 09:21

## 2025-08-12 RX ADMIN — Medication 25 MCG: at 09:22

## 2025-08-12 RX ADMIN — PAROXETINE 20 MG: 20 TABLET, FILM COATED ORAL at 09:22

## 2025-08-12 RX ADMIN — CALCIUM CARBONATE (ANTACID) CHEW TAB 500 MG 1 TABLET: 500 CHEW TAB at 17:36

## 2025-08-12 RX ADMIN — NYSTATIN 1 APPLICATION: 100000 POWDER TOPICAL at 20:01

## 2025-08-12 RX ADMIN — HYDROCODONE BITARTRATE AND ACETAMINOPHEN 1 TABLET: 5; 325 TABLET ORAL at 01:58

## 2025-08-12 RX ADMIN — CALCIUM CARBONATE (ANTACID) CHEW TAB 500 MG 1 TABLET: 500 CHEW TAB at 22:46

## 2025-08-12 RX ADMIN — NYSTATIN 1 APPLICATION: 100000 POWDER TOPICAL at 15:30

## 2025-08-12 RX ADMIN — FERROUS SULFATE TAB 325 MG (65 MG ELEMENTAL FE) 1 TABLET: 325 (65 FE) TAB at 09:22

## 2025-08-12 RX ADMIN — GABAPENTIN 300 MG: 300 CAPSULE ORAL at 09:22

## 2025-08-12 RX ADMIN — HYDROCODONE BITARTRATE AND ACETAMINOPHEN 1 TABLET: 5; 325 TABLET ORAL at 18:33

## 2025-08-12 RX ADMIN — ACETAMINOPHEN 650 MG: 325 TABLET, FILM COATED ORAL at 17:49

## 2025-08-12 RX ADMIN — CARVEDILOL 6.25 MG: 6.25 TABLET, FILM COATED ORAL at 09:22

## 2025-08-12 RX ADMIN — CALCIUM CARBONATE (ANTACID) CHEW TAB 500 MG 1 TABLET: 500 CHEW TAB at 14:12

## 2025-08-12 RX ADMIN — LEVOTHYROXINE SODIUM 75 MCG: 0.07 TABLET ORAL at 09:22

## 2025-08-12 RX ADMIN — OXYCODONE HYDROCHLORIDE AND ACETAMINOPHEN 500 MG: 500 TABLET ORAL at 09:22

## 2025-08-12 RX ADMIN — TEMAZEPAM 7.5 MG: 7.5 CAPSULE ORAL at 22:45

## 2025-08-12 RX ADMIN — CARVEDILOL 6.25 MG: 6.25 TABLET, FILM COATED ORAL at 19:57

## 2025-08-12 RX ADMIN — APIXABAN 5 MG: 5 TABLET, FILM COATED ORAL at 09:21

## 2025-08-12 RX ADMIN — AMLODIPINE BESYLATE 5 MG: 5 TABLET ORAL at 09:22

## 2025-08-12 RX ADMIN — TRIMETHOBENZAMIDE HYDROCHLORIDE 200 MG: 100 INJECTION INTRAMUSCULAR at 12:08

## 2025-08-12 ASSESSMENT — PAIN DESCRIPTION - DESCRIPTORS
DESCRIPTORS: BURNING;DISCOMFORT;ACHING
DESCRIPTORS: BURNING

## 2025-08-12 ASSESSMENT — COGNITIVE AND FUNCTIONAL STATUS - GENERAL
STANDING UP FROM CHAIR USING ARMS: A LITTLE
MOBILITY SCORE: 21
HELP NEEDED FOR BATHING: A LITTLE
DAILY ACTIVITIY SCORE: 20
PERSONAL GROOMING: A LITTLE
DRESSING REGULAR UPPER BODY CLOTHING: A LITTLE
WALKING IN HOSPITAL ROOM: A LITTLE
DRESSING REGULAR LOWER BODY CLOTHING: A LITTLE
CLIMB 3 TO 5 STEPS WITH RAILING: A LITTLE

## 2025-08-12 ASSESSMENT — PAIN SCALES - GENERAL
PAINLEVEL_OUTOF10: 9
PAINLEVEL_OUTOF10: 8

## 2025-08-12 ASSESSMENT — PAIN DESCRIPTION - LOCATION: LOCATION: LEG

## 2025-08-12 ASSESSMENT — PAIN - FUNCTIONAL ASSESSMENT
PAIN_FUNCTIONAL_ASSESSMENT: 0-10
PAIN_FUNCTIONAL_ASSESSMENT: 0-10

## 2025-08-12 NOTE — PROGRESS NOTES
08/12/25 1315   Discharge Planning   Living Arrangements Spouse/significant other   Support Systems Spouse/significant other;Children   Assistance Needed Needs assistance   Type of Residence Private residence   Who is requesting discharge planning? Provider   Home or Post Acute Services None   Expected Discharge Disposition Home   Does the patient need discharge transport arranged? No     Met with patient and her family and introduced myself as Care Coordinator and member of the discharge planning team.  Questions were answered by family. Patient was admitted from home. She requires assistance with ADL's. She has a wheelchair and walker and bedside commode etc. She is not currently active with home care but has used [facility] in the past. Her PCP is Dr. Guo. She uses [facility] in [place] for short term meds and [facility] for long term meds. Plan is for patient to return home when medically ready. Care Coordinator will continue to follow for home going needs.

## 2025-08-12 NOTE — PROGRESS NOTES
"Radha Torres is a 85 y.o. female on day 3 of admission presenting with Chest pain.    Subjective   No acute events overnight.  Patient was evaluated while laying in bed this morning prior to rounds. Around 7 AM, she endorsed worsening shortness of breath on 2 L NC. The supplemental O2 was increased to 3 LPM; CXR and VBG were ordered. Work of breathing improved by the time she was seen on rounds.     At approximately 10:45 AM, the patient had suddenly worsening nausea and some epigastric abdominal pain. She had a formed bowel movement shortly after this started, per nursing. There have been no recent changes in diet. Denies chest pain, palpitations, vomiting, or syncope.        Objective     Physical Exam  Vitals reviewed.   Constitutional:       Comments: Appears uncomfortable.      Cardiovascular:      Rate and Rhythm: Normal rate and regular rhythm.      Heart sounds: Normal heart sounds.      Comments: Improved BLE.   Pulmonary:      Comments: Diffuse expiratory wheezes auscultated in bilateral lung fields. This resolved when patient was seen on rounds.   Abdominal:      Palpations: Abdomen is soft.      Tenderness: There is no abdominal tenderness.     Skin:     Comments: Red, beefy rash present located under bilateral breasts.      Neurological:      General: No focal deficit present.      Mental Status: She is alert. Mental status is at baseline.     Psychiatric:         Mood and Affect: Mood normal.         Behavior: Behavior normal.         Last Recorded Vitals  Blood pressure (!) 159/95, pulse 88, temperature 36.1 °C (97 °F), temperature source Temporal, resp. rate 16, height 1.65 m (5' 4.96\"), weight 78.9 kg (174 lb), SpO2 97%.  Intake/Output last 3 Shifts:  I/O last 3 completed shifts:  In: - (0 mL/kg)   Out: 2100 (26.6 mL/kg) [Urine:2100 (0.7 mL/kg/hr)]  Weight: 78.9 kg     Relevant Results             Scheduled medications  Scheduled Medications[1]  Continuous medications  Continuous Medications[2]  PRN " medications  PRN Medications[3]    Results for orders placed or performed during the hospital encounter of 08/09/25 (from the past 24 hours)   Urine electrolytes   Result Value Ref Range    Sodium, Urine Random 29 mmol/L    Sodium/Creatinine Ratio 59 Not established. mmol/g Creat    Potassium, Urine Random 20 mmol/L    Potassium/Creatinine Ratio 41 Not established mmol/g Creat    Chloride, Urine Random 39 mmol/L    Chloride/Creatinine Ratio 80 38 - 318 mmol/g creat    Creatinine, Urine Random 48.8 20.0 - 320.0 mg/dL   Osmolality, urine   Result Value Ref Range    Osmolality, Urine Random 250 200 - 1,200 mOsm/kg   CBC and Auto Differential   Result Value Ref Range    WBC 9.9 4.4 - 11.3 x10*3/uL    nRBC 0.0 0.0 - 0.0 /100 WBCs    RBC 3.58 (L) 4.00 - 5.20 x10*6/uL    Hemoglobin 10.3 (L) 12.0 - 16.0 g/dL    Hematocrit 32.8 (L) 36.0 - 46.0 %    MCV 92 80 - 100 fL    MCH 28.8 26.0 - 34.0 pg    MCHC 31.4 (L) 32.0 - 36.0 g/dL    RDW 14.5 11.5 - 14.5 %    Platelets 256 150 - 450 x10*3/uL    Neutrophils % 82.9 40.0 - 80.0 %    Immature Granulocytes %, Automated 0.4 0.0 - 0.9 %    Lymphocytes % 9.0 13.0 - 44.0 %    Monocytes % 6.2 2.0 - 10.0 %    Eosinophils % 1.1 0.0 - 6.0 %    Basophils % 0.4 0.0 - 2.0 %    Neutrophils Absolute 8.20 (H) 1.60 - 5.50 x10*3/uL    Immature Granulocytes Absolute, Automated 0.04 0.00 - 0.50 x10*3/uL    Lymphocytes Absolute 0.89 0.80 - 3.00 x10*3/uL    Monocytes Absolute 0.61 0.05 - 0.80 x10*3/uL    Eosinophils Absolute 0.11 0.00 - 0.40 x10*3/uL    Basophils Absolute 0.04 0.00 - 0.10 x10*3/uL   Magnesium   Result Value Ref Range    Magnesium 1.83 1.60 - 2.40 mg/dL   Renal Function Panel   Result Value Ref Range    Glucose 131 (H) 74 - 99 mg/dL    Sodium 127 (L) 136 - 145 mmol/L    Potassium 3.5 3.5 - 5.3 mmol/L    Chloride 88 (L) 98 - 107 mmol/L    Bicarbonate 30 21 - 32 mmol/L    Anion Gap 13 10 - 20 mmol/L    Urea Nitrogen 16 6 - 23 mg/dL    Creatinine 1.06 (H) 0.50 - 1.05 mg/dL    eGFR 52 (L)  >60 mL/min/1.73m*2    Calcium 8.8 8.6 - 10.6 mg/dL    Phosphorus 3.3 2.5 - 4.9 mg/dL    Albumin 3.6 3.4 - 5.0 g/dL   Lipase   Result Value Ref Range    Lipase 11 9 - 82 U/L   Blood Gas Venous Full Panel   Result Value Ref Range    POCT pH, Venous 7.39 7.33 - 7.43 pH    POCT pCO2, Venous 49 41 - 51 mm Hg    POCT pO2, Venous 37 35 - 45 mm Hg    POCT SO2, Venous 57 45 - 75 %    POCT Oxy Hemoglobin, Venous 55.8 45.0 - 75.0 %    POCT Hematocrit Calculated, Venous 35.0 (L) 36.0 - 46.0 %    POCT Sodium, Venous 121 (L) 136 - 145 mmol/L    POCT Potassium, Venous 4.1 3.5 - 5.3 mmol/L    POCT Chloride, Venous 87 (L) 98 - 107 mmol/L    POCT Ionized Calicum, Venous 1.19 1.10 - 1.33 mmol/L    POCT Glucose, Venous 205 (H) 74 - 99 mg/dL    POCT Lactate, Venous 1.9 0.4 - 2.0 mmol/L    POCT Base Excess, Venous 3.9 (H) -2.0 - 3.0 mmol/L    POCT HCO3 Calculated, Venous 29.7 (H) 22.0 - 26.0 mmol/L    POCT Hemoglobin, Venous 11.8 (L) 12.0 - 16.0 g/dL    POCT Anion Gap, Venous 8.0 (L) 10.0 - 25.0 mmol/L    Patient Temperature 37.0 degrees Celsius    FiO2 21 %   ECG 12 lead   Result Value Ref Range    Ventricular Rate 84 BPM    Atrial Rate 83 BPM    QRS Duration 206 ms    QT Interval 488 ms    QTC Calculation(Bazett) 576 ms    R Axis -66 degrees    T Axis 123 degrees    QRS Count 14 beats    Q Onset 238 ms    T Offset 482 ms    QTC Fredericia 545 ms     ECG 12 lead  Result Date: 8/12/2025  Ventricular-paced rhythm Abnormal ECG When compared with ECG of 09-AUG-2025 03:48, Vent. rate has increased BY  14 BPM    XR abdomen 1 view  Result Date: 8/12/2025  Interpreted By:  Sarahy Suarez, STUDY: XR ABDOMEN 1 VIEW;  8/12/2025 11:51 am   INDICATION: Signs/Symptoms:abd pain.   COMPARISON: None. CT dated 08/08/2025.   ACCESSION NUMBER(S): [id number]   ORDERING CLINICIAN: ISHAAN LAURENT   FINDINGS: Cardiac monitoring devices.   Small bilateral pleural effusion.   Average colonic stool burden.   No mechanical bowel obstruction.   No gross free air.    No abnormal calcification.   No osseous destructive lesion.   Visualized lung bases clear.   Postoperative fusion L3-S1. Postoperative changes of the right femoral neck.       1.  Nonacute abdominal findings on today's exam.   Signed by: Sarahy Suarez 8/12/2025 12:28 PM Dictation workstation:   [id number]    XR chest 1 view  Result Date: 8/12/2025  Interpreted By:  Sarahy Suarez and Moradiya Krunal STUDY: XR CHEST 1 VIEW;  8/12/2025 9:03 am   INDICATION: Signs/Symptoms:SOB.   COMPARISON: Chest radiograph from 08/09/2025   ACCESSION NUMBER(S): [id number]   ORDERING CLINICIAN: ISHAAN LAURENT   FINDINGS: AP radiograph of the chest.   MEDICAL DEVICES: Leadless pacemaker overlying the cardiac silhouette.   CARDIOMEDIASTINAL SILHOUETTE: The cardiomediastinal silhouette is stable in size and configuration with similar partial obscuration of left heart border. Mild aortic knob calcification.   LUNGS: Unchanged bilateral blunting of the costophrenic angles with associated basilar consolidative opacities, with layering fluid on the right. No pneumothorax is seen.   ABDOMEN: No remarkable upper abdominal findings.   BONES: Multiple remote right posterolateral rib fractures.       1. Unchanged small left and moderate right pleural effusions with associated basilar consolidation/atelectasis. 2. Leadless pacemaker overlying the cardiac silhouette.   I personally reviewed the images/study and I agree with the findings as stated. This study was interpreted by radiology resident Pepe Urban D.O. at [place], [place].   Signed by: Sarahy Suarez 8/12/2025 10:51 AM Dictation workstation:   [id number]             Assessment & Plan  Chest pain    85 y.o. female with history significant for HFrEF (EF 25-30%), aortic stenosis, Afib, s/p AV node ablation and leadless pacemaker in 2025, HTN, renal cell carcinoma who presented on 8/9/25 as a transfer from [facility] for epigastric pain,  nonradiating, not associated with eating. Hemodynamically stable, afebrile on arrival in no acute distress with some mild epigastric burning. Trop 23>27, lipase within normal limits. EKG with no ischemic changes. CTAP negative for inflammatory process or bowel obstruction. She is scheduled for upcoming TAVR on 8/19/25 with Dr. Howell. She has new onset of hyponatremia, in addition to nausea/feeling generally unwell.    Update 8/12/25:  CXR, VBG ordered due to worsening SOB, expiratory wheezes which have resolved    Sodium 127, improved from 125. Urine osm, urine lytes consistent with possible hypovolemic hyponatremia    KUB, ECG, troponin ordered for general nausea/abdominal pain   Nystatin powder ordered for likely candida intertrigo   Attempt to wean off O2, currently on 2 LPM  Per structural heart team, unable to move up TAVR to earlier date  KUB, ECG, trop, CXR, vbg     #Epigastric pain  #HFrEF (EF 25-30%)  #Severe aortic stenosis  :: Echo 7/11/25 - EF 25-30%, left ventricle mildly dilated, mild concentric LV hypertrophy, leadless pacemaker in right atria and RV, aortic valve is heavily calcified with appearance of severe aortic stenosis (peak gradient 52mmHg, mean gradient 25mmHg, aortic valve area 0.8cm^2), trace AR, mild MR and TR, RVSP 50-60mmHg,   ::Scheduled for TAVR 8/19/25 with Dr. Howell  ::EKG with no ischemic changes  ::Troponin 23>27  ::Lipase, CTAP negative for intra abdominal process  Plan:  - Repeat EKG if any chest pain, repeat ordered 8/12  - Telemetry  - Holding home furosemide 40 mg daily  - Pantoprazole 40mg    #Nausea/GI upset  - Tigan PRN ordered for nausea   - KUB, CXR ordered     #Potential hypovolemic hyponatremia   :: Na 124-125; on admission, Na was 131   - Urine Na 29, urine osm 250   - Na increased to 27 today, has not received lasix since 8/11 at 7AM   - Poor PO intake as endorsed by patient and family, in addition to lasix like;ly contributing to hyponatremia   - Holding home  lasix 40 mg daily    #Candida Intertrigo  - Nystatin power BID started 8/12    #Atrial fibrillation  #S/p AV emiliano ablation and leadless pacemaker placement 5/6/25  #HTN  :: Home regimen - coreg 6.25 BID, amlodipine 2.5 mg, Eliquis 5mg BID  Plan:  - Amlodipine increased to 5mg on 8/10/25    #CKD 3b  :: Baseline Cr appears to be around 1.2  ::Cr 1.22 in ED  Plan:  - Daily RFP  - Avoid nephrotoxic agents    #HLD  - Continue atorvastatin     #Hypothyroidism  - Continue levothyroxine 75 mcg    =======================================    Fluids: PRN  Electrolytes: PRN, keep K>4. Mg>2, PO4>3  Nutrition: regular    GI ppx: pantoprazole 40mg  DVT ppx: apixaban  Bowel care: PEG qD    Abx: none  O2: 2 LPM    Code Status: full code  NOK: Stefanie Roblero, [phone]    Patient seen and discussed with attending.        Debby Hudson MD  Internal Medicine PGY-1              [1] amLODIPine, 5 mg, oral, Daily  apixaban, 5 mg, oral, BID  ascorbic acid, 500 mg, oral, Daily  atorvastatin, 20 mg, oral, Daily  carvedilol, 6.25 mg, oral, BID  cholecalciferol, 25 mcg, oral, Daily  ferrous sulfate, 1 tablet, oral, Daily  gabapentin, 300 mg, oral, Daily  levothyroxine, 75 mcg, oral, Daily  nystatin, 1 Application, Topical, BID  pantoprazole, 40 mg, oral, Daily before breakfast  PARoxetine, 20 mg, oral, Daily  polyethylene glycol, 17 g, oral, Daily     [2]    [3] PRN medications: acetaminophen, calcium carbonate, HYDROcodone-acetaminophen, temazepam, trimethobenzamide

## 2025-08-12 NOTE — CARE PLAN
The patient's goals for the shift include  pt would like to sleep comfortably for at least four hours without interruption    The clinical goals for the shift include pt will remain fall free during shift      Problem: Pain - Adult  Goal: Verbalizes/displays adequate comfort level or baseline comfort level  Outcome: Progressing     Problem: Safety - Adult  Goal: Free from fall injury  Outcome: Progressing     Problem: Discharge Planning  Goal: Discharge to home or other facility with appropriate resources  Outcome: Progressing     Problem: Chronic Conditions and Co-morbidities  Goal: Patient's chronic conditions and co-morbidity symptoms are monitored and maintained or improved  Outcome: Progressing     Problem: Nutrition  Goal: Nutrient intake appropriate for maintaining nutritional needs  Outcome: Progressing     Problem: Skin  Goal: Decreased wound size/increased tissue granulation at next dressing change  Outcome: Progressing  Flowsheets (Taken 8/12/2025 0447)  Decreased wound size/increased tissue granulation at next dressing change: Promote sleep for wound healing  Goal: Participates in plan/prevention/treatment measures  Outcome: Progressing  Flowsheets (Taken 8/12/2025 0447)  Participates in plan/prevention/treatment measures: Elevate heels  Goal: Prevent/manage excess moisture  Outcome: Progressing  Flowsheets (Taken 8/12/2025 0447)  Prevent/manage excess moisture: Cleanse incontinence/protect with barrier cream  Goal: Prevent/minimize sheer/friction injuries  Outcome: Progressing  Flowsheets (Taken 8/12/2025 0447)  Prevent/minimize sheer/friction injuries: Turn/reposition every 2 hours/use positioning/transfer devices  Goal: Promote/optimize nutrition  Outcome: Progressing  Flowsheets (Taken 8/12/2025 0447)  Promote/optimize nutrition: Assist with feeding  Goal: Promote skin healing  Outcome: Progressing  Flowsheets (Taken 8/12/2025 0447)  Promote skin healing: Protective dressings over bony prominences      Problem: Skin  Goal: Decreased wound size/increased tissue granulation at next dressing change  Outcome: Progressing  Flowsheets (Taken 8/12/2025 0447)  Decreased wound size/increased tissue granulation at next dressing change: Promote sleep for wound healing  Goal: Participates in plan/prevention/treatment measures  Outcome: Progressing  Flowsheets (Taken 8/12/2025 0447)  Participates in plan/prevention/treatment measures: Elevate heels  Goal: Prevent/manage excess moisture  Outcome: Progressing  Flowsheets (Taken 8/12/2025 0447)  Prevent/manage excess moisture: Cleanse incontinence/protect with barrier cream  Goal: Prevent/minimize sheer/friction injuries  Outcome: Progressing  Flowsheets (Taken 8/12/2025 0447)  Prevent/minimize sheer/friction injuries: Turn/reposition every 2 hours/use positioning/transfer devices  Goal: Promote/optimize nutrition  Outcome: Progressing  Flowsheets (Taken 8/12/2025 0447)  Promote/optimize nutrition: Assist with feeding  Goal: Promote skin healing  Outcome: Progressing  Flowsheets (Taken 8/12/2025 0447)  Promote skin healing: Protective dressings over bony prominences

## 2025-08-13 LAB
ALBUMIN SERPL BCP-MCNC: 3.9 G/DL (ref 3.4–5)
ALBUMIN SERPL BCP-MCNC: 4 G/DL (ref 3.4–5)
ANION GAP SERPL CALC-SCNC: 12 MMOL/L (ref 10–20)
ANION GAP SERPL CALC-SCNC: 13 MMOL/L (ref 10–20)
ANION GAP SERPL CALC-SCNC: 14 MMOL/L (ref 10–20)
ATRIAL RATE: 288 BPM
ATRIAL RATE: 83 BPM
BASOPHILS # BLD AUTO: 0.05 X10*3/UL (ref 0–0.1)
BASOPHILS NFR BLD AUTO: 0.4 %
BUN SERPL-MCNC: 14 MG/DL (ref 6–23)
BUN SERPL-MCNC: 14 MG/DL (ref 6–23)
BUN SERPL-MCNC: 15 MG/DL (ref 6–23)
CALCIUM SERPL-MCNC: 9.4 MG/DL (ref 8.6–10.6)
CALCIUM SERPL-MCNC: 9.6 MG/DL (ref 8.6–10.6)
CALCIUM SERPL-MCNC: 9.7 MG/DL (ref 8.6–10.6)
CHLORIDE SERPL-SCNC: 82 MMOL/L (ref 98–107)
CHLORIDE SERPL-SCNC: 82 MMOL/L (ref 98–107)
CHLORIDE SERPL-SCNC: 83 MMOL/L (ref 98–107)
CO2 SERPL-SCNC: 28 MMOL/L (ref 21–32)
CO2 SERPL-SCNC: 29 MMOL/L (ref 21–32)
CO2 SERPL-SCNC: 30 MMOL/L (ref 21–32)
CREAT SERPL-MCNC: 1 MG/DL (ref 0.5–1.05)
CREAT SERPL-MCNC: 1.05 MG/DL (ref 0.5–1.05)
CREAT SERPL-MCNC: 1.06 MG/DL (ref 0.5–1.05)
EGFRCR SERPLBLD CKD-EPI 2021: 52 ML/MIN/1.73M*2
EGFRCR SERPLBLD CKD-EPI 2021: 53 ML/MIN/1.73M*2
EGFRCR SERPLBLD CKD-EPI 2021: 56 ML/MIN/1.73M*2
EOSINOPHIL # BLD AUTO: 0.05 X10*3/UL (ref 0–0.4)
EOSINOPHIL NFR BLD AUTO: 0.4 %
ERYTHROCYTE [DISTWIDTH] IN BLOOD BY AUTOMATED COUNT: 14.2 % (ref 11.5–14.5)
GLUCOSE SERPL-MCNC: 174 MG/DL (ref 74–99)
GLUCOSE SERPL-MCNC: 190 MG/DL (ref 74–99)
GLUCOSE SERPL-MCNC: 250 MG/DL (ref 74–99)
HCT VFR BLD AUTO: 36.3 % (ref 36–46)
HGB BLD-MCNC: 11.6 G/DL (ref 12–16)
IMM GRANULOCYTES # BLD AUTO: 0.06 X10*3/UL (ref 0–0.5)
IMM GRANULOCYTES NFR BLD AUTO: 0.5 % (ref 0–0.9)
LYMPHOCYTES # BLD AUTO: 1.17 X10*3/UL (ref 0.8–3)
LYMPHOCYTES NFR BLD AUTO: 9.6 %
MAGNESIUM SERPL-MCNC: 1.89 MG/DL (ref 1.6–2.4)
MCH RBC QN AUTO: 28.8 PG (ref 26–34)
MCHC RBC AUTO-ENTMCNC: 32 G/DL (ref 32–36)
MCV RBC AUTO: 90 FL (ref 80–100)
MONOCYTES # BLD AUTO: 0.82 X10*3/UL (ref 0.05–0.8)
MONOCYTES NFR BLD AUTO: 6.7 %
NEUTROPHILS # BLD AUTO: 10.03 X10*3/UL (ref 1.6–5.5)
NEUTROPHILS NFR BLD AUTO: 82.4 %
NRBC BLD-RTO: 0 /100 WBCS (ref 0–0)
OSMOLALITY SERPL: 263 MOSM/KG (ref 280–300)
PHOSPHATE SERPL-MCNC: 3.1 MG/DL (ref 2.5–4.9)
PHOSPHATE SERPL-MCNC: 3.1 MG/DL (ref 2.5–4.9)
PLATELET # BLD AUTO: 318 X10*3/UL (ref 150–450)
POTASSIUM SERPL-SCNC: 3.6 MMOL/L (ref 3.5–5.3)
POTASSIUM SERPL-SCNC: 4.1 MMOL/L (ref 3.5–5.3)
POTASSIUM SERPL-SCNC: 4.2 MMOL/L (ref 3.5–5.3)
Q ONSET: 189 MS
Q ONSET: 238 MS
QRS COUNT: 12 BEATS
QRS COUNT: 14 BEATS
QRS DURATION: 198 MS
QRS DURATION: 206 MS
QT INTERVAL: 488 MS
QT INTERVAL: 506 MS
QTC CALCULATION(BAZETT): 546 MS
QTC CALCULATION(BAZETT): 576 MS
QTC FREDERICIA: 532 MS
QTC FREDERICIA: 545 MS
R AXIS: -56 DEGREES
R AXIS: -66 DEGREES
RBC # BLD AUTO: 4.03 X10*6/UL (ref 4–5.2)
SODIUM SERPL-SCNC: 120 MMOL/L (ref 136–145)
SODIUM SERPL-SCNC: 120 MMOL/L (ref 136–145)
SODIUM SERPL-SCNC: 121 MMOL/L (ref 136–145)
T AXIS: 110 DEGREES
T AXIS: 123 DEGREES
T OFFSET: 442 MS
T OFFSET: 482 MS
TSH SERPL-ACNC: 3.3 MIU/L (ref 0.44–3.98)
VENTRICULAR RATE: 70 BPM
VENTRICULAR RATE: 84 BPM
WBC # BLD AUTO: 12.2 X10*3/UL (ref 4.4–11.3)

## 2025-08-13 PROCEDURE — 85025 COMPLETE CBC W/AUTO DIFF WBC: CPT

## 2025-08-13 PROCEDURE — 2500000001 HC RX 250 WO HCPCS SELF ADMINISTERED DRUGS (ALT 637 FOR MEDICARE OP)

## 2025-08-13 PROCEDURE — 2500000004 HC RX 250 GENERAL PHARMACY W/ HCPCS (ALT 636 FOR OP/ED)

## 2025-08-13 PROCEDURE — 80069 RENAL FUNCTION PANEL: CPT

## 2025-08-13 PROCEDURE — 80048 BASIC METABOLIC PNL TOTAL CA: CPT | Mod: CCI

## 2025-08-13 PROCEDURE — 99233 SBSQ HOSP IP/OBS HIGH 50: CPT

## 2025-08-13 PROCEDURE — 1200000002 HC GENERAL ROOM WITH TELEMETRY DAILY

## 2025-08-13 PROCEDURE — 36415 COLL VENOUS BLD VENIPUNCTURE: CPT

## 2025-08-13 PROCEDURE — 2500000002 HC RX 250 W HCPCS SELF ADMINISTERED DRUGS (ALT 637 FOR MEDICARE OP, ALT 636 FOR OP/ED)

## 2025-08-13 PROCEDURE — 83735 ASSAY OF MAGNESIUM: CPT

## 2025-08-13 PROCEDURE — 84443 ASSAY THYROID STIM HORMONE: CPT

## 2025-08-13 PROCEDURE — 83930 ASSAY OF BLOOD OSMOLALITY: CPT

## 2025-08-13 RX ORDER — METOLAZONE 2.5 MG/1
TABLET ORAL DAILY
Status: CANCELLED | OUTPATIENT
Start: 2025-08-13

## 2025-08-13 RX ORDER — OXYCODONE AND ACETAMINOPHEN 5; 325 MG/1; MG/1
1 TABLET ORAL EVERY 6 HOURS PRN
Refills: 0 | Status: DISCONTINUED | OUTPATIENT
Start: 2025-08-13 | End: 2025-08-24

## 2025-08-13 RX ORDER — GABAPENTIN 300 MG/1
300 CAPSULE ORAL ONCE
Status: COMPLETED | OUTPATIENT
Start: 2025-08-13 | End: 2025-08-13

## 2025-08-13 RX ADMIN — OXYCODONE AND ACETAMINOPHEN 1 TABLET: 5; 325 TABLET ORAL at 20:37

## 2025-08-13 RX ADMIN — Medication 25 MCG: at 08:38

## 2025-08-13 RX ADMIN — FERROUS SULFATE TAB 325 MG (65 MG ELEMENTAL FE) 1 TABLET: 325 (65 FE) TAB at 08:38

## 2025-08-13 RX ADMIN — TRIMETHOBENZAMIDE HYDROCHLORIDE 200 MG: 100 INJECTION INTRAMUSCULAR at 06:32

## 2025-08-13 RX ADMIN — APIXABAN 5 MG: 5 TABLET, FILM COATED ORAL at 08:38

## 2025-08-13 RX ADMIN — AMLODIPINE BESYLATE 5 MG: 5 TABLET ORAL at 08:38

## 2025-08-13 RX ADMIN — POLYETHYLENE GLYCOL 3350 17 G: 17 POWDER, FOR SOLUTION ORAL at 08:38

## 2025-08-13 RX ADMIN — OXYCODONE AND ACETAMINOPHEN 1 TABLET: 5; 325 TABLET ORAL at 12:15

## 2025-08-13 RX ADMIN — NYSTATIN 1 APPLICATION: 100000 POWDER TOPICAL at 20:55

## 2025-08-13 RX ADMIN — APIXABAN 5 MG: 5 TABLET, FILM COATED ORAL at 20:37

## 2025-08-13 RX ADMIN — ACETAMINOPHEN 650 MG: 325 TABLET, FILM COATED ORAL at 08:51

## 2025-08-13 RX ADMIN — OXYCODONE HYDROCHLORIDE AND ACETAMINOPHEN 500 MG: 500 TABLET ORAL at 08:38

## 2025-08-13 RX ADMIN — OXYCODONE AND ACETAMINOPHEN 1 TABLET: 5; 325 TABLET ORAL at 12:19

## 2025-08-13 RX ADMIN — HYDROCODONE BITARTRATE AND ACETAMINOPHEN 1 TABLET: 5; 325 TABLET ORAL at 06:37

## 2025-08-13 RX ADMIN — HYDROCODONE BITARTRATE AND ACETAMINOPHEN 1 TABLET: 5; 325 TABLET ORAL at 02:34

## 2025-08-13 RX ADMIN — ATORVASTATIN CALCIUM 20 MG: 20 TABLET, FILM COATED ORAL at 08:38

## 2025-08-13 RX ADMIN — NYSTATIN 1 APPLICATION: 100000 POWDER TOPICAL at 08:47

## 2025-08-13 RX ADMIN — PAROXETINE 20 MG: 20 TABLET, FILM COATED ORAL at 08:41

## 2025-08-13 RX ADMIN — PANTOPRAZOLE SODIUM 40 MG: 40 TABLET, DELAYED RELEASE ORAL at 06:32

## 2025-08-13 RX ADMIN — SODIUM CHLORIDE 500 ML: 0.9 INJECTION, SOLUTION INTRAVENOUS at 12:23

## 2025-08-13 RX ADMIN — GABAPENTIN 300 MG: 300 CAPSULE ORAL at 08:38

## 2025-08-13 RX ADMIN — CALCIUM CARBONATE (ANTACID) CHEW TAB 500 MG 1 TABLET: 500 CHEW TAB at 01:20

## 2025-08-13 RX ADMIN — GABAPENTIN 300 MG: 300 CAPSULE ORAL at 01:20

## 2025-08-13 RX ADMIN — CARVEDILOL 6.25 MG: 6.25 TABLET, FILM COATED ORAL at 08:38

## 2025-08-13 RX ADMIN — ACETAMINOPHEN 650 MG: 325 TABLET, FILM COATED ORAL at 00:31

## 2025-08-13 RX ADMIN — CARVEDILOL 6.25 MG: 6.25 TABLET, FILM COATED ORAL at 20:36

## 2025-08-13 RX ADMIN — LEVOTHYROXINE SODIUM 75 MCG: 0.07 TABLET ORAL at 08:41

## 2025-08-13 RX ADMIN — TEMAZEPAM 7.5 MG: 7.5 CAPSULE ORAL at 20:44

## 2025-08-13 ASSESSMENT — COGNITIVE AND FUNCTIONAL STATUS - GENERAL
TOILETING: A LITTLE
DRESSING REGULAR LOWER BODY CLOTHING: A LITTLE
WALKING IN HOSPITAL ROOM: A LITTLE
DRESSING REGULAR UPPER BODY CLOTHING: A LITTLE
HELP NEEDED FOR BATHING: A LITTLE
DAILY ACTIVITIY SCORE: 20
PERSONAL GROOMING: A LITTLE
DAILY ACTIVITIY SCORE: 18
STANDING UP FROM CHAIR USING ARMS: A LITTLE
CLIMB 3 TO 5 STEPS WITH RAILING: A LITTLE
EATING MEALS: A LITTLE
DRESSING REGULAR LOWER BODY CLOTHING: A LITTLE
MOBILITY SCORE: 21
HELP NEEDED FOR BATHING: A LITTLE
DRESSING REGULAR UPPER BODY CLOTHING: A LITTLE
WALKING IN HOSPITAL ROOM: A LITTLE
PERSONAL GROOMING: A LITTLE
MOBILITY SCORE: 22
CLIMB 3 TO 5 STEPS WITH RAILING: A LITTLE

## 2025-08-13 ASSESSMENT — PAIN DESCRIPTION - DESCRIPTORS
DESCRIPTORS: ACHING;BURNING
DESCRIPTORS: BURNING
DESCRIPTORS: ACHING;BURNING

## 2025-08-13 ASSESSMENT — PAIN SCALES - GENERAL
PAINLEVEL_OUTOF10: 4
PAINLEVEL_OUTOF10: 8
PAINLEVEL_OUTOF10: 6
PAINLEVEL_OUTOF10: 8
PAINLEVEL_OUTOF10: 5 - MODERATE PAIN
PAINLEVEL_OUTOF10: 8
PAINLEVEL_OUTOF10: 8
PAINLEVEL_OUTOF10: 9
PAINLEVEL_OUTOF10: 8

## 2025-08-13 ASSESSMENT — PAIN - FUNCTIONAL ASSESSMENT
PAIN_FUNCTIONAL_ASSESSMENT: 0-10

## 2025-08-13 ASSESSMENT — PAIN DESCRIPTION - ORIENTATION
ORIENTATION: RIGHT;LEFT

## 2025-08-13 ASSESSMENT — PAIN DESCRIPTION - LOCATION
LOCATION: LEG

## 2025-08-13 NOTE — CARE PLAN
The clinical goals for the shift include pt will have controlled pain by end of shift    Problem: Pain - Adult  Goal: Verbalizes/displays adequate comfort level or baseline comfort level  Outcome: Progressing     Problem: Safety - Adult  Goal: Free from fall injury  Outcome: Progressing     Problem: Discharge Planning  Goal: Discharge to home or other facility with appropriate resources  Outcome: Progressing     Problem: Chronic Conditions and Co-morbidities  Goal: Patient's chronic conditions and co-morbidity symptoms are monitored and maintained or improved  Outcome: Progressing     Problem: Nutrition  Goal: Nutrient intake appropriate for maintaining nutritional needs  Outcome: Progressing     Problem: Skin  Goal: Decreased wound size/increased tissue granulation at next dressing change  Outcome: Progressing  Goal: Participates in plan/prevention/treatment measures  Outcome: Progressing  Goal: Prevent/manage excess moisture  Outcome: Progressing  Goal: Prevent/minimize sheer/friction injuries  Outcome: Progressing  Goal: Promote/optimize nutrition  Outcome: Progressing  Goal: Promote skin healing  Outcome: Progressing

## 2025-08-13 NOTE — PROGRESS NOTES
Radha Torres is a 85 y.o. female on day 4 of admission presenting with Chest pain.    Subjective   Overnight, patient complained of severe epigastric pain. Norco and Tigan was given for this which modestly improved discomfort. Repeat ECG was ordered and was unchanged from previous. She also endorsed bilateral leg pain, for which she takes gabapentin at home; night team prescribed additional dose of gabapentin.     Regarding epigastric pain, patient stated that she frequently experiences this at home, particularly after large meals. At home, she manages this with baking soda which provides relief. Stated that this pain feels the same as what she feels at home, only worse. Per chart review, patient had an EGD in 2018 at [facility] that showed peptic ulcer disease. She has never been admitted to  for GI bleed or PUD. Patient endorses some nausea and general epigastric discomfort; denies melena, hematochezia, hematemesis, chest pain, or vomiting. PO intake has still been poor.       Objective     Physical Exam  Vitals reviewed.   Constitutional:       Comments: Appears uncomfortable.      Cardiovascular:      Rate and Rhythm: Normal rate and regular rhythm.      Heart sounds: Normal heart sounds.   Pulmonary:      Effort: Pulmonary effort is normal.      Comments: Mild expiratory wheezes in bilateral lung fields.   Abdominal:      Palpations: Abdomen is soft.      Tenderness: There is no abdominal tenderness.     Musculoskeletal:      Right lower leg: Edema present.      Left lower leg: Edema present.     Skin:     Comments: Red, beefy rash present located under bilateral breasts.      Neurological:      General: No focal deficit present.      Mental Status: She is alert. Mental status is at baseline.     Psychiatric:         Mood and Affect: Mood normal.         Behavior: Behavior normal.         Last Recorded Vitals  Blood pressure (!) 149/96, pulse 99, temperature 36 °C (96.8 °F), temperature source Temporal, resp. rate  "22, height 1.65 m (5' 4.96\"), weight 80.2 kg (176 lb 12.9 oz), SpO2 98%.  Intake/Output last 3 Shifts:  I/O last 3 completed shifts:  In: 480 (6 mL/kg) [P.O.:480]  Out: 1450 (18.1 mL/kg) [Urine:1450 (0.5 mL/kg/hr)]  Weight: 80.2 kg     Relevant Results             Scheduled medications  Scheduled Medications[1]  Continuous medications  Continuous Medications[2]  PRN medications  PRN Medications[3]    Results for orders placed or performed during the hospital encounter of 08/09/25 (from the past 24 hours)   Troponin I, High Sensitivity   Result Value Ref Range    Troponin I, High Sensitivity (CMC) 26 0 - 34 ng/L   CBC and Auto Differential   Result Value Ref Range    WBC 12.2 (H) 4.4 - 11.3 x10*3/uL    nRBC 0.0 0.0 - 0.0 /100 WBCs    RBC 4.03 4.00 - 5.20 x10*6/uL    Hemoglobin 11.6 (L) 12.0 - 16.0 g/dL    Hematocrit 36.3 36.0 - 46.0 %    MCV 90 80 - 100 fL    MCH 28.8 26.0 - 34.0 pg    MCHC 32.0 32.0 - 36.0 g/dL    RDW 14.2 11.5 - 14.5 %    Platelets 318 150 - 450 x10*3/uL    Neutrophils % 82.4 40.0 - 80.0 %    Immature Granulocytes %, Automated 0.5 0.0 - 0.9 %    Lymphocytes % 9.6 13.0 - 44.0 %    Monocytes % 6.7 2.0 - 10.0 %    Eosinophils % 0.4 0.0 - 6.0 %    Basophils % 0.4 0.0 - 2.0 %    Neutrophils Absolute 10.03 (H) 1.60 - 5.50 x10*3/uL    Immature Granulocytes Absolute, Automated 0.06 0.00 - 0.50 x10*3/uL    Lymphocytes Absolute 1.17 0.80 - 3.00 x10*3/uL    Monocytes Absolute 0.82 (H) 0.05 - 0.80 x10*3/uL    Eosinophils Absolute 0.05 0.00 - 0.40 x10*3/uL    Basophils Absolute 0.05 0.00 - 0.10 x10*3/uL   Magnesium   Result Value Ref Range    Magnesium 1.89 1.60 - 2.40 mg/dL   Renal Function Panel   Result Value Ref Range    Glucose 174 (H) 74 - 99 mg/dL    Sodium 120 (LL) 136 - 145 mmol/L    Potassium 4.1 3.5 - 5.3 mmol/L    Chloride 83 (L) 98 - 107 mmol/L    Bicarbonate 28 21 - 32 mmol/L    Anion Gap 13 10 - 20 mmol/L    Urea Nitrogen 14 6 - 23 mg/dL    Creatinine 1.06 (H) 0.50 - 1.05 mg/dL    eGFR 52 (L) >60 " mL/min/1.73m*2    Calcium 9.6 8.6 - 10.6 mg/dL    Phosphorus 3.1 2.5 - 4.9 mg/dL    Albumin 4.0 3.4 - 5.0 g/dL   Osmolality   Result Value Ref Range    Osmolality, Serum 263 (L) 280 - 300 mOsm/kg   TSH with reflex to Free T4 if abnormal   Result Value Ref Range    Thyroid Stimulating Hormone 3.30 0.44 - 3.98 mIU/L   Renal function panel   Result Value Ref Range    Glucose 250 (H) 74 - 99 mg/dL    Sodium 120 (LL) 136 - 145 mmol/L    Potassium 3.6 3.5 - 5.3 mmol/L    Chloride 82 (L) 98 - 107 mmol/L    Bicarbonate 30 21 - 32 mmol/L    Anion Gap 12 10 - 20 mmol/L    Urea Nitrogen 15 6 - 23 mg/dL    Creatinine 1.00 0.50 - 1.05 mg/dL    eGFR 56 (L) >60 mL/min/1.73m*2    Calcium 9.4 8.6 - 10.6 mg/dL    Phosphorus 3.1 2.5 - 4.9 mg/dL    Albumin 3.9 3.4 - 5.0 g/dL     ECG 12 lead  Result Date: 8/13/2025  Ventricular-paced rhythm Abnormal ECG When compared with ECG of 09-AUG-2025 03:48, Vent. rate has increased BY  14 BPM Confirmed by Anatoly Watson ([id number]) on 8/13/2025 12:56:00 AM    XR abdomen 1 view  Result Date: 8/12/2025  Interpreted By:  Sarahy Suarez, STUDY: XR ABDOMEN 1 VIEW;  8/12/2025 11:51 am   INDICATION: Signs/Symptoms:abd pain.   COMPARISON: None. CT dated 08/08/2025.   ACCESSION NUMBER(S): [id number]   ORDERING CLINICIAN: ISHAAN LAURENT   FINDINGS: Cardiac monitoring devices.   Small bilateral pleural effusion.   Average colonic stool burden.   No mechanical bowel obstruction.   No gross free air.   No abnormal calcification.   No osseous destructive lesion.   Visualized lung bases clear.   Postoperative fusion L3-S1. Postoperative changes of the right femoral neck.       1.  Nonacute abdominal findings on today's exam.   Signed by: Sarahy Suarez 8/12/2025 12:28 PM Dictation workstation:   [id number]    XR chest 1 view  Result Date: 8/12/2025  Interpreted By:  Sarahy Suarez and Moradiya Krunal STUDY: XR CHEST 1 VIEW;  8/12/2025 9:03 am   INDICATION: Signs/Symptoms:SOB.   COMPARISON: Chest radiograph from  08/09/2025   ACCESSION NUMBER(S): [id number]   ORDERING CLINICIAN: ISHAAN LAURENT   FINDINGS: AP radiograph of the chest.   MEDICAL DEVICES: Leadless pacemaker overlying the cardiac silhouette.   CARDIOMEDIASTINAL SILHOUETTE: The cardiomediastinal silhouette is stable in size and configuration with similar partial obscuration of left heart border. Mild aortic knob calcification.   LUNGS: Unchanged bilateral blunting of the costophrenic angles with associated basilar consolidative opacities, with layering fluid on the right. No pneumothorax is seen.   ABDOMEN: No remarkable upper abdominal findings.   BONES: Multiple remote right posterolateral rib fractures.       1. Unchanged small left and moderate right pleural effusions with associated basilar consolidation/atelectasis. 2. Leadless pacemaker overlying the cardiac silhouette.   I personally reviewed the images/study and I agree with the findings as stated. This study was interpreted by radiology resident Pepe Urban D.O. at [facility], [place], [place].   Signed by: Sarahy Suarez 8/12/2025 10:51 AM Dictation workstation:   [id number]             Assessment & Plan  Chest pain    85 y.o. female with history significant for HFrEF (EF 25-30%), aortic stenosis, Afib, s/p AV node ablation and leadless pacemaker in 2025, HTN, renal cell carcinoma who presented on 8/9/25 as a transfer from [facility] for epigastric pain, nonradiating, not associated with eating. Hemodynamically stable, afebrile on arrival in no acute distress with some mild epigastric burning. Trop 23>27, lipase within normal limits. EKG with no ischemic changes. CTAP negative for inflammatory process or bowel obstruction. She is scheduled for upcoming TAVR on 8/19/25 with Dr. Howell. She has new onset of hyponatremia, in addition to feeling generally unwell. Sodium resulted severely low at 120 on AM labs; repeat was 120.     Update 8/13/25:  Sodium very low at  120; given 500 mL bolus NS   Recheck Na in PM   Serum osm, morning cortisol, and TSH ordered for hyponatremia workup  Resumed home Percocet, discontinued Norco   Continues to endorse epigastric pain, KUB from yesterday 8/12 with no acute findings; etiology likely GERD  Nutrition consulted due to poor PO intake     #Epigastric pain  #HFrEF (EF 25-30%)  #Severe aortic stenosis  :: Echo 7/11/25 - EF 25-30%, left ventricle mildly dilated, mild concentric LV hypertrophy, leadless pacemaker in right atria and RV, aortic valve is heavily calcified with appearance of severe aortic stenosis (peak gradient 52mmHg, mean gradient 25mmHg, aortic valve area 0.8cm^2), trace AR, mild MR and TR, RVSP 50-60mmHg,   ::Scheduled for TAVR 8/19/25 with Dr. Howell  ::EKG with no ischemic changes  ::Troponin 23>27  ::Lipase, CTAP negative for intra abdominal process  Plan:  - Repeat EKG if any chest pain, repeat ordered 8/12  - Telemetry  - Holding home furosemide 40 mg daily    #Acute severe hyponatremia  :: Na 131 on admission  Plan:   - Sodium severely low at 120; given 500 mL bolus LR  - Recheck Na in PM   - Urine osm 250, urine Na 29  - Serum osm, morning cortisol, and TSH ordered  - Holding home lasix 40 mg    #Epigastric pain  #Nausea/GI upset  :: EGG performed 2018 at [facility] notable for PUD  Plan:   - Tigan PRN ordered for nausea   - KUB with no acute findings; ECG unchanged   - Epigastric pain/nausea most likely due to severe GERD, indicated by relief from tums, worsening after large meals, and epigastric burning  - Continue pantoprazole 40 mg daily, tums PRN  - Discontinued Norco, ordered home Percocet 5-325 mg Q6 PRN    #Atrial fibrillation  #S/p AV emiliano ablation and leadless pacemaker placement 5/6/25  #HTN  :: Home regimen - coreg 6.25 BID, amlodipine 2.5 mg, Eliquis 5mg BID  Plan:  - Amlodipine increased to 5mg on 8/10/25  #Candida Intertrigo  - Nystatin power BID     #CKD 3b  :: Baseline Cr appears to be around 1.2  ::Cr 1.22  in ED  Plan:  - Daily RFP  - Avoid nephrotoxic agents    #HLD  - Continue atorvastatin     #Hypothyroidism  - Continue levothyroxine 75 mcg    =======================================    Fluids: PRN  Electrolytes: PRN, keep K>4. Mg>2, PO4>3  Nutrition: regular    GI ppx: pantoprazole 40mg  DVT ppx: apixaban  Bowel care: PEG qD    Abx: none  O2: 2 LPM    Code Status: full code  NOK: Stefanie Roblero, [phone]    Patient seen and discussed with attending.        Debby Hudson MD  Internal Medicine PGY-1                  [1] amLODIPine, 5 mg, oral, Daily  apixaban, 5 mg, oral, BID  ascorbic acid, 500 mg, oral, Daily  atorvastatin, 20 mg, oral, Daily  carvedilol, 6.25 mg, oral, BID  cholecalciferol, 25 mcg, oral, Daily  ferrous sulfate, 1 tablet, oral, Daily  gabapentin, 300 mg, oral, Daily  levothyroxine, 75 mcg, oral, Daily  nystatin, 1 Application, Topical, BID  pantoprazole, 40 mg, oral, Daily before breakfast  PARoxetine, 20 mg, oral, Daily  polyethylene glycol, 17 g, oral, Daily     [2]    [3] PRN medications: acetaminophen, calcium carbonate, oxyCODONE-acetaminophen, temazepam, trimethobenzamide

## 2025-08-13 NOTE — CARE PLAN
The clinical goals for the shift include Pt VS terrie remain WNL      Problem: Pain - Adult  Goal: Verbalizes/displays adequate comfort level or baseline comfort level  Outcome: Progressing     Problem: Safety - Adult  Goal: Free from fall injury  Outcome: Progressing     Problem: Discharge Planning  Goal: Discharge to home or other facility with appropriate resources  Outcome: Progressing     Problem: Chronic Conditions and Co-morbidities  Goal: Patient's chronic conditions and co-morbidity symptoms are monitored and maintained or improved  Outcome: Progressing     Problem: Nutrition  Goal: Nutrient intake appropriate for maintaining nutritional needs  Outcome: Progressing     Problem: Skin  Goal: Decreased wound size/increased tissue granulation at next dressing change  Outcome: Progressing  Goal: Participates in plan/prevention/treatment measures  Outcome: Progressing  Goal: Prevent/manage excess moisture  Outcome: Progressing  Goal: Prevent/minimize sheer/friction injuries  Outcome: Progressing  Goal: Promote/optimize nutrition  Outcome: Progressing  Goal: Promote skin healing  Outcome: Progressing

## 2025-08-14 ENCOUNTER — APPOINTMENT (OUTPATIENT)
Dept: RADIOLOGY | Facility: HOSPITAL | Age: 85
DRG: 306 | End: 2025-08-14
Payer: MEDICARE

## 2025-08-14 LAB
ALBUMIN SERPL BCP-MCNC: 4 G/DL (ref 3.4–5)
ALBUMIN SERPL BCP-MCNC: 4.1 G/DL (ref 3.4–5)
ANION GAP BLDA CALCULATED.4IONS-SCNC: 15 MMO/L (ref 10–25)
ANION GAP SERPL CALC-SCNC: 13 MMOL/L (ref 10–20)
ANION GAP SERPL CALC-SCNC: 13 MMOL/L (ref 10–20)
BASE EXCESS BLDA CALC-SCNC: -2.6 MMOL/L (ref -2–3)
BASOPHILS # BLD AUTO: 0.04 X10*3/UL (ref 0–0.1)
BASOPHILS NFR BLD AUTO: 0.3 %
BODY TEMPERATURE: 37 DEGREES CELSIUS
BUN SERPL-MCNC: 17 MG/DL (ref 6–23)
BUN SERPL-MCNC: 18 MG/DL (ref 6–23)
CA-I BLDA-SCNC: 1.18 MMOL/L (ref 1.1–1.33)
CALCIUM SERPL-MCNC: 9.2 MG/DL (ref 8.6–10.6)
CALCIUM SERPL-MCNC: 9.5 MG/DL (ref 8.6–10.6)
CHLORIDE BLDA-SCNC: 79 MMOL/L (ref 98–107)
CHLORIDE SERPL-SCNC: 80 MMOL/L (ref 98–107)
CHLORIDE SERPL-SCNC: 83 MMOL/L (ref 98–107)
CHLORIDE UR-SCNC: 100 MMOL/L
CHLORIDE/CREATININE (MMOL/G) IN URINE: 699 MMOL/G CREAT (ref 38–318)
CO2 SERPL-SCNC: 26 MMOL/L (ref 21–32)
CO2 SERPL-SCNC: 28 MMOL/L (ref 21–32)
CORTIS AM PEAK SERPL-MCNC: 17.8 UG/DL (ref 5–20)
CREAT SERPL-MCNC: 0.94 MG/DL (ref 0.5–1.05)
CREAT SERPL-MCNC: 1.02 MG/DL (ref 0.5–1.05)
CREAT UR-MCNC: 14.3 MG/DL (ref 20–320)
EGFRCR SERPLBLD CKD-EPI 2021: 55 ML/MIN/1.73M*2
EGFRCR SERPLBLD CKD-EPI 2021: 60 ML/MIN/1.73M*2
EOSINOPHIL # BLD AUTO: 0.09 X10*3/UL (ref 0–0.4)
EOSINOPHIL NFR BLD AUTO: 0.8 %
ERYTHROCYTE [DISTWIDTH] IN BLOOD BY AUTOMATED COUNT: 14.2 % (ref 11.5–14.5)
GLUCOSE BLDA-MCNC: 305 MG/DL (ref 74–99)
GLUCOSE SERPL-MCNC: 183 MG/DL (ref 74–99)
GLUCOSE SERPL-MCNC: 209 MG/DL (ref 74–99)
HCO3 BLDA-SCNC: 24.1 MMOL/L (ref 22–26)
HCT VFR BLD AUTO: 34 % (ref 36–46)
HCT VFR BLD EST: 36 % (ref 36–46)
HGB BLD-MCNC: 10.9 G/DL (ref 12–16)
HGB BLDA-MCNC: 11.9 G/DL (ref 12–16)
IMM GRANULOCYTES # BLD AUTO: 0.07 X10*3/UL (ref 0–0.5)
IMM GRANULOCYTES NFR BLD AUTO: 0.6 % (ref 0–0.9)
INHALED O2 CONCENTRATION: 40 %
LACTATE BLDA-SCNC: 3.3 MMOL/L (ref 0.4–2)
LYMPHOCYTES # BLD AUTO: 1.28 X10*3/UL (ref 0.8–3)
LYMPHOCYTES NFR BLD AUTO: 11.1 %
MAGNESIUM SERPL-MCNC: 2.04 MG/DL (ref 1.6–2.4)
MCH RBC QN AUTO: 28.6 PG (ref 26–34)
MCHC RBC AUTO-ENTMCNC: 32.1 G/DL (ref 32–36)
MCV RBC AUTO: 89 FL (ref 80–100)
MONOCYTES # BLD AUTO: 0.79 X10*3/UL (ref 0.05–0.8)
MONOCYTES NFR BLD AUTO: 6.8 %
NEUTROPHILS # BLD AUTO: 9.29 X10*3/UL (ref 1.6–5.5)
NEUTROPHILS NFR BLD AUTO: 80.4 %
NRBC BLD-RTO: 0 /100 WBCS (ref 0–0)
OSMOLALITY SERPL: 255 MOSM/KG (ref 280–300)
OSMOLALITY UR: 248 MOSM/KG (ref 200–1200)
OXYHGB MFR BLDA: 92.6 % (ref 94–98)
PCO2 BLDA: 49 MM HG (ref 38–42)
PH BLDA: 7.3 PH (ref 7.38–7.42)
PHOSPHATE SERPL-MCNC: 2.8 MG/DL (ref 2.5–4.9)
PHOSPHATE SERPL-MCNC: 3.2 MG/DL (ref 2.5–4.9)
PLATELET # BLD AUTO: 292 X10*3/UL (ref 150–450)
PO2 BLDA: 75 MM HG (ref 85–95)
POTASSIUM BLDA-SCNC: 4.8 MMOL/L (ref 3.5–5.3)
POTASSIUM SERPL-SCNC: 4.4 MMOL/L (ref 3.5–5.3)
POTASSIUM SERPL-SCNC: 4.8 MMOL/L (ref 3.5–5.3)
POTASSIUM UR-SCNC: 19 MMOL/L
POTASSIUM/CREAT UR-RTO: 133 MMOL/G CREAT
RBC # BLD AUTO: 3.81 X10*6/UL (ref 4–5.2)
SAO2 % BLDA: 95 % (ref 94–100)
SODIUM BLDA-SCNC: 113 MMOL/L (ref 136–145)
SODIUM SERPL-SCNC: 117 MMOL/L (ref 136–145)
SODIUM SERPL-SCNC: 117 MMOL/L (ref 136–145)
SODIUM UR-SCNC: 70 MMOL/L
SODIUM/CREAT UR-RTO: 490 MMOL/G CREAT
WBC # BLD AUTO: 11.6 X10*3/UL (ref 4.4–11.3)

## 2025-08-14 PROCEDURE — 2500000002 HC RX 250 W HCPCS SELF ADMINISTERED DRUGS (ALT 637 FOR MEDICARE OP, ALT 636 FOR OP/ED)

## 2025-08-14 PROCEDURE — 82306 VITAMIN D 25 HYDROXY: CPT

## 2025-08-14 PROCEDURE — 2500000001 HC RX 250 WO HCPCS SELF ADMINISTERED DRUGS (ALT 637 FOR MEDICARE OP)

## 2025-08-14 PROCEDURE — 83930 ASSAY OF BLOOD OSMOLALITY: CPT

## 2025-08-14 PROCEDURE — 99233 SBSQ HOSP IP/OBS HIGH 50: CPT

## 2025-08-14 PROCEDURE — 2500000004 HC RX 250 GENERAL PHARMACY W/ HCPCS (ALT 636 FOR OP/ED)

## 2025-08-14 PROCEDURE — 71045 X-RAY EXAM CHEST 1 VIEW: CPT

## 2025-08-14 PROCEDURE — 71045 X-RAY EXAM CHEST 1 VIEW: CPT | Performed by: RADIOLOGY

## 2025-08-14 PROCEDURE — 83735 ASSAY OF MAGNESIUM: CPT

## 2025-08-14 PROCEDURE — 82570 ASSAY OF URINE CREATININE: CPT

## 2025-08-14 PROCEDURE — 2500000005 HC RX 250 GENERAL PHARMACY W/O HCPCS

## 2025-08-14 PROCEDURE — 36415 COLL VENOUS BLD VENIPUNCTURE: CPT

## 2025-08-14 PROCEDURE — 85025 COMPLETE CBC W/AUTO DIFF WBC: CPT

## 2025-08-14 PROCEDURE — 80069 RENAL FUNCTION PANEL: CPT

## 2025-08-14 PROCEDURE — 1200000002 HC GENERAL ROOM WITH TELEMETRY DAILY

## 2025-08-14 PROCEDURE — 83935 ASSAY OF URINE OSMOLALITY: CPT

## 2025-08-14 PROCEDURE — 82533 TOTAL CORTISOL: CPT

## 2025-08-14 PROCEDURE — 94640 AIRWAY INHALATION TREATMENT: CPT

## 2025-08-14 PROCEDURE — 84132 ASSAY OF SERUM POTASSIUM: CPT

## 2025-08-14 RX ORDER — METOCLOPRAMIDE HYDROCHLORIDE 5 MG/ML
10 INJECTION INTRAMUSCULAR; INTRAVENOUS EVERY 6 HOURS PRN
Status: DISCONTINUED | OUTPATIENT
Start: 2025-08-14 | End: 2025-08-22

## 2025-08-14 RX ORDER — ALBUTEROL SULFATE 0.83 MG/ML
SOLUTION RESPIRATORY (INHALATION)
Status: COMPLETED
Start: 2025-08-14 | End: 2025-08-14

## 2025-08-14 RX ORDER — FUROSEMIDE 10 MG/ML
60 INJECTION INTRAMUSCULAR; INTRAVENOUS ONCE
Status: COMPLETED | OUTPATIENT
Start: 2025-08-14 | End: 2025-08-14

## 2025-08-14 RX ORDER — ALBUTEROL SULFATE 0.83 MG/ML
2.5 SOLUTION RESPIRATORY (INHALATION) EVERY 8 HOURS PRN
Status: DISPENSED | OUTPATIENT
Start: 2025-08-14 | End: 2025-08-15

## 2025-08-14 RX ORDER — HYDROXYZINE HYDROCHLORIDE 10 MG/1
10 TABLET, FILM COATED ORAL ONCE
Status: DISCONTINUED | OUTPATIENT
Start: 2025-08-14 | End: 2025-08-16

## 2025-08-14 RX ORDER — FUROSEMIDE 10 MG/ML
40 INJECTION INTRAMUSCULAR; INTRAVENOUS ONCE
Status: COMPLETED | OUTPATIENT
Start: 2025-08-14 | End: 2025-08-14

## 2025-08-14 RX ADMIN — CALCIUM CARBONATE (ANTACID) CHEW TAB 500 MG 1 TABLET: 500 CHEW TAB at 17:44

## 2025-08-14 RX ADMIN — OXYCODONE AND ACETAMINOPHEN 1 TABLET: 5; 325 TABLET ORAL at 08:01

## 2025-08-14 RX ADMIN — ALBUTEROL SULFATE 2.5 MG: 2.5 SOLUTION RESPIRATORY (INHALATION) at 21:50

## 2025-08-14 RX ADMIN — TRIMETHOBENZAMIDE HYDROCHLORIDE 200 MG: 100 INJECTION INTRAMUSCULAR at 18:08

## 2025-08-14 RX ADMIN — PAROXETINE 20 MG: 20 TABLET, FILM COATED ORAL at 08:01

## 2025-08-14 RX ADMIN — Medication 15 G: at 17:40

## 2025-08-14 RX ADMIN — CARVEDILOL 6.25 MG: 6.25 TABLET, FILM COATED ORAL at 20:50

## 2025-08-14 RX ADMIN — Medication 25 MCG: at 08:01

## 2025-08-14 RX ADMIN — LEVOTHYROXINE SODIUM 75 MCG: 0.07 TABLET ORAL at 08:01

## 2025-08-14 RX ADMIN — POLYETHYLENE GLYCOL 3350 17 G: 17 POWDER, FOR SOLUTION ORAL at 08:01

## 2025-08-14 RX ADMIN — OXYCODONE AND ACETAMINOPHEN 1 TABLET: 5; 325 TABLET ORAL at 20:50

## 2025-08-14 RX ADMIN — FUROSEMIDE 40 MG: 10 INJECTION, SOLUTION INTRAMUSCULAR; INTRAVENOUS at 14:52

## 2025-08-14 RX ADMIN — METOCLOPRAMIDE 10 MG: 5 INJECTION, SOLUTION INTRAMUSCULAR; INTRAVENOUS at 19:43

## 2025-08-14 RX ADMIN — NYSTATIN 1 APPLICATION: 100000 POWDER TOPICAL at 08:02

## 2025-08-14 RX ADMIN — NYSTATIN 1 APPLICATION: 100000 POWDER TOPICAL at 20:50

## 2025-08-14 RX ADMIN — FERROUS SULFATE TAB 325 MG (65 MG ELEMENTAL FE) 1 TABLET: 325 (65 FE) TAB at 08:01

## 2025-08-14 RX ADMIN — CARVEDILOL 6.25 MG: 6.25 TABLET, FILM COATED ORAL at 08:01

## 2025-08-14 RX ADMIN — FUROSEMIDE 60 MG: 10 INJECTION, SOLUTION INTRAMUSCULAR; INTRAVENOUS at 23:30

## 2025-08-14 RX ADMIN — APIXABAN 5 MG: 5 TABLET, FILM COATED ORAL at 20:50

## 2025-08-14 RX ADMIN — Medication 1 DOSE: at 21:50

## 2025-08-14 RX ADMIN — OXYCODONE HYDROCHLORIDE AND ACETAMINOPHEN 500 MG: 500 TABLET ORAL at 08:02

## 2025-08-14 RX ADMIN — ATORVASTATIN CALCIUM 20 MG: 20 TABLET, FILM COATED ORAL at 08:01

## 2025-08-14 RX ADMIN — APIXABAN 5 MG: 5 TABLET, FILM COATED ORAL at 08:01

## 2025-08-14 RX ADMIN — GABAPENTIN 300 MG: 300 CAPSULE ORAL at 08:01

## 2025-08-14 RX ADMIN — AMLODIPINE BESYLATE 5 MG: 5 TABLET ORAL at 08:01

## 2025-08-14 RX ADMIN — PANTOPRAZOLE SODIUM 40 MG: 40 TABLET, DELAYED RELEASE ORAL at 06:05

## 2025-08-14 RX ADMIN — OXYCODONE AND ACETAMINOPHEN 1 TABLET: 5; 325 TABLET ORAL at 14:51

## 2025-08-14 RX ADMIN — SODIUM CHLORIDE 500 ML: 0.9 INJECTION, SOLUTION INTRAVENOUS at 10:27

## 2025-08-14 RX ADMIN — CALCIUM CARBONATE (ANTACID) CHEW TAB 500 MG 1 TABLET: 500 CHEW TAB at 14:50

## 2025-08-14 ASSESSMENT — COGNITIVE AND FUNCTIONAL STATUS - GENERAL
TOILETING: A LITTLE
PERSONAL GROOMING: A LITTLE
MOBILITY SCORE: 22
CLIMB 3 TO 5 STEPS WITH RAILING: A LITTLE
DRESSING REGULAR LOWER BODY CLOTHING: A LITTLE
DRESSING REGULAR UPPER BODY CLOTHING: A LITTLE
EATING MEALS: A LITTLE
DAILY ACTIVITIY SCORE: 18
WALKING IN HOSPITAL ROOM: A LITTLE
HELP NEEDED FOR BATHING: A LITTLE

## 2025-08-14 ASSESSMENT — PAIN SCALES - GENERAL
PAINLEVEL_OUTOF10: 8
PAINLEVEL_OUTOF10: 9
PAINLEVEL_OUTOF10: 0 - NO PAIN
PAINLEVEL_OUTOF10: 7

## 2025-08-14 ASSESSMENT — PAIN - FUNCTIONAL ASSESSMENT
PAIN_FUNCTIONAL_ASSESSMENT: 0-10

## 2025-08-14 ASSESSMENT — PAIN DESCRIPTION - DESCRIPTORS: DESCRIPTORS: SHARP

## 2025-08-14 ASSESSMENT — PAIN DESCRIPTION - LOCATION: LOCATION: ABDOMEN

## 2025-08-14 ASSESSMENT — ACTIVITIES OF DAILY LIVING (ADL): LACK_OF_TRANSPORTATION: NO

## 2025-08-14 NOTE — CONSULTS
"NEPHROLOGY NEW CONSULT NOTE   Radha Torres   85 y.o.    @WT@  MRN/Room: [id number]    Reason for consult: Hyponatremia     HPI:  Radha Torres is a 85 y.o. female with a past medical hx of HFrEF (EF 25-30% on TTE 7/11/25), aortic stenosis, Afib, s/p AV emiliano ablation and leadless pacemaker 2025, HTN, CKD, renal cell carcinoma who presented on 8/9/2025 as a transfer from [facility] for epigastric pain. Nephrology consulted for worsening hyponatremia.     Patient initially presented to the ED for epigastric abdominal pain. Notes a one to two month history of abdominal pain however it was worsening over the last several days so she decided to go to the ER. She had one episodes of vomiting. She's being evaluated by the cardiology team for TAVR due to severe aortic stenosis. Patient is scheduled to undergo TAVR on 8/19/25. During her hospital course, she has developed worsening hyponatremia. Sodium on presentation 131, has been slowly downtrending daily, most recent sodium 117.     On speaking with patient today, she states that she's been having epigastric pain at home for the past 1-2 months. It worsens when she ambulates. She has been eating only ~1 - 2 cans of noodle soup at home for 2-3 weeks due to her epigastric pain. Usually, she eats a diet of sandwiches and fast food. Denies any diarrhea at home or in the hospital. One episode of emesis at home but has not had any more episodes while in the hospital. States that her legs are more swollen than usual today. She's also endorsing SOB. Denies any lightheadedness, dizziness, or confusion.       In The ER: BP (!) 156/94 (BP Location: Left arm, Patient Position: Lying) Comment: RN notified  Pulse 70   Temp 36.4 °C (97.5 °F) (Temporal)   Resp 16   Ht 1.65 m (5' 4.96\")   Wt 81.9 kg (180 lb 8.9 oz)   SpO2 95%   BMI 30.08 kg/m²      Medical History[1]   Surgical History[2]   Family History[3]  Social History     Socioeconomic History    Marital status: "      Spouse name: Not on file    Number of children: Not on file    Years of education: Not on file    Highest education level: Not on file   Occupational History    Not on file   Tobacco Use    Smoking status: Never    Smokeless tobacco: Never   Vaping Use    Vaping status: Never Used   Substance and Sexual Activity    Alcohol use: Never    Drug use: Never    Sexual activity: Not Currently     Partners: Male     Birth control/protection: None     Comment: Hysterectomy at 38 years of age.   Other Topics Concern    Not on file   Social History Narrative    Not on file     Social Drivers of Health     Financial Resource Strain: Low Risk  (8/14/2025)    Overall Financial Resource Strain (CARDIA)     Difficulty of Paying Living Expenses: Not very hard   Food Insecurity: No Food Insecurity (8/9/2025)    Hunger Vital Sign     Worried About Running Out of Food in the Last Year: Never true     Ran Out of Food in the Last Year: Never true   Transportation Needs: No Transportation Needs (8/14/2025)    PRAPARE - Transportation     Lack of Transportation (Medical): No     Lack of Transportation (Non-Medical): No   Physical Activity: Unknown (8/9/2025)    Exercise Vital Sign     Days of Exercise per Week: Patient declined     Minutes of Exercise per Session: 30 min   Stress: Not on file   Social Connections: Not on file   Intimate Partner Violence: Not At Risk (8/9/2025)    Humiliation, Afraid, Rape, and Kick questionnaire     Fear of Current or Ex-Partner: No     Emotionally Abused: No     Physically Abused: No     Sexually Abused: No   Housing Stability: Unknown (8/14/2025)    Housing Stability Vital Sign     Unable to Pay for Housing in the Last Year: No     Number of Times Moved in the Last Year: Not on file     Homeless in the Last Year: No       Allergies[4]     Prescriptions Prior to Admission[5]     Meds:   amLODIPine, 5 mg, Daily  apixaban, 5 mg, BID  ascorbic acid, 500 mg, Daily  atorvastatin, 20 mg,  Daily  carvedilol, 6.25 mg, BID  cholecalciferol, 25 mcg, Daily  ferrous sulfate, 1 tablet, Daily  gabapentin, 300 mg, Daily  levothyroxine, 75 mcg, Daily  nystatin, 1 Application, BID  pantoprazole, 40 mg, Daily before breakfast  PARoxetine, 20 mg, Daily  polyethylene glycol, 17 g, Daily  sodium chloride, 500 mL, Once         acetaminophen, 650 mg, q6h PRN  calcium carbonate, 500 mg of calcium carbonate, 4x daily PRN  oxyCODONE-acetaminophen, 1 tablet, q6h PRN  temazepam, 7.5 mg, Nightly PRN  trimethobenzamide, 200 mg, q6h PRN        Vitals:    08/14/25 1106   BP: (!) 156/94   Pulse: 70   Resp: 16   Temp: 36.4 °C (97.5 °F)   SpO2: 95%        08/12 1900 - 08/14 0659  In: 1581.7 [P.O.:840]  Out: 250 [Urine:250]   Weight change: 1.7 kg (3 lb 12 oz)     General appearance: AAOx3. No distress  Eyes: non-icteric  Skin: no apparent rash  Heart: regular rate and rhythm   Lungs: normal work of breathing, on 2L NC   Abdomen: soft, nt/nd  Extremities: 2+ pitting edema bilat  Neuro: No FND      ASSESSMENT:  Radha Torres is a  85 y.o.  Year old , with PMHx of HFrEF (EF 25-30% on TTE 7/11/25), aortic stenosis, Afib, s/p AV emiliano ablation and leadless pacemaker 2025, HTN, CKD, renal cell carcinoma who presented on 8/9/2025 as a transfer from [facility] for epigastric pain. Nephrology consulted for worsening hyponatremia.     #Hyponatremia   - Sodium 131 on 8/09 arrival -> 117 on 8/14   - EF 25-30% on TTE 7/11   - Last diuresis on 08/11 at 7AM. Takes furosemide 40 mg daily at home  - 500 mL bolus NS on 8/13 and 8/14   - Urine chem 8/11 (had received Lasix)- urine sodium 29, urine osm 250. FeNa 0.6%,    - Serum osm 8/13 - 263   - Fluid overloaded on physical exam   - True hypotonic hyponatremia.  Patient looks fluid overloaded on exam, chest x-ray on 8/14 showing pulmonary congestion, and patient has been off diuretics since 8/11. Given these findings, most likely differential is hypervolemic hyponatremia. Could also consider low  "effective osmolality (\"tea and toast diet\") contributing to patient's hyponatremia given her home diet for 2-3 weeks was minimal in protein and patient hasn't had a great appetite while inpatient. Urine osm of 250 also points towards low effective osmolality playing a part. Would expect a higher urine osm in a setting of  hypervolemic hyponatremia.     Recommendations:  - Recommend urea 15 g once   - Maintain euvolemic   - Encourage good diet w/ adequate protein intake   - Obtain urine lytes, urine osm, serum osm before diuretics if able   - strict Is/Os  - Renal dosing for medications for latest eGFR, follow medication trough as appropriate  - Avoid hypotension/rapid fluctuations in BPs    Damon Garg MD  Internal Medicine PGY-1   24 hour Renal Pager - [phone]    Staffed with fellow, to be staffed with attending in the morning.              [1]   Past Medical History:  Diagnosis Date    Abnormal ECG 2018    Arrhythmia 2018    afib    Atrial fibrillation (Multi) 2018    Cancer (Multi) 2018    Cardiomyopathy     CHF (congestive heart failure)     Chronic kidney disease     Hyperlipidemia 2018    Hypertension Years ago   [2]   Past Surgical History:  Procedure Laterality Date    ABLATION OF DYSRHYTHMIC FOCUS  05/06/2025    CARDIAC ELECTROPHYSIOLOGY PROCEDURE Right 5/6/2025    Procedure: Ablation AV Node;  Surgeon: Marija Ortiz MD;  Location: [facility];  Service: Electrophysiology;  Laterality: Right;    CARDIAC ELECTROPHYSIOLOGY PROCEDURE Right 5/6/2025    Procedure: PPM Leadless Implant;  Surgeon: Marija Ortiz MD;  Location: [facility];  Service: Electrophysiology;  Laterality: Right;  [place], [place]    INSERT / REPLACE / REMOVE PACEMAKER  05/06/2025    OTHER SURGICAL HISTORY  09/13/2021    Hysterectomy    OTHER SURGICAL HISTORY  09/13/2021    Cholecystectomy    OTHER SURGICAL HISTORY  09/13/2021    Complete colonoscopy    OTHER SURGICAL HISTORY  09/13/2021    Kidney surgery    OTHER SURGICAL " HISTORY  09/13/2021    Leg surgery   [3]   Family History  Problem Relation Name Age of Onset    Cancer Mother Radha Groves     Stroke Father Hung Groves     Hypertension Mother Radha Groves    [4] No Known Allergies  [5]   Medications Prior to Admission   Medication Sig Dispense Refill Last Dose/Taking    apixaban (Eliquis) 2.5 mg tablet Take 1 tablet (2.5 mg) by mouth 2 times a day. 180 tablet 1 Taking    furosemide (Lasix) 20 mg tablet Take 1 tablet (20 mg) by mouth once daily. 90 tablet 1 Taking    amLODIPine (Norvasc) 2.5 mg tablet Take 1 tablet (2.5 mg) by mouth once daily. 90 tablet 1     ascorbic acid (Vitamin C) 500 mg tablet Take 1 tablet (500 mg) by mouth once daily.       atorvastatin (Lipitor) 20 mg tablet Take 1 tablet (20 mg) by mouth once daily. 90 tablet 1     calcium citrate-vitamin D3 (Citracal+D) 315 mg-5 mcg (200 unit) tablet Take 1 tablet by mouth once daily.       carvedilol (Coreg) 6.25 mg tablet Take 1 tablet (6.25 mg) by mouth 2 times a day. 60 tablet 5     cholecalciferol (Vitamin D-3) 25 MCG (1000 UT) capsule Take 1 capsule (25 mcg) by mouth once daily.       esomeprazole (NexIUM) 20 mg DR capsule Take 1 capsule (20 mg) by mouth once daily.       ferrous sulfate 325 (65 Fe) MG tablet Take 1 tablet by mouth once daily.       gabapentin (Neurontin) 300 mg capsule Take 1 capsule (300 mg) by mouth once daily.       levothyroxine (Synthroid, Levoxyl) 75 mcg tablet Take 1 tablet (75 mcg) by mouth once daily.       oxyCODONE-acetaminophen (Percocet) 5-325 mg tablet Take 1 tablet by mouth 2 times a day as needed for severe pain (7 - 10).       PARoxetine (Paxil) 20 mg tablet Take 1 tablet (20 mg) by mouth once daily.       potassium chloride CR 10 mEq ER tablet Take 1 tablet (10 mEq) by mouth early in the morning.. 90 tablet 0     temazepam (Restoril) 7.5 mg capsule Take 1 capsule (7.5 mg) by mouth as needed at bedtime.

## 2025-08-14 NOTE — CARE PLAN
The patient's goals for the shift include      The clinical goals for the shift include Patient will remain HDS

## 2025-08-14 NOTE — PROGRESS NOTES
08/14/25 0905   Discharge Planning   Living Arrangements Spouse/significant other   Support Systems Spouse/significant other   Assistance Needed Needs assistance   Type of Residence Private residence   Number of Stairs to Enter Residence 1   Number of Stairs Within Residence 0   Do you have animals or pets at home? No   Who is requesting discharge planning? Provider   Home or Post Acute Services None   Expected Discharge Disposition Home   Does the patient need discharge transport arranged? No   Financial Resource Strain   How hard is it for you to pay for the very basics like food, housing, medical care, and heating? Not very   Housing Stability   In the last 12 months, was there a time when you were not able to pay the mortgage or rent on time? N   At any time in the past 12 months, were you homeless or living in a shelter (including now)? N   Transportation Needs   In the past 12 months, has lack of transportation kept you from medical appointments or from getting medications? no   In the past 12 months, has lack of transportation kept you from meetings, work, or from getting things needed for daily living? No   Patient Choice   Provider Choice list and CMS website (https://medicare.gov/care-compare#search) for post-acute Quality and Resource Measure Data were provided and reviewed with: Patient   Patient / Family choosing to utilize agency / facility established prior to hospitalization No   Stroke Family Assessment   Stroke Family Assessment Needed No   Intensity of Service   Intensity of Service >30 min     Transitional Care Coordination Progress Note:  Patient discussed during interdisciplinary rounds.   Team members present: RN TCC MD  Plan per Medical/Surgical team: YAW 8-19  Payor: [employer]ICARE   Discharge disposition: Home   Potential Barriers: None   ADOD: 8-      Previous Home Care: None   DME: None   Pharmacy: [facility]   Falls: None   PCP:  SHAR MOTT   Dialysis: None

## 2025-08-14 NOTE — PROGRESS NOTES
08/14/25 1341   Rapid Rounds   Attendance Provider;Nurse;Care Transitions;Pharmacist   Expected Discharge Disposition Home   Today we still await: Clinical stability   Review at Escalation Rounds Clinically complex

## 2025-08-14 NOTE — PROGRESS NOTES
"Radha Torres is a 85 y.o. female on day 5 of admission presenting with Chest pain.    Subjective   Overnight, night team was paged due to audible wheezing noted by nursing staff. She was vitally stable during this and oxygen saturation was >95% on 3L NC. This resolved without intervention. During admission, pt has been wheezing for brief periods that resolve without intervention.     This morning, patient still does not feel well. She has shortness of breath that is much worse when laying flat. PO intake has still been poor. Denies chest pain, palpitations or syncope. Nausea has improved, but pt is frustrated about being in hospital. She is not confused and denies new onset weakness. Epigastric pain and nausea are mildly improved while being managed with pantoprazole and Tums.           Objective     Physical Exam  Vitals reviewed.   Constitutional:       Comments: Appears uncomfortable.      Cardiovascular:      Rate and Rhythm: Normal rate and regular rhythm.      Heart sounds: Normal heart sounds.   Pulmonary:      Effort: Pulmonary effort is normal.      Comments: Crackles present on auscultation bilaterally.  Abdominal:      Palpations: Abdomen is soft.      Tenderness: There is no abdominal tenderness.     Musculoskeletal:      Right lower leg: Edema present.      Left lower leg: Edema present.     Skin:     Comments: Red, beefy rash present located under bilateral breasts.      Neurological:      General: No focal deficit present.      Mental Status: She is alert. Mental status is at baseline.     Psychiatric:         Behavior: Behavior normal.         Last Recorded Vitals  Blood pressure 153/90, pulse 91, temperature 36.4 °C (97.5 °F), temperature source Temporal, resp. rate (!) 148, height 1.65 m (5' 4.96\"), weight 81.9 kg (180 lb 8.9 oz), SpO2 97%.  Intake/Output last 3 Shifts:  I/O last 3 completed shifts:  In: 1581.7 (19.3 mL/kg) [P.O.:840; IV Piggyback:741.7]  Out: 250 (3.1 mL/kg) [Urine:250 (0.1 " mL/kg/hr)]  Weight: 81.9 kg     Relevant Results             Scheduled medications  Scheduled Medications[1]  Continuous medications  Continuous Medications[2]  PRN medications  PRN Medications[3]    Results for orders placed or performed during the hospital encounter of 08/09/25 (from the past 24 hours)   Basic metabolic panel   Result Value Ref Range    Glucose 190 (H) 74 - 99 mg/dL    Sodium 121 (L) 136 - 145 mmol/L    Potassium 4.2 3.5 - 5.3 mmol/L    Chloride 82 (L) 98 - 107 mmol/L    Bicarbonate 29 21 - 32 mmol/L    Anion Gap 14 10 - 20 mmol/L    Urea Nitrogen 14 6 - 23 mg/dL    Creatinine 1.05 0.50 - 1.05 mg/dL    eGFR 53 (L) >60 mL/min/1.73m*2    Calcium 9.7 8.6 - 10.6 mg/dL   CBC and Auto Differential   Result Value Ref Range    WBC 11.6 (H) 4.4 - 11.3 x10*3/uL    nRBC 0.0 0.0 - 0.0 /100 WBCs    RBC 3.81 (L) 4.00 - 5.20 x10*6/uL    Hemoglobin 10.9 (L) 12.0 - 16.0 g/dL    Hematocrit 34.0 (L) 36.0 - 46.0 %    MCV 89 80 - 100 fL    MCH 28.6 26.0 - 34.0 pg    MCHC 32.1 32.0 - 36.0 g/dL    RDW 14.2 11.5 - 14.5 %    Platelets 292 150 - 450 x10*3/uL    Neutrophils % 80.4 40.0 - 80.0 %    Immature Granulocytes %, Automated 0.6 0.0 - 0.9 %    Lymphocytes % 11.1 13.0 - 44.0 %    Monocytes % 6.8 2.0 - 10.0 %    Eosinophils % 0.8 0.0 - 6.0 %    Basophils % 0.3 0.0 - 2.0 %    Neutrophils Absolute 9.29 (H) 1.60 - 5.50 x10*3/uL    Immature Granulocytes Absolute, Automated 0.07 0.00 - 0.50 x10*3/uL    Lymphocytes Absolute 1.28 0.80 - 3.00 x10*3/uL    Monocytes Absolute 0.79 0.05 - 0.80 x10*3/uL    Eosinophils Absolute 0.09 0.00 - 0.40 x10*3/uL    Basophils Absolute 0.04 0.00 - 0.10 x10*3/uL   Magnesium   Result Value Ref Range    Magnesium 2.04 1.60 - 2.40 mg/dL   Renal Function Panel   Result Value Ref Range    Glucose 183 (H) 74 - 99 mg/dL    Sodium 117 (LL) 136 - 145 mmol/L    Potassium 4.8 3.5 - 5.3 mmol/L    Chloride 83 (L) 98 - 107 mmol/L    Bicarbonate 26 21 - 32 mmol/L    Anion Gap 13 10 - 20 mmol/L    Urea  Nitrogen 18 6 - 23 mg/dL    Creatinine 0.94 0.50 - 1.05 mg/dL    eGFR 60 (L) >60 mL/min/1.73m*2    Calcium 9.5 8.6 - 10.6 mg/dL    Phosphorus 2.8 2.5 - 4.9 mg/dL    Albumin 4.0 3.4 - 5.0 g/dL   Osmolality   Result Value Ref Range    Osmolality, Serum 255 (L) 280 - 300 mOsm/kg   Cortisol AM   Result Value Ref Range    Cortisol  A.M. 17.8 5.0 - 20.0 ug/dL     No results found.      Assessment & Plan  Chest pain    85 y.o. female with history significant for HFrEF (EF 25-30%), aortic stenosis, Afib, s/p AV node ablation and leadless pacemaker in 2025, HTN, renal cell carcinoma who presented on 8/9/25 as a transfer from [facility] for epigastric pain, nonradiating, not associated with eating. Hemodynamically stable, afebrile on arrival in no acute distress with some mild epigastric burning. Trop 23>27, lipase within normal limits. EKG with no ischemic changes. CTAP negative for inflammatory process or bowel obstruction. She is scheduled for upcoming TAVR on 8/19/25 with Dr. Howell. She has new onset of hyponatremia, in addition to feeling generally unwell. Sodium resulted severely low at 117; nephrology has been consulted for management of hyponatremia.    Update 8/14/25:  Hyponatremia worsened on AM labs; 117 this morning, down from 121 yesterday PM  Given 500 mL bolus of NS   Nephrology consulted, appreciate recs  Repeated urine Na, urine osm, and serum osm; pending  TSH, morning cortisol within normal limits   CXR concerning for pulmonary edema as cause of orthopnea; IV lasix 40 mg ordered  Nutrition consulted due to poor PO intake     #Epigastric pain  #HFrEF (EF 25-30%)  #Severe aortic stenosis  :: Echo 7/11/25 - EF 25-30%, left ventricle mildly dilated, mild concentric LV hypertrophy, leadless pacemaker in right atria and RV, aortic valve is heavily calcified with appearance of severe aortic stenosis (peak gradient 52mmHg, mean gradient 25mmHg, aortic valve area 0.8cm^2), trace AR, mild MR and TR, RVSP 50-60mmHg,    ::Scheduled for TAVR 8/19/25 with Dr. Howell  ::EKG with no ischemic changes  ::Troponin 23>27  ::Lipase, CTAP negative for intra abdominal process  Plan:  - Repeat EKG if any chest pain, repeat ordered 8/12; was nonacute   - CXR ordered 8/14 to assess for orthopnea, concerning for pulmonary edema. IV lasix 40 ordered   - Continue telemetry  - Holding home furosemide 40 mg daily    #Acute severe hyponatremia  :: Na 131 on admission; Na on AM labs 8/14 very low at 117  Plan:   - Sodium severely low at 120 on 8/13; repeat was 121 after 500 mL bolus LR  - Hyponatremia worsened on AM labs; 117 this morning, down from 121 yesterday PM  - Given 500 mL bolus of NS   - Nephrology consulted, appreciate recs  - Repeated urine Na, urine osm, and serum osm; pending  - TSH, morning cortisol within normal limits  - CXR concerning for pulmonary edema as cause of orthopnea; IV lasix 40 mg ordered, may help correct sodium is hypervolemic hyponatremia is etiology     #Epigastric pain  #Nausea/GI upset  :: EGG performed 2018 at [facility] notable for PUD  Plan:   - Tigan PRN ordered for nausea   - KUB with no acute findings; ECG unchanged   - Epigastric pain/nausea most likely due to severe GERD, indicated by relief from tums, worsening after large meals, and epigastric burning  - Continue pantoprazole 40 mg daily, tums PRN  - Continue home Percocet 5-325 mg Q6 PRN    #Atrial fibrillation  #S/p AV emiliano ablation and leadless pacemaker placement 5/6/25  #HTN  :: Home regimen - coreg 6.25 BID, amlodipine 2.5 mg, Eliquis 5mg BID  Plan:  - Amlodipine increased to 5mg on 8/10/25    #Candida Intertrigo  - Nystatin power BID     #CKD 3b  :: Baseline Cr appears to be around 1.2  ::Cr 1.22 in ED  Plan:  - Daily RFP  - Avoid nephrotoxic agents    #HLD  - Continue atorvastatin     #Hypothyroidism  - Continue levothyroxine 75 mcg    =======================================    Fluids: PRN  Electrolytes: PRN, keep K>4. Mg>2, PO4>3  Nutrition:  regular    GI ppx: pantoprazole 40mg  DVT ppx: apixaban  Bowel care: PEG qD    Abx: none  O2: 2 LPM    Code Status: full code  NOK: Stefanie Roblero, [phone]    Patient seen and discussed with attending.        Debby Hudson MD  Internal Medicine PGY-1          [1] amLODIPine, 5 mg, oral, Daily  apixaban, 5 mg, oral, BID  ascorbic acid, 500 mg, oral, Daily  atorvastatin, 20 mg, oral, Daily  carvedilol, 6.25 mg, oral, BID  cholecalciferol, 25 mcg, oral, Daily  ferrous sulfate, 1 tablet, oral, Daily  furosemide, 40 mg, intravenous, Once  gabapentin, 300 mg, oral, Daily  levothyroxine, 75 mcg, oral, Daily  nystatin, 1 Application, Topical, BID  pantoprazole, 40 mg, oral, Daily before breakfast  PARoxetine, 20 mg, oral, Daily  polyethylene glycol, 17 g, oral, Daily     [2]    [3] PRN medications: acetaminophen, calcium carbonate, oxyCODONE-acetaminophen, temazepam, trimethobenzamide

## 2025-08-14 NOTE — CARE PLAN
Problem: Pain - Adult  Goal: Verbalizes/displays adequate comfort level or baseline comfort level  Outcome: Progressing     Problem: Safety - Adult  Goal: Free from fall injury  Outcome: Progressing     Problem: Discharge Planning  Goal: Discharge to home or other facility with appropriate resources  Outcome: Progressing     Problem: Chronic Conditions and Co-morbidities  Goal: Patient's chronic conditions and co-morbidity symptoms are monitored and maintained or improved  Outcome: Progressing     Problem: Nutrition  Goal: Nutrient intake appropriate for maintaining nutritional needs  Outcome: Progressing     Problem: Skin  Goal: Decreased wound size/increased tissue granulation at next dressing change  Outcome: Progressing  Goal: Participates in plan/prevention/treatment measures  Outcome: Progressing  Goal: Prevent/manage excess moisture  Outcome: Progressing  Goal: Prevent/minimize sheer/friction injuries  Outcome: Progressing  Goal: Promote/optimize nutrition  Outcome: Progressing  Goal: Promote skin healing  Outcome: Progressing   The patient's goals for the shift include      The clinical goals for the shift include Patient will remain HDS

## 2025-08-14 NOTE — CARE PLAN
The clinical goals for the shift include Pt VS will remain WNL    Problem: Pain - Adult  Goal: Verbalizes/displays adequate comfort level or baseline comfort level  Outcome: Progressing     Problem: Safety - Adult  Goal: Free from fall injury  Outcome: Progressing     Problem: Discharge Planning  Goal: Discharge to home or other facility with appropriate resources  Outcome: Progressing     Problem: Chronic Conditions and Co-morbidities  Goal: Patient's chronic conditions and co-morbidity symptoms are monitored and maintained or improved  Outcome: Progressing     Problem: Nutrition  Goal: Nutrient intake appropriate for maintaining nutritional needs  Outcome: Progressing     Problem: Skin  Goal: Decreased wound size/increased tissue granulation at next dressing change  Outcome: Progressing  Goal: Participates in plan/prevention/treatment measures  Outcome: Progressing  Goal: Prevent/manage excess moisture  Outcome: Progressing  Goal: Prevent/minimize sheer/friction injuries  Outcome: Progressing  Goal: Promote/optimize nutrition  Outcome: Progressing  Goal: Promote skin healing  Outcome: Progressing

## 2025-08-15 LAB
ALBUMIN SERPL BCP-MCNC: 3.5 G/DL (ref 3.4–5)
ANION GAP SERPL CALC-SCNC: 13 MMOL/L
ANION GAP SERPL CALC-SCNC: 13 MMOL/L (ref 10–20)
ANION GAP SERPL CALC-SCNC: 13 MMOL/L (ref 10–20)
BASOPHILS # BLD AUTO: 0.01 X10*3/UL (ref 0–0.1)
BASOPHILS NFR BLD AUTO: 0.1 %
BUN SERPL-MCNC: 27 MG/DL (ref 6–23)
BUN SERPL-MCNC: 27 MG/DL (ref 6–23)
BUN SERPL-MCNC: 29 MG/DL (ref 6–23)
CALCIUM SERPL-MCNC: 8.7 MG/DL (ref 8.6–10.6)
CALCIUM SERPL-MCNC: 8.8 MG/DL (ref 8.6–10.6)
CALCIUM SERPL-MCNC: 9.7 MG/DL (ref 8.6–10.6)
CHLORIDE SERPL-SCNC: 76 MMOL/L (ref 98–107)
CHLORIDE SERPL-SCNC: 77 MMOL/L (ref 98–107)
CHLORIDE SERPL-SCNC: 79 MMOL/L (ref 98–107)
CO2 SERPL-SCNC: 30 MMOL/L (ref 21–32)
CREAT SERPL-MCNC: 1.1 MG/DL (ref 0.5–1.05)
CREAT SERPL-MCNC: 1.12 MG/DL (ref 0.5–1.05)
CREAT SERPL-MCNC: 1.2 MG/DL (ref 0.5–1.05)
EGFRCR SERPLBLD CKD-EPI 2021: 45 ML/MIN/1.73M*2
EGFRCR SERPLBLD CKD-EPI 2021: 49 ML/MIN/1.73M*2
EGFRCR SERPLBLD CKD-EPI 2021: 50 ML/MIN/1.73M*2
EOSINOPHIL # BLD AUTO: 0 X10*3/UL (ref 0–0.4)
EOSINOPHIL NFR BLD AUTO: 0 %
ERYTHROCYTE [DISTWIDTH] IN BLOOD BY AUTOMATED COUNT: 13.6 % (ref 11.5–14.5)
GLUCOSE BLD MANUAL STRIP-MCNC: 241 MG/DL (ref 74–99)
GLUCOSE SERPL-MCNC: 193 MG/DL (ref 74–99)
GLUCOSE SERPL-MCNC: 212 MG/DL (ref 74–99)
GLUCOSE SERPL-MCNC: 217 MG/DL (ref 74–99)
HCT VFR BLD AUTO: 30.3 % (ref 36–46)
HGB BLD-MCNC: 9.7 G/DL (ref 12–16)
IMM GRANULOCYTES # BLD AUTO: 0.09 X10*3/UL (ref 0–0.5)
IMM GRANULOCYTES NFR BLD AUTO: 0.6 % (ref 0–0.9)
LYMPHOCYTES # BLD AUTO: 0.72 X10*3/UL (ref 0.8–3)
LYMPHOCYTES NFR BLD AUTO: 5 %
MAGNESIUM SERPL-MCNC: 1.59 MG/DL (ref 1.6–2.4)
MCH RBC QN AUTO: 27.8 PG (ref 26–34)
MCHC RBC AUTO-ENTMCNC: 32 G/DL (ref 32–36)
MCV RBC AUTO: 87 FL (ref 80–100)
MONOCYTES # BLD AUTO: 0.62 X10*3/UL (ref 0.05–0.8)
MONOCYTES NFR BLD AUTO: 4.3 %
NEUTROPHILS # BLD AUTO: 12.86 X10*3/UL (ref 1.6–5.5)
NEUTROPHILS NFR BLD AUTO: 90 %
NRBC BLD-RTO: 0 /100 WBCS (ref 0–0)
PHOSPHATE SERPL-MCNC: 3.1 MG/DL (ref 2.5–4.9)
PLATELET # BLD AUTO: 256 X10*3/UL (ref 150–450)
POTASSIUM SERPL-SCNC: 3.5 MMOL/L (ref 3.5–5.3)
POTASSIUM SERPL-SCNC: 4 MMOL/L (ref 3.5–5.3)
POTASSIUM SERPL-SCNC: 4.1 MMOL/L (ref 3.5–5.3)
RBC # BLD AUTO: 3.49 X10*6/UL (ref 4–5.2)
SODIUM SERPL-SCNC: 115 MMOL/L (ref 136–145)
SODIUM SERPL-SCNC: 116 MMOL/L (ref 136–145)
SODIUM SERPL-SCNC: 118 MMOL/L (ref 136–145)
WBC # BLD AUTO: 14.3 X10*3/UL (ref 4.4–11.3)

## 2025-08-15 PROCEDURE — 36415 COLL VENOUS BLD VENIPUNCTURE: CPT

## 2025-08-15 PROCEDURE — 80069 RENAL FUNCTION PANEL: CPT

## 2025-08-15 PROCEDURE — 82565 ASSAY OF CREATININE: CPT

## 2025-08-15 PROCEDURE — 83735 ASSAY OF MAGNESIUM: CPT

## 2025-08-15 PROCEDURE — 2500000002 HC RX 250 W HCPCS SELF ADMINISTERED DRUGS (ALT 637 FOR MEDICARE OP, ALT 636 FOR OP/ED)

## 2025-08-15 PROCEDURE — 83036 HEMOGLOBIN GLYCOSYLATED A1C: CPT

## 2025-08-15 PROCEDURE — 2500000004 HC RX 250 GENERAL PHARMACY W/ HCPCS (ALT 636 FOR OP/ED)

## 2025-08-15 PROCEDURE — 2500000001 HC RX 250 WO HCPCS SELF ADMINISTERED DRUGS (ALT 637 FOR MEDICARE OP)

## 2025-08-15 PROCEDURE — 80048 BASIC METABOLIC PNL TOTAL CA: CPT | Mod: CCI

## 2025-08-15 PROCEDURE — 99233 SBSQ HOSP IP/OBS HIGH 50: CPT

## 2025-08-15 PROCEDURE — 85025 COMPLETE CBC W/AUTO DIFF WBC: CPT

## 2025-08-15 PROCEDURE — 82947 ASSAY GLUCOSE BLOOD QUANT: CPT

## 2025-08-15 PROCEDURE — 99222 1ST HOSP IP/OBS MODERATE 55: CPT

## 2025-08-15 PROCEDURE — 1200000002 HC GENERAL ROOM WITH TELEMETRY DAILY

## 2025-08-15 RX ORDER — OLANZAPINE 5 MG/1
5 TABLET, FILM COATED ORAL NIGHTLY PRN
Status: DISCONTINUED | OUTPATIENT
Start: 2025-08-15 | End: 2025-08-18

## 2025-08-15 RX ORDER — OLANZAPINE 5 MG/1
2.5 TABLET, FILM COATED ORAL ONCE
Status: DISCONTINUED | OUTPATIENT
Start: 2025-08-15 | End: 2025-08-16

## 2025-08-15 RX ORDER — POTASSIUM CHLORIDE 20 MEQ/1
20 TABLET, EXTENDED RELEASE ORAL ONCE
Status: COMPLETED | OUTPATIENT
Start: 2025-08-15 | End: 2025-08-15

## 2025-08-15 RX ORDER — MAGNESIUM SULFATE HEPTAHYDRATE 40 MG/ML
2 INJECTION, SOLUTION INTRAVENOUS ONCE
Status: COMPLETED | OUTPATIENT
Start: 2025-08-15 | End: 2025-08-15

## 2025-08-15 RX ORDER — TOLVAPTAN 15 MG/1
15 TABLET ORAL ONCE
Status: DISCONTINUED | OUTPATIENT
Start: 2025-08-15 | End: 2025-08-15

## 2025-08-15 RX ORDER — TOLVAPTAN 15 MG/1
15 TABLET ORAL ONCE
Status: COMPLETED | OUTPATIENT
Start: 2025-08-15 | End: 2025-08-15

## 2025-08-15 RX ORDER — OLANZAPINE 5 MG/1
5 TABLET, FILM COATED ORAL NIGHTLY
Status: DISCONTINUED | OUTPATIENT
Start: 2025-08-15 | End: 2025-08-15

## 2025-08-15 RX ADMIN — OXYCODONE HYDROCHLORIDE AND ACETAMINOPHEN 500 MG: 500 TABLET ORAL at 08:57

## 2025-08-15 RX ADMIN — Medication: at 19:54

## 2025-08-15 RX ADMIN — TEMAZEPAM 7.5 MG: 7.5 CAPSULE ORAL at 20:12

## 2025-08-15 RX ADMIN — MAGNESIUM SULFATE HEPTAHYDRATE 2 G: 40 INJECTION, SOLUTION INTRAVENOUS at 10:46

## 2025-08-15 RX ADMIN — APIXABAN 5 MG: 5 TABLET, FILM COATED ORAL at 20:02

## 2025-08-15 RX ADMIN — CALCIUM CARBONATE (ANTACID) CHEW TAB 500 MG 1 TABLET: 500 CHEW TAB at 17:29

## 2025-08-15 RX ADMIN — NYSTATIN 1 APPLICATION: 100000 POWDER TOPICAL at 09:01

## 2025-08-15 RX ADMIN — GABAPENTIN 300 MG: 300 CAPSULE ORAL at 08:57

## 2025-08-15 RX ADMIN — OXYCODONE AND ACETAMINOPHEN 1 TABLET: 5; 325 TABLET ORAL at 11:25

## 2025-08-15 RX ADMIN — CARVEDILOL 6.25 MG: 6.25 TABLET, FILM COATED ORAL at 20:02

## 2025-08-15 RX ADMIN — POTASSIUM CHLORIDE 20 MEQ: 1500 TABLET, EXTENDED RELEASE ORAL at 11:25

## 2025-08-15 RX ADMIN — APIXABAN 5 MG: 5 TABLET, FILM COATED ORAL at 08:57

## 2025-08-15 RX ADMIN — TRIMETHOBENZAMIDE HYDROCHLORIDE 200 MG: 100 INJECTION INTRAMUSCULAR at 19:44

## 2025-08-15 RX ADMIN — OXYCODONE AND ACETAMINOPHEN 1 TABLET: 5; 325 TABLET ORAL at 17:31

## 2025-08-15 RX ADMIN — PANTOPRAZOLE SODIUM 40 MG: 40 TABLET, DELAYED RELEASE ORAL at 08:56

## 2025-08-15 RX ADMIN — FERROUS SULFATE TAB 325 MG (65 MG ELEMENTAL FE) 1 TABLET: 325 (65 FE) TAB at 08:57

## 2025-08-15 RX ADMIN — AMLODIPINE BESYLATE 5 MG: 5 TABLET ORAL at 08:57

## 2025-08-15 RX ADMIN — Medication 25 MCG: at 08:57

## 2025-08-15 RX ADMIN — ALBUTEROL SULFATE 2.5 MG: 2.5 SOLUTION RESPIRATORY (INHALATION) at 19:56

## 2025-08-15 RX ADMIN — TOLVAPTAN 15 MG: 15 TABLET ORAL at 14:43

## 2025-08-15 RX ADMIN — Medication: at 10:00

## 2025-08-15 RX ADMIN — ATORVASTATIN CALCIUM 20 MG: 20 TABLET, FILM COATED ORAL at 08:57

## 2025-08-15 RX ADMIN — PAROXETINE 20 MG: 20 TABLET, FILM COATED ORAL at 08:57

## 2025-08-15 RX ADMIN — CARVEDILOL 6.25 MG: 6.25 TABLET, FILM COATED ORAL at 08:57

## 2025-08-15 RX ADMIN — LEVOTHYROXINE SODIUM 75 MCG: 0.07 TABLET ORAL at 09:09

## 2025-08-15 RX ADMIN — NYSTATIN 1 APPLICATION: 100000 POWDER TOPICAL at 20:12

## 2025-08-15 ASSESSMENT — PAIN DESCRIPTION - DESCRIPTORS
DESCRIPTORS: SORE;SHARP
DESCRIPTORS: BURNING;CRAMPING;SHARP

## 2025-08-15 ASSESSMENT — PAIN DESCRIPTION - ORIENTATION
ORIENTATION: MID
ORIENTATION: MID

## 2025-08-15 ASSESSMENT — PAIN SCALES - GENERAL
PAINLEVEL_OUTOF10: 0 - NO PAIN
PAINLEVEL_OUTOF10: 10 - WORST POSSIBLE PAIN
PAINLEVEL_OUTOF10: 7
PAINLEVEL_OUTOF10: 6
PAINLEVEL_OUTOF10: 0 - NO PAIN
PAINLEVEL_OUTOF10: 5 - MODERATE PAIN

## 2025-08-15 ASSESSMENT — COGNITIVE AND FUNCTIONAL STATUS - GENERAL
TURNING FROM BACK TO SIDE WHILE IN FLAT BAD: A LITTLE
CLIMB 3 TO 5 STEPS WITH RAILING: TOTAL
STANDING UP FROM CHAIR USING ARMS: A LOT
DAILY ACTIVITIY SCORE: 14
MOBILITY SCORE: 13
DRESSING REGULAR LOWER BODY CLOTHING: A LOT
PERSONAL GROOMING: A LITTLE
MOVING FROM LYING ON BACK TO SITTING ON SIDE OF FLAT BED WITH BEDRAILS: A LITTLE
TOILETING: A LOT
HELP NEEDED FOR BATHING: A LOT
DRESSING REGULAR UPPER BODY CLOTHING: A LOT
EATING MEALS: A LITTLE
MOVING TO AND FROM BED TO CHAIR: A LOT
WALKING IN HOSPITAL ROOM: A LOT

## 2025-08-15 ASSESSMENT — PAIN DESCRIPTION - LOCATION
LOCATION: ABDOMEN
LOCATION: ABDOMEN

## 2025-08-15 ASSESSMENT — PAIN - FUNCTIONAL ASSESSMENT
PAIN_FUNCTIONAL_ASSESSMENT: 0-10

## 2025-08-15 NOTE — SIGNIFICANT EVENT
Ms Torres was seen at bedside at around 10pm after she complained of increased difficulty in breathing. Her BP was normal however she had tachypnea with RR of 20 and she had SPO2 of 93% at 5 L via NC as compared to 2-3 L which is her baseline. She said she had this increased shortness of breath for around 20 mins. We ordered a stat CXR. Her CXR showed increased pleural effusion, with worsening atlectasis. She was given 60 mg of IV lasix, as she was severely volume overloaded. We kept her on continuous pulse oximetry and asked nursing staff to do strict I/Os through external catheter. Her ABGs at 11 PM showed pH: 7.30, pCO2: 49, SO2: 95%, lactate: 3.3. Her SOB improved, her vitals at 1 AM were, BP: 148/86, Pulse: 86, and SPO2 of 95% on 3 L of O2 and patient was calm, and she managed to get some sleep. Her output was approx 500ml. Her RFP will be repeated at 5 AM to evaluate for any electrolyte derangements.

## 2025-08-15 NOTE — PROGRESS NOTES
Radha Torres   85 [address]    @@  N/Room: [id number]/5070/5070-A    Subjective: No acute issues this AM. Feels okay.     Objective:     Meds:   amLODIPine, 5 mg, Daily  apixaban, 5 mg, BID  ascorbic acid, 500 mg, Daily  atorvastatin, 20 mg, Daily  carvedilol, 6.25 mg, BID  cholecalciferol, 25 mcg, Daily  ferrous sulfate, 1 tablet, Daily  gabapentin, 300 mg, Daily  levothyroxine, 75 mcg, Daily  nystatin, 1 Application, BID  OLANZapine, 2.5 mg, Once  OLANZapine, 5 mg, Nightly  pantoprazole, 40 mg, Daily before breakfast  PARoxetine, 20 mg, Daily  polyethylene glycol, 17 g, Daily         acetaminophen, 650 mg, q6h PRN  albuterol, 2.5 mg, q8h PRN  calcium carbonate, 500 mg of calcium carbonate, 4x daily PRN  metoclopramide, 10 mg, q6h PRN  oxyCODONE-acetaminophen, 1 tablet, q6h PRN  oxygen, 1 Dose, Continuous - O2/gases  temazepam, 7.5 mg, Nightly PRN  trimethobenzamide, 200 mg, q6h PRN        Vitals:    08/15/25 0900   BP: (!) 147/92   Pulse:    Resp:    Temp:    SpO2:           Intake/Output Summary (Last 24 hours) at 8/15/2025 0910  Last data filed at 8/15/2025 0900  Gross per 24 hour   Intake 955 ml   Output 1200 ml   Net -245 ml       General appearance: no distress  Eyes: non-icteric  Skin: no apparent rash  Lungs: normal work of breathing, on NC   Extremities: 2+ pitting edema bilat  Neuro: No FND      Blood Labs:  Results for orders placed or performed during the hospital encounter of 08/09/25 (from the past 24 hours)   Renal function panel   Result Value Ref Range    Glucose 209 (H) 74 - 99 mg/dL    Sodium 117 (LL) 136 - 145 mmol/L    Potassium 4.4 3.5 - 5.3 mmol/L    Chloride 80 (L) 98 - 107 mmol/L    Bicarbonate 28 21 - 32 mmol/L    Anion Gap 13 10 - 20 mmol/L    Urea Nitrogen 17 6 - 23 mg/dL    Creatinine 1.02 0.50 - 1.05 mg/dL    eGFR 55 (L) >60 mL/min/1.73m*2    Calcium 9.2 8.6 - 10.6 mg/dL    Phosphorus 3.2 2.5 - 4.9 mg/dL    Albumin 4.1 3.4 - 5.0 g/dL   Urine electrolytes   Result Value Ref Range     Sodium, Urine Random 70 mmol/L    Sodium/Creatinine Ratio 490 Not established. mmol/g Creat    Potassium, Urine Random 19 mmol/L    Potassium/Creatinine Ratio 133 Not established mmol/g Creat    Chloride, Urine Random 100 mmol/L    Chloride/Creatinine Ratio 699 (H) 38 - 318 mmol/g creat    Creatinine, Urine Random 14.3 (L) 20.0 - 320.0 mg/dL   Osmolality, urine   Result Value Ref Range    Osmolality, Urine Random 248 200 - 1,200 mOsm/kg   Blood Gas Arterial Full Panel   Result Value Ref Range    POCT pH, Arterial 7.30 (L) 7.38 - 7.42 pH    POCT pCO2, Arterial 49 (H) 38 - 42 mm Hg    POCT pO2, Arterial 75 (L) 85 - 95 mm Hg    POCT SO2, Arterial 95 94 - 100 %    POCT Oxy Hemoglobin, Arterial 92.6 (L) 94.0 - 98.0 %    POCT Hematocrit Calculated, Arterial 36.0 36.0 - 46.0 %    POCT Sodium, Arterial 113 (LL) 136 - 145 mmol/L    POCT Potassium, Arterial 4.8 3.5 - 5.3 mmol/L    POCT Chloride, Arterial 79 (L) 98 - 107 mmol/L    POCT Ionized Calcium, Arterial 1.18 1.10 - 1.33 mmol/L    POCT Glucose, Arterial 305 (H) 74 - 99 mg/dL    POCT Lactate, Arterial 3.3 (H) 0.4 - 2.0 mmol/L    POCT Base Excess, Arterial -2.6 (L) -2.0 - 3.0 mmol/L    POCT HCO3 Calculated, Arterial 24.1 22.0 - 26.0 mmol/L    POCT Hemoglobin, Arterial 11.9 (L) 12.0 - 16.0 g/dL    POCT Anion Gap, Arterial 15 10 - 25 mmo/L    Patient Temperature 37.0 degrees Celsius    FiO2 40 %   POCT GLUCOSE   Result Value Ref Range    POCT Glucose 241 (H) 74 - 99 mg/dL   CBC and Auto Differential   Result Value Ref Range    WBC 14.3 (H) 4.4 - 11.3 x10*3/uL    nRBC 0.0 0.0 - 0.0 /100 WBCs    RBC 3.49 (L) 4.00 - 5.20 x10*6/uL    Hemoglobin 9.7 (L) 12.0 - 16.0 g/dL    Hematocrit 30.3 (L) 36.0 - 46.0 %    MCV 87 80 - 100 fL    MCH 27.8 26.0 - 34.0 pg    MCHC 32.0 32.0 - 36.0 g/dL    RDW 13.6 11.5 - 14.5 %    Platelets 256 150 - 450 x10*3/uL    Neutrophils % 90.0 40.0 - 80.0 %    Immature Granulocytes %, Automated 0.6 0.0 - 0.9 %    Lymphocytes % 5.0 13.0 - 44.0 %     Monocytes % 4.3 2.0 - 10.0 %    Eosinophils % 0.0 0.0 - 6.0 %    Basophils % 0.1 0.0 - 2.0 %    Neutrophils Absolute 12.86 (H) 1.60 - 5.50 x10*3/uL    Immature Granulocytes Absolute, Automated 0.09 0.00 - 0.50 x10*3/uL    Lymphocytes Absolute 0.72 (L) 0.80 - 3.00 x10*3/uL    Monocytes Absolute 0.62 0.05 - 0.80 x10*3/uL    Eosinophils Absolute 0.00 0.00 - 0.40 x10*3/uL    Basophils Absolute 0.01 0.00 - 0.10 x10*3/uL   Magnesium   Result Value Ref Range    Magnesium 1.59 (L) 1.60 - 2.40 mg/dL   Renal Function Panel   Result Value Ref Range    Glucose 193 (H) 74 - 99 mg/dL    Sodium 118 (LL) 136 - 145 mmol/L    Potassium 3.5 3.5 - 5.3 mmol/L    Chloride 79 (L) 98 - 107 mmol/L    Bicarbonate 30 21 - 32 mmol/L    Anion Gap 13 10 - 20 mmol/L    Urea Nitrogen 29 (H) 6 - 23 mg/dL    Creatinine 1.12 (H) 0.50 - 1.05 mg/dL    eGFR 49 (L) >60 mL/min/1.73m*2    Calcium 8.7 8.6 - 10.6 mg/dL    Phosphorus 3.1 2.5 - 4.9 mg/dL    Albumin 3.5 3.4 - 5.0 g/dL              ASSESSMENT:  Radha Torres is a  85 y.o.  Year old , with PMHx of HFrEF (EF 25-30% on TTE 7/11/25), aortic stenosis, Afib, s/p AV emiliano ablation and leadless pacemaker 2025, HTN, CKD, renal cell carcinoma who presented on 8/9/2025 as a transfer from [facility] for epigastric pain. Nephrology consulted for worsening hyponatremia.      #Hyponatremia   - Sodium 131 on 8/09 arrival -> 117 (8/14) -> 118 (8/15)    - EF 25-30% on TTE 7/11   - Takes furosemide 40 mg daily at home  - Urine chem 8/11 (had received Lasix)- urine sodium 29, urine osm 250. FeNa 0.6%,    - Serum osm 8/13 - 263   - Fluid overloaded on physical exam   - Repeat urine chem 8/14 (2 hrs after Lasix dose) - urine sodium 70, urine osm 248   - Received 40 mg Lasix and another 60 mg Lasix on 8/14 with only 700 ml urine output   - True hypotonic hyponatremia.  Patient looks fluid overloaded on exam, chest x-ray on 8/14 showing pulmonary congestion, and patient had been off diuretics since 8/11. Given these  "findings, most likely differential is hypervolemic hyponatremia. Could also consider low effective osmolality (\"tea and toast diet\") contributing to patient's hyponatremia given her home diet for 2-3 weeks was minimal in protein and patient hasn't had a great appetite while inpatient.      Recommendations:  - Give one time dose of tolvaptan 15 mg today. Draw BMP before administration and another BMP 8 hrs after tolvaptan dose. If serum sodium increases by >8 mmol/L then please notify nephrology team.   - No fluid restriction, can eat and drink as able   - Maintain euvolemic   - Encourage good diet w/ adequate protein intake   - strict Is/Os     Damon Garg MD  Internal Medicine PGY-1   Daytime / Weekend Renal Pager [phone]  After 7 pm Emergencies [phone] Pager [phone]     "

## 2025-08-15 NOTE — PROGRESS NOTES
Social Work Note:     08/15/25 8114   Rapid Rounds   Attendance Provider;Care Transitions;Nurse   Expected Discharge Disposition Home   Today we still await: Clinical stability   Review at Escalation Rounds No escalation needed     LISW spoke with the patient's medical team on this date.  Patient is still being worked up.  ADOD unknown at this time.  Patient should return home at discharge.  LISW will continue to follow  TR Whittington, ALEJANDRA-S

## 2025-08-15 NOTE — CARE PLAN
Problem: Skin  Goal: Decreased wound size/increased tissue granulation at next dressing change  8/15/2025 1711 by Isela Mendez RN  Outcome: Progressing  Flowsheets (Taken 8/15/2025 1711)  Decreased wound size/increased tissue granulation at next dressing change:   Promote sleep for wound healing   Protective dressings over bony prominences  8/15/2025 1710 by Isela Mendez RN  Outcome: Progressing  Goal: Participates in plan/prevention/treatment measures  8/15/2025 1711 by Isela Mendez RN  Outcome: Progressing  Flowsheets (Taken 8/15/2025 1711)  Participates in plan/prevention/treatment measures:   Elevate heels   Increase activity/out of bed for meals  8/15/2025 1710 by Isela Mendez RN  Outcome: Progressing  Goal: Prevent/manage excess moisture  8/15/2025 1711 by Isela Mendez RN  Outcome: Progressing  Flowsheets (Taken 8/15/2025 1711)  Prevent/manage excess moisture:   Cleanse incontinence/protect with barrier cream   Moisturize dry skin   Monitor for/manage infection if present  8/15/2025 1710 by Isela Mendez RN  Outcome: Progressing  Goal: Prevent/minimize sheer/friction injuries  8/15/2025 1711 by Isela Mendez RN  Outcome: Progressing  Flowsheets (Taken 8/15/2025 1711)  Prevent/minimize sheer/friction injuries:   HOB 30 degrees or less   Turn/reposition every 2 hours/use positioning/transfer devices   Use pull sheet  8/15/2025 1710 by Isela Mendez RN  Outcome: Progressing  Goal: Promote/optimize nutrition  8/15/2025 1711 by Isela Mendez RN  Outcome: Progressing  Flowsheets (Taken 8/15/2025 1711)  Promote/optimize nutrition:   Assist with feeding   Consume > 50% meals/supplements  8/15/2025 1710 by Isela Mendez RN  Outcome: Progressing  Goal: Promote skin healing  8/15/2025 1711 by Isela Mendez RN  Outcome: Progressing  Flowsheets (Taken 8/15/2025 1711)  Promote skin healing:   Turn/reposition every 2 hours/use positioning/transfer devices   Assess skin/pad  under line(s)/device(s)  8/15/2025 1710 by Isela Mendez, RN  Outcome: Progressing

## 2025-08-15 NOTE — PROGRESS NOTES
Radha Torres is a 85 y.o. female on day 6 of admission presenting with Chest pain.    Subjective   Ms Torres was seen at bedside at around 10pm after she complained of increased difficulty in breathing. Her BP was normal however she had tachypnea with RR of 20 and she had SPO2 of 93% at 5 L via NC as compared to 2-3 L which is her baseline. She said she had this increased shortness of breath for around 20 mins. We ordered a stat CXR. Her CXR showed increased pleural effusion, with worsening atlectasis. She was given 60 mg of IV lasix, as she was severely volume overloaded. We kept her on continuous pulse oximetry and asked nursing staff to do strict I/Os through external catheter. Her ABGs at 11 PM showed pH: 7.30, pCO2: 49, SO2: 95%, lactate: 3.3. Her SOB improved, her vitals at 1 AM were, BP: 148/86, Pulse: 86, and SPO2 of 95% on 3 L of O2 and patient was calm, and she managed to get some sleep. Her output was approx 500ml.     Patient agitated this morning (pulling lines, getting out of bed frantically, kicking). Patient Aox1. Patient redirected, took about 20 minutes. Patient now mentally better an Aox3.       Objective     Physical Exam  Vitals reviewed.   Constitutional:       Comments: Appears uncomfortable.      Cardiovascular:      Rate and Rhythm: Normal rate and regular rhythm.      Heart sounds: Normal heart sounds.   Pulmonary:      Effort: Pulmonary effort is normal.      Comments: Crackles present on auscultation bilaterally.  Abdominal:      Palpations: Abdomen is soft.      Tenderness: There is no abdominal tenderness.     Musculoskeletal:      Right lower leg: Edema present.      Left lower leg: Edema present.     Skin:     Comments: Red, beefy rash present located under bilateral breasts.      Neurological:      General: No focal deficit present.      Mental Status: She is alert. Mental status is at baseline.     Psychiatric:         Behavior: Behavior normal.         Last Recorded Vitals  Blood  "pressure 148/86, pulse 81, temperature 36 °C (96.8 °F), temperature source Temporal, resp. rate 19, height 1.65 m (5' 4.96\"), weight 85.7 kg (188 lb 15 oz), SpO2 95%.  Intake/Output last 3 Shifts:  I/O last 3 completed shifts:  In: 955 (11.1 mL/kg) [P.O.:480; IV Piggyback:475]  Out: 800 (9.3 mL/kg) [Urine:800 (0.3 mL/kg/hr)]  Weight: 85.7 kg     Relevant Results             Scheduled medications  Scheduled Medications[1]  Continuous medications  Continuous Medications[2]  PRN medications  PRN Medications[3]    Results for orders placed or performed during the hospital encounter of 08/09/25 (from the past 24 hours)   Renal function panel   Result Value Ref Range    Glucose 209 (H) 74 - 99 mg/dL    Sodium 117 (LL) 136 - 145 mmol/L    Potassium 4.4 3.5 - 5.3 mmol/L    Chloride 80 (L) 98 - 107 mmol/L    Bicarbonate 28 21 - 32 mmol/L    Anion Gap 13 10 - 20 mmol/L    Urea Nitrogen 17 6 - 23 mg/dL    Creatinine 1.02 0.50 - 1.05 mg/dL    eGFR 55 (L) >60 mL/min/1.73m*2    Calcium 9.2 8.6 - 10.6 mg/dL    Phosphorus 3.2 2.5 - 4.9 mg/dL    Albumin 4.1 3.4 - 5.0 g/dL   Urine electrolytes   Result Value Ref Range    Sodium, Urine Random 70 mmol/L    Sodium/Creatinine Ratio 490 Not established. mmol/g Creat    Potassium, Urine Random 19 mmol/L    Potassium/Creatinine Ratio 133 Not established mmol/g Creat    Chloride, Urine Random 100 mmol/L    Chloride/Creatinine Ratio 699 (H) 38 - 318 mmol/g creat    Creatinine, Urine Random 14.3 (L) 20.0 - 320.0 mg/dL   Osmolality, urine   Result Value Ref Range    Osmolality, Urine Random 248 200 - 1,200 mOsm/kg   Blood Gas Arterial Full Panel   Result Value Ref Range    POCT pH, Arterial 7.30 (L) 7.38 - 7.42 pH    POCT pCO2, Arterial 49 (H) 38 - 42 mm Hg    POCT pO2, Arterial 75 (L) 85 - 95 mm Hg    POCT SO2, Arterial 95 94 - 100 %    POCT Oxy Hemoglobin, Arterial 92.6 (L) 94.0 - 98.0 %    POCT Hematocrit Calculated, Arterial 36.0 36.0 - 46.0 %    POCT Sodium, Arterial 113 (LL) 136 - 145 " mmol/L    POCT Potassium, Arterial 4.8 3.5 - 5.3 mmol/L    POCT Chloride, Arterial 79 (L) 98 - 107 mmol/L    POCT Ionized Calcium, Arterial 1.18 1.10 - 1.33 mmol/L    POCT Glucose, Arterial 305 (H) 74 - 99 mg/dL    POCT Lactate, Arterial 3.3 (H) 0.4 - 2.0 mmol/L    POCT Base Excess, Arterial -2.6 (L) -2.0 - 3.0 mmol/L    POCT HCO3 Calculated, Arterial 24.1 22.0 - 26.0 mmol/L    POCT Hemoglobin, Arterial 11.9 (L) 12.0 - 16.0 g/dL    POCT Anion Gap, Arterial 15 10 - 25 mmo/L    Patient Temperature 37.0 degrees Celsius    FiO2 40 %   POCT GLUCOSE   Result Value Ref Range    POCT Glucose 241 (H) 74 - 99 mg/dL   CBC and Auto Differential   Result Value Ref Range    WBC 14.3 (H) 4.4 - 11.3 x10*3/uL    nRBC 0.0 0.0 - 0.0 /100 WBCs    RBC 3.49 (L) 4.00 - 5.20 x10*6/uL    Hemoglobin 9.7 (L) 12.0 - 16.0 g/dL    Hematocrit 30.3 (L) 36.0 - 46.0 %    MCV 87 80 - 100 fL    MCH 27.8 26.0 - 34.0 pg    MCHC 32.0 32.0 - 36.0 g/dL    RDW 13.6 11.5 - 14.5 %    Platelets 256 150 - 450 x10*3/uL    Neutrophils % 90.0 40.0 - 80.0 %    Immature Granulocytes %, Automated 0.6 0.0 - 0.9 %    Lymphocytes % 5.0 13.0 - 44.0 %    Monocytes % 4.3 2.0 - 10.0 %    Eosinophils % 0.0 0.0 - 6.0 %    Basophils % 0.1 0.0 - 2.0 %    Neutrophils Absolute 12.86 (H) 1.60 - 5.50 x10*3/uL    Immature Granulocytes Absolute, Automated 0.09 0.00 - 0.50 x10*3/uL    Lymphocytes Absolute 0.72 (L) 0.80 - 3.00 x10*3/uL    Monocytes Absolute 0.62 0.05 - 0.80 x10*3/uL    Eosinophils Absolute 0.00 0.00 - 0.40 x10*3/uL    Basophils Absolute 0.01 0.00 - 0.10 x10*3/uL   Magnesium   Result Value Ref Range    Magnesium 1.59 (L) 1.60 - 2.40 mg/dL   Renal Function Panel   Result Value Ref Range    Glucose 193 (H) 74 - 99 mg/dL    Sodium 118 (LL) 136 - 145 mmol/L    Potassium 3.5 3.5 - 5.3 mmol/L    Chloride 79 (L) 98 - 107 mmol/L    Bicarbonate 30 21 - 32 mmol/L    Anion Gap 13 10 - 20 mmol/L    Urea Nitrogen 29 (H) 6 - 23 mg/dL    Creatinine 1.12 (H) 0.50 - 1.05 mg/dL    eGFR  49 (L) >60 mL/min/1.73m*2    Calcium 8.7 8.6 - 10.6 mg/dL    Phosphorus 3.1 2.5 - 4.9 mg/dL    Albumin 3.5 3.4 - 5.0 g/dL     XR chest 1 view  Result Date: 8/15/2025  Interpreted By:  Suarez, Yonker,  and Derick Kimmy STUDY: XR CHEST 1 VIEW;  8/14/2025 11:09 pm   INDICATION: Signs/Symptoms:SOB.   COMPARISON: Chest radiograph 08/14/2025 and TAVR CT on 07/31/2025   ACCESSION NUMBER(S): [id number]   ORDERING CLINICIAN: ISHAAN LAURENT   FINDINGS: AP radiograph of the chest was provided.   CARDIOMEDIASTINAL SILHOUETTE: Cardiomediastinal silhouette is enlarged and stable in size and configuration.   LUNGS: Moderate left and small right pleural effusions. Diffuse hazy alveolar opacities throughout the right mid to lower lung, increased from prior. Prominent interstitial markings bilaterally. No pneumothorax.   ABDOMEN: No remarkable upper abdominal findings.   BONES: Stable chronic deformity of multiple right ribs.       1. Increased hazy alveolar opacities throughout the right mid to lower lung, which may reflect enlarging pleural effusion with worsening atelectasis versus developing infectious/inflammatory infiltrate. 2. Similar cardiomegaly with bilateral pleural effusions and diffuse interstitial pulmonary edema.   I personally reviewed the image(s)/study and resident interpretation as stated by Dr. Kimmy Sepulveda MD. I agree with the findings as stated. This study was interpreted at [facility], [place], [place].   MACRO: None   Signed by: Sarahy Suarez 8/15/2025 7:48 AM Dictation workstation:   [id number]    XR chest 1 view  Result Date: 8/14/2025  Interpreted By:  Sarahy Suarez and Moradiya Krunal STUDY: XR CHEST 1 VIEW;  8/14/2025 1:05 pm   INDICATION: Signs/Symptoms:SOB.   COMPARISON: Chest radiograph from 12/10/2025   ACCESSION NUMBER(S): [id number]   ORDERING CLINICIAN: ISHAAN LAURENT   FINDINGS: AP radiograph of the chest.   MEDICAL DEVICES: Leadless pacemaker  overlying the cardiac silhouette.   CARDIOMEDIASTINAL SILHOUETTE: The cardiomediastinal silhouette is stable in size and configuration with similar partial obscuration of left heart border. Mild aortic knob calcification.   LUNGS: Similar bilateral blunting of the costophrenic angles with associated bibasilar consolidation. Redemonstrated bilateral Kerley B-lines and diffuse interstitial opacities. No pneumothorax is seen.   ABDOMEN: No remarkable upper abdominal findings.   BONES: Redemonstrated multiple remote right posterolateral rib fractures.       1. Unchanged small left and moderate right pleural effusions with associated bibasilar consolidation/atelectasis, on a background of unchanged pulmonary edema. 2. Leadless pacemaker overlying the cardiac silhouette.   I personally reviewed the images/study and I agree with the findings as stated. This study was interpreted by radiology resident Pepe Urban D.O. at [facility], [place], [place].   Signed by: Sarahy Suarez 8/14/2025 5:03 PM Dictation workstation:   [id number]        Assessment & Plan  Chest pain    85 y.o. female with history significant for HFrEF (EF 25-30%), aortic stenosis, Afib, s/p AV node ablation and leadless pacemaker in 2025, HTN, renal cell carcinoma who presented on 8/9/25 as a transfer from [facility] for epigastric pain, nonradiating, not associated with eating. Hemodynamically stable, afebrile on arrival in no acute distress with some mild epigastric burning. Trop 23>27, lipase within normal limits. EKG with no ischemic changes. CTAP negative for inflammatory process or bowel obstruction. She is scheduled for upcoming TAVR on 8/19/25 with Dr. Howell. She has new onset of hyponatremia, in addition to feeling generally unwell. Sodium resulted severely low at 117; nephrology has been consulted for management of hyponatremia.    Update 8/15/25:  Give one time dose of tolvaptan 15 mg today. Draw BMP before  administration and another BMP 8 hrs after tolvaptan dose. If serum sodium increases by >8 mmol/L then please notify nephrology team.   Evening RFP at 8:35 pm  Stop Diuresis today ISO tolvaptan  Zyprexa nightly PRN ISO agitation c/f delirium vs hyponatremic encephalopathy   Vitamin D level collected per nutrition  Per nutrition: continue regular diet, Ensure max BID    #Epigastric pain  #HFrEF (EF 25-30%)  #Severe aortic stenosis  :: Echo 7/11/25 - EF 25-30%, left ventricle mildly dilated, mild concentric LV hypertrophy, leadless pacemaker in right atria and RV, aortic valve is heavily calcified with appearance of severe aortic stenosis (peak gradient 52mmHg, mean gradient 25mmHg, aortic valve area 0.8cm^2), trace AR, mild MR and TR, RVSP 50-60mmHg,   ::Scheduled for TAVR 8/19/25 with Dr. Howell  ::EKG with no ischemic changes  ::Troponin 23>27  ::Lipase, CTAP negative for intra abdominal process  Plan:  - Repeat EKG if any chest pain, repeat ordered 8/12; was nonacute   - CXR ordered 8/14 to assess for orthopnea, concerning for pulmonary edema. IV lasix 40 ordered   - Continue telemetry  - Holding home furosemide 40 mg daily    #Acute severe hyponatremia  :: Na 131 on admission; Na on AM labs 8/14 very low at 117  Plan:   - Sodium severely low at 120 on 8/13; repeat was 121 after 500 mL bolus LR  - Hyponatremia worsened on AM labs; 117 this morning, down from 121 yesterday PM  - Given 500 mL bolus of NS   - Nephrology consulted, appreciate recs  - Repeated urine Na, urine osm, and serum osm; pending  - TSH, morning cortisol within normal limits  - CXR concerning for pulmonary edema as cause of orthopnea; IV lasix 40 mg ordered, may help correct sodium is hypervolemic hyponatremia is etiology     #Epigastric pain  #Nausea/GI upset  :: EGG performed 2018 at [facility] notable for PUD  Plan:   - Tigan PRN ordered for nausea   - KUB with no acute findings; ECG unchanged   - Epigastric pain/nausea most likely due to severe  GERD, indicated by relief from tums, worsening after large meals, and epigastric burning  - Continue pantoprazole 40 mg daily, tums PRN  - Continue home Percocet 5-325 mg Q6 PRN    #Atrial fibrillation  #S/p AV emiliano ablation and leadless pacemaker placement 5/6/25  #HTN  :: Home regimen - coreg 6.25 BID, amlodipine 2.5 mg, Eliquis 5mg BID  Plan:  - Amlodipine increased to 5mg on 8/10/25    #Candida Intertrigo  - Nystatin power BID     #CKD 3b  :: Baseline Cr appears to be around 1.2  ::Cr 1.22 in ED  Plan:  - Daily RFP  - Avoid nephrotoxic agents    #HLD  - Continue atorvastatin     #Hypothyroidism  - Continue levothyroxine 75 mcg    =======================================    Fluids: PRN  Electrolytes: PRN, keep K>4. Mg>2, PO4>3  Nutrition: regular    GI ppx: pantoprazole 40mg  DVT ppx: apixaban  Bowel care: PEG qD    Abx: none  O2: 2 LPM    Code Status: full code  NOK: Stefanie Roblero, [phone]    Patient seen and discussed with attending.        Raffaele Figueroa MD  Internal Medicine PGY-1              [1] amLODIPine, 5 mg, oral, Daily  apixaban, 5 mg, oral, BID  ascorbic acid, 500 mg, oral, Daily  atorvastatin, 20 mg, oral, Daily  carvedilol, 6.25 mg, oral, BID  cholecalciferol, 25 mcg, oral, Daily  ferrous sulfate, 1 tablet, oral, Daily  gabapentin, 300 mg, oral, Daily  hydrOXYzine HCL, 10 mg, oral, Once  levothyroxine, 75 mcg, oral, Daily  nystatin, 1 Application, Topical, BID  OLANZapine, 2.5 mg, oral, Once  pantoprazole, 40 mg, oral, Daily before breakfast  PARoxetine, 20 mg, oral, Daily  polyethylene glycol, 17 g, oral, Daily  tolvaptan, 15 mg, oral, Once     [2]    [3] PRN medications: acetaminophen, albuterol, calcium carbonate, metoclopramide, OLANZapine, oxyCODONE-acetaminophen, oxygen, temazepam, trimethobenzamide

## 2025-08-15 NOTE — CONSULTS
"Nutrition Initial Assessment:   Nutrition Assessment    Reason for Assessment: Provider consult order    Patient is a 85 y.o. female on day 6 of admission presenting with history significant for HFrEF (EF 25-30%), aortic stenosis, Afib, s/p AV emiliano ablation and leadless pacemaker 2025, HTN, renal cell carcinoma who presented on 8/9/2025 as a transfer from [facility] for epigastric pain.     8/14- Nephrology consult d/t hyponatremia, suspect it is d/t fluid overload     Nutrition History:  Energy Intake: Fair 50-75 %, Poor < 50 %  Food and Nutrient History: met with pt and sitter at bedside. Sitter stated this AM pt ate 1/2 of oatmeal, 1/2 of peaches, 100% of orange juice, and hot tea. Pt stated that she isn't feeling hungry. Pt stated that she has been having ~ 2 meals/day and drinking 1 ensure max since admission. Prior to admission her appetite was decreased and per chart review was having 1-2 cans chicken noodle soup/day for ~2-3 weeks d/t stomach pain. Pt is unsure of UBW but stated she thinks she lost ~3-4lb. offered pt trial of different ONS and pt was agreeable  Vitamin/Herbal Supplement Use: potassium, Vitamin C, Vitamin D per home med list, pt was unsure of the names of vitamins she took  Food Allergy:  (none)       Anthropometrics:  Height: 165 cm (5' 4.96\")   Weight: 85.7 kg (188 lb 15 oz)   BMI (Calculated): 31.48  IBW/kg (Dietitian Calculated): 56.8 kg  Percent of IBW: 151 %                      Weight History:   Wt Readings per review of EMR    08/15/25 85.7 kg (188 lb 15 oz)-->suspect increase is d/t fluid overload    08/08/25 80 kg (176 lb 5.9 oz)   07/22/25 74.4 kg (164 lb)   06/30/25 75.1 kg (165 lb 9.6 oz)   05/13/25 76.2 kg (168 lb)   05/06/25 74.2 kg (163 lb 9.3 oz)   04/16/25 77.1 kg (170 lb)-->3.5% wt loss from this date to 7/22 (not significant)    03/31/25 77.6 kg (171 lb)   12/18/24 76.2 kg (168 lb)   10/25/24 78.5 kg (173 lb)   08/12/24 82.1 kg (181 lb)   01/08/24 81.6 kg (180 lb) "   08/07/23 80.7 kg (178 lb)   02/20/23 76.7 kg (169 lb)   10/19/22 75 kg (165 lb 6 oz)   04/07/22 80 kg (176 lb 6.4 oz)   10/07/21 85.3 kg (188 lb)       Weight Change %:  Weight History / % Weight Change: Wt has fluctuated in the past year, pt had 3.5% wt loss from 4/16-7/22  Significant Weight Loss: No    Nutrition Focused Physical Exam Findings:    Subcutaneous Fat Loss:   Orbital Fat Pads: Mild-Moderate (slight dark circles and slight hollowing)  Buccal Fat Pads: Mild-Moderate (flat cheeks, minimal bounce)  Triceps: Well nourished (ample fat tissue)  Muscle Wasting:  Temporalis: Mild-Moderate (slight depression)  Pectoralis (Clavicular Region): Mild-Moderate (some protrusion of clavicle)  Deltoid/Trapezius: Well nourished (rounded appearance at arm, shoulder, neck)  Interosseous: Mild-Moderate (slightly depressed area between thumb and forefinger)  Gastrocnemius: Well nourished (well developed bulbous muscle)  Edema:  Edema: +2 mild  Edema Location: LLE  Physical Findings:  Hair: Negative  Eyes: Negative  Nails: Negative  Skin: Negative    Nutrition Significant Labs:  CBC Trend:   Results from last 7 days   Lab Units 08/15/25  0727 08/14/25  0721 08/13/25  0705 08/12/25  0623   WBC AUTO x10*3/uL 14.3* 11.6* 12.2* 9.9   RBC AUTO x10*6/uL 3.49* 3.81* 4.03 3.58*   HEMOGLOBIN g/dL 9.7* 10.9* 11.6* 10.3*   HEMATOCRIT % 30.3* 34.0* 36.3 32.8*   MCV fL 87 89 90 92   PLATELETS AUTO x10*3/uL 256 292 318 256   BMP Trend:   Results from last 7 days   Lab Units 08/15/25  0727 08/14/25  1706 08/14/25  0721 08/13/25  1445   GLUCOSE mg/dL 193* 209* 183* 190*   CALCIUM mg/dL 8.7 9.2 9.5 9.7   SODIUM mmol/L 118* 117* 117* 121*   POTASSIUM mmol/L 3.5 4.4 4.8 4.2   CO2 mmol/L 30 28 26 29   CHLORIDE mmol/L 79* 80* 83* 82*   BUN mg/dL 29* 17 18 14   CREATININE mg/dL 1.12* 1.02 0.94 1.05   Renal Lab Trend:   Results from last 7 days   Lab Units 08/15/25  0727 08/14/25  1706 08/14/25  0721 08/13/25  1445   POTASSIUM mmol/L 3.5 4.4 4.8  4.2   PHOSPHORUS mg/dL 3.1 3.2 2.8  --    SODIUM mmol/L 118* 117* 117* 121*   MAGNESIUM mg/dL 1.59*  --  2.04  --    EGFR mL/min/1.73m*2 49* 55* 60* 53*   BUN mg/dL 29* 17 18 14   CREATININE mg/dL 1.12* 1.02 0.94 1.05   CRP:   Lab Results   Component Value Date    CRP 0.87 08/09/2025       Nutrition Specific Medications:  Scheduled medications  ascorbic acid, 500 mg, oral, Daily  atorvastatin, 20 mg, oral, Daily  carvedilol, 6.25 mg, oral, BID  cholecalciferol, 25 mcg, oral, Daily  ferrous sulfate, 1 tablet, oral, Daily  levothyroxine, 75 mcg, oral, Daily  magnesium sulfate, 2 g, intravenous, Once  pantoprazole, 40 mg, oral, Daily before breakfast  polyethylene glycol, 17 g, oral, Daily      I/O:   Last BM Date: 08/12/25; Stool Appearance: Unable to assess (08/14/25 2005)    Dietary Orders (From admission, onward)       Start     Ordered    08/11/25 1833  Oral nutritional supplements  Until discontinued        Question Answer Comment   Deliver with Breakfast    Deliver with Lunch    Deliver with Dinner    Select supplement: Ensure Max        08/11/25 1833    08/09/25 0504  Adult diet Regular  Diet effective now        Question:  Diet type  Answer:  Regular    08/09/25 0503    08/09/25 0333  May Participate in Room Service  ( ROOM SERVICE MAY PARTICIPATE)  Once        Question:  .  Answer:  Yes    08/09/25 0332                     Estimated Needs:   Total Energy Estimated Needs in 24 hours (kCal): 1600 kCal  Method for Estimating Needs: MSJx1.2  Total Protein Estimated Needs in 24 Hours (g): 65 g  Method for Estimating 24 Hour Protein Needs: 1.2g/kg IBW  Total Fluid Estimated Needs in 24 Hours (mL): 1600 mL  Method for Estimating 24 Hour Fluid Needs: 1ml/kcal or per MD team        Nutrition Diagnosis   Malnutrition Diagnosis  Patient has Malnutrition Diagnosis: Yes  Diagnosis Status: New  Malnutrition Diagnosis: Moderate malnutrition related to acute disease or injury  As Evidenced by: suspected intake of <75% of  estimated needs for > 7 days and mild muscle and fat losses            Nutrition Interventions/Recommendations   Nutrition prescription for oral nutrition    Nutrition Recommendations:  Individualized Nutrition Prescription Provided for :   1. Continue regular diet as tolerated  2. Continue Ensure max BID   -will order Ensure clear once daily for pt to trial   3. Please obtain updated Vitamin D    Nutrition Interventions/Goals:   Meals and Snacks: General healthful diet  Goal: consume >50% of meals and snacks  Medical Food Supplement: Commercial beverage medical food supplement therapy  Goal: Ensure max provides 150kcals and 30g PRO each, Ensure clear provides 240kcals and 8g PRO each      Education Documentation  Pt had no further questions at this time             Nutrition Monitoring and Evaluation   Intake / Amount of food: Consumes at least 50% or more of meals/snacks/supplements    Body Weight: Body weight - Weight reduction from fluids, as needed    Electrolyte and Renal Panel: Electrolytes within normal limits  Glucose/Endocrine Profile: Glucose within normal limits ( mg/dL)         Goal Status: New goal(s) identified    Time Spent (min): 60 minutes

## 2025-08-16 ENCOUNTER — APPOINTMENT (OUTPATIENT)
Dept: RADIOLOGY | Facility: HOSPITAL | Age: 85
DRG: 306 | End: 2025-08-16
Payer: MEDICARE

## 2025-08-16 ENCOUNTER — APPOINTMENT (OUTPATIENT)
Dept: CARDIOLOGY | Facility: HOSPITAL | Age: 85
DRG: 306 | End: 2025-08-16
Payer: MEDICARE

## 2025-08-16 LAB
25(OH)D3 SERPL-MCNC: 73 NG/ML (ref 30–100)
ALBUMIN SERPL BCP-MCNC: 3.4 G/DL (ref 3.4–5)
ALBUMIN SERPL BCP-MCNC: 3.5 G/DL (ref 3.4–5)
ALBUMIN SERPL BCP-MCNC: 3.5 G/DL (ref 3.4–5)
ALBUMIN SERPL BCP-MCNC: 3.6 G/DL (ref 3.4–5)
ALBUMIN SERPL BCP-MCNC: 4 G/DL (ref 3.4–5)
ANION GAP BLDA CALCULATED.4IONS-SCNC: 12 MMO/L (ref 10–25)
ANION GAP BLDA CALCULATED.4IONS-SCNC: 4 MMO/L (ref 10–25)
ANION GAP SERPL CALC-SCNC: 11 MMOL/L (ref 10–20)
ANION GAP SERPL CALC-SCNC: 11 MMOL/L (ref 10–20)
ANION GAP SERPL CALC-SCNC: 12 MMOL/L (ref 10–20)
ANION GAP SERPL CALC-SCNC: 12 MMOL/L (ref 10–20)
ANION GAP SERPL CALC-SCNC: 14 MMOL/L (ref 10–20)
BASE EXCESS BLDA CALC-SCNC: 0.8 MMOL/L (ref -2–3)
BASE EXCESS BLDA CALC-SCNC: 12.3 MMOL/L (ref -2–3)
BASOPHILS # BLD AUTO: 0.02 X10*3/UL (ref 0–0.1)
BASOPHILS NFR BLD AUTO: 0.1 %
BODY TEMPERATURE: 37 DEGREES CELSIUS
BODY TEMPERATURE: 37 DEGREES CELSIUS
BUN SERPL-MCNC: 25 MG/DL (ref 6–23)
BUN SERPL-MCNC: 27 MG/DL (ref 6–23)
BUN SERPL-MCNC: 36 MG/DL (ref 6–23)
BUN SERPL-MCNC: 40 MG/DL (ref 6–23)
BUN SERPL-MCNC: 40 MG/DL (ref 6–23)
CA-I BLDA-SCNC: 1.14 MMOL/L (ref 1.1–1.33)
CA-I BLDA-SCNC: 1.21 MMOL/L (ref 1.1–1.33)
CALCIUM SERPL-MCNC: 9.2 MG/DL (ref 8.6–10.6)
CALCIUM SERPL-MCNC: 9.3 MG/DL (ref 8.6–10.6)
CALCIUM SERPL-MCNC: 9.3 MG/DL (ref 8.6–10.6)
CALCIUM SERPL-MCNC: 9.5 MG/DL (ref 8.6–10.6)
CALCIUM SERPL-MCNC: 9.6 MG/DL (ref 8.6–10.6)
CARDIAC TROPONIN I PNL SERPL HS: 34 NG/L (ref 0–34)
CHLORIDE BLDA-SCNC: 78 MMOL/L (ref 98–107)
CHLORIDE BLDA-SCNC: 85 MMOL/L (ref 98–107)
CHLORIDE SERPL-SCNC: 77 MMOL/L (ref 98–107)
CHLORIDE SERPL-SCNC: 80 MMOL/L (ref 98–107)
CHLORIDE SERPL-SCNC: 81 MMOL/L (ref 98–107)
CHLORIDE SERPL-SCNC: 82 MMOL/L (ref 98–107)
CHLORIDE SERPL-SCNC: 83 MMOL/L (ref 98–107)
CHLORIDE UR-SCNC: 44 MMOL/L
CHLORIDE/CREATININE (MMOL/G) IN URINE: 603 MMOL/G CREAT (ref 38–318)
CO2 SERPL-SCNC: 26 MMOL/L (ref 21–32)
CO2 SERPL-SCNC: 33 MMOL/L (ref 21–32)
CO2 SERPL-SCNC: 33 MMOL/L (ref 21–32)
CO2 SERPL-SCNC: 34 MMOL/L (ref 21–32)
CO2 SERPL-SCNC: 34 MMOL/L (ref 21–32)
CREAT SERPL-MCNC: 1 MG/DL (ref 0.5–1.05)
CREAT SERPL-MCNC: 1.01 MG/DL (ref 0.5–1.05)
CREAT SERPL-MCNC: 1.05 MG/DL (ref 0.5–1.05)
CREAT SERPL-MCNC: 1.1 MG/DL (ref 0.5–1.05)
CREAT SERPL-MCNC: 1.18 MG/DL (ref 0.5–1.05)
CREAT UR-MCNC: 7.3 MG/DL (ref 20–320)
EGFRCR SERPLBLD CKD-EPI 2021: 46 ML/MIN/1.73M*2
EGFRCR SERPLBLD CKD-EPI 2021: 50 ML/MIN/1.73M*2
EGFRCR SERPLBLD CKD-EPI 2021: 53 ML/MIN/1.73M*2
EGFRCR SERPLBLD CKD-EPI 2021: 55 ML/MIN/1.73M*2
EGFRCR SERPLBLD CKD-EPI 2021: 56 ML/MIN/1.73M*2
EOSINOPHIL # BLD AUTO: 0.02 X10*3/UL (ref 0–0.4)
EOSINOPHIL NFR BLD AUTO: 0.1 %
ERYTHROCYTE [DISTWIDTH] IN BLOOD BY AUTOMATED COUNT: 13.2 % (ref 11.5–14.5)
EST. AVERAGE GLUCOSE BLD GHB EST-MCNC: 180 MG/DL
GLUCOSE BLD MANUAL STRIP-MCNC: 114 MG/DL (ref 74–99)
GLUCOSE BLD MANUAL STRIP-MCNC: 117 MG/DL (ref 74–99)
GLUCOSE BLDA-MCNC: 105 MG/DL (ref 74–99)
GLUCOSE BLDA-MCNC: 316 MG/DL (ref 74–99)
GLUCOSE SERPL-MCNC: 103 MG/DL (ref 74–99)
GLUCOSE SERPL-MCNC: 113 MG/DL (ref 74–99)
GLUCOSE SERPL-MCNC: 137 MG/DL (ref 74–99)
GLUCOSE SERPL-MCNC: 161 MG/DL (ref 74–99)
GLUCOSE SERPL-MCNC: 280 MG/DL (ref 74–99)
HBA1C MFR BLD: 7.9 % (ref ?–5.7)
HCO3 BLDA-SCNC: 26 MMOL/L (ref 22–26)
HCO3 BLDA-SCNC: 36.7 MMOL/L (ref 22–26)
HCT VFR BLD AUTO: 32.1 % (ref 36–46)
HCT VFR BLD EST: 32 % (ref 36–46)
HCT VFR BLD EST: 35 % (ref 36–46)
HGB BLD-MCNC: 11 G/DL (ref 12–16)
HGB BLDA-MCNC: 10.6 G/DL (ref 12–16)
HGB BLDA-MCNC: 11.5 G/DL (ref 12–16)
IMM GRANULOCYTES # BLD AUTO: 0.1 X10*3/UL (ref 0–0.5)
IMM GRANULOCYTES NFR BLD AUTO: 0.7 % (ref 0–0.9)
INHALED O2 CONCENTRATION: 28 %
INHALED O2 CONCENTRATION: 30 %
LACTATE BLDA-SCNC: 0.8 MMOL/L (ref 0.4–2)
LACTATE BLDA-SCNC: 1.7 MMOL/L (ref 0.4–2)
LACTATE SERPL-SCNC: 1.9 MMOL/L (ref 0.4–2)
LYMPHOCYTES # BLD AUTO: 0.51 X10*3/UL (ref 0.8–3)
LYMPHOCYTES NFR BLD AUTO: 3.4 %
MAGNESIUM SERPL-MCNC: 2 MG/DL (ref 1.6–2.4)
MCH RBC QN AUTO: 28.6 PG (ref 26–34)
MCHC RBC AUTO-ENTMCNC: 34.3 G/DL (ref 32–36)
MCV RBC AUTO: 83 FL (ref 80–100)
MONOCYTES # BLD AUTO: 0.53 X10*3/UL (ref 0.05–0.8)
MONOCYTES NFR BLD AUTO: 3.5 %
NEUTROPHILS # BLD AUTO: 13.84 X10*3/UL (ref 1.6–5.5)
NEUTROPHILS NFR BLD AUTO: 92.2 %
NRBC BLD-RTO: 0 /100 WBCS (ref 0–0)
OSMOLALITY SERPL: 256 MOSM/KG (ref 280–300)
OSMOLALITY UR: 124 MOSM/KG (ref 200–1200)
OXYHGB MFR BLDA: 95.1 % (ref 94–98)
OXYHGB MFR BLDA: 96.1 % (ref 94–98)
PCO2 BLDA: 43 MM HG (ref 38–42)
PCO2 BLDA: 46 MM HG (ref 38–42)
PH BLDA: 7.39 PH (ref 7.38–7.42)
PH BLDA: 7.51 PH (ref 7.38–7.42)
PHOSPHATE SERPL-MCNC: 3 MG/DL (ref 2.5–4.9)
PHOSPHATE SERPL-MCNC: 3.1 MG/DL (ref 2.5–4.9)
PHOSPHATE SERPL-MCNC: 3.2 MG/DL (ref 2.5–4.9)
PHOSPHATE SERPL-MCNC: 3.2 MG/DL (ref 2.5–4.9)
PHOSPHATE SERPL-MCNC: 4.1 MG/DL (ref 2.5–4.9)
PLATELET # BLD AUTO: 350 X10*3/UL (ref 150–450)
PO2 BLDA: 109 MM HG (ref 85–95)
PO2 BLDA: 91 MM HG (ref 85–95)
POTASSIUM BLDA-SCNC: 3.2 MMOL/L (ref 3.5–5.3)
POTASSIUM BLDA-SCNC: 4.3 MMOL/L (ref 3.5–5.3)
POTASSIUM SERPL-SCNC: 3.4 MMOL/L (ref 3.5–5.3)
POTASSIUM SERPL-SCNC: 4.2 MMOL/L (ref 3.5–5.3)
POTASSIUM UR-SCNC: 11 MMOL/L
POTASSIUM/CREAT UR-RTO: 151 MMOL/G CREAT
RBC # BLD AUTO: 3.85 X10*6/UL (ref 4–5.2)
SAO2 % BLDA: 98 % (ref 94–100)
SAO2 % BLDA: 99 % (ref 94–100)
SODIUM BLDA-SCNC: 112 MMOL/L (ref 136–145)
SODIUM BLDA-SCNC: 122 MMOL/L (ref 136–145)
SODIUM SERPL-SCNC: 113 MMOL/L (ref 136–145)
SODIUM SERPL-SCNC: 121 MMOL/L (ref 136–145)
SODIUM SERPL-SCNC: 123 MMOL/L (ref 136–145)
SODIUM SERPL-SCNC: 124 MMOL/L (ref 136–145)
SODIUM SERPL-SCNC: 126 MMOL/L (ref 136–145)
SODIUM UR-SCNC: 30 MMOL/L
SODIUM/CREAT UR-RTO: 411 MMOL/G CREAT
UFH PPP CHRO-ACNC: >2 IU/ML (ref ?–1.1)
WBC # BLD AUTO: 15 X10*3/UL (ref 4.4–11.3)

## 2025-08-16 PROCEDURE — 85520 HEPARIN ASSAY: CPT

## 2025-08-16 PROCEDURE — 5A09457 ASSISTANCE WITH RESPIRATORY VENTILATION, 24-96 CONSECUTIVE HOURS, CONTINUOUS POSITIVE AIRWAY PRESSURE: ICD-10-PCS | Performed by: INTERNAL MEDICINE

## 2025-08-16 PROCEDURE — 1200000002 HC GENERAL ROOM WITH TELEMETRY DAILY

## 2025-08-16 PROCEDURE — 82947 ASSAY GLUCOSE BLOOD QUANT: CPT

## 2025-08-16 PROCEDURE — 84132 ASSAY OF SERUM POTASSIUM: CPT | Performed by: STUDENT IN AN ORGANIZED HEALTH CARE EDUCATION/TRAINING PROGRAM

## 2025-08-16 PROCEDURE — 84132 ASSAY OF SERUM POTASSIUM: CPT

## 2025-08-16 PROCEDURE — 99233 SBSQ HOSP IP/OBS HIGH 50: CPT

## 2025-08-16 PROCEDURE — 82436 ASSAY OF URINE CHLORIDE: CPT

## 2025-08-16 PROCEDURE — 2500000002 HC RX 250 W HCPCS SELF ADMINISTERED DRUGS (ALT 637 FOR MEDICARE OP, ALT 636 FOR OP/ED)

## 2025-08-16 PROCEDURE — 2500000001 HC RX 250 WO HCPCS SELF ADMINISTERED DRUGS (ALT 637 FOR MEDICARE OP)

## 2025-08-16 PROCEDURE — 71045 X-RAY EXAM CHEST 1 VIEW: CPT

## 2025-08-16 PROCEDURE — 85025 COMPLETE CBC W/AUTO DIFF WBC: CPT

## 2025-08-16 PROCEDURE — 37799 UNLISTED PX VASCULAR SURGERY: CPT

## 2025-08-16 PROCEDURE — 83930 ASSAY OF BLOOD OSMOLALITY: CPT

## 2025-08-16 PROCEDURE — 83935 ASSAY OF URINE OSMOLALITY: CPT

## 2025-08-16 PROCEDURE — 83735 ASSAY OF MAGNESIUM: CPT

## 2025-08-16 PROCEDURE — 2500000004 HC RX 250 GENERAL PHARMACY W/ HCPCS (ALT 636 FOR OP/ED)

## 2025-08-16 PROCEDURE — 2500000005 HC RX 250 GENERAL PHARMACY W/O HCPCS

## 2025-08-16 PROCEDURE — 99291 CRITICAL CARE FIRST HOUR: CPT

## 2025-08-16 PROCEDURE — 94660 CPAP INITIATION&MGMT: CPT

## 2025-08-16 PROCEDURE — 83605 ASSAY OF LACTIC ACID: CPT

## 2025-08-16 PROCEDURE — 93005 ELECTROCARDIOGRAM TRACING: CPT

## 2025-08-16 PROCEDURE — 84484 ASSAY OF TROPONIN QUANT: CPT

## 2025-08-16 PROCEDURE — 36620 INSERTION CATHETER ARTERY: CPT | Mod: GC

## 2025-08-16 PROCEDURE — 51701 INSERT BLADDER CATHETER: CPT

## 2025-08-16 RX ORDER — INSULIN LISPRO 100 [IU]/ML
0-10 INJECTION, SOLUTION INTRAVENOUS; SUBCUTANEOUS
Status: DISCONTINUED | OUTPATIENT
Start: 2025-08-16 | End: 2025-08-23

## 2025-08-16 RX ORDER — SODIUM NITROPRUSSIDE IN 0.9% SODIUM CHLORIDE 0.5 MG/ML
.25-5 INJECTION INTRAVENOUS CONTINUOUS
Status: DISCONTINUED | OUTPATIENT
Start: 2025-08-16 | End: 2025-08-18

## 2025-08-16 RX ORDER — NITROGLYCERIN 20 MG/100ML
INJECTION INTRAVENOUS
Status: DISPENSED
Start: 2025-08-16 | End: 2025-08-16

## 2025-08-16 RX ORDER — DEXTROSE 50 % IN WATER (D50W) INTRAVENOUS SYRINGE
25
Status: DISCONTINUED | OUTPATIENT
Start: 2025-08-16 | End: 2025-08-26

## 2025-08-16 RX ORDER — DEXTROSE 50 % IN WATER (D50W) INTRAVENOUS SYRINGE
12.5
Status: DISCONTINUED | OUTPATIENT
Start: 2025-08-16 | End: 2025-08-26

## 2025-08-16 RX ORDER — FUROSEMIDE 10 MG/ML
60 INJECTION INTRAMUSCULAR; INTRAVENOUS ONCE
Status: COMPLETED | OUTPATIENT
Start: 2025-08-16 | End: 2025-08-16

## 2025-08-16 RX ORDER — POTASSIUM CHLORIDE 14.9 MG/ML
20 INJECTION INTRAVENOUS ONCE
Status: COMPLETED | OUTPATIENT
Start: 2025-08-16 | End: 2025-08-16

## 2025-08-16 RX ORDER — SODIUM NITROPRUSSIDE IN 0.9% SODIUM CHLORIDE 0.5 MG/ML
INJECTION INTRAVENOUS
Status: COMPLETED
Start: 2025-08-16 | End: 2025-08-16

## 2025-08-16 RX ORDER — POTASSIUM CHLORIDE 20 MEQ/1
40 TABLET, EXTENDED RELEASE ORAL ONCE
Status: COMPLETED | OUTPATIENT
Start: 2025-08-16 | End: 2025-08-16

## 2025-08-16 RX ORDER — FUROSEMIDE 10 MG/ML
60 INJECTION INTRAMUSCULAR; INTRAVENOUS EVERY 12 HOURS
Status: DISCONTINUED | OUTPATIENT
Start: 2025-08-16 | End: 2025-08-16

## 2025-08-16 RX ORDER — INSULIN LISPRO 100 [IU]/ML
0-5 INJECTION, SOLUTION INTRAVENOUS; SUBCUTANEOUS
Status: DISCONTINUED | OUTPATIENT
Start: 2025-08-16 | End: 2025-08-16

## 2025-08-16 RX ORDER — SODIUM NITROPRUSSIDE IN 0.9% SODIUM CHLORIDE 0.5 MG/ML
.25-5 INJECTION INTRAVENOUS CONTINUOUS
Status: DISCONTINUED | OUTPATIENT
Start: 2025-08-16 | End: 2025-08-16

## 2025-08-16 RX ADMIN — Medication 15 G: at 12:17

## 2025-08-16 RX ADMIN — LEVOTHYROXINE SODIUM 75 MCG: 0.07 TABLET ORAL at 08:15

## 2025-08-16 RX ADMIN — INSULIN LISPRO 2 UNITS: 100 INJECTION, SOLUTION INTRAVENOUS; SUBCUTANEOUS at 12:17

## 2025-08-16 RX ADMIN — Medication 25 MCG: at 08:15

## 2025-08-16 RX ADMIN — FERROUS SULFATE TAB 325 MG (65 MG ELEMENTAL FE) 1 TABLET: 325 (65 FE) TAB at 08:15

## 2025-08-16 RX ADMIN — SODIUM NITROPRUSSIDE IN 0.9% SODIUM CHLORIDE 0.25 MCG/KG/MIN: 0.5 INJECTION INTRAVENOUS at 06:08

## 2025-08-16 RX ADMIN — PANTOPRAZOLE SODIUM 40 MG: 40 TABLET, DELAYED RELEASE ORAL at 08:00

## 2025-08-16 RX ADMIN — FUROSEMIDE 60 MG: 10 INJECTION, SOLUTION INTRAMUSCULAR; INTRAVENOUS at 04:07

## 2025-08-16 RX ADMIN — PAROXETINE 20 MG: 20 TABLET, FILM COATED ORAL at 10:25

## 2025-08-16 RX ADMIN — ATORVASTATIN CALCIUM 20 MG: 20 TABLET, FILM COATED ORAL at 08:15

## 2025-08-16 RX ADMIN — SODIUM NITROPRUSSIDE IN 0.9% SODIUM CHLORIDE 50000 MCG: 0.5 INJECTION INTRAVENOUS at 05:34

## 2025-08-16 RX ADMIN — METOCLOPRAMIDE 10 MG: 5 INJECTION, SOLUTION INTRAMUSCULAR; INTRAVENOUS at 01:52

## 2025-08-16 RX ADMIN — Medication 1 DOSE: at 04:38

## 2025-08-16 RX ADMIN — SODIUM NITROPRUSSIDE IN 0.9% SODIUM CHLORIDE 1.25 MCG/KG/MIN: 0.5 INJECTION INTRAVENOUS at 15:57

## 2025-08-16 RX ADMIN — OXYCODONE AND ACETAMINOPHEN 1 TABLET: 5; 325 TABLET ORAL at 02:33

## 2025-08-16 RX ADMIN — GABAPENTIN 300 MG: 300 CAPSULE ORAL at 08:15

## 2025-08-16 RX ADMIN — INSULIN LISPRO 6 UNITS: 100 INJECTION, SOLUTION INTRAVENOUS; SUBCUTANEOUS at 08:08

## 2025-08-16 RX ADMIN — POTASSIUM CHLORIDE 20 MEQ: 14.9 INJECTION, SOLUTION INTRAVENOUS at 20:53

## 2025-08-16 RX ADMIN — OXYCODONE AND ACETAMINOPHEN 1 TABLET: 5; 325 TABLET ORAL at 17:53

## 2025-08-16 RX ADMIN — Medication: at 07:41

## 2025-08-16 RX ADMIN — POLYETHYLENE GLYCOL 3350 17 G: 17 POWDER, FOR SOLUTION ORAL at 08:15

## 2025-08-16 RX ADMIN — OXYCODONE HYDROCHLORIDE AND ACETAMINOPHEN 500 MG: 500 TABLET ORAL at 08:15

## 2025-08-16 RX ADMIN — CALCIUM CARBONATE (ANTACID) CHEW TAB 500 MG 1 TABLET: 500 CHEW TAB at 00:07

## 2025-08-16 RX ADMIN — POTASSIUM CHLORIDE 40 MEQ: 1500 TABLET, EXTENDED RELEASE ORAL at 20:52

## 2025-08-16 RX ADMIN — NYSTATIN 1 APPLICATION: 100000 POWDER TOPICAL at 20:28

## 2025-08-16 RX ADMIN — NYSTATIN 1 APPLICATION: 100000 POWDER TOPICAL at 08:15

## 2025-08-16 ASSESSMENT — PAIN DESCRIPTION - ORIENTATION: ORIENTATION: LEFT;RIGHT

## 2025-08-16 ASSESSMENT — PAIN SCALES - GENERAL
PAINLEVEL_OUTOF10: 8
PAINLEVEL_OUTOF10: 0 - NO PAIN

## 2025-08-16 ASSESSMENT — PAIN - FUNCTIONAL ASSESSMENT
PAIN_FUNCTIONAL_ASSESSMENT: 0-10

## 2025-08-16 ASSESSMENT — PAIN DESCRIPTION - LOCATION: LOCATION: LEG

## 2025-08-16 NOTE — NURSING NOTE
0330: Patient was noted to have audible wheezes when RN came into the room. Was visibly diaphoretic and had an increased work of breathing. RN messaged Kera Lopez MD to come and see patient. Patient vital signs were temp: 36.1, HR 81, RR 21, /100, O2 94% on 3L then increased to 4L. MD at bedside noted of BP and no medications were ordered. MD ordered stat chest x-ray and EKG.     0348: MD back and bedside with an order for lasix. Patient stated that they could not breathe and audible wheezes had gotten worse. New vital signs were HR 85, RR 24, and /104, and O2 93% on 4 L. No new medications were ordered.     0400: Respiratory was called per [facility] team for patient to be placed on bipap or cpap. Resident finding a place for patient and determined CICU bed 15. 60 mg of IV push lasix was ordered and given before transfer of patient to CICU.     Patient was transferred to CICU bed 15 and RN gave bedside report to oncoming nurse Carlito Buck

## 2025-08-16 NOTE — SIGNIFICANT EVENT
Floor to ICU Transfer Note    Hospital Course   85 y.o. female with history significant for HFrEF (EF 25-30%), aortic stenosis, Afib, s/p AV node ablation and leadless pacemaker in 2025, HTN, renal cell carcinoma who presented on 8/9/25 as a transfer from [facility] for epigastric pain, nonradiating, not associated with eating. Hemodynamically stable, afebrile on arrival in no acute distress with some mild epigastric burning. Trop 23>27, lipase within normal limits. EKG with no ischemic changes. CTAP negative for inflammatory process or bowel obstruction. She is scheduled for upcoming TAVR on 8/19/25 with Dr. Howell.    Labs were significant for BNP of 2422 on 8/9/25, and patient was diuresed with IV lasix 40 mg. Pacemaker was interrogated which showed no notable events. Dr. Howell coordination team was contacted regarding the possibility of expediting TAVR to an earlier date. TAVR was unable to be moved to an earlier date and remained scheduled for 8/19. On 8/11, patient was hyponatremic at 125, and lasix was held. Urine osm 250, urine sodium 29, and Na increased to 127 by 8/12. Patient continued to endorse epigastric pain and nausea. KUB was unremarkable, ECG remained unchanged. Patient experienced some relief with Tigan, Tums and home pain medication; epigastric pain most likely due to reflux/GERD, as it was burning in nature, worse after eating, worse with lying down, and improvement with Tums.  However, by 8/13, serum sodium resulted severely low at 120; repeat was also 120. Serum osm, morning cortisol, and 500 ml NS was ordered. Likely hypervolemia hyponatremia. 15mg urea, diuresis.       Brief overnight Hx:  I was called by the nurse to see this patient for sudden new onset SOB and agitation around 3:30am. The patient reports feeling nauseous and having difficulty breathing. Her nausea have gotten worse today and she was given her PRN metoclopramide earlier and was asking for more chewable Tums. She says I  feel sick and points to her epigastric area. The nurse reports that she was restless earlier. She was speaking in short sentences. She denies chest pain or vomiting. A rapid was called for her later as her SOB got worse and she became diaphoretic and tachypnic.   BP: 164/110, HR: 84, RR: 27, O2:92 on 4L. Her oxygen requirement increased from 3 to 4L. Bilateral wheezing on auscultation. Patient is volume overloaded and have lower extremity pitting edema to the knees. She was given 60mg IV Lasix push and RT was called for CPAP/Bi-PAP placement. ECG, Chest-X-ray, trops, RFP ordered. Case discussed with NACR. CICU fellow informed about the patient for transfer for likely flash pulmonary edema requiring more oxygen and diuresis. Patient transferred to CICU and signed out to cardiology fellow.

## 2025-08-16 NOTE — PROGRESS NOTES
"CARDIAC ICU PROGRESS NOTE    Radha Torres is a 85 y.o. female on day 7 of admission presenting with Chest pain.    Subjective     Overnight remained on CPAP, diuresed additional 60 mg IV Lasix at 0400. Put out almost 1L since 0400, responding well to IV Lasix. This morning on rounds was able to be taken off CPAP.  Denies chest discomfort or lightheadedness, slightly confused but no signs of seizure or serious neurologic sequale from low sodium.        Objective     Physical Exam  Constitutional:       General: She is in acute distress.      Appearance: She is obese. She is ill-appearing.   HENT:      Head: Normocephalic and atraumatic.      Mouth/Throat:      Mouth: Mucous membranes are moist.      Pharynx: Oropharynx is clear.      Eyes:      Extraocular Movements: Extraocular movements intact.      Pupils: Pupils are equal, round, and reactive to light.         Cardiovascular:      Rate and Rhythm: Normal rate and regular rhythm.      Heart sounds: Murmur heard.      No friction rub. No gallop.   Pulmonary:      Effort: Respiratory distress present.      Breath sounds: Wheezing and rales present.   Abdominal:      General: There is no distension.      Palpations: Abdomen is soft.      Tenderness: There is no abdominal tenderness. There is no guarding.      Musculoskeletal:      Right lower leg: Edema present.      Left lower leg: Edema present.      Skin:     General: Skin is dry.      Neurological:      General: No focal deficit present.      Mental Status: She is alert.     Last Recorded Vitals  Blood pressure 153/70, pulse 85, temperature 35.5 °C (95.9 °F), resp. rate 18, height 1.65 m (5' 4.96\"), weight 85.7 kg (188 lb 15 oz), SpO2 100%.  Intake/Output last 3 Shifts:  I/O last 3 completed shifts:  In: 360 (4.2 mL/kg) [P.O.:360]  Out: 2550 (29.8 mL/kg) [Urine:2550 (0.8 mL/kg/hr)]  Weight: 85.7 kg     Relevant Results  Scheduled medications  Scheduled Medications[1]  Continuous medications  Continuous " Medications[2]  PRN medications  PRN Medications[3]    XR chest 1 view  Result Date: 8/15/2025  Interpreted By:  Suarez, Yonker,  and Louisa Kimmy STUDY: XR CHEST 1 VIEW;  8/14/2025 11:09 pm   INDICATION: Signs/Symptoms:SOB.   COMPARISON: Chest radiograph 08/14/2025 and TAVR CT on 07/31/2025   ACCESSION NUMBER(S): [id number]   ORDERING CLINICIAN: ISHAAN LAURENT   FINDINGS: AP radiograph of the chest was provided.   CARDIOMEDIASTINAL SILHOUETTE: Cardiomediastinal silhouette is enlarged and stable in size and configuration.   LUNGS: Moderate left and small right pleural effusions. Diffuse hazy alveolar opacities throughout the right mid to lower lung, increased from prior. Prominent interstitial markings bilaterally. No pneumothorax.   ABDOMEN: No remarkable upper abdominal findings.   BONES: Stable chronic deformity of multiple right ribs.       1. Increased hazy alveolar opacities throughout the right mid to lower lung, which may reflect enlarging pleural effusion with worsening atelectasis versus developing infectious/inflammatory infiltrate. 2. Similar cardiomegaly with bilateral pleural effusions and diffuse interstitial pulmonary edema.   I personally reviewed the image(s)/study and resident interpretation as stated by Dr. Kimmy Sepulveda MD. I agree with the findings as stated. This study was interpreted at [facility], [place], [place].   MACRO: None   Signed by: Sarahy Suarez 8/15/2025 7:48 AM Dictation workstation:   [id number]    XR chest 1 view  Result Date: 8/14/2025  Interpreted By:  Sarahy Suarez and Moradiya Krunal STUDY: XR CHEST 1 VIEW;  8/14/2025 1:05 pm   INDICATION: Signs/Symptoms:SOB.   COMPARISON: Chest radiograph from 12/10/2025   ACCESSION NUMBER(S): [id number]   ORDERING CLINICIAN: ISHAAN LAURENT   FINDINGS: AP radiograph of the chest.   MEDICAL DEVICES: Leadless pacemaker overlying the cardiac silhouette.   CARDIOMEDIASTINAL SILHOUETTE: The  cardiomediastinal silhouette is stable in size and configuration with similar partial obscuration of left heart border. Mild aortic knob calcification.   LUNGS: Similar bilateral blunting of the costophrenic angles with associated bibasilar consolidation. Redemonstrated bilateral Kerley B-lines and diffuse interstitial opacities. No pneumothorax is seen.   ABDOMEN: No remarkable upper abdominal findings.   BONES: Redemonstrated multiple remote right posterolateral rib fractures.       1. Unchanged small left and moderate right pleural effusions with associated bibasilar consolidation/atelectasis, on a background of unchanged pulmonary edema. 2. Leadless pacemaker overlying the cardiac silhouette.   I personally reviewed the images/study and I agree with the findings as stated. This study was interpreted by radiology resident Pepe Urban D.O. at [facility], [place], [place].   Signed by: Sarahy Suarez 8/14/2025 5:03 PM Dictation workstation:   [id number]    Results for orders placed or performed during the hospital encounter of 08/09/25 (from the past 24 hours)   Basic Metabolic Panel   Result Value Ref Range    Glucose 217 (H) 74 - 99 mg/dL    Sodium 116 (LL) 136 - 145 mmol/L    Potassium 4.1 3.5 - 5.3 mmol/L    Chloride 77 (L) 98 - 107 mmol/L    Bicarbonate 30 21 - 32 mmol/L    Anion Gap 13 mmol/L    Urea Nitrogen 27 (H) 6 - 23 mg/dL    Creatinine 1.10 (H) 0.50 - 1.05 mg/dL    eGFR 50 (L) >60 mL/min/1.73m*2    Calcium 8.8 8.6 - 10.6 mg/dL   Basic Metabolic Panel   Result Value Ref Range    Glucose 212 (H) 74 - 99 mg/dL    Sodium 115 (LL) 136 - 145 mmol/L    Potassium 4.0 3.5 - 5.3 mmol/L    Chloride 76 (L) 98 - 107 mmol/L    Bicarbonate 30 21 - 32 mmol/L    Anion Gap 13 10 - 20 mmol/L    Urea Nitrogen 27 (H) 6 - 23 mg/dL    Creatinine 1.20 (H) 0.50 - 1.05 mg/dL    eGFR 45 (L) >60 mL/min/1.73m*2    Calcium 9.7 8.6 - 10.6 mg/dL   Blood Gas Arterial Full Panel   Result Value Ref  Range    POCT pH, Arterial 7.39 7.38 - 7.42 pH    POCT pCO2, Arterial 43 (H) 38 - 42 mm Hg    POCT pO2, Arterial 91 85 - 95 mm Hg    POCT SO2, Arterial 98 94 - 100 %    POCT Oxy Hemoglobin, Arterial 95.1 94.0 - 98.0 %    POCT Hematocrit Calculated, Arterial 35.0 (L) 36.0 - 46.0 %    POCT Sodium, Arterial 112 (LL) 136 - 145 mmol/L    POCT Potassium, Arterial 4.3 3.5 - 5.3 mmol/L    POCT Chloride, Arterial 78 (L) 98 - 107 mmol/L    POCT Ionized Calcium, Arterial 1.14 1.10 - 1.33 mmol/L    POCT Glucose, Arterial 316 (H) 74 - 99 mg/dL    POCT Lactate, Arterial 1.7 0.4 - 2.0 mmol/L    POCT Base Excess, Arterial 0.8 -2.0 - 3.0 mmol/L    POCT HCO3 Calculated, Arterial 26.0 22.0 - 26.0 mmol/L    POCT Hemoglobin, Arterial 11.5 (L) 12.0 - 16.0 g/dL    POCT Anion Gap, Arterial 12 10 - 25 mmo/L    Patient Temperature 37.0 degrees Celsius    FiO2 30 %   CBC and Auto Differential   Result Value Ref Range    WBC 15.0 (H) 4.4 - 11.3 x10*3/uL    nRBC 0.0 0.0 - 0.0 /100 WBCs    RBC 3.85 (L) 4.00 - 5.20 x10*6/uL    Hemoglobin 11.0 (L) 12.0 - 16.0 g/dL    Hematocrit 32.1 (L) 36.0 - 46.0 %    MCV 83 80 - 100 fL    MCH 28.6 26.0 - 34.0 pg    MCHC 34.3 32.0 - 36.0 g/dL    RDW 13.2 11.5 - 14.5 %    Platelets 350 150 - 450 x10*3/uL    Neutrophils % 92.2 40.0 - 80.0 %    Immature Granulocytes %, Automated 0.7 0.0 - 0.9 %    Lymphocytes % 3.4 13.0 - 44.0 %    Monocytes % 3.5 2.0 - 10.0 %    Eosinophils % 0.1 0.0 - 6.0 %    Basophils % 0.1 0.0 - 2.0 %    Neutrophils Absolute 13.84 (H) 1.60 - 5.50 x10*3/uL    Immature Granulocytes Absolute, Automated 0.10 0.00 - 0.50 x10*3/uL    Lymphocytes Absolute 0.51 (L) 0.80 - 3.00 x10*3/uL    Monocytes Absolute 0.53 0.05 - 0.80 x10*3/uL    Eosinophils Absolute 0.02 0.00 - 0.40 x10*3/uL    Basophils Absolute 0.02 0.00 - 0.10 x10*3/uL   Magnesium   Result Value Ref Range    Magnesium 2.00 1.60 - 2.40 mg/dL   Lactate   Result Value Ref Range    Lactate 1.9 0.4 - 2.0 mmol/L   Renal function panel   Result  Value Ref Range    Glucose 280 (H) 74 - 99 mg/dL    Sodium 113 (LL) 136 - 145 mmol/L    Potassium 4.2 3.5 - 5.3 mmol/L    Chloride 77 (L) 98 - 107 mmol/L    Bicarbonate 26 21 - 32 mmol/L    Anion Gap 14 10 - 20 mmol/L    Urea Nitrogen 27 (H) 6 - 23 mg/dL    Creatinine 1.18 (H) 0.50 - 1.05 mg/dL    eGFR 46 (L) >60 mL/min/1.73m*2    Calcium 9.6 8.6 - 10.6 mg/dL    Phosphorus 4.1 2.5 - 4.9 mg/dL    Albumin 4.0 3.4 - 5.0 g/dL   Troponin I, High Sensitivity   Result Value Ref Range    Troponin I, High Sensitivity (CMC) 34 0 - 34 ng/L   Heparin Assay   Result Value Ref Range    Heparin Unfractionated >2.0 (HH) See Comment Below for Therapeutic Ranges IU/mL      Assessment & Plan  Chest pain      Radha Torres is a 85 y.o. female with PMH HFrEF (EF 25-30%), aortic stenosis, Afib, s/p AV node ablation and leadless pacemaker in 2025, HTN, renal cell carcinoma who presented on 8/9/25 as a transfer from [facility] for epigastric pain, now transferred to CICU for possible flash pulmonary edema and hyponatremia. Suspect GI symptoms and hyponatremia due to severe fluid overload iso ADHF. Received diuresis and on CPAP. Also received a dose of tolvaptan 8/15 per nephrology. Now diuresing with IV Lasix 60mg BID, RFP q4h, and supplementing with Urea TID per nephrology. More urine studies pending per renal.     CNS  #agitation  -1:1 sitter  -PRN zyprexa  -consider hyponatremia as etiology, fu renal recs      PULM  #flash pulmonary edema  -CPAP as tolerated, SpO2>92%  -nipride gtt, caution iso severe aortic stenosis, SBP goal 130-150  -diuresis with IV lasix, caution iso severe aortic stenosis     CV  #Severe aortic stenosis  #HFrEF (EF 25-30%)  #HTN  :: Echo 7/11/25 - EF 25-30%, left ventricle mildly dilated, mild concentric LV hypertrophy, leadless pacemaker in right atria and RV, aortic valve is heavily calcified with appearance of severe aortic stenosis (peak gradient 52mmHg, mean gradient 25mmHg, aortic valve area 0.8cm^2), trace  AR, mild MR and TR, RVSP 50-60mmHg,   ::Scheduled for TAVR 8/19/25 with Dr. Howell  ::EKG with no ischemic changes  ::Troponin 23>27  Plan:  -diuresis with IV lasix 60mg BID, caution iso severe aortic stenosis  -strict I/Os, redose as needed  -holding home coreg 6.25mg, amlodipine 5mg  -nipride gtt, caution iso severe aortic stenosis  -Plan for TAVR 8/19 still, pre-TAVR workup complete,will need to touch base with structural in AM     #Atrial fibrillation  #S/p AV emiliano ablation and leadless pacemaker placement 5/6/25  :: Home regimen - coreg 6.25 BID, amlodipine 2.5 mg, Eliquis 5mg BID  Plan:  -Holding eliquis given possible TAVR, consider heparin gtt if needed for stroke risk reduction inpatient  -fu heparin assay  -holding home coreg     GI  #Epigastric pain  #Nausea/GI upset  :: EGG performed 2018 at [facility] notable for PUD  ::Possibly due to volume overload/ADHF vs GERD  Plan:   - Tigan PRN ordered for nausea   - KUB with no acute findings; ECG unchanged   - Continue pantoprazole 40 mg daily, tums PRN  - Continue home Percocet 5-325 mg Q6 PRN     RENAL/  #Acute severe hypo-osmolar hyponatremia  :: Na 131 on admission; Na on AM labs 8/14 very low at 117  ::Has received diuresis and 500cc NS bolus this admission  ::serum osm 255, urine Na 70, urine osm 248  ::TSH, morning cortisol within normal limits  Plan:   -Nephrology consulted, recommended 15mg tolvaptan given 8/15, holding for now  - Repeat Urine studies  - PO Urea TID  -diuresis as above     #CKD 3b  :: Baseline Cr appears to be around 1.2  ::Cr 1.22 in ED  Plan:  - Daily RFP q4h  - Avoid nephrotoxic agents     ID  DANIELE     HEME/ONC  DANIELE     ENDO  #Hypothyroidism  ::TSH wnl  - Continue levothyroxine 75 mcg     #suspected DM  -fu A1c  -ISS #1, hypoglycemia protocol     MSK/Skin  DANIELE     F: PRN  E: K>4 Phos>3 Mag>2  N: NPO  A: PIV, L radial a-line (8/16)  O2: CPAP 30% FIO2  Drips: nipride  Abx: None  DVT PPx: pending heparin assay  GI PPx: PPI     CODE STATUS:  FULL (confirmed with patient on admission)  SURROGATE DECISION MAKER: Stefanie Roblero, [phone]     Konstantin Hunter MD  PGY-2, Internal Medicine  [facility]         [1] [Held by provider] amLODIPine, 5 mg, oral, Daily  [Held by provider] apixaban, 5 mg, oral, BID  ascorbic acid, 500 mg, oral, Daily  atorvastatin, 20 mg, oral, Daily  [Held by provider] carvedilol, 6.25 mg, oral, BID  cholecalciferol, 25 mcg, oral, Daily  ferrous sulfate, 1 tablet, oral, Daily  furosemide, 60 mg, intravenous, q12h  gabapentin, 300 mg, oral, Daily  insulin lispro, 0-10 Units, subcutaneous, TID AC  levothyroxine, 75 mcg, oral, Daily  nitroglycerin in 5 % dextrose, , ,   nystatin, 1 Application, Topical, BID  pantoprazole, 40 mg, oral, Daily before breakfast  PARoxetine, 20 mg, oral, Daily  polyethylene glycol, 17 g, oral, Daily  urea, 15 g, oral, TID  [2] nitroprusside, 0.25-5 mcg/kg/min, Last Rate: 0.75 mcg/kg/min (08/16/25 1045)  [3] PRN medications: acetaminophen, calcium carbonate, dextrose, dextrose, glucagon, glucagon, metoclopramide, nitroglycerin in 5 % dextrose, OLANZapine, oxyCODONE-acetaminophen, oxygen, oxygen, temazepam, trimethobenzamide

## 2025-08-16 NOTE — CARE PLAN
The clinical goals for the shift include pt will increase sodium level and have less wheezing by end of shift    Problem: Pain - Adult  Goal: Verbalizes/displays adequate comfort level or baseline comfort level  Outcome: Progressing     Problem: Safety - Adult  Goal: Free from fall injury  Outcome: Progressing     Problem: Discharge Planning  Goal: Discharge to home or other facility with appropriate resources  Outcome: Progressing     Problem: Chronic Conditions and Co-morbidities  Goal: Patient's chronic conditions and co-morbidity symptoms are monitored and maintained or improved  Outcome: Progressing     Problem: Nutrition  Goal: Nutrient intake appropriate for maintaining nutritional needs  Outcome: Progressing     Problem: Skin  Goal: Decreased wound size/increased tissue granulation at next dressing change  Outcome: Progressing  Goal: Participates in plan/prevention/treatment measures  Outcome: Progressing  Goal: Prevent/manage excess moisture  Outcome: Progressing  Goal: Prevent/minimize sheer/friction injuries  Outcome: Progressing  Goal: Promote/optimize nutrition  Outcome: Progressing  Goal: Promote skin healing  Outcome: Progressing

## 2025-08-16 NOTE — H&P
CARDIAC INTENSIVE CARE UNIT  ADMISSION H&P    Subjective   HPI:  Radha Torres is a 85 y.o. female with PMH HFrEF (EF 25-30%), aortic stenosis, Afib, s/p AV node ablation and leadless pacemaker in 2025, HTN, renal cell carcinoma who presented on 8/9/25 as a transfer from [facility] for epigastric pain, now transferred to CICU for possible flash pulmonary edema and hyponatremia.    Hospital Course to date:  On arrival to [facility] patient hemodynamically stable, afebrile on arrival in no acute distress with some mild epigastric burning. Trop 23>27, lipase within normal limits. EKG with no ischemic changes. CTAP negative for inflammatory process or bowel obstruction. She is scheduled for upcoming TAVR on 8/19/25 with Dr. Howell.    Labs were significant for BNP of 2422 on 8/9/25, and patient was diuresed with IV lasix 40 mg. Pacemaker was interrogated which showed no notable events. Dr. Howell coordination team was contacted regarding the possibility of expediting TAVR to an earlier date. TAVR was unable to be moved to an earlier date and remained scheduled for 8/19. On 8/11, patient was hyponatremic at 125, and lasix was held. Urine osm 250, urine sodium 29, and Na increased to 127 by 8/12. Patient continued to endorse epigastric pain and nausea. KUB was unremarkable, ECG remained unchanged. Patient experienced some relief with Tigan, Tums and home pain medication; epigastric pain most likely due to reflux/GERD, as it was burning in nature, worse after eating, worse with lying down, and improvement with Tums.  However, by 8/13, serum sodium resulted severely low at 120; repeat was also 120. Serum osm, morning cortisol, and 500 ml NS was ordered. Likely hypervolemia hyponatremia. Rapid called 8/15 AM for SOB, CXR showed worsening fluid overload and she 60 IV lasix. ABG 7.30/49/lactate 3.3.Nephrology was consulted who recommended urea and also tolvaptan 15mg which was given 8/15.     On the morning of 8/16 around 0400  "rapid was called for increased WOB. Noted to be hypertensive to 180/110, satting 94% on 4LNC. . Diffuse crackles b/l. Given 60mg IV lasix and transferred to CICU.     On arrival to CICU patient alert and placed on CPAP. Unable to obtain full ROS however patient nods her head when asked if her breathing is improved. A-line placed with SBPs 170-180s.     Results from last 7 days   Lab Units 08/15/25  0727 08/14/25  0721 08/13/25  0705   WBC AUTO x10*3/uL 14.3* 11.6* 12.2*   HEMOGLOBIN g/dL 9.7* 10.9* 11.6*   HEMATOCRIT % 30.3* 34.0* 36.3   PLATELETS AUTO x10*3/uL 256 292 318     Results from last 7 days   Lab Units 08/15/25  2256 08/15/25  1443 08/15/25  0727 08/14/25  1706 08/14/25  0721 08/13/25  1100 08/13/25  0705   CO2 mmol/L 30 30 30 28 26   < > 28   ANION GAP mmol/L 13 13 13 13 13   < > 13   BUN mg/dL 27* 27* 29* 17 18   < > 14   CREATININE mg/dL 1.20* 1.10* 1.12* 1.02 0.94   < > 1.06*   GLUCOSE mg/dL 212* 217* 193* 209* 183*   < > 174*   CALCIUM mg/dL 9.7 8.8 8.7 9.2 9.5   < > 9.6   MAGNESIUM mg/dL  --   --  1.59*  --  2.04  --  1.89   PHOSPHORUS mg/dL  --   --  3.1 3.2 2.8   < > 3.1    < > = values in this interval not displayed.            No lab exists for component: \"BILIT\"         No lab exists for component: \"PT\"  Results from last 7 days   Lab Units 08/14/25  2300 08/12/25  1112   POCT PH, ARTERIAL pH 7.30*  --    POCT PO2, ARTERIAL mm Hg 75*  --    POCT PCO2, ARTERIAL mm Hg 49*  --    FIO2 % 40 21     Results from last 7 days   Lab Units 08/12/25  1112   POCT PH, VENOUS pH 7.39   POCT PCO2, VENOUS mm Hg 49           No lab exists for component: \"BNPRESU\", \"CPKT\"  Results from last 7 days   Lab Units 08/12/25  0623   LIPASE U/L 11         Medical History:  PMH: Per HPI    Allergies: NKDA   PSH: Per HPI  FamHx: Non-contributory    SocHx: Unable to obtain at this time.    ROS: 10-point ROS negative unless otherwise mentioned in HPI            Objective   Vitals:    08/16/25 0100   BP: 110/53 "   Pulse: 95   Resp: 18   Temp: 36.2 °C (97.2 °F)   SpO2: 97%        Physical Exam:  Physical Exam  Constitutional:       General: She is in acute distress.      Appearance: She is obese. She is ill-appearing.   HENT:      Head: Normocephalic and atraumatic.      Mouth/Throat:      Mouth: Mucous membranes are moist.      Pharynx: Oropharynx is clear.     Eyes:      Extraocular Movements: Extraocular movements intact.      Pupils: Pupils are equal, round, and reactive to light.       Cardiovascular:      Rate and Rhythm: Normal rate and regular rhythm.      Heart sounds: Murmur heard.      No friction rub. No gallop.   Pulmonary:      Effort: Respiratory distress present.      Breath sounds: Wheezing and rales present.   Abdominal:      General: There is no distension.      Palpations: Abdomen is soft.      Tenderness: There is no abdominal tenderness. There is no guarding.     Musculoskeletal:      Right lower leg: Edema present.      Left lower leg: Edema present.     Skin:     General: Skin is dry.     Neurological:      General: No focal deficit present.      Mental Status: She is alert.          Labs:    Lab Results   Component Value Date    WBC 14.3 (H) 08/15/2025    HGB 9.7 (L) 08/15/2025    HCT 30.3 (L) 08/15/2025    MCV 87 08/15/2025     08/15/2025      Hemoglobin   Date Value Ref Range Status   08/15/2025 9.7 (L) 12.0 - 16.0 g/dL Final   08/14/2025 10.9 (L) 12.0 - 16.0 g/dL Final   08/13/2025 11.6 (L) 12.0 - 16.0 g/dL Final   08/12/2025 10.3 (L) 12.0 - 16.0 g/dL Final   08/11/2025 10.2 (L) 12.0 - 16.0 g/dL Final        Lab Results   Component Value Date    CREATININE 1.20 (H) 08/15/2025    BUN 27 (H) 08/15/2025     (LL) 08/15/2025    K 4.0 08/15/2025    CL 76 (L) 08/15/2025    CO2 30 08/15/2025      Glucose   Date Value Ref Range Status   08/15/2025 212 (H) 74 - 99 mg/dL Final      Creatinine   Date Value Ref Range Status   08/15/2025 1.20 (H) 0.50 - 1.05 mg/dL Final   08/15/2025 1.10 (H) 0.50 -  "1.05 mg/dL Final   08/15/2025 1.12 (H) 0.50 - 1.05 mg/dL Final   08/14/2025 1.02 0.50 - 1.05 mg/dL Final   08/14/2025 0.94 0.50 - 1.05 mg/dL Final      Lab Results   Component Value Date    CALCIUM 9.7 08/15/2025    PHOS 3.1 08/15/2025      Magnesium   Date Value Ref Range Status   08/15/2025 1.59 (L) 1.60 - 2.40 mg/dL Final      No results found for: \"ALT\", \"AST\", \"GGT\", \"ALKPHOS\", \"BILITOT\"   Lab Results   Component Value Date    INR 1.0 05/06/2025    PROTIME 11.0 05/06/2025        Imaging:  === Results for orders placed during the hospital encounter of 08/09/25 ===    ECG 12 lead [ECG1] 08/12/2025    Status: Normal    ___________________________________________________________________________    ECG 12 lead [ECG1] 08/09/2025    Status: Normal    ___________________________________________________________________________    XR chest 1 view [[id number]] 08/14/2025    Status: Normal  1. Increased hazy alveolar opacities throughout the right mid to  lower lung, which may reflect enlarging pleural effusion with  worsening atelectasis versus developing infectious/inflammatory  infiltrate.  2. Similar cardiomegaly with bilateral pleural effusions and diffuse  interstitial pulmonary edema.    I personally reviewed the image(s)/study and resident interpretation  as stated by Dr. Kimmy Sepulveda MD. I agree with the findings as  stated. This study was interpreted at [facility], [place], [place].    MACRO:  None    Signed by: Sarahy Suarez 8/15/2025 7:48 AM  Dictation workstation:   [id number]    ___________________________________________________________________________    XR chest 1 view [[id number]] 08/14/2025    Status: Normal  1. Unchanged small left and moderate right pleural effusions with  associated bibasilar consolidation/atelectasis, on a background of  unchanged pulmonary edema.  2. Leadless pacemaker overlying the cardiac silhouette.    I personally reviewed the images/study and I " agree with the findings  as stated. This study was interpreted by radiology resident Pepe Urban D.O. at [place], [place].    Signed by: Sarahy Suarez 8/14/2025 5:03 PM  Dictation workstation:   [id number]    ___________________________________________________________________________    XR abdomen 1 view [[id number]] 08/12/2025    Status: Normal  1.  Nonacute abdominal findings on today's exam.    Signed by: Sarahy Suarez 8/12/2025 12:28 PM  Dictation workstation:   [id number]    ___________________________________________________________________________    XR chest 1 view [[id number]] 08/12/2025    Status: Normal  1. Unchanged small left and moderate right pleural effusions with  associated basilar consolidation/atelectasis.  2. Leadless pacemaker overlying the cardiac silhouette.    I personally reviewed the images/study and I agree with the findings  as stated. This study was interpreted by radiology resident Pepe Urban D.O. at [place], [place].    Signed by: Sarahy Suarez 8/12/2025 10:51 AM  Dictation workstation:   [id number]    ___________________________________________________________________________    XR chest 2 views [[id number]] 08/09/2025    Status: Normal  1.  Interval increase in bilateral pleural effusions with basilar  edema. Correlate with cardiac and fluid status.      Signed by: Yamil Fraser 8/9/2025 1:30 PM  Dictation workstation:   [id number]      === Results for orders placed during the hospital encounter of 07/31/25 ===    CT TAVR low contrast chest abdomen pelvis [[id number]] 07/31/2025    Status: Normal  1. Moderate atherosclerotic changes involving the thoracoabdominal  aorta and its branches. Access vessels are patent.  2. Severe calcifications of the aortic valve correlating with history  of aortic stenosis.  3. Left ventricular and left atrial enlargement and correlate with  recent  echocardiogram.  4. Mildly dilated pulmonary artery and correlate with concern for  pulmonary hypertension.  5. Severe coronary artery calcifications. Please note that, the  present study is not tailored for evaluation of coronary arteries and  correlate with patient's symptoms and cardiovascular risk factors.  6. Small bilateral pleural effusions, left larger than right.  7. Right-sided chest wall deformity with adjacent pleural thickening  and atelectasis, also seen on prior imaging.  8. Status post L3-L5 spinal fusion. Retrolisthesis of L1-L2 and L2-L3.    I personally reviewed the images/study and I agree with the findings  as stated by Rocky Ye D.O. (Radiology resident). This study was  interpreted at [facility],  [place], [place].    MACRO:  None    Signed by: Bairon Katz 7/31/2025 4:27 PM  Dictation workstation:   [id number]      === Results for orders placed in visit on 07/11/25 ===    Echocardiogram [[id number]]     Status: Normal      === Results for orders placed in visit on 07/09/25 ===    Echocardiogram [[id number]]     Status: Normal     Micro:  No results found for the last 90 days.            Assessment/Plan   Radha Torres is a 85 y.o. female with PMH HFrEF (EF 25-30%), aortic stenosis, Afib, s/p AV node ablation and leadless pacemaker in 2025, HTN, renal cell carcinoma who presented on 8/9/25 as a transfer from [facility] for epigastric pain, now transferred to CICU for possible flash pulmonary edema and hyponatremia. Suspect GI symptoms and hyponatremia due to severe fluid overload iso ADHF. Received diuresis and on CPAP. Also received a dose of tolvaptan 8/15 per nephrology. Will start nipride however exhibit caution given severe aortic stenosis. Pending TAVR likely this admission.     CNS  #agitation  -1:1 sitter  -PRN zyprexa  -consider hyponatremia as etiology, fu renal recs     PULM  #flash pulmonary edema  -CPAP as tolerated, SpO2>92%  -nipride gtt, caution iso  severe aortic stenosis, SBP goal 130-150  -diuresis with IV lasix, caution iso severe aortic stenosis    CV  #Severe aortic stenosis  #HFrEF (EF 25-30%)  #HTN  :: Echo 7/11/25 - EF 25-30%, left ventricle mildly dilated, mild concentric LV hypertrophy, leadless pacemaker in right atria and RV, aortic valve is heavily calcified with appearance of severe aortic stenosis (peak gradient 52mmHg, mean gradient 25mmHg, aortic valve area 0.8cm^2), trace AR, mild MR and TR, RVSP 50-60mmHg,   ::Scheduled for TAVR 8/19/25 with Dr. Howell  ::EKG with no ischemic changes  ::Troponin 23>27  Plan:  -diuresis with IV lasix (given 60mg IV 8/16 0500), caution iso severe aortic stenosis  -strict I/Os, redose as needed, consider lasix gtt  -holding home coreg 6.25mg, amlodipine 5mg  -nipride gtt, caution iso severe aortic stenosis  -Plan for TAVR 8/19 still, pre-TAVR workup complete,will need to touch base with structural in AM    #Atrial fibrillation  #S/p AV emiliano ablation and leadless pacemaker placement 5/6/25  :: Home regimen - coreg 6.25 BID, amlodipine 2.5 mg, Eliquis 5mg BID  Plan:  -Holding eliquis given possible TAVR, consider heparin gtt if needed for stroke risk reduction inpatient  -fu heparin assay  -holding home coreg    GI  #Epigastric pain  #Nausea/GI upset  :: EGG performed 2018 at [facility] notable for PUD  ::Possibly due to volume overload/ADHF vs GERD  Plan:   - Tigan PRN ordered for nausea   - KUB with no acute findings; ECG unchanged   - Continue pantoprazole 40 mg daily, tums PRN  - Continue home Percocet 5-325 mg Q6 PRN    RENAL/  #Acute severe hypo-osmolar hyponatremia  :: Na 131 on admission; Na on AM labs 8/14 very low at 117  ::Has received diuresis and 500cc NS bolus this admission  ::serum osm 255, urine Na 70, urine osm 248  ::TSH, morning cortisol within normal limits  Plan:   -Nephrology consulted, recommended 15mg tolvaptan given 8/15  -fu neprho recs, trend RFP q8 for now  -diuresis as above      #CKD  3b  :: Baseline Cr appears to be around 1.2  ::Cr 1.22 in ED  Plan:  - Daily RFP  - Avoid nephrotoxic agents      ID  DANIELE    HEME/ONC  DANIELE    ENDO  #Hypothyroidism  ::TSH wnl  - Continue levothyroxine 75 mcg    #suspected DM  -fu A1c  -ISS #1, hypoglycemia protocol    MSK/Skin  DANIELE      F: PRN  E: K>4 Phos>3 Mag>2  N: NPO  A: PIV, L radial a-line (8/16)  O2: CPAP 30% FIO2  Drips: nipride  Abx: None  DVT PPx: pending heparin assay  GI PPx: PPI    CODE STATUS: FULL (confirmed with patient on admission)  SURROGATE DECISION MAKER: Stefanie Roblero, [phone]     Newton Maddox MD  Resident, PGY-3

## 2025-08-16 NOTE — PROGRESS NOTES
"Radha Torres   85 y.o.    @WT@  N/Room: 93371723/15/15-A    Subjective: Overnight patient had a rapid response called due to hypertension, worsening shortness of breath, and agitation.  She was given 60 mg IV Lasix push and transferred to the CICU.  On speaking with patient today, patient states that she feels \"so-so\".  Shortness of breath is still present.  She states that she is urinating a lot.     Objective:     Meds:   [Held by provider] amLODIPine, 5 mg, Daily  [Held by provider] apixaban, 5 mg, BID  ascorbic acid, 500 mg, Daily  atorvastatin, 20 mg, Daily  [Held by provider] carvedilol, 6.25 mg, BID  cholecalciferol, 25 mcg, Daily  ferrous sulfate, 1 tablet, Daily  furosemide, 60 mg, q12h  gabapentin, 300 mg, Daily  insulin lispro, 0-10 Units, TID AC  levothyroxine, 75 mcg, Daily  nitroglycerin in 5 % dextrose, ,   nystatin, 1 Application, BID  pantoprazole, 40 mg, Daily before breakfast  PARoxetine, 20 mg, Daily  polyethylene glycol, 17 g, Daily  urea, 15 g, TID      nitroprusside, Last Rate: 0.75 mcg/kg/min (08/16/25 1045)      acetaminophen, 650 mg, q6h PRN  calcium carbonate, 500 mg of calcium carbonate, 4x daily PRN  dextrose, 12.5 g, q15 min PRN  dextrose, 25 g, q15 min PRN  glucagon, 1 mg, q15 min PRN  glucagon, 1 mg, q15 min PRN  metoclopramide, 10 mg, q6h PRN  nitroglycerin in 5 % dextrose, ,   OLANZapine, 5 mg, Nightly PRN  oxyCODONE-acetaminophen, 1 tablet, q6h PRN  oxygen, 1 Dose, Continuous - O2/gases  oxygen, 1 Dose, Continuous - O2/gases  temazepam, 7.5 mg, Nightly PRN  trimethobenzamide, 200 mg, q6h PRN        Vitals:    08/16/25 1100   BP:    Pulse: 85   Resp: 18   Temp: 35.5 °C (95.9 °F)   SpO2: 100%          Intake/Output Summary (Last 24 hours) at 8/16/2025 1124  Last data filed at 8/16/2025 1045  Gross per 24 hour   Intake 365.03 ml   Output 2775 ml   Net -2409.97 ml       General appearance: no distress  Eyes: non-icteric  Skin: no apparent rash  Heart: regular rate and rhythm   Lungs: " normal work of breathing, on NC   Abdomen: soft, nt/nd  Extremities: 2+ pitting edema bilat      Blood Labs:  Results for orders placed or performed during the hospital encounter of 08/09/25 (from the past 24 hours)   Basic Metabolic Panel   Result Value Ref Range    Glucose 217 (H) 74 - 99 mg/dL    Sodium 116 (LL) 136 - 145 mmol/L    Potassium 4.1 3.5 - 5.3 mmol/L    Chloride 77 (L) 98 - 107 mmol/L    Bicarbonate 30 21 - 32 mmol/L    Anion Gap 13 mmol/L    Urea Nitrogen 27 (H) 6 - 23 mg/dL    Creatinine 1.10 (H) 0.50 - 1.05 mg/dL    eGFR 50 (L) >60 mL/min/1.73m*2    Calcium 8.8 8.6 - 10.6 mg/dL   Basic Metabolic Panel   Result Value Ref Range    Glucose 212 (H) 74 - 99 mg/dL    Sodium 115 (LL) 136 - 145 mmol/L    Potassium 4.0 3.5 - 5.3 mmol/L    Chloride 76 (L) 98 - 107 mmol/L    Bicarbonate 30 21 - 32 mmol/L    Anion Gap 13 10 - 20 mmol/L    Urea Nitrogen 27 (H) 6 - 23 mg/dL    Creatinine 1.20 (H) 0.50 - 1.05 mg/dL    eGFR 45 (L) >60 mL/min/1.73m*2    Calcium 9.7 8.6 - 10.6 mg/dL   Blood Gas Arterial Full Panel   Result Value Ref Range    POCT pH, Arterial 7.39 7.38 - 7.42 pH    POCT pCO2, Arterial 43 (H) 38 - 42 mm Hg    POCT pO2, Arterial 91 85 - 95 mm Hg    POCT SO2, Arterial 98 94 - 100 %    POCT Oxy Hemoglobin, Arterial 95.1 94.0 - 98.0 %    POCT Hematocrit Calculated, Arterial 35.0 (L) 36.0 - 46.0 %    POCT Sodium, Arterial 112 (LL) 136 - 145 mmol/L    POCT Potassium, Arterial 4.3 3.5 - 5.3 mmol/L    POCT Chloride, Arterial 78 (L) 98 - 107 mmol/L    POCT Ionized Calcium, Arterial 1.14 1.10 - 1.33 mmol/L    POCT Glucose, Arterial 316 (H) 74 - 99 mg/dL    POCT Lactate, Arterial 1.7 0.4 - 2.0 mmol/L    POCT Base Excess, Arterial 0.8 -2.0 - 3.0 mmol/L    POCT HCO3 Calculated, Arterial 26.0 22.0 - 26.0 mmol/L    POCT Hemoglobin, Arterial 11.5 (L) 12.0 - 16.0 g/dL    POCT Anion Gap, Arterial 12 10 - 25 mmo/L    Patient Temperature 37.0 degrees Celsius    FiO2 30 %   CBC and Auto Differential   Result Value Ref  Range    WBC 15.0 (H) 4.4 - 11.3 x10*3/uL    nRBC 0.0 0.0 - 0.0 /100 WBCs    RBC 3.85 (L) 4.00 - 5.20 x10*6/uL    Hemoglobin 11.0 (L) 12.0 - 16.0 g/dL    Hematocrit 32.1 (L) 36.0 - 46.0 %    MCV 83 80 - 100 fL    MCH 28.6 26.0 - 34.0 pg    MCHC 34.3 32.0 - 36.0 g/dL    RDW 13.2 11.5 - 14.5 %    Platelets 350 150 - 450 x10*3/uL    Neutrophils % 92.2 40.0 - 80.0 %    Immature Granulocytes %, Automated 0.7 0.0 - 0.9 %    Lymphocytes % 3.4 13.0 - 44.0 %    Monocytes % 3.5 2.0 - 10.0 %    Eosinophils % 0.1 0.0 - 6.0 %    Basophils % 0.1 0.0 - 2.0 %    Neutrophils Absolute 13.84 (H) 1.60 - 5.50 x10*3/uL    Immature Granulocytes Absolute, Automated 0.10 0.00 - 0.50 x10*3/uL    Lymphocytes Absolute 0.51 (L) 0.80 - 3.00 x10*3/uL    Monocytes Absolute 0.53 0.05 - 0.80 x10*3/uL    Eosinophils Absolute 0.02 0.00 - 0.40 x10*3/uL    Basophils Absolute 0.02 0.00 - 0.10 x10*3/uL   Magnesium   Result Value Ref Range    Magnesium 2.00 1.60 - 2.40 mg/dL   Lactate   Result Value Ref Range    Lactate 1.9 0.4 - 2.0 mmol/L   Renal function panel   Result Value Ref Range    Glucose 280 (H) 74 - 99 mg/dL    Sodium 113 (LL) 136 - 145 mmol/L    Potassium 4.2 3.5 - 5.3 mmol/L    Chloride 77 (L) 98 - 107 mmol/L    Bicarbonate 26 21 - 32 mmol/L    Anion Gap 14 10 - 20 mmol/L    Urea Nitrogen 27 (H) 6 - 23 mg/dL    Creatinine 1.18 (H) 0.50 - 1.05 mg/dL    eGFR 46 (L) >60 mL/min/1.73m*2    Calcium 9.6 8.6 - 10.6 mg/dL    Phosphorus 4.1 2.5 - 4.9 mg/dL    Albumin 4.0 3.4 - 5.0 g/dL   Troponin I, High Sensitivity   Result Value Ref Range    Troponin I, High Sensitivity ([facility]) 34 0 - 34 ng/L   Heparin Assay   Result Value Ref Range    Heparin Unfractionated >2.0 (HH) See Comment Below for Therapeutic Ranges IU/mL              ASSESSMENT:  Radha Torres is a  85 y.o.  Year old , with PMHx of HFrEF (EF 25-30% on TTE 7/11/25), aortic stenosis, Afib, s/p AV emiliano ablation and leadless pacemaker 2025, HTN, CKD, renal cell carcinoma who presented on 8/9/2025  [facility] Nephrology consulted for worsening hyponatremia.      #Hyponatremia   - Sodium 131 on 8/09 arrival -> 117 (8/14) -> 118 (8/15) ->  113 (8/16)   - EF 25-30% on TTE 7/11   - Urine chem 8/11 (had received Lasix)- urine sodium 29, urine osm 250. FeNa 0.6%,    - Serum osm 8/13 - 263  - Repeat urine chem 8/14 (2 hrs after Lasix dose) - urine sodium 70, urine osm 248. Repeat serum osm 8/14 - 255   - True hypotonic hyponatremia.  Patient looks fluid overloaded on exam, chest x-ray on 8/14 showing pulmonary congestion, and patient had been off diuretics since 8/11. Given these findings, most likely differential is hypervolemic hyponatremia. Could also consider low effective osmolality (\"tea and toast diet\") contributing to patient's hyponatremia given her home diet for 2-3 weeks was minimal in protein and patient hasn't had a great appetite while inpatient.   - Was given 15mg tolvaptan on 8/15 w/ no response in serum sodium. Sodium 116 before administration, 115 8 hrs after tolvaptan.      Recommendations:  - Repeat urine electrolytes, urine urea, urine osmolality, serum osmolality before giving Lasix and urea packet.   - Urea packet 15mg TID   - Place Pinzon for accurate I's/O's  - Check RFP every 4 hours  -Consider getting renal duplex ultrasound in vascular lab to assess for renal artery stenosis  -Will assess later today if she needs tolvaptan  - Maintain euvolemic   - Encourage good diet w/ adequate protein intake   - strict Is/Os     Damon Garg MD  Internal Medicine PGY-1   Daytime / Weekend Renal Pager [phone]  After 7 pm Emergencies [phone] Pager [phone]    "

## 2025-08-16 NOTE — PROCEDURES
Insert arterial line    Date/Time: 8/16/2025 5:16 AM    Performed by: Newton Maddox MD  Authorized by: Anita De Leon MD    Consent:     Consent obtained:  Written    Consent given by:  Patient    Risks, benefits, and alternatives were discussed: yes      Risks discussed:  Bleeding, ischemia, infection and pain  Universal protocol:     Immediately prior to procedure, a time out was called: yes      Patient identity confirmed:  Arm band  Indications:     Indications: hemodynamic monitoring    Sedation:     Sedation type:  None  Anesthesia:     Anesthesia method:  Local infiltration    Local anesthetic:  Lidocaine 1% w/o epi  Procedure details:     Location:  L radial    Needle gauge:  20 G    Placement technique:  Seldinger    Number of attempts:  1    Transducer: waveform confirmed    Post-procedure details:     Post-procedure:  Sutured    Procedure completion:  Tolerated well, no immediate complications

## 2025-08-16 NOTE — SIGNIFICANT EVENT
RAPID RESPONSE NOTE    Reason for Rapid Response:   []    BAT []  New CPAP/BiPAP []  Bleeding []  Change in mental status   []  Chest pain []  Code blue []  FiO2 >/= 50% []  HR </= 40 bpm   []  HR >/= 130 bpm []  Hyperglycemia []  Hypoglycemia []  SpO2 < 90%    []  RR </= 8 bpm []  RR >/= 30 bpm []  SBP </= 90 mmHg []  Seizure   [x]  Staff concern: shortness of breath         Vital Signs on Arrival: /110, /min, saturation 94% on 4 l/min    Focused Physical Exam:   General: in distress, tachypneic, diaphoretic, excessive work of breathing  Cardiac: tachycardic, aortic murmur  Pulm: bilateral diffuse crackles . Difficulty breathing  Abdomen: abdominal breathing  Extremities: cold and edematous  Neuro: intact    Workup:  Patient in acute respiratory distress secondary to flash pulmonary edema.  -CICU fellow called and patient transferred directly to CICU .  Given lasix 60 mg push before transfer    Intervention:  Diuretics, transfer to cicu for positive pressure and diuretics    Working Diagnosis/Impression: acute pulmonary edema    Conclusion:     [x] Patient failed to improve, urgent CICU transfer    ICU Notified:   [x] Yes  [] No        Xu Guzman MD

## 2025-08-16 NOTE — SIGNIFICANT EVENT
Rapid Response Nurse Note: Rapid Response    Pager time: [phone]  Arrival time: 420  Event end time: 441   Location: L5      Rapid response initiated by:  [] Rapid response RN [] Family [] Nursing Supervisor [x] Physician (NACR)   [] RADAR auto page [] Sepsis auto-page [] RN [] RT   [] NP/PA [] Other:     Primary reason for call:   [] BAT [] New CPAP/BiPAP [] Bleeding [] Change in mental status   [] Chest pain [] Code blue [] FiO2 >/= 50% [] HR </= 40 bpm   [] HR >/= 130 bpm [] Hyperglycemia [] Hypoglycemia [] RADAR    [] RR </= 8 bpm [] RR >/= 30 bpm [] SBP </= 90 mmHg [] SpO2 < 90%   [] Seizure [] Sepsis [] Shortness of breath  [] Staff concern: see comments     Initial VS and/or RADAR VS: T n/a °C; HR 85; RR 27; /100; SPO2 99%4 L NC    Providers present at bedside (if applicable): AGATA Guzman MD ([facility])    Name of ICU Provider contacted (if applicable): CICU transfer arrangements made prior to Rapid Response page per [place] ARANA resident and CICU Fellow    Interventions:  [] None [] ABG/VBG [x] Assist w/ICU transfer [] BAT paged    [] Bag mask [] Blood [] Cardioversion [] Code Blue   [] Code blue for intubation [] Code status changed [] Chest x-ray [] EKG   [] IV fluid/bolus [] KUB x-ray [] Labs/cultures [] Medication   [] Nebulizer treatment [] NIPPV (CPAP/BiPAP) [] Oxygen [] Oral airway   [] Peripheral IV [] Palliative care consult [] CT/MRI [] Sepsis protocol    [] Suctioned [] Other:     Outcome:  [] Coded and  [] Code blue for intubation [] Coded and transferred to ICU []  on division   [] Remained on division (no change) [] Remained on division + additional monitoring [] Remained in ED [] Transferred to ED   [x] Transferred to ICU [] Transferred to inpatient status [] Transferred for interventions (procedure) [] Transferred to ICU stepdown    [] Transferred to surgery [] Transferred to telemetry [] Sepsis protocol [] STEMI protocol   [] Stroke protocol [] Bedside nurse instructed to  page rapid response for any concerns or acute change in condition/VS     Additional Comments: Rapid Response RN paged for transport assistance of patient with suspected flash pulmonary edema--being transferred to CICU for NIPPV and additional monitoring. Rapid RN met patient and team of nursing and physician staff in hallway near elevators. Patient awake, alert, able to follow directions en route to CICU. VS captured on continuous monitoring equipment while off unit. RT on standby in ICU room and applied NIPPV mask to patient; MD to MD and RN to RN report provided at bedside in ICU. No issues.

## 2025-08-17 LAB
ALBUMIN SERPL BCP-MCNC: 3.3 G/DL (ref 3.4–5)
ALBUMIN SERPL BCP-MCNC: 3.3 G/DL (ref 3.4–5)
ALBUMIN SERPL BCP-MCNC: 3.4 G/DL (ref 3.4–5)
ANION GAP SERPL CALC-SCNC: 11 MMOL/L (ref 10–20)
ANION GAP SERPL CALC-SCNC: 12 MMOL/L (ref 10–20)
BASOPHILS # BLD AUTO: 0.02 X10*3/UL (ref 0–0.1)
BASOPHILS NFR BLD AUTO: 0.2 %
BUN SERPL-MCNC: 23 MG/DL (ref 6–23)
BUN SERPL-MCNC: 27 MG/DL (ref 6–23)
BUN SERPL-MCNC: 29 MG/DL (ref 6–23)
BUN SERPL-MCNC: 32 MG/DL (ref 6–23)
BUN SERPL-MCNC: 33 MG/DL (ref 6–23)
CALCIUM SERPL-MCNC: 9.1 MG/DL (ref 8.6–10.6)
CALCIUM SERPL-MCNC: 9.2 MG/DL (ref 8.6–10.6)
CALCIUM SERPL-MCNC: 9.4 MG/DL (ref 8.6–10.6)
CHLORIDE SERPL-SCNC: 85 MMOL/L (ref 98–107)
CHLORIDE SERPL-SCNC: 86 MMOL/L (ref 98–107)
CHLORIDE SERPL-SCNC: 87 MMOL/L (ref 98–107)
CHLORIDE SERPL-SCNC: 87 MMOL/L (ref 98–107)
CHLORIDE SERPL-SCNC: 88 MMOL/L (ref 98–107)
CO2 SERPL-SCNC: 33 MMOL/L (ref 21–32)
CO2 SERPL-SCNC: 33 MMOL/L (ref 21–32)
CO2 SERPL-SCNC: 34 MMOL/L (ref 21–32)
CREAT SERPL-MCNC: 1.01 MG/DL (ref 0.5–1.05)
CREAT SERPL-MCNC: 1.05 MG/DL (ref 0.5–1.05)
CREAT SERPL-MCNC: 1.07 MG/DL (ref 0.5–1.05)
CREAT SERPL-MCNC: 1.08 MG/DL (ref 0.5–1.05)
CREAT SERPL-MCNC: 1.11 MG/DL (ref 0.5–1.05)
EGFRCR SERPLBLD CKD-EPI 2021: 49 ML/MIN/1.73M*2
EGFRCR SERPLBLD CKD-EPI 2021: 51 ML/MIN/1.73M*2
EGFRCR SERPLBLD CKD-EPI 2021: 52 ML/MIN/1.73M*2
EGFRCR SERPLBLD CKD-EPI 2021: 53 ML/MIN/1.73M*2
EGFRCR SERPLBLD CKD-EPI 2021: 55 ML/MIN/1.73M*2
EOSINOPHIL # BLD AUTO: 0.01 X10*3/UL (ref 0–0.4)
EOSINOPHIL NFR BLD AUTO: 0.1 %
ERYTHROCYTE [DISTWIDTH] IN BLOOD BY AUTOMATED COUNT: 14 % (ref 11.5–14.5)
GLUCOSE BLD MANUAL STRIP-MCNC: 149 MG/DL (ref 74–99)
GLUCOSE BLD MANUAL STRIP-MCNC: 153 MG/DL (ref 74–99)
GLUCOSE BLD MANUAL STRIP-MCNC: 160 MG/DL (ref 74–99)
GLUCOSE SERPL-MCNC: 135 MG/DL (ref 74–99)
GLUCOSE SERPL-MCNC: 137 MG/DL (ref 74–99)
GLUCOSE SERPL-MCNC: 138 MG/DL (ref 74–99)
GLUCOSE SERPL-MCNC: 141 MG/DL (ref 74–99)
GLUCOSE SERPL-MCNC: 141 MG/DL (ref 74–99)
HCT VFR BLD AUTO: 29.4 % (ref 36–46)
HGB BLD-MCNC: 10.2 G/DL (ref 12–16)
IMM GRANULOCYTES # BLD AUTO: 0.08 X10*3/UL (ref 0–0.5)
IMM GRANULOCYTES NFR BLD AUTO: 0.6 % (ref 0–0.9)
LYMPHOCYTES # BLD AUTO: 0.86 X10*3/UL (ref 0.8–3)
LYMPHOCYTES NFR BLD AUTO: 6.9 %
MAGNESIUM SERPL-MCNC: 1.98 MG/DL (ref 1.6–2.4)
MCH RBC QN AUTO: 29.1 PG (ref 26–34)
MCHC RBC AUTO-ENTMCNC: 34.7 G/DL (ref 32–36)
MCV RBC AUTO: 84 FL (ref 80–100)
MONOCYTES # BLD AUTO: 0.96 X10*3/UL (ref 0.05–0.8)
MONOCYTES NFR BLD AUTO: 7.7 %
NEUTROPHILS # BLD AUTO: 10.61 X10*3/UL (ref 1.6–5.5)
NEUTROPHILS NFR BLD AUTO: 84.5 %
NRBC BLD-RTO: 0 /100 WBCS (ref 0–0)
PHOSPHATE SERPL-MCNC: 3 MG/DL (ref 2.5–4.9)
PHOSPHATE SERPL-MCNC: 3 MG/DL (ref 2.5–4.9)
PHOSPHATE SERPL-MCNC: 3.1 MG/DL (ref 2.5–4.9)
PLATELET # BLD AUTO: 300 X10*3/UL (ref 150–450)
POTASSIUM SERPL-SCNC: 3.8 MMOL/L (ref 3.5–5.3)
POTASSIUM SERPL-SCNC: 4 MMOL/L (ref 3.5–5.3)
POTASSIUM SERPL-SCNC: 4.2 MMOL/L (ref 3.5–5.3)
POTASSIUM SERPL-SCNC: 4.2 MMOL/L (ref 3.5–5.3)
POTASSIUM SERPL-SCNC: 4.4 MMOL/L (ref 3.5–5.3)
RBC # BLD AUTO: 3.5 X10*6/UL (ref 4–5.2)
SODIUM SERPL-SCNC: 125 MMOL/L (ref 136–145)
SODIUM SERPL-SCNC: 127 MMOL/L (ref 136–145)
SODIUM SERPL-SCNC: 129 MMOL/L (ref 136–145)
SODIUM SERPL-SCNC: 129 MMOL/L (ref 136–145)
SODIUM SERPL-SCNC: 130 MMOL/L (ref 136–145)
UFH PPP CHRO-ACNC: 1.4 IU/ML (ref ?–1.1)
UFH PPP CHRO-ACNC: 1.6 IU/ML (ref ?–1.1)
WBC # BLD AUTO: 12.5 X10*3/UL (ref 4.4–11.3)

## 2025-08-17 PROCEDURE — 85025 COMPLETE CBC W/AUTO DIFF WBC: CPT

## 2025-08-17 PROCEDURE — 99233 SBSQ HOSP IP/OBS HIGH 50: CPT | Performed by: INTERNAL MEDICINE

## 2025-08-17 PROCEDURE — 2500000001 HC RX 250 WO HCPCS SELF ADMINISTERED DRUGS (ALT 637 FOR MEDICARE OP)

## 2025-08-17 PROCEDURE — 83735 ASSAY OF MAGNESIUM: CPT

## 2025-08-17 PROCEDURE — 2500000004 HC RX 250 GENERAL PHARMACY W/ HCPCS (ALT 636 FOR OP/ED)

## 2025-08-17 PROCEDURE — 1200000002 HC GENERAL ROOM WITH TELEMETRY DAILY

## 2025-08-17 PROCEDURE — 37799 UNLISTED PX VASCULAR SURGERY: CPT

## 2025-08-17 PROCEDURE — 80069 RENAL FUNCTION PANEL: CPT

## 2025-08-17 PROCEDURE — 99291 CRITICAL CARE FIRST HOUR: CPT

## 2025-08-17 PROCEDURE — 85520 HEPARIN ASSAY: CPT

## 2025-08-17 PROCEDURE — 2500000002 HC RX 250 W HCPCS SELF ADMINISTERED DRUGS (ALT 637 FOR MEDICARE OP, ALT 636 FOR OP/ED)

## 2025-08-17 PROCEDURE — 83690 ASSAY OF LIPASE: CPT

## 2025-08-17 PROCEDURE — 2500000001 HC RX 250 WO HCPCS SELF ADMINISTERED DRUGS (ALT 637 FOR MEDICARE OP): Performed by: STUDENT IN AN ORGANIZED HEALTH CARE EDUCATION/TRAINING PROGRAM

## 2025-08-17 PROCEDURE — 82947 ASSAY GLUCOSE BLOOD QUANT: CPT

## 2025-08-17 RX ORDER — POTASSIUM CHLORIDE 20 MEQ/1
40 TABLET, EXTENDED RELEASE ORAL ONCE
Status: COMPLETED | OUTPATIENT
Start: 2025-08-17 | End: 2025-08-17

## 2025-08-17 RX ORDER — HYDRALAZINE HYDROCHLORIDE 10 MG/1
10 TABLET, FILM COATED ORAL 3 TIMES DAILY
Status: DISCONTINUED | OUTPATIENT
Start: 2025-08-17 | End: 2025-08-18

## 2025-08-17 RX ORDER — AMLODIPINE BESYLATE 10 MG/1
10 TABLET ORAL DAILY
Status: DISCONTINUED | OUTPATIENT
Start: 2025-08-18 | End: 2025-08-18

## 2025-08-17 RX ADMIN — PAROXETINE 20 MG: 20 TABLET, FILM COATED ORAL at 08:47

## 2025-08-17 RX ADMIN — HYDRALAZINE HYDROCHLORIDE 10 MG: 10 TABLET ORAL at 20:30

## 2025-08-17 RX ADMIN — Medication: at 10:00

## 2025-08-17 RX ADMIN — SODIUM NITROPRUSSIDE IN 0.9% SODIUM CHLORIDE 1.5 MCG/KG/MIN: 0.5 INJECTION INTRAVENOUS at 11:10

## 2025-08-17 RX ADMIN — PANTOPRAZOLE SODIUM 40 MG: 40 TABLET, DELAYED RELEASE ORAL at 08:47

## 2025-08-17 RX ADMIN — OXYCODONE AND ACETAMINOPHEN 1 TABLET: 5; 325 TABLET ORAL at 20:30

## 2025-08-17 RX ADMIN — OXYCODONE AND ACETAMINOPHEN 1 TABLET: 5; 325 TABLET ORAL at 01:24

## 2025-08-17 RX ADMIN — SODIUM NITROPRUSSIDE IN 0.9% SODIUM CHLORIDE 1 MCG/KG/MIN: 0.5 INJECTION INTRAVENOUS at 18:44

## 2025-08-17 RX ADMIN — METOCLOPRAMIDE 10 MG: 5 INJECTION, SOLUTION INTRAMUSCULAR; INTRAVENOUS at 21:40

## 2025-08-17 RX ADMIN — LEVOTHYROXINE SODIUM 75 MCG: 0.07 TABLET ORAL at 08:47

## 2025-08-17 RX ADMIN — HYDRALAZINE HYDROCHLORIDE 10 MG: 10 TABLET ORAL at 15:11

## 2025-08-17 RX ADMIN — ATORVASTATIN CALCIUM 20 MG: 20 TABLET, FILM COATED ORAL at 08:47

## 2025-08-17 RX ADMIN — GABAPENTIN 300 MG: 300 CAPSULE ORAL at 08:47

## 2025-08-17 RX ADMIN — POTASSIUM CHLORIDE 40 MEQ: 1500 TABLET, EXTENDED RELEASE ORAL at 21:40

## 2025-08-17 RX ADMIN — SODIUM NITROPRUSSIDE IN 0.9% SODIUM CHLORIDE 1 MCG/KG/MIN: 0.5 INJECTION INTRAVENOUS at 02:57

## 2025-08-17 RX ADMIN — SODIUM NITROPRUSSIDE IN 0.9% SODIUM CHLORIDE 1.5 MCG/KG/MIN: 0.5 INJECTION INTRAVENOUS at 20:30

## 2025-08-17 RX ADMIN — ACETAMINOPHEN 650 MG: 325 TABLET, FILM COATED ORAL at 08:48

## 2025-08-17 RX ADMIN — OXYCODONE AND ACETAMINOPHEN 1 TABLET: 5; 325 TABLET ORAL at 15:11

## 2025-08-17 RX ADMIN — FERROUS SULFATE TAB 325 MG (65 MG ELEMENTAL FE) 1 TABLET: 325 (65 FE) TAB at 08:47

## 2025-08-17 RX ADMIN — AMLODIPINE BESYLATE 5 MG: 5 TABLET ORAL at 11:40

## 2025-08-17 RX ADMIN — POLYETHYLENE GLYCOL 3350 17 G: 17 POWDER, FOR SOLUTION ORAL at 08:47

## 2025-08-17 RX ADMIN — Medication 25 MCG: at 08:47

## 2025-08-17 RX ADMIN — OXYCODONE HYDROCHLORIDE AND ACETAMINOPHEN 500 MG: 500 TABLET ORAL at 08:47

## 2025-08-17 ASSESSMENT — PAIN SCALES - GENERAL
PAINLEVEL_OUTOF10: 0 - NO PAIN
PAINLEVEL_OUTOF10: 4
PAINLEVEL_OUTOF10: 10 - WORST POSSIBLE PAIN
PAINLEVEL_OUTOF10: 7
PAINLEVEL_OUTOF10: 1
PAINLEVEL_OUTOF10: 8
PAINLEVEL_OUTOF10: 0 - NO PAIN
PAINLEVEL_OUTOF10: 3
PAINLEVEL_OUTOF10: 5 - MODERATE PAIN
PAINLEVEL_OUTOF10: 2
PAINLEVEL_OUTOF10: 0 - NO PAIN
PAINLEVEL_OUTOF10: 0 - NO PAIN

## 2025-08-17 ASSESSMENT — PAIN DESCRIPTION - ORIENTATION
ORIENTATION: MID
ORIENTATION: RIGHT;LEFT

## 2025-08-17 ASSESSMENT — PAIN DESCRIPTION - DESCRIPTORS
DESCRIPTORS: ACHING
DESCRIPTORS: ACHING
DESCRIPTORS: SHARP;ACHING

## 2025-08-17 ASSESSMENT — PAIN DESCRIPTION - LOCATION
LOCATION: BACK
LOCATION: BACK
LOCATION: FOOT

## 2025-08-17 NOTE — CARE PLAN
Problem: Pain - Adult  Goal: Verbalizes/displays adequate comfort level or baseline comfort level  Outcome: Progressing     Problem: Safety - Adult  Goal: Free from fall injury  Outcome: Progressing     Problem: Discharge Planning  Goal: Discharge to home or other facility with appropriate resources  Outcome: Progressing     Problem: Chronic Conditions and Co-morbidities  Goal: Patient's chronic conditions and co-morbidity symptoms are monitored and maintained or improved  Outcome: Progressing     Problem: Nutrition  Goal: Nutrient intake appropriate for maintaining nutritional needs  Outcome: Progressing     Problem: Skin  Goal: Decreased wound size/increased tissue granulation at next dressing change  Outcome: Progressing  Flowsheets (Taken 8/17/2025 1013)  Decreased wound size/increased tissue granulation at next dressing change: Protective dressings over bony prominences  Goal: Participates in plan/prevention/treatment measures  Outcome: Progressing  Flowsheets (Taken 8/17/2025 1013)  Participates in plan/prevention/treatment measures: Elevate heels  Goal: Prevent/manage excess moisture  Outcome: Progressing  Flowsheets (Taken 8/17/2025 1013)  Prevent/manage excess moisture:   Monitor for/manage infection if present   Follow provider orders for dressing changes  Goal: Prevent/minimize sheer/friction injuries  Outcome: Progressing  Flowsheets (Taken 8/17/2025 1013)  Prevent/minimize sheer/friction injuries:   Use pull sheet   Turn/reposition every 2 hours/use positioning/transfer devices  Goal: Promote/optimize nutrition  Outcome: Progressing  Flowsheets (Taken 8/17/2025 1013)  Promote/optimize nutrition:   Monitor/record intake including meals   Offer water/supplements/favorite foods   Consume > 50% meals/supplements  Goal: Promote skin healing  Outcome: Progressing  Flowsheets (Taken 8/17/2025 1013)  Promote skin healing:   Protective dressings over bony prominences   Turn/reposition every 2 hours/use  positioning/transfer devices

## 2025-08-17 NOTE — PROGRESS NOTES
"CARDIAC ICU PROGRESS NOTE    Radha Torres is a 85 y.o. female on day 8 of admission presenting with Chest pain.    Subjective     Overnight no acute events, O2 requirement remained at baseline. Na levels correcting nicely with 4L of urine output in total yesterday. Still on nitroprusside gtt for afterload reduction, can wean off today.        Objective     Physical Exam    Constitutional:       General: She is in acute distress.      Appearance: She is obese. She is ill-appearing.   HENT:      Head: Normocephalic and atraumatic.      Mouth/Throat:      Mouth: Mucous membranes are moist.      Pharynx: Oropharynx is clear.      Eyes:      Extraocular Movements: Extraocular movements intact.      Pupils: Pupils are equal, round, and reactive to light.         Cardiovascular:      Rate and Rhythm: Normal rate and regular rhythm.      Heart sounds: Murmur heard.      No friction rub. No gallop.   Pulmonary:      Effort: Respiratory distress present.      Breath sounds: Wheezing and rales present.   Abdominal:      General: There is no distension.      Palpations: Abdomen is soft.      Tenderness: There is no abdominal tenderness. There is no guarding.      Musculoskeletal:      Right lower leg: Edema present.      Left lower leg: Edema present.      Skin:     General: Skin is dry.      Neurological:      General: No focal deficit present.      Mental Status: She is alert.     Last Recorded Vitals  Blood pressure 155/69, pulse 80, temperature 35.7 °C (96.3 °F), temperature source Temporal, resp. rate 11, height 1.65 m (5' 4.96\"), weight 85.7 kg (188 lb 15 oz), SpO2 100%.  Intake/Output last 3 Shifts:  I/O last 3 completed shifts:  In: 309 (3.6 mL/kg) [I.V.:309 (3.6 mL/kg)]  Out: 4825 (56.3 mL/kg) [Urine:4825 (1.6 mL/kg/hr)]  Weight: 85.7 kg       Assessment & Plan  Chest pain      Radha Torres is a 85 y.o. female with PMH HFrEF (EF 25-30%), aortic stenosis, Afib, s/p AV node ablation and leadless pacemaker in 2025, HTN, " renal cell carcinoma who presented on 8/9/25 as a transfer from [facility] for epigastric pain, now transferred to CICU for possible flash pulmonary edema and hyponatremia. Suspect GI symptoms and hyponatremia due to severe fluid overload iso ADHF. Received diuresis and on CPAP. Also received a dose of tolvaptan 8/15 per nephrology. Now diuresing with IV Lasix 60mg BID, RFP q4h, and supplementing with Urea TID per nephrology.     Update 8/17:  - Na level corrected with IV Lasix 60mg yesterday, O2 requirements dropped  - Restarted on diet  - Holding diuresis and urea per renal for now  - Weaning Nipride gtt  - Mentation appears slightly better on exam today     CNS  #agitation  -1:1 sitter  -PRN zyprexa  -consider hyponatremia as etiology, fu renal recs      PULM  #flash pulmonary edema  -CPAP as tolerated, SpO2>92%  -nipride gtt, caution iso severe aortic stenosis, SBP goal 130-150  -diuresis with IV lasix, caution iso severe aortic stenosis     CV  #Severe aortic stenosis  #HFrEF (EF 25-30%)  #HTN  :: Echo 7/11/25 - EF 25-30%, left ventricle mildly dilated, mild concentric LV hypertrophy, leadless pacemaker in right atria and RV, aortic valve is heavily calcified with appearance of severe aortic stenosis (peak gradient 52mmHg, mean gradient 25mmHg, aortic valve area 0.8cm^2), trace AR, mild MR and TR, RVSP 50-60mmHg,   ::Scheduled for TAVR 8/19/25 with Dr. Howell  ::EKG with no ischemic changes  ::Troponin 23>27  Plan:  -holding diuresis with IV lasix 60mg BID for now, caution iso severe aortic stenosis  -strict I/Os, redose as needed  -holding home coreg 6.25mg, amlodipine 5mg  -nipride gtt, caution iso severe aortic stenosis  -Plan for TAVR 8/19 still, pre-TAVR workup complete, structural aware of patient      #Atrial fibrillation  #S/p AV emiliano ablation and leadless pacemaker placement 5/6/25  :: Home regimen - coreg 6.25 BID, amlodipine 2.5 mg, Eliquis 5mg BID  Plan:  -Holding eliquis given possible TAVR,  consider heparin gtt if needed for stroke risk reduction inpatient  -fu heparin assay  -holding home coreg     GI  #Epigastric pain  #Nausea/GI upset  :: EGG performed 2018 at [facility] notable for PUD  ::Possibly due to volume overload/ADHF vs GERD  Plan:   - Tigan PRN ordered for nausea   - KUB with no acute findings; ECG unchanged   - Continue pantoprazole 40 mg daily, tums PRN  - Continue home Percocet 5-325 mg Q6 PRN     RENAL/  #Acute severe hypo-osmolar hyponatremia  :: Na 131 on admission; Na on AM labs 8/14 very low at 117  ::Has received diuresis and 500cc NS bolus this admission  ::serum osm 255, urine Na 70, urine osm 248  ::TSH, morning cortisol within normal limits  Plan:   -Nephrology consulted, recommended 15mg tolvaptan given 8/15, holding for now  - Repeat Urine studies  - PO Urea TID  -diuresis as above     #CKD 3b  :: Baseline Cr appears to be around 1.2  ::Cr 1.22 in ED  Plan:  - Daily RFP q4h  - Avoid nephrotoxic agents     ID  DAINELE     HEME/ONC  DANIELE     ENDO  #Hypothyroidism  ::TSH wnl  - Continue levothyroxine 75 mcg     #suspected DM  -fu A1c  -ISS #1, hypoglycemia protocol     MSK/Skin  DANIELE     F: PRN  E: K>4 Phos>3 Mag>2  N: NPO  A: PIV, L radial a-line (8/16)  O2: CPAP 30% FIO2  Drips: nipride  Abx: None  DVT PPx: pending heparin assay  GI PPx: PPI     CODE STATUS: FULL (confirmed with patient on admission)  SURROGATE DECISION MAKER: Stefanie Roblero, [phone]       Konstantin Hunter MD

## 2025-08-17 NOTE — PROGRESS NOTES
Radha Torres   85 bhup[address]    @WT@  MRN/Room: [id number]/15/15-A    Subjective:   On NC  No major events  Na improving    Objective:     Meds:   amLODIPine, 5 mg, Daily  [Held by provider] apixaban, 5 mg, BID  ascorbic acid, 500 mg, Daily  atorvastatin, 20 mg, Daily  [Held by provider] carvedilol, 6.25 mg, BID  cholecalciferol, 25 mcg, Daily  ferrous sulfate, 1 tablet, Daily  gabapentin, 300 mg, Daily  insulin lispro, 0-10 Units, TID AC  levothyroxine, 75 mcg, Daily  pantoprazole, 40 mg, Daily before breakfast  PARoxetine, 20 mg, Daily  polyethylene glycol, 17 g, Daily  [Held by provider] urea, 15 g, TID      nitroprusside, Last Rate: 1.5 mcg/kg/min (08/17/25 1110)      acetaminophen, 650 mg, q6h PRN  calcium carbonate, 500 mg of calcium carbonate, 4x daily PRN  dextrose, 12.5 g, q15 min PRN  dextrose, 25 g, q15 min PRN  glucagon, 1 mg, q15 min PRN  glucagon, 1 mg, q15 min PRN  metoclopramide, 10 mg, q6h PRN  OLANZapine, 5 mg, Nightly PRN  oxyCODONE-acetaminophen, 1 tablet, q6h PRN  oxygen, 1 Dose, Continuous - O2/gases  oxygen, 1 Dose, Continuous - O2/gases  temazepam, 7.5 mg, Nightly PRN  trimethobenzamide, 200 mg, q6h PRN        Vitals:    08/17/25 1100   BP:    Pulse: 74   Resp: 13   Temp:    SpO2: 100%          Intake/Output Summary (Last 24 hours) at 8/17/2025 1201  Last data filed at 8/17/2025 1000  Gross per 24 hour   Intake 516.34 ml   Output 2925 ml   Net -2408.66 ml       General appearance: no distress  Eyes: non-icteric  Skin: no apparent rash  Heart: regular rate and rhythm   Lungs: normal work of breathing, on NC   Extremities: Trace pitting edema bilat      Blood Labs:  Results for orders placed or performed during the hospital encounter of 08/09/25 (from the past 24 hours)   Urine electrolytes   Result Value Ref Range    Sodium, Urine Random 30 mmol/L    Sodium/Creatinine Ratio 411 Not established. mmol/g Creat    Potassium, Urine Random 11 mmol/L    Potassium/Creatinine Ratio 151 Not established mmol/g  Creat    Chloride, Urine Random 44 mmol/L    Chloride/Creatinine Ratio 603 (H) 38 - 318 mmol/g creat    Creatinine, Urine Random 7.3 (L) 20.0 - 320.0 mg/dL   Osmolality, urine   Result Value Ref Range    Osmolality, Urine Random 124 (L) 200 - 1,200 mOsm/kg   Renal function panel   Result Value Ref Range    Glucose 137 (H) 74 - 99 mg/dL    Sodium 123 (L) 136 - 145 mmol/L    Potassium 3.4 (L) 3.5 - 5.3 mmol/L    Chloride 81 (L) 98 - 107 mmol/L    Bicarbonate 33 (H) 21 - 32 mmol/L    Anion Gap 12 10 - 20 mmol/L    Urea Nitrogen 40 (H) 6 - 23 mg/dL    Creatinine 1.05 0.50 - 1.05 mg/dL    eGFR 53 (L) >60 mL/min/1.73m*2    Calcium 9.2 8.6 - 10.6 mg/dL    Phosphorus 3.2 2.5 - 4.9 mg/dL    Albumin 3.6 3.4 - 5.0 g/dL   POCT GLUCOSE   Result Value Ref Range    POCT Glucose 117 (H) 74 - 99 mg/dL   Renal function panel   Result Value Ref Range    Glucose 103 (H) 74 - 99 mg/dL    Sodium 124 (L) 136 - 145 mmol/L    Potassium 3.4 (L) 3.5 - 5.3 mmol/L    Chloride 82 (L) 98 - 107 mmol/L    Bicarbonate 34 (H) 21 - 32 mmol/L    Anion Gap 11 10 - 20 mmol/L    Urea Nitrogen 40 (H) 6 - 23 mg/dL    Creatinine 1.00 0.50 - 1.05 mg/dL    eGFR 56 (L) >60 mL/min/1.73m*2    Calcium 9.5 8.6 - 10.6 mg/dL    Phosphorus 3.1 2.5 - 4.9 mg/dL    Albumin 3.5 3.4 - 5.0 g/dL   Heparin Assay   Result Value Ref Range    Heparin Unfractionated >2.0 (HH) See Comment Below for Therapeutic Ranges IU/mL   Blood Gas Arterial Full Panel   Result Value Ref Range    POCT pH, Arterial 7.51 (H) 7.38 - 7.42 pH    POCT pCO2, Arterial 46 (H) 38 - 42 mm Hg    POCT pO2, Arterial 109 (H) 85 - 95 mm Hg    POCT SO2, Arterial 99 94 - 100 %    POCT Oxy Hemoglobin, Arterial 96.1 94.0 - 98.0 %    POCT Hematocrit Calculated, Arterial 32.0 (L) 36.0 - 46.0 %    POCT Sodium, Arterial 122 (L) 136 - 145 mmol/L    POCT Potassium, Arterial 3.2 (L) 3.5 - 5.3 mmol/L    POCT Chloride, Arterial 85 (L) 98 - 107 mmol/L    POCT Ionized Calcium, Arterial 1.21 1.10 - 1.33 mmol/L    POCT Glucose,  Arterial 105 (H) 74 - 99 mg/dL    POCT Lactate, Arterial 0.8 0.4 - 2.0 mmol/L    POCT Base Excess, Arterial 12.3 (H) -2.0 - 3.0 mmol/L    POCT HCO3 Calculated, Arterial 36.7 (H) 22.0 - 26.0 mmol/L    POCT Hemoglobin, Arterial 10.6 (L) 12.0 - 16.0 g/dL    POCT Anion Gap, Arterial 4 (L) 10 - 25 mmo/L    Patient Temperature 37.0 degrees Celsius    FiO2 28 %   POCT GLUCOSE   Result Value Ref Range    POCT Glucose 114 (H) 74 - 99 mg/dL   Renal function panel   Result Value Ref Range    Glucose 113 (H) 74 - 99 mg/dL    Sodium 126 (L) 136 - 145 mmol/L    Potassium 3.4 (L) 3.5 - 5.3 mmol/L    Chloride 83 (L) 98 - 107 mmol/L    Bicarbonate 34 (H) 21 - 32 mmol/L    Anion Gap 12 10 - 20 mmol/L    Urea Nitrogen 36 (H) 6 - 23 mg/dL    Creatinine 1.01 0.50 - 1.05 mg/dL    eGFR 55 (L) >60 mL/min/1.73m*2    Calcium 9.3 8.6 - 10.6 mg/dL    Phosphorus 3.2 2.5 - 4.9 mg/dL    Albumin 3.4 3.4 - 5.0 g/dL   Heparin Assay   Result Value Ref Range    Heparin Unfractionated >2.0 (HH) See Comment Below for Therapeutic Ranges IU/mL   Renal function panel   Result Value Ref Range    Glucose 138 (H) 74 - 99 mg/dL    Sodium 125 (L) 136 - 145 mmol/L    Potassium 4.2 3.5 - 5.3 mmol/L    Chloride 85 (L) 98 - 107 mmol/L    Bicarbonate 33 (H) 21 - 32 mmol/L    Anion Gap 11 10 - 20 mmol/L    Urea Nitrogen 33 (H) 6 - 23 mg/dL    Creatinine 1.08 (H) 0.50 - 1.05 mg/dL    eGFR 51 (L) >60 mL/min/1.73m*2    Calcium 9.1 8.6 - 10.6 mg/dL    Phosphorus 3.0 2.5 - 4.9 mg/dL    Albumin 3.3 (L) 3.4 - 5.0 g/dL   CBC and Auto Differential   Result Value Ref Range    WBC 12.5 (H) 4.4 - 11.3 x10*3/uL    nRBC 0.0 0.0 - 0.0 /100 WBCs    RBC 3.50 (L) 4.00 - 5.20 x10*6/uL    Hemoglobin 10.2 (L) 12.0 - 16.0 g/dL    Hematocrit 29.4 (L) 36.0 - 46.0 %    MCV 84 80 - 100 fL    MCH 29.1 26.0 - 34.0 pg    MCHC 34.7 32.0 - 36.0 g/dL    RDW 14.0 11.5 - 14.5 %    Platelets 300 150 - 450 x10*3/uL    Neutrophils % 84.5 40.0 - 80.0 %    Immature Granulocytes %, Automated 0.6 0.0 - 0.9  %    Lymphocytes % 6.9 13.0 - 44.0 %    Monocytes % 7.7 2.0 - 10.0 %    Eosinophils % 0.1 0.0 - 6.0 %    Basophils % 0.2 0.0 - 2.0 %    Neutrophils Absolute 10.61 (H) 1.60 - 5.50 x10*3/uL    Immature Granulocytes Absolute, Automated 0.08 0.00 - 0.50 x10*3/uL    Lymphocytes Absolute 0.86 0.80 - 3.00 x10*3/uL    Monocytes Absolute 0.96 (H) 0.05 - 0.80 x10*3/uL    Eosinophils Absolute 0.01 0.00 - 0.40 x10*3/uL    Basophils Absolute 0.02 0.00 - 0.10 x10*3/uL   Magnesium   Result Value Ref Range    Magnesium 1.98 1.60 - 2.40 mg/dL   Renal function panel   Result Value Ref Range    Glucose 137 (H) 74 - 99 mg/dL    Sodium 127 (L) 136 - 145 mmol/L    Potassium 4.4 3.5 - 5.3 mmol/L    Chloride 86 (L) 98 - 107 mmol/L    Bicarbonate 33 (H) 21 - 32 mmol/L    Anion Gap 12 10 - 20 mmol/L    Urea Nitrogen 32 (H) 6 - 23 mg/dL    Creatinine 1.11 (H) 0.50 - 1.05 mg/dL    eGFR 49 (L) >60 mL/min/1.73m*2    Calcium 9.4 8.6 - 10.6 mg/dL    Phosphorus 3.1 2.5 - 4.9 mg/dL    Albumin 3.4 3.4 - 5.0 g/dL   Renal function panel   Result Value Ref Range    Glucose 141 (H) 74 - 99 mg/dL    Sodium 129 (L) 136 - 145 mmol/L    Potassium 4.2 3.5 - 5.3 mmol/L    Chloride 87 (L) 98 - 107 mmol/L    Bicarbonate 34 (H) 21 - 32 mmol/L    Anion Gap 12 10 - 20 mmol/L    Urea Nitrogen 29 (H) 6 - 23 mg/dL    Creatinine 1.05 0.50 - 1.05 mg/dL    eGFR 53 (L) >60 mL/min/1.73m*2    Calcium 9.2 8.6 - 10.6 mg/dL    Phosphorus 3.1 2.5 - 4.9 mg/dL    Albumin 3.4 3.4 - 5.0 g/dL   POCT GLUCOSE   Result Value Ref Range    POCT Glucose 149 (H) 74 - 99 mg/dL   POCT GLUCOSE   Result Value Ref Range    POCT Glucose 153 (H) 74 - 99 mg/dL              ASSESSMENT:  Radha Torres is a  85 y.o.  Year old , with PMHx of HFrEF (EF 25-30% on TTE 7/11/25), aortic stenosis, Afib, s/p AV emiliano ablation and leadless pacemaker 2025, HTN, CKD, renal cell carcinoma who presented on 8/9/2025 as a transfer from [facility] for epigastric pain. Nephrology consulted for worsening hyponatremia.       #Hyponatremia   - Sodium 131 on 8/09 arrival  - EF 25-30% on TTE 7/11   - Urine chem 8/11 (had received Lasix)- urine sodium 29, urine osm 250. FeNa 0.6%,    - Serum osm 8/13 - 263  - Repeat urine chem 8/14 (2 hrs after Lasix dose) - urine sodium 70, urine osm 248. Repeat serum osm 8/14 - 255   - True hypotonic hyponatremia.  Likely multifactorial Hyponatremia due to Low Effective Osmoles considering poor diet for the past few weeks as well as Hypervolemic Hyponatremia related to CHF considering Overload.  - Was given 15mg tolvaptan on 8/15 w/ no significant response in serum sodium.  - Improving on Lasix mainly (60 mg 8/16) and Urea 1 dose     #For TAVR 8/19    Recommendations:  - No interventions, Hold Urea, Hold Lasix unless Pulmonary Edema develops  - Chart I's/O's  - Check Na every 4 hours  - Consider getting renal duplex ultrasound in vascular lab to assess for renal artery stenosis  - Bladder scan to rule out retention  - Maintain euvolemia  - Encourage good diet w/ adequate protein intake      Evi Mac MD

## 2025-08-18 ENCOUNTER — APPOINTMENT (OUTPATIENT)
Dept: RADIOLOGY | Facility: HOSPITAL | Age: 85
DRG: 306 | End: 2025-08-18
Payer: MEDICARE

## 2025-08-18 ENCOUNTER — APPOINTMENT (OUTPATIENT)
Dept: CARDIOLOGY | Facility: CLINIC | Age: 85
End: 2025-08-18
Payer: MEDICARE

## 2025-08-18 ENCOUNTER — APPOINTMENT (OUTPATIENT)
Dept: CARDIOLOGY | Facility: HOSPITAL | Age: 85
DRG: 306 | End: 2025-08-18
Payer: MEDICARE

## 2025-08-18 VITALS
HEART RATE: 78 BPM | DIASTOLIC BLOOD PRESSURE: 69 MMHG | BODY MASS INDEX: 29.27 KG/M2 | HEIGHT: 65 IN | OXYGEN SATURATION: 100 % | RESPIRATION RATE: 14 BRPM | SYSTOLIC BLOOD PRESSURE: 155 MMHG | WEIGHT: 175.71 LBS | TEMPERATURE: 97.3 F

## 2025-08-18 LAB
ALBUMIN SERPL BCP-MCNC: 3.5 G/DL (ref 3.4–5)
ALBUMIN SERPL BCP-MCNC: 3.6 G/DL (ref 3.4–5)
ANION GAP BLDV CALCULATED.4IONS-SCNC: 7 MMOL/L (ref 10–25)
ANION GAP SERPL CALC-SCNC: 12 MMOL/L (ref 10–20)
ANION GAP SERPL CALC-SCNC: 14 MMOL/L (ref 10–20)
ATRIAL RATE: 81 BPM
BASE EXCESS BLDV CALC-SCNC: 5.9 MMOL/L (ref -2–3)
BASOPHILS # BLD AUTO: 0.02 X10*3/UL (ref 0–0.1)
BASOPHILS NFR BLD AUTO: 0.1 %
BODY TEMPERATURE: 37 DEGREES CELSIUS
BUN SERPL-MCNC: 20 MG/DL (ref 6–23)
BUN SERPL-MCNC: 21 MG/DL (ref 6–23)
CA-I BLDV-SCNC: 1.16 MMOL/L (ref 1.1–1.33)
CALCIUM SERPL-MCNC: 8.8 MG/DL (ref 8.6–10.6)
CALCIUM SERPL-MCNC: 8.8 MG/DL (ref 8.6–10.6)
CARDIAC TROPONIN I PNL SERPL HS: 21 NG/L (ref 0–34)
CHLORIDE BLDV-SCNC: 88 MMOL/L (ref 98–107)
CHLORIDE SERPL-SCNC: 87 MMOL/L (ref 98–107)
CHLORIDE SERPL-SCNC: 88 MMOL/L (ref 98–107)
CO2 SERPL-SCNC: 31 MMOL/L (ref 21–32)
CO2 SERPL-SCNC: 32 MMOL/L (ref 21–32)
CREAT SERPL-MCNC: 1.08 MG/DL (ref 0.5–1.05)
CREAT SERPL-MCNC: 1.17 MG/DL (ref 0.5–1.05)
EGFRCR SERPLBLD CKD-EPI 2021: 46 ML/MIN/1.73M*2
EGFRCR SERPLBLD CKD-EPI 2021: 51 ML/MIN/1.73M*2
EOSINOPHIL # BLD AUTO: 0.08 X10*3/UL (ref 0–0.4)
EOSINOPHIL NFR BLD AUTO: 0.5 %
ERYTHROCYTE [DISTWIDTH] IN BLOOD BY AUTOMATED COUNT: 15.7 % (ref 11.5–14.5)
GLUCOSE BLD MANUAL STRIP-MCNC: 130 MG/DL (ref 74–99)
GLUCOSE BLD MANUAL STRIP-MCNC: 149 MG/DL (ref 74–99)
GLUCOSE BLD MANUAL STRIP-MCNC: 241 MG/DL (ref 74–99)
GLUCOSE BLD MANUAL STRIP-MCNC: 255 MG/DL (ref 74–99)
GLUCOSE BLD MANUAL STRIP-MCNC: 291 MG/DL (ref 74–99)
GLUCOSE BLDV-MCNC: 236 MG/DL (ref 74–99)
GLUCOSE SERPL-MCNC: 125 MG/DL (ref 74–99)
GLUCOSE SERPL-MCNC: 240 MG/DL (ref 74–99)
HCO3 BLDV-SCNC: 30.6 MMOL/L (ref 22–26)
HCT VFR BLD AUTO: 31.1 % (ref 36–46)
HCT VFR BLD EST: 35 % (ref 36–46)
HGB BLD-MCNC: 10.5 G/DL (ref 12–16)
HGB BLDV-MCNC: 11.5 G/DL (ref 12–16)
IMM GRANULOCYTES # BLD AUTO: 0.08 X10*3/UL (ref 0–0.5)
IMM GRANULOCYTES NFR BLD AUTO: 0.5 % (ref 0–0.9)
INHALED O2 CONCENTRATION: 28 %
LACTATE BLDV-SCNC: 1.5 MMOL/L (ref 0.4–2)
LIPASE SERPL-CCNC: 20 U/L (ref 9–82)
LYMPHOCYTES # BLD AUTO: 0.24 X10*3/UL (ref 0.8–3)
LYMPHOCYTES NFR BLD AUTO: 1.6 %
MAGNESIUM SERPL-MCNC: 1.9 MG/DL (ref 1.6–2.4)
MCH RBC QN AUTO: 28.8 PG (ref 26–34)
MCHC RBC AUTO-ENTMCNC: 33.8 G/DL (ref 32–36)
MCV RBC AUTO: 85 FL (ref 80–100)
MONOCYTES # BLD AUTO: 0.85 X10*3/UL (ref 0.05–0.8)
MONOCYTES NFR BLD AUTO: 5.7 %
NEUTROPHILS # BLD AUTO: 13.73 X10*3/UL (ref 1.6–5.5)
NEUTROPHILS NFR BLD AUTO: 91.6 %
NRBC BLD-RTO: 0 /100 WBCS (ref 0–0)
OXYHGB MFR BLDV: 77.6 % (ref 45–75)
PCO2 BLDV: 44 MM HG (ref 41–51)
PH BLDV: 7.45 PH (ref 7.33–7.43)
PHOSPHATE SERPL-MCNC: 2.8 MG/DL (ref 2.5–4.9)
PHOSPHATE SERPL-MCNC: 2.9 MG/DL (ref 2.5–4.9)
PLATELET # BLD AUTO: 283 X10*3/UL (ref 150–450)
PO2 BLDV: 49 MM HG (ref 35–45)
POTASSIUM BLDV-SCNC: 4.4 MMOL/L (ref 3.5–5.3)
POTASSIUM SERPL-SCNC: 4.1 MMOL/L (ref 3.5–5.3)
POTASSIUM SERPL-SCNC: 4.8 MMOL/L (ref 3.5–5.3)
Q ONSET: 191 MS
QRS COUNT: 13 BEATS
QRS DURATION: 168 MS
QT INTERVAL: 462 MS
QTC CALCULATION(BAZETT): 536 MS
QTC FREDERICIA: 510 MS
R AXIS: -60 DEGREES
RBC # BLD AUTO: 3.65 X10*6/UL (ref 4–5.2)
SAO2 % BLDV: 80 % (ref 45–75)
SODIUM BLDV-SCNC: 121 MMOL/L (ref 136–145)
SODIUM SERPL-SCNC: 126 MMOL/L (ref 136–145)
SODIUM SERPL-SCNC: 129 MMOL/L (ref 136–145)
T AXIS: 106 DEGREES
T OFFSET: 422 MS
UFH PPP CHRO-ACNC: 1.3 IU/ML (ref ?–1.1)
VENTRICULAR RATE: 81 BPM
WBC # BLD AUTO: 15 X10*3/UL (ref 4.4–11.3)

## 2025-08-18 PROCEDURE — 37799 UNLISTED PX VASCULAR SURGERY: CPT

## 2025-08-18 PROCEDURE — 2500000001 HC RX 250 WO HCPCS SELF ADMINISTERED DRUGS (ALT 637 FOR MEDICARE OP)

## 2025-08-18 PROCEDURE — 71045 X-RAY EXAM CHEST 1 VIEW: CPT

## 2025-08-18 PROCEDURE — 2500000004 HC RX 250 GENERAL PHARMACY W/ HCPCS (ALT 636 FOR OP/ED)

## 2025-08-18 PROCEDURE — 85025 COMPLETE CBC W/AUTO DIFF WBC: CPT

## 2025-08-18 PROCEDURE — 84484 ASSAY OF TROPONIN QUANT: CPT

## 2025-08-18 PROCEDURE — 2500000002 HC RX 250 W HCPCS SELF ADMINISTERED DRUGS (ALT 637 FOR MEDICARE OP, ALT 636 FOR OP/ED)

## 2025-08-18 PROCEDURE — 99233 SBSQ HOSP IP/OBS HIGH 50: CPT

## 2025-08-18 PROCEDURE — 85520 HEPARIN ASSAY: CPT

## 2025-08-18 PROCEDURE — 84132 ASSAY OF SERUM POTASSIUM: CPT

## 2025-08-18 PROCEDURE — 99232 SBSQ HOSP IP/OBS MODERATE 35: CPT

## 2025-08-18 PROCEDURE — 82947 ASSAY GLUCOSE BLOOD QUANT: CPT

## 2025-08-18 PROCEDURE — 97530 THERAPEUTIC ACTIVITIES: CPT | Mod: GP

## 2025-08-18 PROCEDURE — 97166 OT EVAL MOD COMPLEX 45 MIN: CPT | Mod: GO

## 2025-08-18 PROCEDURE — 94660 CPAP INITIATION&MGMT: CPT

## 2025-08-18 PROCEDURE — 80069 RENAL FUNCTION PANEL: CPT

## 2025-08-18 PROCEDURE — 99222 1ST HOSP IP/OBS MODERATE 55: CPT | Performed by: NURSE PRACTITIONER

## 2025-08-18 PROCEDURE — 97162 PT EVAL MOD COMPLEX 30 MIN: CPT | Mod: GP

## 2025-08-18 PROCEDURE — 1200000002 HC GENERAL ROOM WITH TELEMETRY DAILY

## 2025-08-18 PROCEDURE — 71045 X-RAY EXAM CHEST 1 VIEW: CPT | Performed by: RADIOLOGY

## 2025-08-18 PROCEDURE — 83735 ASSAY OF MAGNESIUM: CPT

## 2025-08-18 PROCEDURE — 36415 COLL VENOUS BLD VENIPUNCTURE: CPT

## 2025-08-18 PROCEDURE — 93005 ELECTROCARDIOGRAM TRACING: CPT

## 2025-08-18 RX ORDER — HYDRALAZINE HYDROCHLORIDE 50 MG/1
50 TABLET, FILM COATED ORAL ONCE
Status: COMPLETED | OUTPATIENT
Start: 2025-08-18 | End: 2025-08-18

## 2025-08-18 RX ORDER — HYDRALAZINE HYDROCHLORIDE 25 MG/1
50 TABLET, FILM COATED ORAL 3 TIMES DAILY
Status: DISCONTINUED | OUTPATIENT
Start: 2025-08-18 | End: 2025-08-26

## 2025-08-18 RX ORDER — AMLODIPINE BESYLATE 2.5 MG/1
5 TABLET ORAL DAILY
Status: DISCONTINUED | OUTPATIENT
Start: 2025-08-19 | End: 2025-08-26

## 2025-08-18 RX ORDER — TOLVAPTAN 15 MG/1
15 TABLET ORAL ONCE
Status: COMPLETED | OUTPATIENT
Start: 2025-08-18 | End: 2025-08-18

## 2025-08-18 RX ORDER — ACETAZOLAMIDE 500 MG/5ML
500 INJECTION, POWDER, LYOPHILIZED, FOR SOLUTION INTRAVENOUS ONCE
Status: COMPLETED | OUTPATIENT
Start: 2025-08-18 | End: 2025-08-18

## 2025-08-18 RX ORDER — FUROSEMIDE 10 MG/ML
60 INJECTION INTRAMUSCULAR; INTRAVENOUS ONCE
Status: COMPLETED | OUTPATIENT
Start: 2025-08-18 | End: 2025-08-18

## 2025-08-18 RX ORDER — ALUMINUM HYDROXIDE, MAGNESIUM HYDROXIDE, AND SIMETHICONE 1200; 120; 1200 MG/30ML; MG/30ML; MG/30ML
20 SUSPENSION ORAL ONCE
Status: COMPLETED | OUTPATIENT
Start: 2025-08-18 | End: 2025-08-18

## 2025-08-18 RX ORDER — MAGNESIUM SULFATE HEPTAHYDRATE 40 MG/ML
2 INJECTION, SOLUTION INTRAVENOUS ONCE
Status: COMPLETED | OUTPATIENT
Start: 2025-08-18 | End: 2025-08-18

## 2025-08-18 RX ORDER — OLANZAPINE 10 MG/2ML
5 INJECTION, POWDER, FOR SOLUTION INTRAMUSCULAR NIGHTLY PRN
Status: DISCONTINUED | OUTPATIENT
Start: 2025-08-18 | End: 2025-08-26

## 2025-08-18 RX ORDER — AMLODIPINE BESYLATE 5 MG/1
2.5 TABLET ORAL DAILY
Status: DISCONTINUED | OUTPATIENT
Start: 2025-08-19 | End: 2025-08-18

## 2025-08-18 RX ADMIN — FERROUS SULFATE TAB 325 MG (65 MG ELEMENTAL FE) 1 TABLET: 325 (65 FE) TAB at 08:11

## 2025-08-18 RX ADMIN — SODIUM NITROPRUSSIDE IN 0.9% SODIUM CHLORIDE 2 MCG/KG/MIN: 0.5 INJECTION INTRAVENOUS at 05:20

## 2025-08-18 RX ADMIN — CALCIUM CARBONATE (ANTACID) CHEW TAB 500 MG 1 TABLET: 500 CHEW TAB at 05:08

## 2025-08-18 RX ADMIN — TOLVAPTAN 15 MG: 15 TABLET ORAL at 13:35

## 2025-08-18 RX ADMIN — LEVOTHYROXINE SODIUM 75 MCG: 0.07 TABLET ORAL at 08:11

## 2025-08-18 RX ADMIN — INSULIN LISPRO 6 UNITS: 100 INJECTION, SOLUTION INTRAVENOUS; SUBCUTANEOUS at 13:16

## 2025-08-18 RX ADMIN — OXYCODONE HYDROCHLORIDE AND ACETAMINOPHEN 500 MG: 500 TABLET ORAL at 08:11

## 2025-08-18 RX ADMIN — Medication 25 MCG: at 08:11

## 2025-08-18 RX ADMIN — ALUMINUM HYDROXIDE, MAGNESIUM HYDROXIDE, AND SIMETHICONE 20 ML: 200; 200; 20 SUSPENSION ORAL at 06:22

## 2025-08-18 RX ADMIN — POLYETHYLENE GLYCOL 3350 17 G: 17 POWDER, FOR SOLUTION ORAL at 08:12

## 2025-08-18 RX ADMIN — ACETAZOLAMIDE 500 MG: 500 INJECTION, POWDER, LYOPHILIZED, FOR SOLUTION INTRAVENOUS at 20:52

## 2025-08-18 RX ADMIN — OLANZAPINE 5 MG: 10 INJECTION, POWDER, LYOPHILIZED, FOR SOLUTION INTRAMUSCULAR at 23:27

## 2025-08-18 RX ADMIN — MAGNESIUM SULFATE HEPTAHYDRATE 2 G: 40 INJECTION, SOLUTION INTRAVENOUS at 08:12

## 2025-08-18 RX ADMIN — ACETAZOLAMIDE 500 MG: 500 INJECTION, POWDER, LYOPHILIZED, FOR SOLUTION INTRAVENOUS at 04:55

## 2025-08-18 RX ADMIN — OXYCODONE AND ACETAMINOPHEN 1 TABLET: 5; 325 TABLET ORAL at 02:20

## 2025-08-18 RX ADMIN — FUROSEMIDE 60 MG: 10 INJECTION, SOLUTION INTRAMUSCULAR; INTRAVENOUS at 20:52

## 2025-08-18 RX ADMIN — HYDRALAZINE HYDROCHLORIDE 50 MG: 50 TABLET ORAL at 20:52

## 2025-08-18 RX ADMIN — AMLODIPINE BESYLATE 10 MG: 10 TABLET ORAL at 08:11

## 2025-08-18 RX ADMIN — GABAPENTIN 300 MG: 300 CAPSULE ORAL at 08:11

## 2025-08-18 RX ADMIN — PAROXETINE 20 MG: 20 TABLET, FILM COATED ORAL at 08:14

## 2025-08-18 RX ADMIN — ATORVASTATIN CALCIUM 20 MG: 20 TABLET, FILM COATED ORAL at 08:11

## 2025-08-18 RX ADMIN — CALCIUM CARBONATE (ANTACID) CHEW TAB 500 MG 1 TABLET: 500 CHEW TAB at 08:19

## 2025-08-18 RX ADMIN — Medication: at 08:00

## 2025-08-18 RX ADMIN — HYDRALAZINE HYDROCHLORIDE 50 MG: 50 TABLET ORAL at 02:20

## 2025-08-18 RX ADMIN — HYDRALAZINE HYDROCHLORIDE 50 MG: 50 TABLET ORAL at 08:11

## 2025-08-18 RX ADMIN — PANTOPRAZOLE SODIUM 40 MG: 40 TABLET, DELAYED RELEASE ORAL at 06:22

## 2025-08-18 RX ADMIN — OXYCODONE AND ACETAMINOPHEN 1 TABLET: 5; 325 TABLET ORAL at 08:11

## 2025-08-18 ASSESSMENT — COGNITIVE AND FUNCTIONAL STATUS - GENERAL
EATING MEALS: A LITTLE
WALKING IN HOSPITAL ROOM: TOTAL
MOVING FROM LYING ON BACK TO SITTING ON SIDE OF FLAT BED WITH BEDRAILS: A LITTLE
MOBILITY SCORE: 10
TOILETING: A LOT
DRESSING REGULAR UPPER BODY CLOTHING: A LOT
STANDING UP FROM CHAIR USING ARMS: TOTAL
HELP NEEDED FOR BATHING: A LOT
CLIMB 3 TO 5 STEPS WITH RAILING: TOTAL
DRESSING REGULAR LOWER BODY CLOTHING: A LOT
DAILY ACTIVITIY SCORE: 14
TURNING FROM BACK TO SIDE WHILE IN FLAT BAD: A LITTLE
PERSONAL GROOMING: A LITTLE
MOVING TO AND FROM BED TO CHAIR: TOTAL

## 2025-08-18 ASSESSMENT — ACTIVITIES OF DAILY LIVING (ADL)
ADL_ASSISTANCE: NEEDS ASSISTANCE
BATHING_ASSISTANCE: MODERATE

## 2025-08-18 ASSESSMENT — PAIN SCALES - GENERAL
PAINLEVEL_OUTOF10: 2
PAINLEVEL_OUTOF10: 0 - NO PAIN
PAINLEVEL_OUTOF10: 7
PAINLEVEL_OUTOF10: 4
PAINLEVEL_OUTOF10: 3
PAINLEVEL_OUTOF10: 0 - NO PAIN
PAINLEVEL_OUTOF10: 5 - MODERATE PAIN
PAINLEVEL_OUTOF10: 8
PAINLEVEL_OUTOF10: 7
PAINLEVEL_OUTOF10: 0 - NO PAIN

## 2025-08-18 ASSESSMENT — PAIN DESCRIPTION - DESCRIPTORS
DESCRIPTORS: ACHING;CRAMPING
DESCRIPTORS: ACHING

## 2025-08-18 NOTE — CONSULTS
Inpatient consult to Valve and Structural Heart  Consult performed by: LINDSEY Thompson-CNP  Consult ordered by: Anita De Leon MD  Reason for consult: severe AS  Assessment/Recommendations: Fully worked up for TAVR    Recs:  - No TAVR 8/19/25 ([place] at [facility] [facility]  - Work-up elevated WBCs (BC x2 and UA)  - Will reassess ability to reschedule TAVR once infection ruled out, mental status improves, and improved physical conditioning  (given level of deconditioning/frailty, very likely TAVR will not be during this admission)  - Agree with Palliative Medicine Consult for goals of care  - PT/OT consult  - Structural Heart will continue to follow        History Of Present Illness:    Radha Torres is a 85 y.o. female with PMH HFrEF (EF 25-30%), aortic stenosis, Afib, s/p AV node ablation and leadless pacemaker in 2025, HTN, renal cell carcinoma who presented on 8/9/25 as a transfer from [facility] for epigastric pain, now transferred to CICU for possible flash pulmonary edema and hyponatremia. Suspect GI symptoms and hyponatremia due to severe fluid overload iso ADHF. Received diuresis and on CPAP. Also received a dose of tolvaptan 8/15 per nephrology. Per nephrology will hold IV Lasix and Urea.  Pt originally planned TAVR at [place] on 8/19/25 with Dr. Howell and Dr. Douglas.       Valve and Structural Heart Work Up  - NYHA: IV  - Frailty: 4/5 (only albumin is normal)  - ECG: V-paced  - Echo: TTE Completed 7/9/25, EF 30-35%, Peak Timo 3.28.  - MAKSIM Completed 7/11/25  EF 25-30 %, severe aortic stenosis  MG/PG 52/25, m/s BOB 0.8 cm/2  - LHC: Completed 7/11/25  - CT TAVR: Completed 7/31/25  - Dental clearance:  follows up with dentist regularly, no issues  - STS: Isolated AVR 9.68%        Last Recorded Vitals:  Vitals:    08/18/25 0615 08/18/25 0630 08/18/25 0700 08/18/25 0800   BP:       BP Location:       Patient Position:       Pulse: 72 79 72 99   Resp: 18 20 18 21   Temp:    36.3 °C (97.3 °F)  "  TempSrc:    Temporal   SpO2: 100% 100% 100% 100%   Weight:       Height:           Last Labs:  CBC - 8/18/2025:  4:12 AM  15.0 10.5 283    31.1      CMP - 8/18/2025:  4:12 AM  8.8 _ _ --- _   2.8 3.5 _ _      PTT - 5/6/2025: 11:10 AM  1.0   11.0 31     Troponin I, High Sensitivity ([facility])   Date/Time Value Ref Range Status   08/18/2025 04:12 AM 21 0 - 34 ng/L Final   08/16/2025 05:32 AM 34 0 - 34 ng/L Final   08/12/2025 05:43 PM 26 0 - 34 ng/L Final     BNP   Date/Time Value Ref Range Status   08/09/2025 05:38 AM 2,422 (H) 0 - 99 pg/mL Final     Hemoglobin A1C   Date/Time Value Ref Range Status   08/15/2025 07:27 AM 7.9 (H) See comment % Final      Last I/O:  I/O last 3 completed shifts:  In: 1052.5 (13.2 mL/kg) [P.O.:600; I.V.:452.5 (5.7 mL/kg)]  Out: 3925 (49.2 mL/kg) [Urine:3925 (1.4 mL/kg/hr)]  Weight: 79.7 kg     Past Cardiology Tests (Last 3 Years):  EKG:  ECG 12 lead 08/12/2025      ECG 12 lead 08/09/2025      ECG 12 lead STAT 05/06/2025      ECG 12 Lead 05/06/2025      ECG 12 lead (Clinic Performed) 04/16/2025      ECG 12 Lead 03/31/2025      ECG 12 Lead 12/18/2024      ECG 12 Lead 10/25/2024      ECG 12 Lead 08/12/2024      ECG 12 Lead 01/08/2024    Echo:  No results found for this or any previous visit from the past 1095 days.    Ejection Fractions:  No results found for: \"EF\"  Cath:  No results found for this or any previous visit from the past 1095 days.    Stress Test:  No results found for this or any previous visit from the past 1095 days.    Cardiac Imaging:  No results found for this or any previous visit from the past 1095 days.      Past Medical History:  She has a past medical history of Abnormal ECG (2018), Arrhythmia (2018), Atrial fibrillation (Multi) (2018), Cancer (Multi) (2018), Cardiomyopathy, CHF (congestive heart failure), Chronic kidney disease, Hyperlipidemia (2018), and Hypertension (Years ago).    Past Surgical History:  She has a past surgical history that includes Other surgical " history (09/13/2021); Other surgical history (09/13/2021); Other surgical history (09/13/2021); Other surgical history (09/13/2021); Other surgical history (09/13/2021); Cardiac electrophysiology procedure (Right, 5/6/2025); Cardiac electrophysiology procedure (Right, 5/6/2025); Ablation of dysrhythmic focus (05/06/2025); and Insert / replace / remove pacemaker (05/06/2025).      Social History:  She reports that she has never smoked. She has never used smokeless tobacco. She reports that she does not drink alcohol and does not use drugs.    Family History:  Family History[1]     Allergies:  Patient has no known allergies.    Inpatient Medications:  Scheduled Medications[2]  PRN Medications[3]  Continuous Medications[4]  Outpatient Medications:  Current Outpatient Medications   Medication Instructions    amLODIPine (Norvasc) 5 mg tablet Take 1 tablet (5 mg) by mouth once daily. Do not start before August 11, 2025.    amLODIPine (NORVASC) 2.5 mg, oral, Daily    apixaban (ELIQUIS) 2.5 mg, oral, 2 times daily    ascorbic acid (Vitamin C) 500 mg tablet 1 tablet, oral, Daily    atorvastatin (LIPITOR) 20 mg, oral, Daily    calcium citrate-vitamin D3 (Citracal+D) 315 mg-5 mcg (200 unit) tablet 1 tablet, oral, Daily    carvedilol (COREG) 6.25 mg, oral, 2 times daily    cholecalciferol (VITAMIN D-3) 25 mcg, oral, Daily    esomeprazole (NexIUM) 20 mg DR capsule 1 capsule, oral, Daily    ferrous sulfate 325 (65 Fe) MG tablet 1 tablet, oral, Daily    furosemide (LASIX) 20 mg, oral, Daily    gabapentin (NEURONTIN) 300 mg, oral, Daily    levothyroxine (Synthroid, Levoxyl) 75 mcg tablet 1 tablet, oral, Daily    oxyCODONE-acetaminophen (Percocet) 5-325 mg tablet 1 tablet, oral, 2 times daily PRN    PARoxetine (Paxil) 20 mg tablet 1 tablet, oral, Daily    potassium chloride CR 10 mEq ER tablet 10 mEq, oral, Daily (0630)    temazepam (RESTORIL) 7.5 mg, oral, Nightly PRN       Physical Exam:  Constitutional: frail appearing,  awake/drowsy/oriented x2-3 (oriented to self and time. Knew she was in the hospital, but did not know which one), no distress, cooperative  Eyes: PERRL, EOMI, clear sclera  ENMT: mucous membranes moist  Head/Neck: Neck supple  Respiratory/Thorax: adequate chest expansion, thorax symmetric, lung sounds clear but with diminished bases  Cardiovascular: Regular rate and rhythm, no murmurs, normal S1 and S2  Gastrointestinal: Nondistended, soft, non-tender, no rebound tenderness or guarding, no masses palpable, no organomegaly, +BS with high-pitched tinkering in LLQ  Extremities: trace BLE edema, no cyanosis  Neurological: alert and oriented x2-3, intact senses, motor, response and reflexes, generalized weakness  Psychological: Appropriate mood and behavior   Skin: Warm and dry, intact.       Assessment/Plan   Radha Torres is a 85 y.o. female with PMH of HFrEF (EF 25-30%), aortic stenosis, Afib, s/p AV node ablation and leadless pacemaker in 2025, HTN, renal cell carcinoma who presented on 8/9/25 as a transfer from [facility] for epigastric pain, now transferred to CICU for possible flash pulmonary edema and hyponatremia.  Primary service suspected ct GI symptoms and hyponatremia due to severe fluid overload iso ADHF. Received diuresis and on CPAP. Also received a dose of tolvaptan 8/15 per nephrology. Per nephrology will hold IV Lasix and Urea.      Pt has been fully worked up for TAVR and originally planned TAVR at [place] on 8/19/25 with Dr. Howell and Dr. Douglas.     8/18/25 - Pt currently hyponatremic, with mental status change (??? possible infection), with elevated WBCs.  Pt currently on SNP for HTN, 2L NC sat'ing 100%.  Appears quite frail, likely d/t prolonged hospitalization.  Appears to be mildly volume overloaded on assessment.  Pt's biggest complaint at this time is abd pain (RUQ and epigastric) with occasional nausea, no pain/tenderness with palpation.      Recs:  - No TAVR on 8/19/25 (is scheduled at [facility],  not [facility], will cancel)  - Work-up elevated WBCs (BC x2 and UA)  - Will reassess ability to reschedule TAVR once infection ruled out, mental status improves, and improved physical conditioning (given level of deconditioning/frailty, very likely TAVR will not be during this admission)  - Agree with Palliative Medicine Consult for goals of care  - PT/OT consult  - Structural Heart will continue to follow    Code Status:  Full Code    D/w Dr. Howell and CICU team    I spent 60 minutes in the professional and overall care of this patient.    Marco Angela MSN, AGACNP-BC  Acute Care Nurse Practitioner  Structural Heart / TAVR Team  Structural Pager: [phone]  (Nights) EDH fellow pager: [phone]          [1]   Family History  Problem Relation Name Age of Onset    Cancer Mother Radha Groves     Stroke Father Hung Groves     Hypertension Mother Radha Groves    [2]   Scheduled medications   Medication Dose Route Frequency    amLODIPine  10 mg oral Daily    [Held by provider] apixaban  5 mg oral BID    ascorbic acid  500 mg oral Daily    atorvastatin  20 mg oral Daily    [Held by provider] carvedilol  6.25 mg oral BID    cholecalciferol  25 mcg oral Daily    ferrous sulfate  1 tablet oral Daily    gabapentin  300 mg oral Daily    hydrALAZINE  50 mg oral TID    insulin lispro  0-10 Units subcutaneous TID AC    levothyroxine  75 mcg oral Daily    magnesium sulfate  2 g intravenous Once    pantoprazole  40 mg oral Daily before breakfast    PARoxetine  20 mg oral Daily    polyethylene glycol  17 g oral Daily    [Held by provider] urea  15 g oral TID   [3]   PRN medications   Medication    acetaminophen    calcium carbonate    dextrose    dextrose    glucagon    glucagon    metoclopramide    OLANZapine    oxyCODONE-acetaminophen    oxygen    oxygen    temazepam    trimethobenzamide   [4]   Continuous Medications   Medication Dose Last Rate    nitroprusside  0.25-5 mcg/kg/min 1.5 mcg/kg/min (08/18/25 0620)

## 2025-08-18 NOTE — CARE PLAN
Problem: Pain - Adult  Goal: Verbalizes/displays adequate comfort level or baseline comfort level  Outcome: Progressing     Problem: Safety - Adult  Goal: Free from fall injury  Outcome: Progressing     Problem: Discharge Planning  Goal: Discharge to home or other facility with appropriate resources  Outcome: Progressing     Problem: Chronic Conditions and Co-morbidities  Goal: Patient's chronic conditions and co-morbidity symptoms are monitored and maintained or improved  Outcome: Progressing     Problem: Nutrition  Goal: Nutrient intake appropriate for maintaining nutritional needs  Outcome: Progressing     Problem: Skin  Goal: Decreased wound size/increased tissue granulation at next dressing change  Outcome: Progressing  Flowsheets (Taken 8/18/2025 0939)  Decreased wound size/increased tissue granulation at next dressing change: Protective dressings over bony prominences  Goal: Participates in plan/prevention/treatment measures  Outcome: Progressing  Flowsheets (Taken 8/18/2025 0939)  Participates in plan/prevention/treatment measures: Elevate heels  Goal: Prevent/manage excess moisture  Outcome: Progressing  Flowsheets (Taken 8/18/2025 0939)  Prevent/manage excess moisture:   Monitor for/manage infection if present   Follow provider orders for dressing changes  Goal: Prevent/minimize sheer/friction injuries  Outcome: Progressing  Flowsheets (Taken 8/18/2025 0939)  Prevent/minimize sheer/friction injuries:   Use pull sheet   Turn/reposition every 2 hours/use positioning/transfer devices  Goal: Promote/optimize nutrition  Outcome: Progressing  Flowsheets (Taken 8/18/2025 0939)  Promote/optimize nutrition:   Monitor/record intake including meals   Consume > 50% meals/supplements   Offer water/supplements/favorite foods  Goal: Promote skin healing  Outcome: Progressing  Flowsheets (Taken 8/18/2025 0939)  Promote skin healing:   Turn/reposition every 2 hours/use positioning/transfer devices   Protective dressings  over bony prominences

## 2025-08-18 NOTE — PROGRESS NOTES
Occupational Therapy    Evaluation    Patient Name: Radha Torres  MRN: [id number]  Today's Date: 8/18/2025  Room: 15/15  Time Calculation  Start Time: 0906  Stop Time: 0925  Time Calculation (min): 19 min    Assessment  IP OT Assessment  OT Assessment: Pt demo's deficits in ADLs, IADLs, functional activity tolerance, transfers, functional mobility, and cognition. Pt would benefit from skilled OT intervention to return to PLOF safely  Prognosis: Good  Barriers to Discharge Home: Caregiver assistance, Cognition needs, Physical needs  Caregiver Assistance: Caregiver assistance needed per identified barriers - however, level of patient's required assistance exceeds assistance available at home  Cognition Needs: Insight of patient limited regarding functional ability/needs  Physical Needs: 24hr mobility assistance needed, 24hr ADL assistance needed, High falls risk due to function or environment  Evaluation/Treatment Tolerance: Patient tolerated treatment well  Medical Staff Made Aware: Yes  End of Session Communication: Bedside nurse  End of Session Patient Position: Bed, 3 rail up, Alarm off, not on at start of session (Sitter present)  Plan:  Inpatient Plan  Treatment Interventions: ADL retraining, Functional transfer training, UE strengthening/ROM, Endurance training, Cognitive reorientation, Neuromuscular reeducation, Fine motor coordination activities, Compensatory technique education  OT Frequency for Current Admission: 3 times per week during this acute inpatient hospitalization  OT Discharge Recommendations: Moderate intensity level of continued care, Based on current functional status and rehab potential, patient is anticipated to tolerate and benefit from 5 or more days per week of skilled rehabilitative therapy after discharge from this acute inpatient hospitalization  Equipment Recommended upon Discharge: Wheeled walker  OT Recommended Transfer Status: Assist of 2  OT - OK to Discharge: Yes  OT  Assessment  OT Assessment Results: Decreased ADL status, Decreased upper extremity range of motion, Decreased upper extremity strength, Decreased safe judgment during ADL, Decreased cognition, Decreased endurance, Decreased functional mobility, Decreased gross motor control  Prognosis: Good  Evaluation/Treatment Tolerance: Patient tolerated treatment well  Medical Staff Made Aware: Yes    Subjective   Current Problem:  1. Chest pain        2. Atrial fibrillation, persistent (Multi)  Cardiac device check - Inpatient    Cardiac device check - Inpatient      3. Benign essential hypertension  amLODIPine (Norvasc) 5 mg tablet      4. Flash pulmonary edema  Insert arterial line    Insert arterial line        General:  Reason for Referral: Presented to OSH  on 8/9/2025 for epigastric pain, transferred to [facility] for Possible flash pulmonary edema and hyponatremia; suspect GI symptoms and hyponatremia due to severe fluid overload i/s/o ADHF  Past Medical History Relevant to Rehab: HFrEF (EF 25-30%), Aortic stenosis, Afib, s/p AV node ablation and leadless pacemaker in 2025, HTN, renal cell carcinoma  Co-Treatment: PT  Co-Treatment Reason: sitter present and pt with fluctuating agitation  Prior to Session Communication: Bedside nurse  Patient Position Received: Bed, 3 rail up, Alarm off, not on at start of session (Sitter present)  Family/Caregiver Present: No  General Comment: Pt lethargic and with jerking movements at times. Max cues to remain engaged in session. RN present and making adjustments to Nipride as needed.   Precautions:  Medical Precautions: Cardiac precautions, Fall precautions, Oxygen therapy device and L/min (2LO2)  Precautions Comment: Per RN: SBP <150  Vital Signs:        Vital Signs       Vitals Session Pre OT During OT Post OT   Heart Rate 76   84   Resp  13   25   SpO2 99   99   /30 While seated EOB, SBP increased to 160. RN present and making adjustments to Nipride as needed to maintain  appropriate SBP. 150/59   MAP 47  82          Pain:  Pain Assessment  Pain Assessment: 0-10  0-10 (Numeric) Pain Score:  (Unrated LBP during session)  Lines/Tubes/Drains:  Arterial Line 08/16/25 Left Radial (Active)   Number of days: 2       External Urinary Catheter Female (Active)   Number of days: 2         Objective   Cognition:  Overall Cognitive Status: Impaired  Arousal/Alertness: Delayed responses to stimuli  Orientation Level:  (Oriented to self, that she was in the hospital, and month/year.)  Following Commands:  (Approx 75% simple command following. Needed repetition throughout and multimodal cueing to maintain alertness and fully follow commands.)  Cognition Comments: CAM (+). Lethargic during session.        Buck Agitation Sedation Scale  Buck Agitation Sedation Scale (RASS): Drowsy  Home Living:  Type of Home: House  Lives With: Spouse, Adult children (, son)  Home Adaptive Equipment:  (Rollator)  Home Layout: Able to live on main level with bedroom/bathroom  Home Access: Stairs to enter with rails  Entrance Stairs-Number of Steps: 2  Bathroom Shower/Tub: Walk-in shower  Home Living Comments: Pt is questionable historian.  arrives later and provides info on home set-up.   Prior Function:  Level of [place]: Independent with ADLs and functional transfers  Receives Help From: Family  ADL Assistance: Needs assistance (Per , he assists as needed and Sup for safety during ADLs.)  Ambulatory Assistance: Needs assistance (Per , pt primarily in w/c and self-propels over the past few months.)  Leisure: Likes to garden  Prior Function Comments: Questionable historian regarding PLOF.  arrives later, and provides info. Does admit to a history of falls, does not elaborate.  IADL History:     ADL:  Grooming Assistance: Moderate  Bathing Assistance: Moderate  UE Dressing Assistance: Moderate  LE Dressing Assistance: Maximal  Toileting Assistance with Device:  Maximal  Activity Tolerance:  Endurance: Tolerates less than 10 min exercise, no significant change in vital signs  Early Mobility/Exercise Safety Screen: Proceed with mobilization - No exclusion criteria met  Balance:  Static Sitting Balance  Static Sitting-Comment/Number of Minutes: CGA to MOD A to maintain static EOB sit. Unsteady and lethargic.  Static Standing Balance  Static Standing-Level of Assistance: Moderate assistance  Bed Mobility/Transfers: Bed Mobility  Bed Mobility: Yes  Bed Mobility 1  Bed Mobility 1: Supine to sitting  Level of Assistance 1: Minimum assistance, +2  Bed Mobility 2  Bed Mobility  2: Sitting to supine  Level of Assistance 2: Moderate assistance  Bed Mobility Comments 2: Assist to fully manage BLE back into bed   and Transfers  Transfer: Yes  Transfer 1  Transfer From 1: Sit to  Transfer to 1: Stand  Technique 1: Sit to stand, Stand to sit  Transfer Level of Assistance 1: Moderate assistance, +2, Arm in arm assistance  Trials/Comments 1: 1st trial, no AD used. Upon standing, MAX A to advance LE to attempt to take a lateral step. Fatigues easily.  Transfers 2  Transfer From 2: Sit to  Transfer to 2: Stand  Technique 2: Sit to stand, Stand to sit  Transfer Device 2: Walker  Transfer Level of Assistance 2: Moderate assistance, +2  Trials/Comments 2: 2nd trial completed using FWW. Continues to need MOD A for transfer. Upon stand, attempted to take step, unable d/t knees buckling and needed assist for safe sit.  IADL's:      Vision:     and Vision - Complex Assessment  Ocular Range of Motion: Within Functional Limits  Sensation:  Light Touch: No apparent deficits  Strength:     Perception:  Inattention/Neglect: Appears intact  Coordination:  Movements are Fluid and Coordinated: Yes   Hand Function:  Hand Function  Gross Grasp: Functional  Coordination: Functional  Extremities:  ,  ,  , and      Outcome Measures: [facility] Daily Activity  Putting on and taking off regular lower body clothing: A  lot  Bathing (including washing, rinsing, drying): A lot  Putting on and taking off regular upper body clothing: A lot  Toileting, which includes using toilet, bedpan or urinal: A lot  Taking care of personal grooming such as brushing teeth: A little  Eating Meals: A little  Daily Activity - Total Score: 14    Confusion Assessment Method-ICU (CAM-ICU)  Feature 1: Acute Onset or Fluctuating Course: Positive  Feature 2: Inattention: Positive  Feature 3: Altered Level of Consciousness: Positive  Feature 4: Disorganized Thinking: Negative  Overall CAM-ICU: Positive   ICU Mobility Screen  Early Mobility/Exercise Safety Screen: Proceed with mobilization - No exclusion criteria met,   Buck Agitation Sedation Scale  Buck Agitation Sedation Scale (RASS): Drowsy      Education Documentation  Body Mechanics, taught by Diamante Giordano OT at 8/18/2025  1:21 PM.  Learner: Patient  Readiness: Acceptance  Method: Explanation  Response: Verbalizes Understanding    Precautions, taught by Diamante Giordano OT at 8/18/2025  1:21 PM.  Learner: Patient  Readiness: Acceptance  Method: Explanation  Response: Verbalizes Understanding    ADL Training, taught by Diamante Giordano OT at 8/18/2025  1:21 PM.  Learner: Patient  Readiness: Acceptance  Method: Explanation  Response: Verbalizes Understanding    Education Comments  No comments found.        Goals:     Encounter Problems       Encounter Problems (Active)       ADLs       Patient will complete UB dressing tasks with Sup in order to maximize functional Indep with task completion.        Start:  08/18/25    Expected End:  09/01/25            Patient will complete lower body dressing with SBA  for donning and doffing all LE clothes in order to increase Indep with task participation.        Start:  08/18/25    Expected End:  09/01/25            Patient will complete toileting, including clothing management and hygiene, with SBA in order to maximize functional Indep with task  completion.        Start:  08/18/25    Expected End:  09/01/25               COGNITION/SAFETY       Pt will demo good safety awareness during functional task completion with no more than min vc in order to maximize occupational performance.         Start:  08/18/25    Expected End:  09/01/25               EXERCISE/STRENGTHENING       Pt will increase overall BUE strength to 4/5 in order to increase functional task participation.        Start:  08/18/25    Expected End:  09/01/25            Pt will increase endurance to tolerate 15-20min of activity with no more than 1 rest break in order to increase ability to engage in ADL completion.        Start:  08/18/25    Expected End:  09/01/25               MOBILITY       Pt will demo increased functional mobility to tolerate tasks necessary to complete ADL routine with SBA and while observing good safety.        Start:  08/18/25    Expected End:  09/01/25               TRANSFERS       Patient will complete functional transfers using least restrictive device with SBA   in order to maximize functional potential and increase safety.        Start:  08/18/25    Expected End:  09/01/25 08/18/25 at 2:25 PM   Diamante Giordano OT   Rehab Office: [phone]

## 2025-08-18 NOTE — PROGRESS NOTES
"CARDIAC ICU PROGRESS NOTE    Radha Torres is a 85 y.o. female on day 9 of admission presenting with Chest pain.    Subjective     Overnight patient continued of epigastric chest pain that was similar to presentation on admission. Her hydralazine was increased to 50 mg TID.    Patient seen and examined at bedside. Still on nitroprusside gtt for afterload reduction. Na correcting, currently 129. Denies chest, pain, SOB, nausea, vomiting.          Objective     Physical Exam  Constitutional:       General: She is not in acute distress.     Appearance: Normal appearance.   HENT:      Head: Normocephalic and atraumatic.      Mouth/Throat:      Mouth: Mucous membranes are moist.     Eyes:      Extraocular Movements: Extraocular movements intact.      Pupils: Pupils are equal, round, and reactive to light.       Cardiovascular:      Rate and Rhythm: Normal rate and regular rhythm.      Heart sounds: Murmur heard.      No friction rub. No gallop.   Pulmonary:      Effort: Pulmonary effort is normal.      Breath sounds: Wheezing and rales present.   Abdominal:      General: Abdomen is flat. There is no distension.      Palpations: Abdomen is soft.      Tenderness: There is abdominal tenderness.      Comments: Epigastric tenderness     Musculoskeletal:      Right lower leg: No edema.      Left lower leg: No edema.     Skin:     General: Skin is dry.     Neurological:      General: No focal deficit present.     Psychiatric:         Mood and Affect: Mood normal.         Behavior: Behavior normal.            Last Recorded Vitals  Blood pressure 155/69, pulse 99, temperature 36.3 °C (97.3 °F), temperature source Temporal, resp. rate 21, height 1.65 m (5' 4.96\"), weight 79.7 kg (175 lb 11.3 oz), SpO2 100%.  Intake/Output last 3 Shifts:  I/O last 3 completed shifts:  In: 1052.5 (13.2 mL/kg) [P.O.:600; I.V.:452.5 (5.7 mL/kg)]  Out: 3925 (49.2 mL/kg) [Urine:3925 (1.4 mL/kg/hr)]  Weight: 79.7 kg       Assessment & Plan  Chest " chiqui Torres is a 85 y.o. female with PMH HFrEF (EF 25-30%), aortic stenosis, Afib, s/p AV node ablation and leadless pacemaker in 2025, HTN, renal cell carcinoma who presented on 8/9/25 as a transfer from [facility] for epigastric pain, now transferred to CICU for possible flash pulmonary edema and hyponatremia. Suspect GI symptoms and hyponatremia due to severe fluid overload iso ADHF. Received diuresis and on CPAP. Also received a dose of tolvaptan 8/15 per nephrology. Per nephrology will hold IV Lasix and Urea. Plan for TAVR on 8/19.    Update 8/18:  - Continue to hold diuresis and urea per renal for now  - Nipride gtt  - Mentation, not oriented to place or time  - Increased hydralazine to 50 mg TID overnight  - Plan for TAVR 8/19 still, pre-TAVR workup complete, structural aware of patient      CNS  #agitation  -1:1 sitter  -PRN zyprexa  -consider hyponatremia as etiology, fu renal recs      PULM  #flash pulmonary edema  -CPAP as tolerated, SpO2>92%  -nipride gtt, caution iso severe aortic stenosis, SBP goal 130-150  -Hold diuresis with IV lasix, caution iso severe aortic stenosis     CV  #Severe aortic stenosis  #HFrEF (EF 25-30%)  #HTN  :: Echo 7/11/25 - EF 25-30%, left ventricle mildly dilated, mild concentric LV hypertrophy, leadless pacemaker in right atria and RV, aortic valve is heavily calcified with appearance of severe aortic stenosis (peak gradient 52mmHg, mean gradient 25mmHg, aortic valve area 0.8cm^2), trace AR, mild MR and TR, RVSP 50-60mmHg,   ::Scheduled for TAVR 8/19/25 with Dr. Howell  ::EKG with no ischemic changes  ::Troponin 23>27  Plan:  -holding diuresis with IV lasix 60mg BID for now, caution iso severe aortic stenosis  -strict I/Os, redose as needed  -holding home coreg 6.25mg, amlodipine 5mg  -nipride gtt, caution iso severe aortic stenosis  -continue hydralazine 50 mg TID  -Plan for TAVR 8/19 still, pre-TAVR workup complete, structural aware of patient      #Atrial  fibrillation  #S/p AV emiliano ablation and leadless pacemaker placement 5/6/25  :: Home regimen - coreg 6.25 BID, amlodipine 2.5 mg, Eliquis 5mg BID  Plan:  -Holding eliquis given possible TAVR, consider heparin gtt if needed for stroke risk reduction inpatient  -fu heparin assay  -holding home coreg     GI  #Epigastric pain  #Nausea/GI upset  :: EGG performed 2018 at [facility] notable for PUD  ::Possibly due to volume overload/ADHF vs GERD  Plan:   - Tigan PRN ordered for nausea   - KUB with no acute findings; ECG unchanged   - Continue pantoprazole 40 mg daily, tums PRN  - Continue home Percocet 5-325 mg Q6 PRN     RENAL/  #Acute severe hypo-osmolar hyponatremia  :: Na 131 on admission; Na on AM labs 8/14 very low at 117  ::Has received diuresis and 500cc NS bolus this admission  ::serum osm 255, urine Na 70, urine osm 248  ::TSH, morning cortisol within normal limits  Plan:   - Nephrology consulted, recommended 15mg tolvaptan given 8/15, holding for now  - Repeat Urine studies  - Holding diuresis with IV lasix 60mg BID for now, caution iso severe aortic stenosis per renal       #CKD 3b  :: Baseline Cr appears to be around 1.2  ::Cr 1.22 in ED  Plan:  - Daily RFP q4h  - Avoid nephrotoxic agents  - Cr 1.08     ID  DANIELE     HEME/ONC  DANIELE     ENDO  #Hypothyroidism  ::TSH wnl  - Continue levothyroxine 75 mcg     #suspected DM  -fu A1c  -ISS #1, hypoglycemia protocol     MSK/Skin  DANIELE     F: PRN  E: K>4 Phos>3 Mag>2  N: NPO  A: PIV, L radial a-line (8/16)  O2: CPAP 30% FIO2  Drips: nipride  Abx: None  DVT PPx: pending heparin assay  GI PPx: PPI     CODE STATUS: FULL (confirmed with patient on admission)  SURROGATE DECISION MAKER: Stefanie Roblero, [phone]       Tonja Harris, DO  Internal Medicine - Neurology Prelim PGY-1     Disclaimer: Documentation completed with the information available at the time of input. The times in the chart may not be reflective of actual patient care times, interventions, or procedures.  Documentation occurs after the physical care of the patient.

## 2025-08-18 NOTE — PROGRESS NOTES
Physical Therapy    Physical Therapy Evaluation & Treatment    Patient Name: Radha Torres  MRN: [id number]  Department: [facility]  Room: 15/15-A  Today's Date: 8/18/2025   Time Calculation  Start Time: 0908  Stop Time: 0931  Time Calculation (min): 23 min    Assessment/Plan   PT Assessment  PT Assessment Results: Decreased strength, Decreased endurance, Impaired balance, Decreased mobility, Decreased coordination, Decreased cognition, Impaired judgement, Decreased safety awareness  Rehab Prognosis: Fair  Barriers to Discharge Home: Caregiver assistance, Cognition needs, Physical needs  Caregiver Assistance: Caregiver assistance needed per identified barriers - however, level of patient's required assistance exceeds assistance available at home  Cognition Needs: Insight of patient limited regarding functional ability/needs, Cognition-related high falls risk  Physical Needs: Stair navigation into home limited by function/safety, Ambulating household distances limited by function/safety, 24hr mobility assistance needed, 24hr ADL assistance needed, High falls risk due to function or environment  End of Session Communication: Bedside nurse  Assessment Comment: Pt presents for possible flash pulmonary edema and hyponatremia; course c/b AMS. Pt with impaired strength, cognition, balance, endurance, and functional mobility. Pt unable to progress to ambulation this date 2/2 complete buckle of B knees with one attempt for ambulating forward. Pt will continue to benefit from therapy to address the above deficits.  End of Session Patient Position: Bed, 3 rail up, Alarm on (sitter present)   IP OR SWING BED PT PLAN  Inpatient or Swing Bed: Inpatient  PT Plan  Treatment/Interventions: Transfer training, Bed mobility, Gait training, Stair training, Balance training, Strengthening, Endurance training, Range of motion, Therapeutic exercise, Therapeutic activity, Postural re-education, Positioning  PT Plan: Ongoing PT  PT Frequency for  Current Admission: 4 times per week during this acute inpatient hospitalization  PT Discharge Recommendations: Moderate intensity level of continued care, Based on current functional status and rehab potential, patient is anticipated to tolerate and benefit from 5 or more days per week of skilled rehabilitative therapy after discharge from this acute inpatient hospitalization  PT Recommended Transfer Status: Assist x2  PT - OK to Discharge: Yes    Subjective     PT Visit Info:  PT Received On: 08/18/25    General Visit Information:  General  Reason for Referral: Presented to OSH  on 8/9/2025 for epigastric pain, transferred to [facility] for Possible flash pulmonary edema and hyponatremia; suspect GI symptoms and hyponatremia due to severe fluid overload i/s/o ADHF  Past Medical History Relevant to Rehab: HFrEF (EF 25-30%), Aortic stenosis, Afib, s/p AV node ablation and leadless pacemaker in 2025, HTN, renal cell carcinoma  Family/Caregiver Present: No  Co-Treatment: OT  Co-Treatment Reason: sitter present and pt with fluctuating agitation  Prior to Session Communication: Bedside nurse  Patient Position Received: Bed, 3 rail up, Alarm on (sitter present)  General Comment: Pt lethargic throughout session with pt having jerking movements noted. Needing consistent sitmulation to remain engaged throughout session. RN in room most of session to titrate IV medication for BP management. Returned later in the day when  in room to ask about home set-up and PLOF. Below information was obtained from pt's .    Home Living:  Home Living  Type of Home: House  Lives With: Spouse, Adult children, Other (Comment) (son; pt reports someone is always home to assist as needed)  Home Adaptive Equipment: Wheelchair-manual (Rollator)  Home Layout: One level  Home Access: Stairs to enter without rails  Entrance Stairs-Rails: None  Entrance Stairs-Number of Steps: 2  Bathroom Shower/Tub: Walk-in shower  Bathroom Equipment: Grab  bars in shower, Shower chair with back    Prior Level of Function:  Prior Function Per Pt/Caregiver Report  Receives Help From: Family  ADL Assistance:  (pt needs assist to get in/out of shower and for doing entirity of showering. Pt able ot get dressed but is only wearing pull-over gowns currently.)  Homemaking Assistance:  (Pt's  and son primarily do the cooking/cleaning; pt does help with dishes occasionally if she feels up for it)  Ambulatory Assistance:  (Pt has not been ambulating >10 ft in the last 1.5 months 2/2 severe SOB. Pt mainly performs transfers to/from manual wheelchair indep and self-propels wheelchair either with feet or with UEs. Denies falls in the last few months.)  Leisure: likes to garden  Prior Function Comments: (-) drives    Precautions:  Precautions  Medical Precautions: Cardiac precautions, Fall precautions, Oxygen therapy device and L/min  Precautions Comment: Per RN: SBP <150    Lines/Tubes:   Telemetry   2 L O2 NC     Vital Signs     Vitals Session Pre PT During PT Post PT   Heart Rate 84  84   Resp 13  25   SpO2 99  97   /30 SBP in the 150s mostly throughout 137/53      Objective     Pain:  Pain Assessment  Pain Assessment: 0-10  0-10 (Numeric) Pain Score:  (Pt did not rate but having back pain throughout session. RN aware.)    Cognition:  Cognition  Overall Cognitive Status: Impaired  Arousal/Alertness: Delayed responses to stimuli  Orientation Level:  (oriented to self, hospital, month and year. Pt did not know which hospital or what the exact date was. Reorientation provided.)  Following Commands:  (Pt followed ~75% one step commands with increased time and repetition of stimulation 2/2 level of lethargy.)    General Assessments:   Activity Tolerance  Early Mobility/Exercise Safety Screen: Proceed with mobilization - No exclusion criteria met    Sensation  Light Touch: No apparent deficits    Strength  Strength Comments: B LE strength grossly 4/5 but very  uncoodrinatede muscular coordination noted during mobility with B knee buckling with attempts to take steps  Strength  Strength Comments: B LE strength grossly 4/5 but very uncoodrinatede muscular coordination noted during mobility with B knee buckling with attempts to take steps    Coordination  Movements are Fluid and Coordinated: No  Coordination Comment: jerking movements throughout session almost as if pt near falling asleep    Static Sitting Balance  Static Sitting-Balance Support: No upper extremity supported, Feet supported  Static Sitting-Level of Assistance:  (CGA-modAx1 with L lateral and posterior lean)  Dynamic Sitting Balance  Dynamic Sitting-Balance Support: No upper extremity supported, Feet supported  Dynamic Sitting-Level of Assistance:  (Min-ModAx1 with L lateral and posterior lean)    Static Standing Balance  Static Standing-Balance Support: No upper extremity supported  Static Standing-Level of Assistance: Moderate assistance (x2 person)  Dynamic Standing Balance  Dynamic Standing-Balance Support: No upper extremity supported  Dynamic Standing-Level of Assistance:  (Mod-maxAx2)    Functional Assessments:  Bed Mobility  Bed Mobility: Yes  Bed Mobility 1  Bed Mobility 1: Supine to sitting  Level of Assistance 1: Minimum assistance (x1 person; cues for sequencing)  Bed Mobility Comments 1: HOB elevated; increased effort to complete 2/2 weakness and AMS  Bed Mobility 2  Bed Mobility  2: Sitting to supine  Level of Assistance 2: Moderate assistance (x1 person for LEs onto bed)  Bed Mobility Comments 2: HOB flat    Transfers  Transfer: Yes  Transfer 1  Transfer From 1: Sit to  Transfer to 1: Stand  Technique 1: Sit to stand  Transfer Level of Assistance 1: Moderate assistance (x2 person; cues for sequencing)  Trials/Comments 1: increased time to rise; surface height elevated compared to pt's height  Transfers 2  Transfer From 2: Stand to  Transfer to 2: Sit  Technique 2: Stand to sit  Transfer Level  of Assistance 2: Moderate assistance (x2 person; cues for sequencing)  Trials/Comments 2: decreased eccentric control    Ambulation/Gait Training  Ambulation/Gait Training Performed: Yes  Ambulation/Gait Training 1  Surface 1: Level tile  Device 1: No device  Assistance 1: Moderate verbal cues (x2 person; B arm-in-arm assist. Pt needing depedent assist to laterally advance L LE but able to bring R LE medially; no foot clearance bilaterally)  Quality of Gait 1: Forward flexed posture  Comments/Distance (ft) 1: x 1 side step each leg towards the L    Extremity/Trunk Assessments:  RLE   RLE : Within Functional Limits  LLE   LLE : Within Functional Limits    Treatments:  Therapeutic Activity  Therapeutic Activity Performed: Yes  Therapeutic Activity 1: Pt completed sit<->stand with FWW and modAx2 with cues for hand placement but pt unable to complete transfer despite cues with proper hand placement. Pt needing increased time to complete transfer 2/2 weakness.  Therapeutic Activity 2: Pt attempted 1 step forward with FWW but then having B knee buckle requiring maxAx2 for pt to remain upright.  Therapeutic Activity 3: Pt sat EOB total of 8 minutes with Estefania-modAx1 primarily 2/2 posterior and L lateral lean; however, pt able to achive brief bouts of CGA (<20 seconds in duration). Pt having eyes closed for majority of the duration.    Outcome Measures:  [facility] Basic Mobility  Turning from your back to your side while in a flat bed without using bedrails: A little  Moving from lying on your back to sitting on the side of a flat bed without using bedrails: A little  Moving to and from bed to chair (including a wheelchair): Total  Standing up from a chair using your arms (e.g. wheelchair or bedside chair): Total  To walk in hospital room: Total  Climbing 3-5 steps with railing: Total  Basic Mobility - Total Score: 10    Confusion Assessment Method-ICU (CAM-ICU)  Feature 1: Acute Onset or Fluctuating Course: Positive  Feature 2:  Inattention: Positive  Feature 3: Altered Level of Consciousness: Positive  Feature 4: Disorganized Thinking: Negative  Overall CAM-ICU: Positive    FSS-ICU  Ambulation: Unable to attempt due to weakness  Rolling: Minimal assistance (performs 75% or more of task)  Sitting: Moderate assistance (performs 50 - 74% of task)  Transfer Sit-to-Stand: Total assistance (performs 25% or requires another person)  Transfer Supine-to-Sit: Minimal assistance (performs 75% or more of task)  Total Score: 12    Early Mobility/Exercise Safety Screen: Proceed with mobilization - No exclusion criteria met  ICU Mobility Scale: Standing [4]  E = Exercise and Early Mobility  Early Mobility/Exercise Safety Screen: Proceed with mobilization - No exclusion criteria met  ICU Mobility Scale: Standing    Encounter Problems       Encounter Problems (Active)       Balance       Pt will be able to sit EOB dynamically without UE support >10 minutes with supervision only for improved postural control (Progressing)       Start:  08/18/25    Expected End:  09/01/25               Balance       Pt will be able to complete standing alternating marches x 10 each leg with LRAD and CGA only without LE buckling and complete foor clearance each leg (Progressing)       Start:  08/18/25    Expected End:  09/01/25               Mobility       Patient will ambulate with LRAD >10 ft with CGA with stable vitals (Progressing)       Start:  08/18/25    Expected End:  09/01/25            Patient will ascend and descend 2 stairs with LRAD and no handrail with minAx1 (Progressing)       Start:  08/18/25    Expected End:  09/01/25               PT Transfers       Patient to transfer to and from sit to supine indep (Progressing)       Start:  08/18/25    Expected End:  09/01/25            Patient will transfer sit to and from stand with LRAD and supervision (Progressing)       Start:  08/18/25    Expected End:  09/01/25               PT Transfers       Transfer from bed  to chair with LRAD and CGA (Progressing)       Start:  08/18/25    Expected End:  09/01/25                 Education Documentation  Mobility Training, taught by Jovita Sands PT at 8/18/2025  4:22 PM.  Learner: Patient  Readiness: Nonacceptance  Method: Explanation  Response: No Evidence of Learning, Needs Reinforcement  Comment: mobility precautions    Education Comments  No comments found.    Signed by Jovita Sands DPT

## 2025-08-18 NOTE — PROGRESS NOTES
MEDICINE INPATIENT PROGRESS NOTE  Radha Torres is a 85 y.o. female on day 9 of admission presenting with Chest pain    Subjective   Patient is a CICU transfer. Briefly prior to transfer (per significant event note and signout), patient had a change in mentation and was increasingly lethargic. Patient choked on water. Intern evaluated the patient, who was fully oriented and there were no neurological focal deficits. VBG ordered (pH 7.45, CO2 44, HCO3 30.6). CTH ordered.    When seen, the patient was trying to sip water. She was having trouble holding the cup up and getting agitated with the sitter and her  helping. She ended up spilling the water and throwing her straw. The patient was alert and oriented to person, place, and time, but needed frequent re-asking of questions. She was on 2L NC. She stated she felt good.     Denies fever, chills, chest pain, dyspnea, abdominal pain, nausea, vomiting, leg swelling, and dysuria. No additional questions or concerns at this time.  _______________________  Hospital Course: Radha Torres is an 85 y.o. female with history significant for HFrEF (EF 25-30%), aortic stenosis, Afib, s/p AV node ablation and leadless pacemaker in 2025, HTN, renal cell carcinoma who presented on 8/9/25 as a transfer from [facility] for epigastric pain, nonradiating, not associated with eating. Hemodynamically stable, afebrile on arrival in no acute distress with some mild epigastric burning. Trop 23>27, lipase within normal limits. EKG with no ischemic changes. CTAP negative for inflammatory process or bowel obstruction. She was scheduled for upcoming TAVR on 8/19/25 with Dr. Howell at [facility].     Labs were significant for BNP of 2422 on 8/9/25, and patient was diuresed with IV lasix 40 mg. Pacemaker was interrogated which showed no notable events. Dr. Howell coordination team was contacted regarding the possibility of expediting TAVR to an earlier date. TAVR was unable to be moved to an  "earlier date and remained scheduled for 8/19. On 8/11, patient was hyponatremic at 125, and lasix was held. Urine osm 250, urine sodium 29, and Na increased to 127 by 8/12. Patient continued to endorse epigastric pain and nausea. KUB was unremarkable, ECG remained unchanged. Patient experienced some relief with Tigan, Tums and home pain medication; epigastric pain most likely due to reflux/GERD, as it was burning in nature, worse after eating, worse with lying down, and improvement with Tums.  However, by 8/13, serum sodium resulted severely low at 120; repeat was also 120. Serum osm, morning cortisol, and 500 ml NS was ordered. Likely hypervolemia hyponatremia. Per recommendations of nephrology, 15 mg tolvaptan was given on 8/15 and 8/18. Lasix and urea also held. Hyponatremia continued to improve. On 8/18, nipride was discontinued and hydralazine was increased to 50 mg TID. In discussion with cardiology- structural heart, TAVR will not be done on 8/19. WBCs mildly elevated and will continue to monitor when transferred to the floor.      Objective   Current Vitals  /70   Pulse 81   Temp 36.9 °C (98.4 °F) (Temporal)   Resp 20   Ht 1.65 m (5' 4.96\")   Wt 79.7 kg (175 lb 11.3 oz)   SpO2 100%   BMI 29.27 kg/m²      I/O last 3 completed shifts:  In: 1052.5 (13.2 mL/kg) [P.O.:600; I.V.:452.5 (5.7 mL/kg)]  Out: 3925 (49.2 mL/kg) [Urine:3925 (1.4 mL/kg/hr)]  Weight: 79.7 kg     Physical exam:  Constitutional: Appears stated age, normal hygiene, in no acute distress.  HEENT: NCAT, normal external inspection of ears and nose. Oropharynx normal.  Cardio: RRR, S1/S2, systolic murmur, radial pulses +2, no edema of extremities  Pulm: CTAB, no respiratory distress, no wheezes/rales/rhonchi, normal respiratory effort. On 2L NC.  GI: +BS, soft, non-tender in all four quadrants, nondistended, no guarding or rebound, no masses.  MSK: No joint swelling, normal movements of all extremities. Normal ROM.  Skin: No lesions, " contusions, or erythema. Warm and dry.  Extremities: No BLE swelling.  Neuro: A&Ox4. No focal deficits.  Psych: Cooperative, appropriate mood and affect.     Medications   Scheduled Medications  Scheduled Medications[1] Continuous Medications  Continuous Medications[2] PRN Medications  PRN Medications[3]     Relevant Results  Labs:  CBC  Results from last 72 hours   Lab Units 08/18/25  0412 08/17/25  0443 08/16/25  0532   WBC AUTO x10*3/uL 15.0* 12.5* 15.0*   HEMOGLOBIN g/dL 10.5* 10.2* 11.0*   HEMATOCRIT % 31.1* 29.4* 32.1*   PLATELETS AUTO x10*3/uL 283 300 350        BMP  Results from last 72 hours   Lab Units 08/18/25  1333 08/18/25  0412 08/17/25  1958 08/17/25  0839 08/17/25  0443 08/16/25  1041 08/16/25  0532   SODIUM mmol/L 126* 129* 130*   < > 127*   < > 113*   POTASSIUM mmol/L 4.1 4.8 3.8   < > 4.4   < > 4.2   CHLORIDE mmol/L 87* 88* 88*   < > 86*   < > 77*   BUN mg/dL 21 20 23   < > 32*   < > 27*   CREATININE mg/dL 1.17* 1.08* 1.01   < > 1.11*   < > 1.18*   MAGNESIUM mg/dL  --  1.90  --   --  1.98  --  2.00   PHOSPHORUS mg/dL 2.9 2.8 3.0   < > 3.1   < > 4.1    < > = values in this interval not displayed.       Micro/culture data:  No results found for the last 120 days.    Imaging:     === 07/31/25 ===    CT TAVR LOW CONTRAST CHEST ABDOMEN PELVIS    - Impression -  1. Moderate atherosclerotic changes involving the thoracoabdominal  aorta and its branches. Access vessels are patent.  2. Severe calcifications of the aortic valve correlating with history  of aortic stenosis.  3. Left ventricular and left atrial enlargement and correlate with  recent echocardiogram.  4. Mildly dilated pulmonary artery and correlate with concern for  pulmonary hypertension.  5. Severe coronary artery calcifications. Please note that, the  present study is not tailored for evaluation of coronary arteries and  correlate with patient's symptoms and cardiovascular risk factors.  6. Small bilateral pleural effusions, left larger  than right.  7. Right-sided chest wall deformity with adjacent pleural thickening  and atelectasis, also seen on prior imaging.  8. Status post L3-L5 spinal fusion. Retrolisthesis of L1-L2 and L2-L3.    I personally reviewed the images/study and I agree with the findings  as stated by Rocky Ye D.O. (Radiology resident). This study was  interpreted at [facility],  [place], [place].    MACRO:  None    Signed by: Bairon Katz 7/31/2025 4:27 PM  Dictation workstation:   [id number]     Assessment/Plan     Assessment:     Radha Torres is a 85 y.o. female with PMH HFrEF (EF 25-30%), aortic stenosis, Afib, s/p AV node ablation and leadless pacemaker in 2025, HTN, renal cell carcinoma who presented on 8/9/25 as a transfer from [facility] for epigastric pain to the cardiology floor, now transferred to CICU for possible flash pulmonary edema and hyponatremia. Suspect GI symptoms and hyponatremia due to severe fluid overload iso ADHF. Transferred from CICU back to cardiology floor for management of delirium, weaning off of oxygen, and assessment of TAVR (since one on 8/19 Is cancelled in the acute setting).     Updates 08/18/25  - Continue to monitor WBC. No infectious etiology right now.   - Continue to monitor Na levels. No longer diuresing. Nephrology signed off.   - Continue to monitor BP. Currently on amlodipine 10mg and hydralazine 50 mg TID, off nitroprusside now. Goal systolic 130s-150s  - Continue to monitor mentation. Delirium precautions. CTH ordered.  - Palliative care consulted  - Structural heart said they were going to cancel TAVR that was scheduled for tomorrow. Potentially would do Thursday, but most likely not this admission.   - Consider speech evaluation if patient continues to choke  - Eliquis held in anticipation for TAVR tomorrow, though TAVR was cancelled there is still potential for TAVR this admission (however unlikely). Consider heparin drip to be started once  heparin assay is within therapeutic level.    Acute Medical Problems:    #HFrEF (EF 25-30%)  #Severe aortic stenosis  Echo 7/11/25 - EF 25-30%, left ventricle mildly dilated, mild concentric LV hypertrophy, leadless pacemaker in right atria and RV, aortic valve is heavily calcified with appearance of severe aortic stenosis (peak gradient 52mmHg, mean gradient 25mmHg, aortic valve area 0.8cm^2), trace AR, mild MR and TR, RVSP 50-60mmHg. EKG with no ischemic changes. Troponin 23>27. Lipase, CTAP negative for intra abdominal process.   - Repeat EKG if any chest pain, repeat ordered 8/12; was nonacute   - Continue telemetry  - Holding diuresis currently (home furosemide 40 mg daily, previosuly on IV lasix 60mg BID in the CICU)  - TAVR initially scheduled for 8/19 at [facility]. Structural heart said they were going to cancel this TAVR since patient admitted. Potentially would re-evaluate and do Thursday here, but most likely no TAVR this admission.     #HTN  Goal systolics 130-150. Required nitroprusside drip in the CICU, weaned off 8/18.   - Hydralazine TID 50mg  - Amlodipine 2.5mg at home. Seemed to be 5mg, then 10mg during this admission. Went to 5mg as patient's blood pressure is improving. Will consider going back up to 10mg if blood pressure not controlled.  - Holding home Coreg 6.25 BID      #Epigastric pain  #Nausea/GI upset  EGG performed 2018 at [facility] notable for PUD. KUB with no acute findings; ECG unchanged. Epigastric pain/nausea most likely due to severe GERD, indicated by relief from tums, worsening after large meals, and epigastric burning  - Tigan PRN ordered for nausea   - Reglan PRN ordered if Tigan not working  - Continue pantoprazole 40 mg daily, tums PRN  - Continue home Percocet 5-325 mg Q6 PRN    #Agitation  #Hospital Delirium  Patient became agitated while in the CICU. Had a choking event 8/18. [facility] ordered. Alert and oriented.   - 1:1 sitter  - PRN Zyprexa  - PRN temazepam for sleep     #Acute  severe hyponatremia, resolving  Na 131 on admission; Na on AM labs 8/14 very low at 117. Neph consulted patient is s/p tolvaptan and urea. TSH, morning cortisol within normal limits  - CTM Na levels  - Currently holding diuresis    Chronic Medical Problems:    #Atrial fibrillation  #S/p AV emiliano ablation and leadless pacemaker placement 5/6/25  #HTN  Home regimen - coreg 6.25 BID, amlodipine 2.5 mg, Eliquis 5mg BID  - Amlodipine increased to 5mg on 8/10/25  - Eliquis 5mg held in the setting of potential TAVR (cancelled for tomorrow, but still undergoing eval)     #Candida Intertrigo  - Nystatin power BID      #CKD 3b  Baseline Cr appears to be around 1.2. Cr 1.22 in ED  - Daily RFP  - Avoid nephrotoxic agents     #HLD  - Continue atorvastatin     #Hypothyroidism  - Continue levothyroxine 75 mcg    #suspected DM  A1c 08/15 7.9.   -SSI #1, hypoglycemia protocol    Fluids: Replete PRN  Electrolytes: Replete PRN  Nutrition:  Adult diet Cardiac; 70 gm fat; 2 - 3 grams Sodium   Antimicrobials:   DVT: Apixaban held in anticipation for surgery  GI ppx: Pantoprazole  Catheter:None  Lines:PIV  Oxygen:Room Air    Code Status: Full Code (confirmed on admission)   Surrogate Decision Maker:  Primary Emergency Contact: Stefanie Roblero     Patient to be staffed with attending in the morning.    Jaci Dao MD  Internal Medicine PGY-1       [1] amLODIPine, 10 mg, oral, Daily  [Held by provider] apixaban, 5 mg, oral, BID  ascorbic acid, 500 mg, oral, Daily  atorvastatin, 20 mg, oral, Daily  [Held by provider] carvedilol, 6.25 mg, oral, BID  cholecalciferol, 25 mcg, oral, Daily  ferrous sulfate, 1 tablet, oral, Daily  gabapentin, 300 mg, oral, Daily  hydrALAZINE, 50 mg, oral, TID  insulin lispro, 0-10 Units, subcutaneous, TID AC  levothyroxine, 75 mcg, oral, Daily  pantoprazole, 40 mg, oral, Daily before breakfast  PARoxetine, 20 mg, oral, Daily  polyethylene glycol, 17 g, oral, Daily  [Held by provider] urea, 15 g, oral,  TID  [2]    [3] PRN medications: acetaminophen, calcium carbonate, dextrose, dextrose, glucagon, glucagon, metoclopramide, OLANZapine, oxyCODONE-acetaminophen, oxygen, oxygen, temazepam, trimethobenzamide

## 2025-08-18 NOTE — PROGRESS NOTES
"Radha Torres   85 y.o.    @WT@  N/Room: [id number]/15/15-A    Subjective: Patient feels \"not so good\" today. Endorses back pain. Breathing is stable. Making good urine.     Objective:     Meds:   amLODIPine, 10 mg, Daily  [Held by provider] apixaban, 5 mg, BID  ascorbic acid, 500 mg, Daily  atorvastatin, 20 mg, Daily  [Held by provider] carvedilol, 6.25 mg, BID  cholecalciferol, 25 mcg, Daily  ferrous sulfate, 1 tablet, Daily  gabapentin, 300 mg, Daily  hydrALAZINE, 50 mg, TID  insulin lispro, 0-10 Units, TID AC  levothyroxine, 75 mcg, Daily  pantoprazole, 40 mg, Daily before breakfast  PARoxetine, 20 mg, Daily  polyethylene glycol, 17 g, Daily  tolvaptan, 15 mg, Once  [Held by provider] urea, 15 g, TID      nitroprusside, Last Rate: Stopped (08/18/25 0958)      acetaminophen, 650 mg, q6h PRN  calcium carbonate, 500 mg of calcium carbonate, 4x daily PRN  dextrose, 12.5 g, q15 min PRN  dextrose, 25 g, q15 min PRN  glucagon, 1 mg, q15 min PRN  glucagon, 1 mg, q15 min PRN  metoclopramide, 10 mg, q6h PRN  OLANZapine, 5 mg, Nightly PRN  oxyCODONE-acetaminophen, 1 tablet, q6h PRN  oxygen, 1 Dose, Continuous - O2/gases  oxygen, 1 Dose, Continuous - O2/gases  temazepam, 7.5 mg, Nightly PRN  trimethobenzamide, 200 mg, q6h PRN        Vitals:    08/18/25 1300   BP:    Pulse: 83   Resp: 20   Temp:    SpO2: 100%          Intake/Output Summary (Last 24 hours) at 8/18/2025 1317  Last data filed at 8/18/2025 1200  Gross per 24 hour   Intake 1296.9 ml   Output 2450 ml   Net -1153.1 ml       General appearance: no distress  Eyes: non-icteric  Skin: no apparent rash  Heart: RRR  Lungs: normal work of breathing, on 3 L NC   Abdomen: soft, nt/nd  Extremities: no pitting edema bilat  Neuro: No FN    Blood Labs:  Results for orders placed or performed during the hospital encounter of 08/09/25 (from the past 24 hours)   Heparin Assay   Result Value Ref Range    Heparin Unfractionated 1.6 (HH) See Comment Below for Therapeutic Ranges IU/mL "   POCT GLUCOSE   Result Value Ref Range    POCT Glucose 160 (H) 74 - 99 mg/dL   Renal function panel   Result Value Ref Range    Glucose 135 (H) 74 - 99 mg/dL    Sodium 130 (L) 136 - 145 mmol/L    Potassium 3.8 3.5 - 5.3 mmol/L    Chloride 88 (L) 98 - 107 mmol/L    Bicarbonate 34 (H) 21 - 32 mmol/L    Anion Gap 12 10 - 20 mmol/L    Urea Nitrogen 23 6 - 23 mg/dL    Creatinine 1.01 0.50 - 1.05 mg/dL    eGFR 55 (L) >60 mL/min/1.73m*2    Calcium 9.1 8.6 - 10.6 mg/dL    Phosphorus 3.0 2.5 - 4.9 mg/dL    Albumin 3.4 3.4 - 5.0 g/dL   Heparin Assay   Result Value Ref Range    Heparin Unfractionated 1.4 (HH) See Comment Below for Therapeutic Ranges IU/mL   Lipase   Result Value Ref Range    Lipase 20 9 - 82 U/L   CBC and Auto Differential   Result Value Ref Range    WBC 15.0 (H) 4.4 - 11.3 x10*3/uL    nRBC 0.0 0.0 - 0.0 /100 WBCs    RBC 3.65 (L) 4.00 - 5.20 x10*6/uL    Hemoglobin 10.5 (L) 12.0 - 16.0 g/dL    Hematocrit 31.1 (L) 36.0 - 46.0 %    MCV 85 80 - 100 fL    MCH 28.8 26.0 - 34.0 pg    MCHC 33.8 32.0 - 36.0 g/dL    RDW 15.7 (H) 11.5 - 14.5 %    Platelets 283 150 - 450 x10*3/uL    Neutrophils % 91.6 40.0 - 80.0 %    Immature Granulocytes %, Automated 0.5 0.0 - 0.9 %    Lymphocytes % 1.6 13.0 - 44.0 %    Monocytes % 5.7 2.0 - 10.0 %    Eosinophils % 0.5 0.0 - 6.0 %    Basophils % 0.1 0.0 - 2.0 %    Neutrophils Absolute 13.73 (H) 1.60 - 5.50 x10*3/uL    Immature Granulocytes Absolute, Automated 0.08 0.00 - 0.50 x10*3/uL    Lymphocytes Absolute 0.24 (L) 0.80 - 3.00 x10*3/uL    Monocytes Absolute 0.85 (H) 0.05 - 0.80 x10*3/uL    Eosinophils Absolute 0.08 0.00 - 0.40 x10*3/uL    Basophils Absolute 0.02 0.00 - 0.10 x10*3/uL   Magnesium   Result Value Ref Range    Magnesium 1.90 1.60 - 2.40 mg/dL   Renal function panel   Result Value Ref Range    Glucose 125 (H) 74 - 99 mg/dL    Sodium 129 (L) 136 - 145 mmol/L    Potassium 4.8 3.5 - 5.3 mmol/L    Chloride 88 (L) 98 - 107 mmol/L    Bicarbonate 32 21 - 32 mmol/L    Anion Gap 14  10 - 20 mmol/L    Urea Nitrogen 20 6 - 23 mg/dL    Creatinine 1.08 (H) 0.50 - 1.05 mg/dL    eGFR 51 (L) >60 mL/min/1.73m*2    Calcium 8.8 8.6 - 10.6 mg/dL    Phosphorus 2.8 2.5 - 4.9 mg/dL    Albumin 3.5 3.4 - 5.0 g/dL   Heparin Assay   Result Value Ref Range    Heparin Unfractionated 1.3 (HH) See Comment Below for Therapeutic Ranges IU/mL   Troponin I, High Sensitivity   Result Value Ref Range    Troponin I, High Sensitivity (CMC) 21 0 - 34 ng/L   POCT GLUCOSE   Result Value Ref Range    POCT Glucose 130 (H) 74 - 99 mg/dL   POCT GLUCOSE   Result Value Ref Range    POCT Glucose 149 (H) 74 - 99 mg/dL   POCT GLUCOSE   Result Value Ref Range    POCT Glucose 291 (H) 74 - 99 mg/dL              ASSESSMENT:  Radha Torres is a  85 y.o.  Year old , with PMHx of HFrEF (EF 25-30% on TTE 7/11/25), aortic stenosis, Afib, s/p AV emiliano ablation and leadless pacemaker 2025, HTN, CKD, renal cell carcinoma who presented on 8/9/2025 as a transfer from [facility] for epigastric pain. Nephrology consulted for worsening hyponatremia.      #Hyponatremia   - Sodium 131 on 8/09 arrival  - EF 25-30% on TTE 7/11   - Urine chem 8/11 (had received Lasix)- urine sodium 29, urine osm 250. FeNa 0.6%,    - Serum osm 8/13 - 263  - Repeat urine chem 8/14 (2 hrs after Lasix dose) - urine sodium 70, urine osm 248. Repeat serum osm 8/14 - 255   - True hypotonic hyponatremia.  Likely hypervolemic hyponatremia related to CHF considering Overload.  - Sodium improved after 15mg tolvaptan on 8/15 and Lasix 60 mg 8/16, Urea 1 dose     #For TAVR 8/19     Recommendations:  - Give one time dose of tolvaptan 15 mg today   - Nephrology will sign off at this time   - Chart I's/O's  - Consider getting renal duplex ultrasound in vascular lab to assess for renal artery stenosis  - Bladder scan to rule out retention  - Maintain euvolemia  - Encourage good diet w/ adequate protein intake      Damon Garg MD  Internal Medicine PGY-1   Daytime / Weekend Renal Pager  [phone]  After 7 pm Emergencies [phone] Pager [phone]

## 2025-08-18 NOTE — CARE PLAN
Problem: Nutrition  Goal: Nutrient intake appropriate for maintaining nutritional needs  Outcome: Not Progressing     Problem: Pain - Adult  Goal: Verbalizes/displays adequate comfort level or baseline comfort level  Outcome: Progressing     Problem: Safety - Adult  Goal: Free from fall injury  Outcome: Progressing     Problem: Chronic Conditions and Co-morbidities  Goal: Patient's chronic conditions and co-morbidity symptoms are monitored and maintained or improved  Outcome: Progressing     Problem: Skin  Goal: Participates in plan/prevention/treatment measures  Outcome: Progressing  Goal: Prevent/manage excess moisture  Outcome: Progressing  Goal: Prevent/minimize sheer/friction injuries  Outcome: Progressing  Goal: Promote skin healing  Outcome: Progressing

## 2025-08-18 NOTE — SIGNIFICANT EVENT
TRANSFER TO CARDIOLOGY FLOOR NOTE:    85 y.o. female with history significant for HFrEF (EF 25-30%), aortic stenosis, Afib, s/p AV node ablation and leadless pacemaker in 2025, HTN, renal cell carcinoma who presented on 8/9/25 as a transfer from [facility] for epigastric pain, nonradiating, not associated with eating. Hemodynamically stable, afebrile on arrival in no acute distress with some mild epigastric burning. Trop 23>27, lipase within normal limits. EKG with no ischemic changes. CTAP negative for inflammatory process or bowel obstruction. She was scheduled for upcoming TAVR on 8/19/25 with Dr. Howell at [facility].    Labs were significant for BNP of 2422 on 8/9/25, and patient was diuresed with IV lasix 40 mg. Pacemaker was interrogated which showed no notable events. Dr. Howell coordination team was contacted regarding the possibility of expediting TAVR to an earlier date. TAVR was unable to be moved to an earlier date and remained scheduled for 8/19. On 8/11, patient was hyponatremic at 125, and lasix was held. Urine osm 250, urine sodium 29, and Na increased to 127 by 8/12. Patient continued to endorse epigastric pain and nausea. KUB was unremarkable, ECG remained unchanged. Patient experienced some relief with Tigan, Tums and home pain medication; epigastric pain most likely due to reflux/GERD, as it was burning in nature, worse after eating, worse with lying down, and improvement with Tums.  However, by 8/13, serum sodium resulted severely low at 120; repeat was also 120. Serum osm, morning cortisol, and 500 ml NS was ordered. Likely hypervolemia hyponatremia.     Per recommendations of nephrology, 15 mg tolvaptan was given on 8/15 and 8/18. Lasix and urea also held. Hyponatremia continued to improve. On 8/18, nipride was discontinued and hydralazine was increased to 50 mg TID. In discussion with cardiology- structural heart, TAVR will not be done on 8/19. WBCs mildly elevated and will continue to  monitor when transferred to the floor.    TO DO:  -Continue to monitor WBC  -Follow structural heart recommendations  -Continue to monitor Na levels  -Continue to monitor BP, continue hydralazine 50 mg TID  -Telemetry  -Palliative care consulted

## 2025-08-18 NOTE — SIGNIFICANT EVENT
Around 15:45 RN expressed concern of patient's mentation. Stated that the patient was increasingly lethargic and choked when trying to take a sip of water. I went to bedside and examined the patient. Patient was able to answer orientating questions and follow commands after several prompts. Of note her left eye was closed and she was able to open it. Pupils equal and reactive to light. Sensation intact in the upper and lower extremities. Able to move all four extremities spontaneously. Of note her voice was less clear and she was not able to drink a sip of water without coughing. In order to investigate cause VBG ordered (pH 7.45, CO2 44, HCO3 30.6). As she was not hypercarbic, a CT head wo contrast ordered and pending for an encephalopathic work up.    PLAN:  -Follow up on CT head wo contrast  -Continue to monitor with neuro exam   Please review for refill.

## 2025-08-19 ENCOUNTER — APPOINTMENT (OUTPATIENT)
Dept: RADIOLOGY | Facility: HOSPITAL | Age: 85
DRG: 306 | End: 2025-08-19
Payer: MEDICARE

## 2025-08-19 LAB
ALBUMIN SERPL BCP-MCNC: 3.2 G/DL (ref 3.4–5)
ALBUMIN SERPL BCP-MCNC: 3.8 G/DL (ref 3.4–5)
ANION GAP BLDV CALCULATED.4IONS-SCNC: 4 MMOL/L (ref 10–25)
ANION GAP SERPL CALC-SCNC: 14 MMOL/L (ref 10–20)
ANION GAP SERPL CALC-SCNC: 17 MMOL/L (ref 10–20)
APPEARANCE UR: ABNORMAL
BACTERIA #/AREA URNS AUTO: ABNORMAL /HPF
BASE EXCESS BLDV CALC-SCNC: 4.5 MMOL/L (ref -2–3)
BASOPHILS # BLD AUTO: 0.02 X10*3/UL (ref 0–0.1)
BASOPHILS NFR BLD AUTO: 0.1 %
BILIRUB UR STRIP.AUTO-MCNC: NEGATIVE MG/DL
BODY TEMPERATURE: 37 DEGREES CELSIUS
BUN SERPL-MCNC: 21 MG/DL (ref 6–23)
BUN SERPL-MCNC: 29 MG/DL (ref 6–23)
CA-I BLDV-SCNC: 1.09 MMOL/L (ref 1.1–1.33)
CALCIUM SERPL-MCNC: 8.3 MG/DL (ref 8.6–10.6)
CALCIUM SERPL-MCNC: 8.8 MG/DL (ref 8.6–10.6)
CHLORIDE BLDV-SCNC: 91 MMOL/L (ref 98–107)
CHLORIDE SERPL-SCNC: 86 MMOL/L (ref 98–107)
CHLORIDE SERPL-SCNC: 89 MMOL/L (ref 98–107)
CO2 SERPL-SCNC: 25 MMOL/L (ref 21–32)
CO2 SERPL-SCNC: 29 MMOL/L (ref 21–32)
COLOR UR: ABNORMAL
CREAT SERPL-MCNC: 1.32 MG/DL (ref 0.5–1.05)
CREAT SERPL-MCNC: 1.57 MG/DL (ref 0.5–1.05)
EGFRCR SERPLBLD CKD-EPI 2021: 33 ML/MIN/1.73M*2
EGFRCR SERPLBLD CKD-EPI 2021: 40 ML/MIN/1.73M*2
EOSINOPHIL # BLD AUTO: 0 X10*3/UL (ref 0–0.4)
EOSINOPHIL NFR BLD AUTO: 0 %
ERYTHROCYTE [DISTWIDTH] IN BLOOD BY AUTOMATED COUNT: 15.9 % (ref 11.5–14.5)
GLUCOSE BLD MANUAL STRIP-MCNC: 146 MG/DL (ref 74–99)
GLUCOSE BLD MANUAL STRIP-MCNC: 176 MG/DL (ref 74–99)
GLUCOSE BLD MANUAL STRIP-MCNC: 178 MG/DL (ref 74–99)
GLUCOSE BLD MANUAL STRIP-MCNC: 183 MG/DL (ref 74–99)
GLUCOSE BLDV-MCNC: 159 MG/DL (ref 74–99)
GLUCOSE SERPL-MCNC: 196 MG/DL (ref 74–99)
GLUCOSE SERPL-MCNC: 198 MG/DL (ref 74–99)
GLUCOSE UR STRIP.AUTO-MCNC: NORMAL MG/DL
HCO3 BLDV-SCNC: 29.8 MMOL/L (ref 22–26)
HCT VFR BLD AUTO: 36.4 % (ref 36–46)
HCT VFR BLD EST: ABNORMAL %
HGB BLD-MCNC: 11.3 G/DL (ref 12–16)
HGB BLDV-MCNC: ABNORMAL G/DL
IMM GRANULOCYTES # BLD AUTO: 0.11 X10*3/UL (ref 0–0.5)
IMM GRANULOCYTES NFR BLD AUTO: 0.5 % (ref 0–0.9)
INHALED O2 CONCENTRATION: 40 %
KETONES UR STRIP.AUTO-MCNC: NEGATIVE MG/DL
LACTATE BLDV-SCNC: 0.9 MMOL/L (ref 0.4–2)
LEUKOCYTE ESTERASE UR QL STRIP.AUTO: ABNORMAL
LYMPHOCYTES # BLD AUTO: 0.24 X10*3/UL (ref 0.8–3)
LYMPHOCYTES NFR BLD AUTO: 1.2 %
MAGNESIUM SERPL-MCNC: 2.3 MG/DL (ref 1.6–2.4)
MCH RBC QN AUTO: 28.4 PG (ref 26–34)
MCHC RBC AUTO-ENTMCNC: 31 G/DL (ref 32–36)
MCV RBC AUTO: 92 FL (ref 80–100)
MECA ISLT/SPM QL: NOT DETECTED
METHICILLIN RESISTANCE MECC GENE [PRESENCE] BY MOLECULAR METHOD: NOT DETECTED
MONOCYTES # BLD AUTO: 0.91 X10*3/UL (ref 0.05–0.8)
MONOCYTES NFR BLD AUTO: 4.5 %
NEUTROPHILS # BLD AUTO: 18.78 X10*3/UL (ref 1.6–5.5)
NEUTROPHILS NFR BLD AUTO: 93.7 %
NITRITE UR QL STRIP.AUTO: NEGATIVE
NRBC BLD-RTO: 0 /100 WBCS (ref 0–0)
OXYHGB MFR BLDV: ABNORMAL %
PCO2 BLDV: 46 MM HG (ref 41–51)
PH BLDV: 7.42 PH (ref 7.33–7.43)
PH UR STRIP.AUTO: 8.5 [PH]
PHOSPHATE SERPL-MCNC: 2.9 MG/DL (ref 2.5–4.9)
PHOSPHATE SERPL-MCNC: 3.6 MG/DL (ref 2.5–4.9)
PLATELET # BLD AUTO: 217 X10*3/UL (ref 150–450)
PO2 BLDV: 76 MM HG (ref 35–45)
POTASSIUM BLDV-SCNC: 2.7 MMOL/L (ref 3.5–5.3)
POTASSIUM SERPL-SCNC: 3.9 MMOL/L (ref 3.5–5.3)
POTASSIUM SERPL-SCNC: 4 MMOL/L (ref 3.5–5.3)
PROT UR STRIP.AUTO-MCNC: ABNORMAL MG/DL
RBC # BLD AUTO: 3.98 X10*6/UL (ref 4–5.2)
RBC # UR STRIP.AUTO: ABNORMAL MG/DL
RBC #/AREA URNS AUTO: >20 /HPF
S AUREUS DNA BLD POS QL NAA+PROBE: DETECTED
SAO2 % BLDV: ABNORMAL %
SODIUM BLDV-SCNC: 122 MMOL/L (ref 136–145)
SODIUM SERPL-SCNC: 125 MMOL/L (ref 136–145)
SODIUM SERPL-SCNC: 127 MMOL/L (ref 136–145)
SP GR UR STRIP.AUTO: 1.01
SQUAMOUS #/AREA URNS AUTO: ABNORMAL /HPF
UFH PPP CHRO-ACNC: 0.8 IU/ML (ref ?–1.1)
UROBILINOGEN UR STRIP.AUTO-MCNC: ABNORMAL MG/DL
WBC # BLD AUTO: 20.1 X10*3/UL (ref 4.4–11.3)
WBC #/AREA URNS AUTO: ABNORMAL /HPF

## 2025-08-19 PROCEDURE — 99497 ADVNCD CARE PLAN 30 MIN: CPT

## 2025-08-19 PROCEDURE — 87154 CUL TYP ID BLD PTHGN 6+ TRGT: CPT

## 2025-08-19 PROCEDURE — 36415 COLL VENOUS BLD VENIPUNCTURE: CPT

## 2025-08-19 PROCEDURE — 1100000001 HC PRIVATE ROOM DAILY

## 2025-08-19 PROCEDURE — 85520 HEPARIN ASSAY: CPT

## 2025-08-19 PROCEDURE — 83735 ASSAY OF MAGNESIUM: CPT

## 2025-08-19 PROCEDURE — 70450 CT HEAD/BRAIN W/O DYE: CPT | Performed by: RADIOLOGY

## 2025-08-19 PROCEDURE — 71045 X-RAY EXAM CHEST 1 VIEW: CPT

## 2025-08-19 PROCEDURE — 2500000001 HC RX 250 WO HCPCS SELF ADMINISTERED DRUGS (ALT 637 FOR MEDICARE OP)

## 2025-08-19 PROCEDURE — 87086 URINE CULTURE/COLONY COUNT: CPT

## 2025-08-19 PROCEDURE — 99498 ADVNCD CARE PLAN ADDL 30 MIN: CPT

## 2025-08-19 PROCEDURE — 94660 CPAP INITIATION&MGMT: CPT

## 2025-08-19 PROCEDURE — 84132 ASSAY OF SERUM POTASSIUM: CPT

## 2025-08-19 PROCEDURE — 81001 URINALYSIS AUTO W/SCOPE: CPT

## 2025-08-19 PROCEDURE — 99223 1ST HOSP IP/OBS HIGH 75: CPT

## 2025-08-19 PROCEDURE — 70450 CT HEAD/BRAIN W/O DYE: CPT

## 2025-08-19 PROCEDURE — 2500000004 HC RX 250 GENERAL PHARMACY W/ HCPCS (ALT 636 FOR OP/ED)

## 2025-08-19 PROCEDURE — 87040 BLOOD CULTURE FOR BACTERIA: CPT

## 2025-08-19 PROCEDURE — 85025 COMPLETE CBC W/AUTO DIFF WBC: CPT

## 2025-08-19 PROCEDURE — 99291 CRITICAL CARE FIRST HOUR: CPT

## 2025-08-19 PROCEDURE — 2500000002 HC RX 250 W HCPCS SELF ADMINISTERED DRUGS (ALT 637 FOR MEDICARE OP, ALT 636 FOR OP/ED)

## 2025-08-19 PROCEDURE — 82947 ASSAY GLUCOSE BLOOD QUANT: CPT

## 2025-08-19 PROCEDURE — 80069 RENAL FUNCTION PANEL: CPT

## 2025-08-19 RX ORDER — CEFTRIAXONE 1 G/50ML
1 INJECTION, SOLUTION INTRAVENOUS EVERY 24 HOURS
Status: DISCONTINUED | OUTPATIENT
Start: 2025-08-19 | End: 2025-08-20

## 2025-08-19 RX ORDER — HYDROMORPHONE HYDROCHLORIDE 0.2 MG/ML
0.2 INJECTION INTRAMUSCULAR; INTRAVENOUS; SUBCUTANEOUS EVERY 4 HOURS PRN
Status: DISCONTINUED | OUTPATIENT
Start: 2025-08-19 | End: 2025-08-23 | Stop reason: CLARIF

## 2025-08-19 RX ORDER — ACETAMINOPHEN 10 MG/ML
1000 INJECTION, SOLUTION INTRAVENOUS ONCE
Status: COMPLETED | OUTPATIENT
Start: 2025-08-19 | End: 2025-08-19

## 2025-08-19 RX ORDER — FUROSEMIDE 10 MG/ML
60 INJECTION INTRAMUSCULAR; INTRAVENOUS ONCE
Status: COMPLETED | OUTPATIENT
Start: 2025-08-19 | End: 2025-08-19

## 2025-08-19 RX ORDER — HYDRALAZINE HYDROCHLORIDE 20 MG/ML
5 INJECTION INTRAMUSCULAR; INTRAVENOUS EVERY 8 HOURS
Status: DISCONTINUED | OUTPATIENT
Start: 2025-08-19 | End: 2025-08-19

## 2025-08-19 RX ORDER — POTASSIUM CHLORIDE 14.9 MG/ML
20 INJECTION INTRAVENOUS
Status: COMPLETED | OUTPATIENT
Start: 2025-08-19 | End: 2025-08-19

## 2025-08-19 RX ORDER — OXYMETAZOLINE HCL 0.05 %
2 SPRAY, NON-AEROSOL (ML) NASAL ONCE
Status: COMPLETED | OUTPATIENT
Start: 2025-08-19 | End: 2025-08-19

## 2025-08-19 RX ORDER — LIDOCAINE HYDROCHLORIDE 20 MG/ML
1 JELLY TOPICAL ONCE AS NEEDED
Status: COMPLETED | OUTPATIENT
Start: 2025-08-19 | End: 2025-08-20

## 2025-08-19 RX ADMIN — HYDRALAZINE HYDROCHLORIDE 5 MG: 20 INJECTION INTRAMUSCULAR; INTRAVENOUS at 10:35

## 2025-08-19 RX ADMIN — POTASSIUM CHLORIDE 20 MEQ: 14.9 INJECTION, SOLUTION INTRAVENOUS at 13:47

## 2025-08-19 RX ADMIN — FUROSEMIDE 60 MG: 10 INJECTION, SOLUTION INTRAMUSCULAR; INTRAVENOUS at 18:29

## 2025-08-19 RX ADMIN — INSULIN LISPRO 2 UNITS: 100 INJECTION, SOLUTION INTRAVENOUS; SUBCUTANEOUS at 08:19

## 2025-08-19 RX ADMIN — CEFTRIAXONE 1 G: 1 INJECTION, SOLUTION INTRAVENOUS at 08:21

## 2025-08-19 RX ADMIN — Medication: at 11:42

## 2025-08-19 RX ADMIN — OXYMETAZOLINE HYDROCHLORIDE 2 SPRAY: 0.5 SPRAY NASAL at 18:29

## 2025-08-19 RX ADMIN — INSULIN LISPRO 2 UNITS: 100 INJECTION, SOLUTION INTRAVENOUS; SUBCUTANEOUS at 12:31

## 2025-08-19 RX ADMIN — Medication: at 09:21

## 2025-08-19 RX ADMIN — HYDROMORPHONE HYDROCHLORIDE 0.2 MG: 0.2 INJECTION, SOLUTION INTRAMUSCULAR; INTRAVENOUS; SUBCUTANEOUS at 18:11

## 2025-08-19 RX ADMIN — POTASSIUM CHLORIDE 20 MEQ: 14.9 INJECTION, SOLUTION INTRAVENOUS at 17:13

## 2025-08-19 RX ADMIN — Medication: at 17:51

## 2025-08-19 RX ADMIN — ACETAMINOPHEN 1000 MG: 10 INJECTION INTRAVENOUS at 10:35

## 2025-08-19 RX ADMIN — HYDROMORPHONE HYDROCHLORIDE 0.2 MG: 0.2 INJECTION, SOLUTION INTRAMUSCULAR; INTRAVENOUS; SUBCUTANEOUS at 10:59

## 2025-08-19 ASSESSMENT — PAIN - FUNCTIONAL ASSESSMENT
PAIN_FUNCTIONAL_ASSESSMENT: CPOT (CRITICAL CARE PAIN OBSERVATION TOOL)
PAIN_FUNCTIONAL_ASSESSMENT: 0-10
PAIN_FUNCTIONAL_ASSESSMENT: CPOT (CRITICAL CARE PAIN OBSERVATION TOOL)
PAIN_FUNCTIONAL_ASSESSMENT: 0-10

## 2025-08-19 ASSESSMENT — PAIN SCALES - GENERAL
PAINLEVEL_OUTOF10: 0 - NO PAIN
PAINLEVEL_OUTOF10: 0 - NO PAIN

## 2025-08-19 NOTE — TREATMENT PLAN
"Structural Heart Plan of Care    Radha Torres is a 85 y.o. female with PMH HFrEF (EF 25-30%), aortic stenosis, Afib, s/p AV node ablation and leadless pacemaker in 2025, HTN, renal cell carcinoma who presented on 8/9/25 as a transfer from [facility] for epigastric pain, now transferred to CICU for possible flash pulmonary edema and hyponatremia. Suspect GI symptoms and hyponatremia due to severe fluid overload iso ADHF. Received diuresis and on CPAP. Also received a dose of tolvaptan 8/15 per nephrology. Per nephrology will hold IV Lasix and Urea.  Pt originally planned TAVR at [place] on 8/19/25 with Dr. Howell and Dr. Douglas.     Valve and Structural Heart Work Up  - NYHA: IV  - Frailty: 4/5 (only albumin is normal)  - ECG: V-paced  - Echo: TTE Completed 7/9/25, EF 30-35%, Peak Timo 3.28.  - MAKSIM Completed 7/11/25  EF 25-30 %, severe aortic stenosis  MG/PG 52/25, m/s BOB 0.8 cm/2  - LHC: Completed 7/11/25  - CT TAVR: Completed 7/31/25  - Dental clearance:  follows up with dentist regularly, no issues  - STS: Isolated AVR 9.68%      Impression:  O2 requirements increasing, placed back on CPAP overnight.  Serum Na+ decreased slightly after requiring IV Lasix and Diamox (look UO).  WBCs increased to 20.1.  BC x2 are negative to date, UA with + WBCs, RBCs, Bacteria, and Leuks (nitrate negative).  Urine culture pending.  Started on empiric IV antibiotic.  Mental status continues to wax and wane, periods of calm/cooperative and agitation / lack of impulse control, lethargy persists.  No acute issues on Head CT - moderate small vessel disease, \"? disproportionate volume loss within the anterior temporal lobes, correlate for Alzheimer's\"    Recs:  - Agree with empiric IV antibiotics until culture/sensitivities obtained   - Not a TAVR candidate in current state  - Please obtain limited ECHO to assess EF and Aortic Valve (stenosis and insuffiencey)  - BAV can/should be considered if pt continues to deteriorate " clinically      D/w Dr. Hartman and CICU service    Marco Angela MSN, [facility]  Acute Care Nurse Practitioner  Structural Heart / TAVR Team  Structural Pager: [phone]  (Nights) ED fellow pager: [phone]

## 2025-08-19 NOTE — CARE PLAN
Problem: Pain - Adult  Goal: Verbalizes/displays adequate comfort level or baseline comfort level  Outcome: Progressing     Problem: Safety - Adult  Goal: Free from fall injury  Outcome: Progressing     Problem: Discharge Planning  Goal: Discharge to home or other facility with appropriate resources  Outcome: Progressing     Problem: Chronic Conditions and Co-morbidities  Goal: Patient's chronic conditions and co-morbidity symptoms are monitored and maintained or improved  Outcome: Progressing     Problem: Nutrition  Goal: Nutrient intake appropriate for maintaining nutritional needs  Outcome: Progressing     Problem: Skin  Goal: Decreased wound size/increased tissue granulation at next dressing change  Outcome: Progressing  Goal: Participates in plan/prevention/treatment measures  Outcome: Progressing  Goal: Prevent/manage excess moisture  Outcome: Progressing  Goal: Prevent/minimize sheer/friction injuries  Outcome: Progressing  Goal: Promote/optimize nutrition  Outcome: Progressing  Goal: Promote skin healing  Outcome: Progressing     The clinical goals for the shift include pt will remain HDS

## 2025-08-19 NOTE — CONSULTS
Inpatient consult to Palliative Care  Consult performed by: LEE Cole  Consult ordered by: Sekou Horton MD          Palliative Medicine Consult  Complex medical decision making, symptom management, patient/family support    History obtained from chart review including ED note, H&P, patient's daily progress notes, review of lab/test results, and discussion with primary team and bedside RN.    Subjective    History of Present Illness  Radha Torres is a 85 y.o. female with PMH HFrEF (EF 25-30%), aortic stenosis, Afib, s/p AV node ablation and leadless pacemaker in 2025, HTN, renal cell carcinoma who presented on 8/9/25 as a transfer from [facility] for epigastric pain, now transferred to CICU for possible flash pulmonary edema and hyponatremia. Suspect GI symptoms and hyponatremia due to severe fluid overload iso ADHF.     Introduction to Palliative Medicine  Met with pt and spouse at bedside, spoke to ALYSONA/adri Watts on the phone.   Patient remains altered, does not have capacity to make their own medical decisions at this time. Unable to participate in ROS or goals of care discussion. Surrogate decision maker Stefanie Castro (Daughter)  [phone] ([place])   Staff present: Rosmery Rocha CNP, JONO Gabriel.  Palliative Medicine was introduced as a specialty service for patients with serious illness to help with symptom management, improve quality of life, assist with goals of care conversations, navigate complex decision making, and provide support to patients and families. Support and empathy was provided throughout the encounter. Provided reflective listening and presence.     Symptoms  Pain: low back pain, not new.  Dyspnea: yes  Fatigue: yes  Insomnia: yes  Drowsiness: yes  Constipation: yes  Nausea: denies  Appetite: denies  Anxiety: states yes  Depression: states yes but unable to state why.    Palliative Medicine Social History:  The patient is  to spouse 65 years. They have 2 children, 2  "grand children, and 2 great grand children. 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 Pt is 1 of 14 siblings. Pt used to enjoy traveling and tending to her garden/flowers. Pt lately, has not seemed to find much jarret in things. Pt has impaired vision and has been requiring assistance with adls. Pt is able to feed herself, put on a dress, and wash herself once helped into the shower. Pt is mostly WCB in the home. She has a bsc, hospital bed and other supplies in the home. Spouse states pt has been hallucinating in the home each morning; she has been taking ambien to help her sleep. Pt believes in God but does not practice a certain madan.     Objective    Last Recorded Vitals  /68   Pulse 81   Temp 37 °C (98.6 °F) (Temporal)   Resp 18   Ht 1.65 m (5' 4.96\")   Wt 75.4 kg (166 lb 3.2 oz)   SpO2 96%   BMI 27.69 kg/m²      Physical Exam  Constitutional:       Appearance: She is overweight. She is ill-appearing.   HENT:      Head: Normocephalic.      Mouth/Throat:      Mouth: Mucous membranes are dry.     Cardiovascular:      Rate and Rhythm: Normal rate.      Heart sounds: Murmur heard.   Pulmonary:      Effort: Tachypnea present.      Breath sounds: Decreased breath sounds present.      Comments: Abdominal breathing.  Abdominal:      Palpations: Abdomen is soft.     Skin:     General: Skin is warm and dry.      Coloration: Skin is pale.     Neurological:      Mental Status: She is easily aroused. She is disoriented.      Comments: Oriented to place, month, year, and president, but poor historian.   Psychiatric:      Comments: Speaks with open mouth/garbled, difficult to understand.          Relevant Results  Results for orders placed or performed during the hospital encounter of 08/09/25 (from the past 24 hours)   POCT GLUCOSE   Result Value Ref Range    POCT Glucose 291 (H) 74 - 99 mg/dL   Renal function panel   Result Value Ref Range    Glucose 240 (H) 74 - 99 mg/dL    " Sodium 126 (L) 136 - 145 mmol/L    Potassium 4.1 3.5 - 5.3 mmol/L    Chloride 87 (L) 98 - 107 mmol/L    Bicarbonate 31 21 - 32 mmol/L    Anion Gap 12 10 - 20 mmol/L    Urea Nitrogen 21 6 - 23 mg/dL    Creatinine 1.17 (H) 0.50 - 1.05 mg/dL    eGFR 46 (L) >60 mL/min/1.73m*2    Calcium 8.8 8.6 - 10.6 mg/dL    Phosphorus 2.9 2.5 - 4.9 mg/dL    Albumin 3.6 3.4 - 5.0 g/dL   Blood Gas Venous Full Panel   Result Value Ref Range    POCT pH, Venous 7.45 (H) 7.33 - 7.43 pH    POCT pCO2, Venous 44 41 - 51 mm Hg    POCT pO2, Venous 49 (H) 35 - 45 mm Hg    POCT SO2, Venous 80 (H) 45 - 75 %    POCT Oxy Hemoglobin, Venous 77.6 (H) 45.0 - 75.0 %    POCT Hematocrit Calculated, Venous 35.0 (L) 36.0 - 46.0 %    POCT Sodium, Venous 121 (L) 136 - 145 mmol/L    POCT Potassium, Venous 4.4 3.5 - 5.3 mmol/L    POCT Chloride, Venous 88 (L) 98 - 107 mmol/L    POCT Ionized Calicum, Venous 1.16 1.10 - 1.33 mmol/L    POCT Glucose, Venous 236 (H) 74 - 99 mg/dL    POCT Lactate, Venous 1.5 0.4 - 2.0 mmol/L    POCT Base Excess, Venous 5.9 (H) -2.0 - 3.0 mmol/L    POCT HCO3 Calculated, Venous 30.6 (H) 22.0 - 26.0 mmol/L    POCT Hemoglobin, Venous 11.5 (L) 12.0 - 16.0 g/dL    POCT Anion Gap, Venous 7.0 (L) 10.0 - 25.0 mmol/L    Patient Temperature 37.0 degrees Celsius    FiO2 28 %   POCT GLUCOSE   Result Value Ref Range    POCT Glucose 241 (H) 74 - 99 mg/dL   Electrocardiogram, 12-lead PRN ACS symptoms   Result Value Ref Range    Ventricular Rate 76 BPM    Atrial Rate 77 BPM    QRS Duration 204 ms    QT Interval 498 ms    QTC Calculation(Bazett) 560 ms    R Axis -45 degrees    T Axis 110 degrees    QRS Count 13 beats    Q Onset 187 ms    T Offset 436 ms    QTC Fredericia 538 ms   POCT GLUCOSE   Result Value Ref Range    POCT Glucose 255 (H) 74 - 99 mg/dL   Blood Culture    Specimen: Peripheral Venipuncture; Blood culture   Result Value Ref Range    Blood Culture Loaded on Instrument - Culture in progress    CBC and Auto Differential   Result Value Ref  Range    WBC 20.1 (H) 4.4 - 11.3 x10*3/uL    nRBC 0.0 0.0 - 0.0 /100 WBCs    RBC 3.98 (L) 4.00 - 5.20 x10*6/uL    Hemoglobin 11.3 (L) 12.0 - 16.0 g/dL    Hematocrit 36.4 36.0 - 46.0 %    MCV 92 80 - 100 fL    MCH 28.4 26.0 - 34.0 pg    MCHC 31.0 (L) 32.0 - 36.0 g/dL    RDW 15.9 (H) 11.5 - 14.5 %    Platelets 217 150 - 450 x10*3/uL    Neutrophils % 93.7 40.0 - 80.0 %    Immature Granulocytes %, Automated 0.5 0.0 - 0.9 %    Lymphocytes % 1.2 13.0 - 44.0 %    Monocytes % 4.5 2.0 - 10.0 %    Eosinophils % 0.0 0.0 - 6.0 %    Basophils % 0.1 0.0 - 2.0 %    Neutrophils Absolute 18.78 (H) 1.60 - 5.50 x10*3/uL    Immature Granulocytes Absolute, Automated 0.11 0.00 - 0.50 x10*3/uL    Lymphocytes Absolute 0.24 (L) 0.80 - 3.00 x10*3/uL    Monocytes Absolute 0.91 (H) 0.05 - 0.80 x10*3/uL    Eosinophils Absolute 0.00 0.00 - 0.40 x10*3/uL    Basophils Absolute 0.02 0.00 - 0.10 x10*3/uL   Magnesium   Result Value Ref Range    Magnesium 2.30 1.60 - 2.40 mg/dL   Renal Function Panel   Result Value Ref Range    Glucose 198 (H) 74 - 99 mg/dL    Sodium 125 (L) 136 - 145 mmol/L    Potassium 3.9 3.5 - 5.3 mmol/L    Chloride 86 (L) 98 - 107 mmol/L    Bicarbonate 29 21 - 32 mmol/L    Anion Gap 14 10 - 20 mmol/L    Urea Nitrogen 21 6 - 23 mg/dL    Creatinine 1.32 (H) 0.50 - 1.05 mg/dL    eGFR 40 (L) >60 mL/min/1.73m*2    Calcium 8.8 8.6 - 10.6 mg/dL    Phosphorus 3.6 2.5 - 4.9 mg/dL    Albumin 3.8 3.4 - 5.0 g/dL   POCT GLUCOSE   Result Value Ref Range    POCT Glucose 183 (H) 74 - 99 mg/dL   Urinalysis with Reflex Culture and Microscopic   Result Value Ref Range    Color, Urine Light-Orange (N) Light-Yellow, Yellow, Dark-Yellow    Appearance, Urine Turbid (N) Clear    Specific Gravity, Urine 1.013 1.005 - 1.035    pH, Urine 8.5 (N) 5.0, 5.5, 6.0, 6.5, 7.0, 7.5, 8.0    Protein, Urine 30 (1+) (A) NEGATIVE, 10 (TRACE), 20 (TRACE) mg/dL    Glucose, Urine Normal Normal mg/dL    Blood, Urine 1.0 (3+) (A) NEGATIVE mg/dL    Ketones, Urine NEGATIVE  NEGATIVE mg/dL    Bilirubin, Urine NEGATIVE NEGATIVE mg/dL    Urobilinogen, Urine 3 (1+) (A) Normal mg/dL    Nitrite, Urine NEGATIVE NEGATIVE    Leukocyte Esterase, Urine 500 Sandi/uL (A) NEGATIVE   Microscopic Only, Urine   Result Value Ref Range    WBC, Urine 11-20 (A) 1-5, NONE /HPF    RBC, Urine >20 (A) NONE, 1-2, 3-5 /HPF    Squamous Epithelial Cells, Urine 1-9 (SPARSE) Reference range not established. /HPF    Bacteria, Urine 1+ (A) NONE SEEN /HPF      CT head wo IV contrast  Narrative: Interpreted By:  Yamil Headley,   STUDY:  CT HEAD WO IV CONTRAST;  8/19/2025 3:09 am      INDICATION:  Signs/Symptoms:change in neuro exam.          COMPARISON:  None.      ACCESSION NUMBER(S):  [id number]      ORDERING CLINICIAN:  BRIANA JUSTICE      TECHNIQUE:  Noncontrast axial CT scan of head was performed with coronal and  sagittal reformats provided.      FINDINGS:  Parenchyma: There is no acute intracranial hemorrhage. The grey-white  differentiation is intact. There is no mass effect or midline shift.  There is moderate chronic small vessel ischemic disease suggested.  Atherosclerotic calcification of the intracranial vertebral arteries  and carotid siphons.      CSF Spaces: The ventricles, sulci and basal cisterns are within  normal limits for age with moderate senescent change. Questionable  disproportionate volume loss within the anterior temporal lobes and  hippocampal regions.      Extra-Axial Fluid: There is no extraaxial fluid collection.      Calvarium: The calvarium is unremarkable.      Paranasal sinuses: Visualized paranasal sinuses are clear.      Mastoids: Clear.      Orbits: Normal.      Soft tissues: Unremarkable.      Impression: No acute intracranial hemorrhage, mass effect, or CT apparent acute  infarct.      Moderate chronic small vessel ischemic disease and brain atrophy.  Questionable disproportionate volume loss within the anterior  temporal lobes, correlate for Alzheimer's.      MACRO:  None      Signed  by: Yamil Headley 8/19/2025 9:21 AM  Dictation workstation:   [id number]  XR chest 1 view  Narrative: STUDY:  XR CHEST 1 VIEW;  8/18/2025 9:18 pm      INDICATION:  Signs/Symptoms:Hypoxia.          COMPARISON:  X-ray 08/18/2025.      ACCESSION NUMBER(S):  [id number]      ORDERING CLINICIAN:  BRIANA JUSTICE      FINDINGS:  CARDIOMEDIASTINAL SILHOUETTE:  Cardiomediastinal silhouette is stable in size and configuration.      LUNGS:  Similar blunting of bilateral costophrenic angles. Similar appearance  of the left basilar consolidation. Prominent bilateral perihilar and  interstitial markings. No pneumothorax.      ABDOMEN:  No remarkable upper abdominal findings.      BONES:  No acute osseous changes.      Impression: 1.  Small right pleural effusion.  2. Similar appearance of left basilar consolidation.  3. Similar appearance of bilateral perihilar interstitial edema.      I personally reviewed the images/study and I agree with the findings  as stated by Bhupinder Katz MD.      MACRO:  None          Dictation workstation:   [id number]     Encounter Date: 08/09/25   Electrocardiogram, 12-lead PRN ACS symptoms   Result Value    Ventricular Rate 76    Atrial Rate 77    QRS Duration 204    QT Interval 498    QTC Calculation(Bazett) 560    R Axis -45    T Axis 110    QRS Count 13    Q Onset 187    T Offset 436    QTC Fredericia 538    Narrative    Ventricular-paced rhythm  Abnormal ECG  When compared with ECG of 12-AUG-2025 11:11,  Vent. rate has decreased BY   8 BPM        Allergies  Patient has no known allergies.    Scheduled medications  Scheduled Medications[1]  Continuous medications  Continuous Medications[2]  PRN medications  PRN Medications[3]     Assessment/Plan    Radha Torres is a 85 y.o. female with PMH HFrEF (EF 25-30%), aortic stenosis, Afib, s/p AV node ablation and leadless pacemaker in 2025, HTN, renal cell carcinoma who presented on 8/9/25 as a transfer from [facility] for epigastric pain, now transferred to  CICU for possible flash pulmonary edema and hyponatremia. Suspect GI symptoms and hyponatremia due to severe fluid overload iso ADHF.     8/19: Palliative consultation. Met with spouse at bedside, spoke to dtr on the phone. Agreeable to DNR/DNI to honor pt best and her previously stated health preferences.    Palliative Performance Scale (PPS): 40-50    ----------------------------------------------------------------------------------------------------------------------------------------------------------------------------------------------------------------------------------------------------------------------------------------------------------------------------------------------------------------------  Advanced Care Planning  Patient and/or family consented to a voluntary Advanced Care Planning meeting.   Serious Illness Assessment and Counseling:  Life Limiting Disease: HFrEF, aortic stenosis, acute AMS, posing threat to life or function.     Disease Specific Information Provided/Prognosis Discussed: Patient's current clinical condition, including diagnosis, prognosis, and management plan were discussed.   Counseling provided on current course, reasons pt is unable to have TAVR at this time,   Counseling provided on guarded prognosis and what to expect with disease progression of HF.     Understanding/Overall Impression: Caleb expressing fair understanding of overall health status and severity of illness.     Goals/Hopes: Discussion ensued about patient's goals for their medical care going forward. Allowed Caleb time to talk about pt's current quality of life, disease course/progression, and symptom and treatment burden. Discussed goal is hopefully to stabilize and get tavr so she can get better.    Fears/Worries/Concerns: It's too late and pt will die either way, no doing the procedure or doing the procedure.    Patient's Perception of Functional Status: Requires a lot of assistance.    Minimal Acceptable  "Quality of Life/Maximal Ethel Tolerable for the Possibility of More Time: Counseling provided on the concept of MAO/Maximal Ethel. Caleb expressing that pt would never want to be in a health state where she was totally dependent on other's for ADLs/toileting needs, bed bound, \"surviving off of a machine\". Patient/family deems that this would not be an acceptable quality of life for the patient.   Author asking family to consider whether or not they believed this current health status, hospitalization, cpap would be an acceptable quality of life for the patient. Caleb states pt does not like doctors or being at the hospital. Caleb states pt would not want this and does not like being here.    Health Preferences and Priorities with Disease Progression:   Family is aware of wishes to not pursue heroic as disease progresses to a terminal state. Pt has living will on file ().    Resuscitation Assessment: Counseling provided on the benefit versus burden of CPR in the setting of patient's overall health status and frail state. Discussed that if pt  (or equivalent arrhythmia) that CPR would likely not improve her cardiac state nor her overall QOL. Expressed concern for cpr to be successful and pt would not likely benefit from it, adding pain and suffering. Expressed if pt , she would not be a candidate for TAVR and underlying problem would still exist.     Advanced Directives:  Counseling provided on the importance of not crisis planning as disease burden progresses but to establish treatment limitations now so in the future medical team will be clear on what patient feels is an acceptable quality of life for the patient and what treatment limitations' patient would like set into place based on that.   Family elects DNR/DNI to honor pt best.    Surrogate Health Care Decision Maker: Stefanie Roblero (Daughter)  [phone] ([place])   HPOA: on file  Living will: on file      Code Status: Decision to change code " "status to DNR/DNI per Stefanie's and Caleb's wishes.       I spent 64 minutes in providing separately identifiable ACP services with the patient and/or surrogate decision maker in a voluntary conversation discussing the patient's wishes and goals as detailed in the above note.   ----------------------------------------------------------------------------------------------------------------------------------------------------------------------------------------------------------------------------------------------------------------------------------------------------------------------------------------------------------------------    #Complex Medical Decision Making  #Goals of Care  #Advanced Care Planning  - Code status: DNR/DNI 8/19  - Surrogate decision maker: OsbaldoStefanie (Daughter)  [phone] ([place]) primary. Spouse secondary.  - Goals are mix of survival and time and improved quality of life  -8/19: Met with pt at bedside. Able to glean more information r/t pt's life, wishes, goals, worries, and health preferences. See above/consult note for additional supportive detail.     In short, discussed hopes to stabilize pt for tavr, but expressed worries of further decompensation in the interim. Discussed DNR/DNI in detail in which family supports and aligns with what pt would deem \"non beneficial\" care. Family states pt has said all along, she does not want to be kept alive by machines. Supported family and answered questions.     #ADHF  #HFrEF  #Severe AS  #AMS  #Psychosocial Support  - Ongoing pt/family support and care navigation. Established DNR/DNI to honor pt's wishes. Family still hopeful for TAVR if/when pt can stabilize. Structural following to assist with candidacy for tavr vs balloon valvuloplasty.   - Family requesting daily updates. Please notify Stefanie first/primary contact.  - Music Therapy- added  - Spiritual Care Support  - SW support    Plan of Care discussed with: Updated primary and bedside " RN on goals of care decision, medication adjustments, and code status     Medical Decision Making was high level due to high complexity of problems, extensive data review, and high risk of management/treatment.     - ADHF, severe aortic stenosis, AMS, posing threat to life and function.  - Reviewed external notes from   - Reviewed results from  which were used in decision making for   - Recommended the following tests:   - Assessment required independent historian: primary, family  - Independent interpretation of test: labs and imaging  - Discussion of management with: primary, family  - Drug therapy requiring intensive monitoring for toxicity: insulin  - Decision regarding elective major surgery with identified patient or procedure risk factors:   - Decision regarding emergency major surgery:   - Decision regarding hospitalization or escalation of hospital-level care:   - Decision not to resuscitate or to de-escalate care because of poor prognosis: dnr/dni 8/19  - Parenteral controlled substances:     Thank you for allowing us to participate in the care of this patient. Palliative will continue to follow as needed. Palliative medicine is available Monday-Friday, 8a-6p. Please contact team with any questions or concerns.  Team pager [phone] (weekdays)  Rachel Rocha CNP (on EPIC secure chat)         [1] [Held by provider] amLODIPine, 5 mg, oral, Daily  [Held by provider] ascorbic acid, 500 mg, oral, Daily  [Held by provider] atorvastatin, 20 mg, oral, Daily  [Held by provider] carvedilol, 6.25 mg, oral, BID  cefTRIAXone, 1 g, intravenous, q24h  [Held by provider] cholecalciferol, 25 mcg, oral, Daily  [Held by provider] ferrous sulfate, 1 tablet, oral, Daily  [Held by provider] gabapentin, 300 mg, oral, Daily  hydrALAZINE, 5 mg, intravenous, q8h  [Held by provider] hydrALAZINE, 50 mg, oral, TID  insulin lispro, 0-10 Units, subcutaneous, TID AC  [Held by provider] levothyroxine, 75 mcg, oral, Daily  [Held by  provider] pantoprazole, 40 mg, oral, Daily before breakfast  [Held by provider] PARoxetine, 20 mg, oral, Daily  [Held by provider] polyethylene glycol, 17 g, oral, Daily  [Held by provider] urea, 15 g, oral, TID    [2]    [3] PRN medications: acetaminophen, calcium carbonate, dextrose, dextrose, glucagon, glucagon, metoclopramide, OLANZapine, oxyCODONE-acetaminophen, oxygen, oxygen, temazepam, trimethobenzamide

## 2025-08-19 NOTE — PROGRESS NOTES
" Cardiac Intensive Care - Daily Progress Note   Subjective    Radah Torres is a 85 y.o. year old female patient admitted on 8/9/2025 with following ICU needs: Severe Aortic Stenosis    Interval History:    Had to be placed back on CPAP and Na level went back down, was diuresed overnight with 1 x IV Lasix 60mg, 2 x Diamox 500mg, has had total of 2550cc UO in 24 hours. Workup for AMS still pending--UA, blood cx.    Meds    Scheduled medications  Scheduled Medications[1]  Continuous medications  Continuous Medications[2]  PRN medications  PRN Medications[3]     Objective    Blood pressure 135/86, pulse 75, temperature 36 °C (96.8 °F), temperature source Temporal, resp. rate 24, height 1.65 m (5' 4.96\"), weight 75.4 kg (166 lb 3.2 oz), SpO2 95%.     Physical Exam   Constitutional:       General: She is not in acute distress.     Appearance: Normal appearance.   HENT:      Head: Normocephalic and atraumatic.      Mouth/Throat:      Mouth: Mucous membranes are moist.      Eyes:      Extraocular Movements: Extraocular movements intact.      Pupils: Pupils are equal, round, and reactive to light.         Cardiovascular:      Rate and Rhythm: Normal rate and regular rhythm.      Heart sounds: Murmur heard.      No friction rub. No gallop.   Pulmonary:      Effort: Pulmonary effort is normal.      Breath sounds: Wheezing and rales present.   Abdominal:      General: Abdomen is flat. There is no distension.      Palpations: Abdomen is soft.      Tenderness: There is abdominal tenderness.      Comments: Epigastric tenderness      Musculoskeletal:      Right lower leg: No edema.      Left lower leg: No edema.      Skin:     General: Skin is dry.      Neurological:      General: No focal deficit present.      Psychiatric:         Mood and Affect: Mood normal.         Behavior: Behavior normal.       Intake/Output Summary (Last 24 hours) at 8/19/2025 0721  Last data filed at 8/19/2025 0400  Gross per 24 hour   Intake 704.86 ml " "  Output 2550 ml   Net -1845.14 ml     Labs:   Results from last 72 hours   Lab Units 08/19/25  0505 08/18/25  1333 08/18/25  0412   SODIUM mmol/L 125* 126* 129*   POTASSIUM mmol/L 3.9 4.1 4.8   CHLORIDE mmol/L 86* 87* 88*   CO2 mmol/L 29 31 32   BUN mg/dL 21 21 20   CREATININE mg/dL 1.32* 1.17* 1.08*   GLUCOSE mg/dL 198* 240* 125*   CALCIUM mg/dL 8.8 8.8 8.8   ANION GAP mmol/L 14 12 14   EGFR mL/min/1.73m*2 40* 46* 51*   PHOSPHORUS mg/dL 3.6 2.9 2.8      Results from last 72 hours   Lab Units 08/19/25  0505 08/18/25  0412 08/17/25  0443   WBC AUTO x10*3/uL 20.1* 15.0* 12.5*   HEMOGLOBIN g/dL 11.3* 10.5* 10.2*   HEMATOCRIT % 36.4 31.1* 29.4*   PLATELETS AUTO x10*3/uL 217 283 300   NEUTROS PCT AUTO % 93.7 91.6 84.5   LYMPHS PCT AUTO % 1.2 1.6 6.9   MONOS PCT AUTO % 4.5 5.7 7.7   EOS PCT AUTO % 0.0 0.5 0.1      Results from last 72 hours   Lab Units 08/16/25  1553   POCT PH, ARTERIAL pH 7.51*   POCT PCO2, ARTERIAL mm Hg 46*   POCT PO2, ARTERIAL mm Hg 109*   POCT SO2, ARTERIAL % 99     Results from last 72 hours   Lab Units 08/18/25  1549   POCT PH, VENOUS pH 7.45*   POCT PCO2, VENOUS mm Hg 44   POCT PO2, VENOUS mm Hg 49*        Micro/ID:     No results found for: \"URINECULTURE\", \"BLOODCULT\", \"CSFCULTSMEAR\"      Assessment and Plan     Radha Torres is a 85 y.o. female with PMH HFrEF (EF 25-30%), aortic stenosis, Afib, s/p AV node ablation and leadless pacemaker in 2025, HTN, renal cell carcinoma who presented on 8/9/25 as a transfer from [facility] for epigastric pain, now transferred to CICU for possible flash pulmonary edema and hyponatremia. Suspect GI symptoms and hyponatremia due to severe fluid overload iso ADHF.      Update 8/19:  - Continue Hydralazine 50 mg TID  - Mentation, not oriented to place or time, workup pending  - Structural following for possible TAVR although guarded on ability to do this admission  - Continue to diurese aggressively     CNS  #agitation  -1:1 sitter  -PRN zyprexa  -consider " hyponatremia as etiology, fu renal recs      PULM  #flash pulmonary edema  -CPAP as tolerated, SpO2>92%  -nipride gtt, caution iso severe aortic stenosis, SBP goal 130-150  -Hold diuresis with IV lasix, caution iso severe aortic stenosis     CV  #Severe aortic stenosis  #HFrEF (EF 25-30%)  #HTN  :: Echo 7/11/25 - EF 25-30%, left ventricle mildly dilated, mild concentric LV hypertrophy, leadless pacemaker in right atria and RV, aortic valve is heavily calcified with appearance of severe aortic stenosis (peak gradient 52mmHg, mean gradient 25mmHg, aortic valve area 0.8cm^2), trace AR, mild MR and TR, RVSP 50-60mmHg,   ::Scheduled for TAVR 8/19/25 with Dr. Howell  ::EKG with no ischemic changes  ::Troponin 23>27  Plan:  - Diurese with IV Lasix 60mg BID  -strict I/Os, redose as needed  -holding home coreg 6.25mg, amlodipine 5mg  -continue hydralazine 50 mg TID     #Atrial fibrillation  #S/p AV emiliano ablation and leadless pacemaker placement 5/6/25  :: Home regimen - coreg 6.25 BID, amlodipine 2.5 mg, Eliquis 5mg BID  Plan:  -Holding eliquis given possible TAVR, consider heparin gtt if needed for stroke risk reduction inpatient  -fu heparin assay  -holding home coreg     GI  #Epigastric pain  #Nausea/GI upset  :: EGG performed 2018 at [facility] notable for PUD  ::Possibly due to volume overload/ADHF vs GERD  Plan:   - Tigan PRN ordered for nausea   - KUB with no acute findings; ECG unchanged   - Continue pantoprazole 40 mg daily, tums PRN  - Continue home Percocet 5-325 mg Q6 PRN     RENAL/  #Acute severe hypo-osmolar hyponatremia  :: Na 131 on admission; Na on AM labs 8/14 very low at 117  ::Has received diuresis and 500cc NS bolus this admission  ::serum osm 255, urine Na 70, urine osm 248  ::TSH, morning cortisol within normal limits  Plan:   - Repeat Urine studies  - Continue with IV Lasix 60mg BID     #CKD 3b  :: Baseline Cr appears to be around 1.2  ::Cr 1.22 in ED  Plan:  - Daily RFP   - Avoid nephrotoxic agents  -  Strict I/O     ID  DANIELE     HEME/ONC  DANIELE     ENDO  #Hypothyroidism  ::TSH wnl  - Continue levothyroxine 75 mcg     #suspected DM  -fu A1c  -ISS #1, hypoglycemia protocol     MSK/Skin  DANIELE     F: PRN  E: K>4 Phos>3 Mag>2  N: NPO  A: PIV, L radial a-line (8/16)  O2: CPAP 30% FIO2  Drips: nipride  Abx: None  DVT PPx: pending heparin assay  GI PPx: PPI     CODE STATUS: FULL (confirmed with patient on admission)  SURROGATE DECISION MAKER: Stefanie Roblero, [phone]       Konstantin Hunter MD   08/19/25 at 7:21 AM     Disclaimer: Documentation completed with the information available at the time of input. The times in the chart may not be reflective of actual patient care times, interventions, or procedures. Documentation occurs after the physical care of the patient.            [1] amLODIPine, 5 mg, oral, Daily  [Held by provider] apixaban, 5 mg, oral, BID  ascorbic acid, 500 mg, oral, Daily  atorvastatin, 20 mg, oral, Daily  [Held by provider] carvedilol, 6.25 mg, oral, BID  cholecalciferol, 25 mcg, oral, Daily  ferrous sulfate, 1 tablet, oral, Daily  gabapentin, 300 mg, oral, Daily  hydrALAZINE, 50 mg, oral, TID  insulin lispro, 0-10 Units, subcutaneous, TID AC  levothyroxine, 75 mcg, oral, Daily  pantoprazole, 40 mg, oral, Daily before breakfast  PARoxetine, 20 mg, oral, Daily  polyethylene glycol, 17 g, oral, Daily  [Held by provider] urea, 15 g, oral, TID  [2]    [3] PRN medications: acetaminophen, calcium carbonate, dextrose, dextrose, glucagon, glucagon, metoclopramide, OLANZapine, oxyCODONE-acetaminophen, oxygen, oxygen, temazepam, trimethobenzamide

## 2025-08-19 NOTE — PROGRESS NOTES
Met with pt's  Caleb with Rosmery Rocha CNP from palliative care.  Pt and Caleb have been  for 65 years.  They are both originally from [place].  They have 2 kids, Stefanie and Caleb.  Stefanie is pt's HCPOA.  Caleb shared that pt's health has declined over the last 5 years after a fall in the garage that led to a hip fracture, a bout of COVID, and general weakness since then, with respiratory issues, needing home oxygen at night.  Pt does not like to be in the hospital or see doctors, but her condition was worsening at home, so she was hoping for treatment where she could improve her quality of life, and that is why she was considering this TAVR.  Family is hopeful and palliative care will continue to follow and support pt and family goals.  Will follow.      VINITA Canales

## 2025-08-20 ENCOUNTER — APPOINTMENT (OUTPATIENT)
Dept: RADIOLOGY | Facility: HOSPITAL | Age: 85
DRG: 306 | End: 2025-08-20
Payer: MEDICARE

## 2025-08-20 ENCOUNTER — APPOINTMENT (OUTPATIENT)
Dept: CARDIOLOGY | Facility: HOSPITAL | Age: 85
DRG: 306 | End: 2025-08-20
Payer: MEDICARE

## 2025-08-20 LAB
ALBUMIN SERPL BCP-MCNC: 3.1 G/DL (ref 3.4–5)
ALBUMIN SERPL BCP-MCNC: 3.2 G/DL (ref 3.4–5)
ANION GAP BLDV CALCULATED.4IONS-SCNC: 11 MMOL/L (ref 10–25)
ANION GAP BLDV CALCULATED.4IONS-SCNC: 9 MMOL/L (ref 10–25)
ANION GAP SERPL CALC-SCNC: 16 MMOL/L (ref 10–20)
ANION GAP SERPL CALC-SCNC: 19 MMOL/L (ref 10–20)
AORTIC VALVE MEAN GRADIENT: 30 MMHG
AORTIC VALVE PEAK VELOCITY: 3.76 M/S
AV PEAK GRADIENT: 57 MMHG
AVA (PEAK VEL): 0.8 CM2
AVA (VTI): 0.75 CM2
BASE EXCESS BLDV CALC-SCNC: -0.5 MMOL/L (ref -2–3)
BASE EXCESS BLDV CALC-SCNC: 1.3 MMOL/L (ref -2–3)
BASOPHILS # BLD AUTO: 0.01 X10*3/UL (ref 0–0.1)
BASOPHILS NFR BLD AUTO: 0.1 %
BODY TEMPERATURE: 37 DEGREES CELSIUS
BODY TEMPERATURE: 37 DEGREES CELSIUS
BUN SERPL-MCNC: 33 MG/DL (ref 6–23)
BUN SERPL-MCNC: 46 MG/DL (ref 6–23)
CA-I BLDV-SCNC: 1.06 MMOL/L (ref 1.1–1.33)
CA-I BLDV-SCNC: 1.09 MMOL/L (ref 1.1–1.33)
CALCIUM SERPL-MCNC: 8.4 MG/DL (ref 8.6–10.6)
CALCIUM SERPL-MCNC: 8.4 MG/DL (ref 8.6–10.6)
CHLORIDE BLDV-SCNC: 94 MMOL/L (ref 98–107)
CHLORIDE BLDV-SCNC: 96 MMOL/L (ref 98–107)
CHLORIDE SERPL-SCNC: 89 MMOL/L (ref 98–107)
CHLORIDE SERPL-SCNC: 90 MMOL/L (ref 98–107)
CHLORIDE UR-SCNC: 41 MMOL/L
CHLORIDE/CREATININE (MMOL/G) IN URINE: 43 MMOL/G CREAT (ref 38–318)
CO2 SERPL-SCNC: 26 MMOL/L (ref 21–32)
CO2 SERPL-SCNC: 28 MMOL/L (ref 21–32)
CREAT SERPL-MCNC: 1.73 MG/DL (ref 0.5–1.05)
CREAT SERPL-MCNC: 1.89 MG/DL (ref 0.5–1.05)
CREAT UR-MCNC: 94.7 MG/DL (ref 20–320)
CREAT UR-MCNC: 94.7 MG/DL (ref 20–320)
EGFRCR SERPLBLD CKD-EPI 2021: 26 ML/MIN/1.73M*2
EGFRCR SERPLBLD CKD-EPI 2021: 29 ML/MIN/1.73M*2
EJECTION FRACTION APICAL 4 CHAMBER: 20.5
EJECTION FRACTION: 23 %
EOSINOPHIL # BLD AUTO: 0.03 X10*3/UL (ref 0–0.4)
EOSINOPHIL NFR BLD AUTO: 0.2 %
ERYTHROCYTE [DISTWIDTH] IN BLOOD BY AUTOMATED COUNT: 15.9 % (ref 11.5–14.5)
GLUCOSE BLD MANUAL STRIP-MCNC: 176 MG/DL (ref 74–99)
GLUCOSE BLD MANUAL STRIP-MCNC: 185 MG/DL (ref 74–99)
GLUCOSE BLD MANUAL STRIP-MCNC: 210 MG/DL (ref 74–99)
GLUCOSE BLD MANUAL STRIP-MCNC: 249 MG/DL (ref 74–99)
GLUCOSE BLDV-MCNC: 195 MG/DL (ref 74–99)
GLUCOSE BLDV-MCNC: 205 MG/DL (ref 74–99)
GLUCOSE SERPL-MCNC: 183 MG/DL (ref 74–99)
GLUCOSE SERPL-MCNC: 223 MG/DL (ref 74–99)
HCO3 BLDV-SCNC: 24.9 MMOL/L (ref 22–26)
HCO3 BLDV-SCNC: 26.6 MMOL/L (ref 22–26)
HCT VFR BLD AUTO: 30.3 % (ref 36–46)
HCT VFR BLD EST: 33 % (ref 36–46)
HCT VFR BLD EST: 34 % (ref 36–46)
HGB BLD-MCNC: 9.7 G/DL (ref 12–16)
HGB BLDV-MCNC: 11 G/DL (ref 12–16)
HGB BLDV-MCNC: 11.4 G/DL (ref 12–16)
HOLD SPECIMEN: NORMAL
IMM GRANULOCYTES # BLD AUTO: 0.19 X10*3/UL (ref 0–0.5)
IMM GRANULOCYTES NFR BLD AUTO: 1.2 % (ref 0–0.9)
INHALED O2 CONCENTRATION: 40 %
INHALED O2 CONCENTRATION: 70 %
LACTATE BLDV-SCNC: 2 MMOL/L (ref 0.4–2)
LACTATE BLDV-SCNC: 2 MMOL/L (ref 0.4–2)
LACTATE BLDV-SCNC: 2.4 MMOL/L (ref 0.4–2)
LEFT ATRIUM VOLUME AREA LENGTH INDEX BSA: 51.4 ML/M2
LEFT VENTRICLE INTERNAL DIMENSION DIASTOLE: 6 CM (ref 3.5–6)
LEFT VENTRICULAR OUTFLOW TRACT DIAMETER: 2.1 CM
LYMPHOCYTES # BLD AUTO: 0.3 X10*3/UL (ref 0.8–3)
LYMPHOCYTES NFR BLD AUTO: 1.8 %
MAGNESIUM SERPL-MCNC: 2.19 MG/DL (ref 1.6–2.4)
MCH RBC QN AUTO: 29.1 PG (ref 26–34)
MCHC RBC AUTO-ENTMCNC: 32 G/DL (ref 32–36)
MCV RBC AUTO: 91 FL (ref 80–100)
MONOCYTES # BLD AUTO: 0.81 X10*3/UL (ref 0.05–0.8)
MONOCYTES NFR BLD AUTO: 4.9 %
MRSA DNA SPEC QL NAA+PROBE: NOT DETECTED
NEUTROPHILS # BLD AUTO: 15.12 X10*3/UL (ref 1.6–5.5)
NEUTROPHILS NFR BLD AUTO: 91.8 %
NRBC BLD-RTO: 0 /100 WBCS (ref 0–0)
OXYHGB MFR BLDV: 74.5 % (ref 45–75)
OXYHGB MFR BLDV: 84.7 % (ref 45–75)
PCO2 BLDV: 43 MM HG (ref 41–51)
PCO2 BLDV: 44 MM HG (ref 41–51)
PH BLDV: 7.37 PH (ref 7.33–7.43)
PH BLDV: 7.39 PH (ref 7.33–7.43)
PHOSPHATE SERPL-MCNC: 3.7 MG/DL (ref 2.5–4.9)
PHOSPHATE SERPL-MCNC: 3.9 MG/DL (ref 2.5–4.9)
PLATELET # BLD AUTO: 175 X10*3/UL (ref 150–450)
PO2 BLDV: 51 MM HG (ref 35–45)
PO2 BLDV: 58 MM HG (ref 35–45)
POTASSIUM BLDV-SCNC: 3.6 MMOL/L (ref 3.5–5.3)
POTASSIUM BLDV-SCNC: 3.9 MMOL/L (ref 3.5–5.3)
POTASSIUM SERPL-SCNC: 3.2 MMOL/L (ref 3.5–5.3)
POTASSIUM SERPL-SCNC: 3.6 MMOL/L (ref 3.5–5.3)
POTASSIUM UR-SCNC: 49 MMOL/L
POTASSIUM/CREAT UR-RTO: 52 MMOL/G CREAT
RBC # BLD AUTO: 3.33 X10*6/UL (ref 4–5.2)
RIGHT VENTRICLE PEAK SYSTOLIC PRESSURE: 44 MMHG
SAO2 % BLDV: 76 % (ref 45–75)
SAO2 % BLDV: 87 % (ref 45–75)
SODIUM BLDV-SCNC: 126 MMOL/L (ref 136–145)
SODIUM BLDV-SCNC: 128 MMOL/L (ref 136–145)
SODIUM SERPL-SCNC: 130 MMOL/L (ref 136–145)
SODIUM SERPL-SCNC: 131 MMOL/L (ref 136–145)
SODIUM UR-SCNC: 28 MMOL/L
SODIUM/CREAT UR-RTO: 30 MMOL/G CREAT
TRICUSPID ANNULAR PLANE SYSTOLIC EXCURSION: 1.1 CM
UREA/CREAT UR-SRTO: 4.8 G/G CREAT
UUN UR-MCNC: 452 MG/DL
WBC # BLD AUTO: 16.5 X10*3/UL (ref 4.4–11.3)

## 2025-08-20 PROCEDURE — 93308 TTE F-UP OR LMTD: CPT | Performed by: INTERNAL MEDICINE

## 2025-08-20 PROCEDURE — 87077 CULTURE AEROBIC IDENTIFY: CPT

## 2025-08-20 PROCEDURE — 82570 ASSAY OF URINE CREATININE: CPT

## 2025-08-20 PROCEDURE — 99233 SBSQ HOSP IP/OBS HIGH 50: CPT

## 2025-08-20 PROCEDURE — 85025 COMPLETE CBC W/AUTO DIFF WBC: CPT

## 2025-08-20 PROCEDURE — 82947 ASSAY GLUCOSE BLOOD QUANT: CPT

## 2025-08-20 PROCEDURE — 93325 DOPPLER ECHO COLOR FLOW MAPG: CPT

## 2025-08-20 PROCEDURE — 1100000001 HC PRIVATE ROOM DAILY

## 2025-08-20 PROCEDURE — 71250 CT THORAX DX C-: CPT | Performed by: RADIOLOGY

## 2025-08-20 PROCEDURE — 74018 RADEX ABDOMEN 1 VIEW: CPT

## 2025-08-20 PROCEDURE — 74176 CT ABD & PELVIS W/O CONTRAST: CPT | Performed by: RADIOLOGY

## 2025-08-20 PROCEDURE — 84132 ASSAY OF SERUM POTASSIUM: CPT

## 2025-08-20 PROCEDURE — 87641 MR-STAPH DNA AMP PROBE: CPT

## 2025-08-20 PROCEDURE — 2500000001 HC RX 250 WO HCPCS SELF ADMINISTERED DRUGS (ALT 637 FOR MEDICARE OP)

## 2025-08-20 PROCEDURE — 99222 1ST HOSP IP/OBS MODERATE 55: CPT | Performed by: INTERNAL MEDICINE

## 2025-08-20 PROCEDURE — 36415 COLL VENOUS BLD VENIPUNCTURE: CPT

## 2025-08-20 PROCEDURE — 83605 ASSAY OF LACTIC ACID: CPT

## 2025-08-20 PROCEDURE — 93321 DOPPLER ECHO F-UP/LMTD STD: CPT | Performed by: INTERNAL MEDICINE

## 2025-08-20 PROCEDURE — 2500000005 HC RX 250 GENERAL PHARMACY W/O HCPCS

## 2025-08-20 PROCEDURE — 93325 DOPPLER ECHO COLOR FLOW MAPG: CPT | Performed by: INTERNAL MEDICINE

## 2025-08-20 PROCEDURE — 2500000004 HC RX 250 GENERAL PHARMACY W/ HCPCS (ALT 636 FOR OP/ED)

## 2025-08-20 PROCEDURE — 3E0G76Z INTRODUCTION OF NUTRITIONAL SUBSTANCE INTO UPPER GI, VIA NATURAL OR ARTIFICIAL OPENING: ICD-10-PCS | Performed by: INTERNAL MEDICINE

## 2025-08-20 PROCEDURE — 83735 ASSAY OF MAGNESIUM: CPT

## 2025-08-20 PROCEDURE — 80069 RENAL FUNCTION PANEL: CPT

## 2025-08-20 PROCEDURE — 94660 CPAP INITIATION&MGMT: CPT

## 2025-08-20 PROCEDURE — 2500000002 HC RX 250 W HCPCS SELF ADMINISTERED DRUGS (ALT 637 FOR MEDICARE OP, ALT 636 FOR OP/ED)

## 2025-08-20 PROCEDURE — 74176 CT ABD & PELVIS W/O CONTRAST: CPT

## 2025-08-20 PROCEDURE — 99222 1ST HOSP IP/OBS MODERATE 55: CPT

## 2025-08-20 PROCEDURE — 82436 ASSAY OF URINE CHLORIDE: CPT

## 2025-08-20 PROCEDURE — 99291 CRITICAL CARE FIRST HOUR: CPT

## 2025-08-20 RX ORDER — FLUCONAZOLE 150 MG/1
75 TABLET ORAL ONCE
Status: COMPLETED | OUTPATIENT
Start: 2025-08-20 | End: 2025-08-20

## 2025-08-20 RX ORDER — FLUCONAZOLE 100 MG/1
100 TABLET ORAL DAILY
Status: DISCONTINUED | OUTPATIENT
Start: 2025-08-20 | End: 2025-08-20

## 2025-08-20 RX ORDER — ACETAZOLAMIDE 500 MG/5ML
1000 INJECTION, POWDER, LYOPHILIZED, FOR SOLUTION INTRAVENOUS ONCE
Status: DISCONTINUED | OUTPATIENT
Start: 2025-08-20 | End: 2025-08-20

## 2025-08-20 RX ORDER — FUROSEMIDE 10 MG/ML
100 INJECTION INTRAMUSCULAR; INTRAVENOUS ONCE
Status: COMPLETED | OUTPATIENT
Start: 2025-08-20 | End: 2025-08-20

## 2025-08-20 RX ORDER — CHLOROTHIAZIDE SODIUM 500 MG/1
1000 INJECTION INTRAVENOUS ONCE
Status: COMPLETED | OUTPATIENT
Start: 2025-08-20 | End: 2025-08-20

## 2025-08-20 RX ORDER — DOPAMINE HYDROCHLORIDE 160 MG/100ML
2.5 INJECTION, SOLUTION INTRAVENOUS CONTINUOUS
Status: DISCONTINUED | OUTPATIENT
Start: 2025-08-20 | End: 2025-08-23

## 2025-08-20 RX ORDER — LORAZEPAM 2 MG/ML
0.5 INJECTION INTRAMUSCULAR ONCE
Status: COMPLETED | OUTPATIENT
Start: 2025-08-20 | End: 2025-08-20

## 2025-08-20 RX ORDER — VANCOMYCIN HYDROCHLORIDE 1 G/20ML
INJECTION, POWDER, LYOPHILIZED, FOR SOLUTION INTRAVENOUS DAILY PRN
Status: DISCONTINUED | OUTPATIENT
Start: 2025-08-20 | End: 2025-08-22

## 2025-08-20 RX ORDER — POTASSIUM CHLORIDE 1.5 G/1.58G
40 POWDER, FOR SOLUTION ORAL ONCE
Status: COMPLETED | OUTPATIENT
Start: 2025-08-21 | End: 2025-08-21

## 2025-08-20 RX ADMIN — DOPAMINE HYDROCHLORIDE 2.5 MCG/KG/MIN: 160 INJECTION, SOLUTION INTRAVENOUS at 12:05

## 2025-08-20 RX ADMIN — INSULIN LISPRO 2 UNITS: 100 INJECTION, SOLUTION INTRAVENOUS; SUBCUTANEOUS at 11:54

## 2025-08-20 RX ADMIN — LORAZEPAM 0.5 MG: 2 INJECTION, SOLUTION INTRAMUSCULAR; INTRAVENOUS at 11:01

## 2025-08-20 RX ADMIN — CEFTRIAXONE 1 G: 1 INJECTION, SOLUTION INTRAVENOUS at 08:21

## 2025-08-20 RX ADMIN — VANCOMYCIN HYDROCHLORIDE 1500 MG: 1.5 INJECTION, POWDER, LYOPHILIZED, FOR SOLUTION INTRAVENOUS at 11:53

## 2025-08-20 RX ADMIN — PIPERACILLIN SODIUM AND TAZOBACTAM SODIUM 3.38 G: 3; .375 INJECTION, SOLUTION INTRAVENOUS at 15:53

## 2025-08-20 RX ADMIN — FUROSEMIDE 100 MG: 10 INJECTION, SOLUTION INTRAMUSCULAR; INTRAVENOUS at 13:06

## 2025-08-20 RX ADMIN — LIDOCAINE HYDROCHLORIDE 1 APPLICATION: 20 JELLY TOPICAL at 09:27

## 2025-08-20 RX ADMIN — INSULIN LISPRO 4 UNITS: 100 INJECTION, SOLUTION INTRAVENOUS; SUBCUTANEOUS at 15:53

## 2025-08-20 RX ADMIN — CHLOROTHIAZIDE SODIUM 1000 MG: 500 INJECTION, POWDER, LYOPHILIZED, FOR SOLUTION INTRAVENOUS at 16:05

## 2025-08-20 RX ADMIN — PIPERACILLIN SODIUM AND TAZOBACTAM SODIUM 3.38 G: 3; .375 INJECTION, SOLUTION INTRAVENOUS at 21:40

## 2025-08-20 RX ADMIN — PIPERACILLIN SODIUM AND TAZOBACTAM SODIUM 3.38 G: 3; .375 INJECTION, SOLUTION INTRAVENOUS at 09:51

## 2025-08-20 RX ADMIN — FLUCONAZOLE 75 MG: 150 TABLET ORAL at 18:08

## 2025-08-20 RX ADMIN — INSULIN LISPRO 4 UNITS: 100 INJECTION, SOLUTION INTRAVENOUS; SUBCUTANEOUS at 08:00

## 2025-08-20 RX ADMIN — Medication: at 08:55

## 2025-08-20 RX ADMIN — Medication: at 20:20

## 2025-08-20 RX ADMIN — HYDROMORPHONE HYDROCHLORIDE 0.2 MG: 0.2 INJECTION, SOLUTION INTRAMUSCULAR; INTRAVENOUS; SUBCUTANEOUS at 23:11

## 2025-08-20 RX ADMIN — PERFLUTREN 4 ML OF DILUTION: 6.52 INJECTION, SUSPENSION INTRAVENOUS at 12:15

## 2025-08-20 ASSESSMENT — PAIN - FUNCTIONAL ASSESSMENT
PAIN_FUNCTIONAL_ASSESSMENT: CPOT (CRITICAL CARE PAIN OBSERVATION TOOL)

## 2025-08-20 NOTE — CONSULTS
Reason For Consult  Pleural Effusion, AHRF    History Of Present Illness  Radha Torres is a 85 y.o. female with pmh sig for HfREF, Severe aortic stenosis, AFIB, s/p AV node ablation and leadless pacemaker in 2025, HTN, and RCC who [facility] having abdmonal and epigastric pain with nausea found to be fluid overloaded, eventually necessitating CICU for concerns of flash pulmonary edema requiring CPAP. Unfortuantely, patient was scheduled for TAVR on 8/19 that was unable to happen given above. Patient seen on CPAP, altered and confused with family at bedside. Blood cultures growing staph aureues. Pulmonary consulted for pleural effusion and AHRF. Primary team with discussion of family, that patient is now unable to undergo TAVR per structural heart team. Because unable to correct valve, concern that patient will continue to have fluid overload without ability to fix root cause of aortic stenosis. Primary team initiated GOC discussion and palliative consulted.       ROS   Unable to obtain 2/2 to mental status.      Past Medical History  She has a past medical history of Abnormal ECG (2018), Arrhythmia (2018), Atrial fibrillation (Multi) (2018), Cancer (Multi) (2018), Cardiomyopathy, CHF (congestive heart failure), Chronic kidney disease, Hyperlipidemia (2018), and Hypertension (Years ago).    Surgical History  She has a past surgical history that includes Other surgical history (09/13/2021); Other surgical history (09/13/2021); Other surgical history (09/13/2021); Other surgical history (09/13/2021); Other surgical history (09/13/2021); Cardiac electrophysiology procedure (Right, 5/6/2025); Cardiac electrophysiology procedure (Right, 5/6/2025); Ablation of dysrhythmic focus (05/06/2025); and Insert / replace / remove pacemaker (05/06/2025).     Social History  She reports that she has never smoked. She has never used smokeless tobacco. She reports that she does not drink alcohol and  "does not use drugs.    Family History  Family History[1]     Allergies  Patient has no known allergies.    Review of Systems  Unable to obtain 2/2 to mental status      Physical Exam  Constitutional: confused   HEENT: Closed eyes   CV: RRR  PULM: Roncherous  Skin: Warm and dry   Ext: Bilateral Lower extremity without pitting edema   Neuro: moves all spontaneously   Psych: confused        Last Recorded Vitals  Blood pressure 148/75, pulse 76, temperature 35.9 °C (96.6 °F), temperature source Temporal, resp. rate (!) 32, height 1.65 m (5' 4.96\"), weight 78.8 kg (173 lb 12.8 oz), SpO2 90%.    Relevant Results  Results for orders placed or performed during the hospital encounter of 08/09/25 (from the past 24 hours)   POCT GLUCOSE   Result Value Ref Range    POCT Glucose 146 (H) 74 - 99 mg/dL   POCT GLUCOSE   Result Value Ref Range    POCT Glucose 176 (H) 74 - 99 mg/dL   Renal function panel   Result Value Ref Range    Glucose 196 (H) 74 - 99 mg/dL    Sodium 127 (L) 136 - 145 mmol/L    Potassium 4.0 3.5 - 5.3 mmol/L    Chloride 89 (L) 98 - 107 mmol/L    Bicarbonate 25 21 - 32 mmol/L    Anion Gap 17 10 - 20 mmol/L    Urea Nitrogen 29 (H) 6 - 23 mg/dL    Creatinine 1.57 (H) 0.50 - 1.05 mg/dL    eGFR 33 (L) >60 mL/min/1.73m*2    Calcium 8.3 (L) 8.6 - 10.6 mg/dL    Phosphorus 2.9 2.5 - 4.9 mg/dL    Albumin 3.2 (L) 3.4 - 5.0 g/dL   CBC and Auto Differential   Result Value Ref Range    WBC 16.5 (H) 4.4 - 11.3 x10*3/uL    nRBC 0.0 0.0 - 0.0 /100 WBCs    RBC 3.33 (L) 4.00 - 5.20 x10*6/uL    Hemoglobin 9.7 (L) 12.0 - 16.0 g/dL    Hematocrit 30.3 (L) 36.0 - 46.0 %    MCV 91 80 - 100 fL    MCH 29.1 26.0 - 34.0 pg    MCHC 32.0 32.0 - 36.0 g/dL    RDW 15.9 (H) 11.5 - 14.5 %    Platelets 175 150 - 450 x10*3/uL    Neutrophils % 91.8 40.0 - 80.0 %    Immature Granulocytes %, Automated 1.2 (H) 0.0 - 0.9 %    Lymphocytes % 1.8 13.0 - 44.0 %    Monocytes % 4.9 2.0 - 10.0 %    Eosinophils % 0.2 0.0 - 6.0 %    Basophils % 0.1 0.0 - 2.0 %    " Neutrophils Absolute 15.12 (H) 1.60 - 5.50 x10*3/uL    Immature Granulocytes Absolute, Automated 0.19 0.00 - 0.50 x10*3/uL    Lymphocytes Absolute 0.30 (L) 0.80 - 3.00 x10*3/uL    Monocytes Absolute 0.81 (H) 0.05 - 0.80 x10*3/uL    Eosinophils Absolute 0.03 0.00 - 0.40 x10*3/uL    Basophils Absolute 0.01 0.00 - 0.10 x10*3/uL   Magnesium   Result Value Ref Range    Magnesium 2.19 1.60 - 2.40 mg/dL   Renal Function Panel   Result Value Ref Range    Glucose 223 (H) 74 - 99 mg/dL    Sodium 130 (L) 136 - 145 mmol/L    Potassium 3.6 3.5 - 5.3 mmol/L    Chloride 89 (L) 98 - 107 mmol/L    Bicarbonate 26 21 - 32 mmol/L    Anion Gap 19 10 - 20 mmol/L    Urea Nitrogen 33 (H) 6 - 23 mg/dL    Creatinine 1.73 (H) 0.50 - 1.05 mg/dL    eGFR 29 (L) >60 mL/min/1.73m*2    Calcium 8.4 (L) 8.6 - 10.6 mg/dL    Phosphorus 3.9 2.5 - 4.9 mg/dL    Albumin 3.1 (L) 3.4 - 5.0 g/dL   POCT GLUCOSE   Result Value Ref Range    POCT Glucose 249 (H) 74 - 99 mg/dL   Transthoracic Echo (TTE) Limited   Result Value Ref Range    AV pk karsten 3.76 m/s    AV mn grad 30 mmHg    LVOT diam 2.10 cm    LA vol index A/L 51.4 ml/m2    Tricuspid annular plane systolic excursion 1.1 cm    LV EF 23 %    LVIDd 6.00 cm    RVSP 44 mmHg    Aortic Valve Area by Continuity of VTI 0.75 cm2    Aortic Valve Area by Continuity of Peak Velocity 0.80 cm2    AV pk grad 57 mmHg    LV A4C EF 20.5    MRSA Surveillance for Vancomycin De-escalation, PCR    Specimen: Anterior Nares; Swab   Result Value Ref Range    MRSA PCR Not Detected Not Detected   Blood Culture    Specimen: Peripheral Venipuncture; Blood culture   Result Value Ref Range    Blood Culture Loaded on Instrument - Culture in progress    Blood Culture    Specimen: Peripheral Venipuncture; Blood culture   Result Value Ref Range    Blood Culture Loaded on Instrument - Culture in progress    POCT GLUCOSE   Result Value Ref Range    POCT Glucose 176 (H) 74 - 99 mg/dL   BLOOD GAS VENOUS FULL PANEL   Result Value Ref Range     POCT pH, Venous 7.37 7.33 - 7.43 pH    POCT pCO2, Venous 43 41 - 51 mm Hg    POCT pO2, Venous 51 (H) 35 - 45 mm Hg    POCT SO2, Venous 76 (H) 45 - 75 %    POCT Oxy Hemoglobin, Venous 74.5 45.0 - 75.0 %    POCT Hematocrit Calculated, Venous 34.0 (L) 36.0 - 46.0 %    POCT Sodium, Venous 126 (L) 136 - 145 mmol/L    POCT Potassium, Venous 3.9 3.5 - 5.3 mmol/L    POCT Chloride, Venous 96 (L) 98 - 107 mmol/L    POCT Ionized Calicum, Venous 1.06 (L) 1.10 - 1.33 mmol/L    POCT Glucose, Venous 195 (H) 74 - 99 mg/dL    POCT Lactate, Venous 2.4 (H) 0.4 - 2.0 mmol/L    POCT Base Excess, Venous -0.5 -2.0 - 3.0 mmol/L    POCT HCO3 Calculated, Venous 24.9 22.0 - 26.0 mmol/L    POCT Hemoglobin, Venous 11.4 (L) 12.0 - 16.0 g/dL    POCT Anion Gap, Venous 9.0 (L) 10.0 - 25.0 mmol/L    Patient Temperature 37.0 degrees Celsius    FiO2 40 %       CT CHEST personally reviewed, compared to CT TAVR 7/31, lung parencyma roughly stable thought plerual effusions have increased in size on both right and left     Pending final impression via radiology.           Assessment/Plan     Radha Torres is a 85 y.o. female with pmh sig for HfREF, Severe aortic stenosis, AFIB, s/p AV node ablation and leadless pacemaker in 2025, HTN, and RCC who presented as a transfer from [facility] after having abdmonal and epigastric pain with nausea found to be fluid overloaded, eventually necessitating CICU for concerns of flash pulmonary edema requiring CPAP. Patient with both right and left plerual effusion L>R with CICU inquiring about tap. Patient seen following discussion via primary team about inability to do TAVR procedure with concern that thus unable to treat root cause of patient's heart disease. Consulting palliative with further discussions pending.     #Staph bacteremia   #Fluid overlaod   #Sever aortic stenosis   #AHRF   #Confusion   -Patient growing staph aureus in blood cultures   -Unfortunately per cards Not a candidate for TAVR at this time   -Agree  with diuresis via primary team though caution with severe aortic stenosis   -Appears discussions pending at this time given bactermia, severe aortic stenosis and inability to do procedure. Will touch base with team tomorrow regarding patient and family goals and consider thora at that time (though plerual effusions present on 7/31 CT scan and likely in the setting of fluid overload.   -Aggressive Bronchopulmonary hygiene as tolerated     Seen with Dr. Ronnie Solo MD         [1]   Family History  Problem Relation Name Age of Onset    Cancer Mother Radha Groves     Stroke Father Hung Groves     Hypertension Mother Radha Groves

## 2025-08-20 NOTE — PROGRESS NOTES
Occupational Therapy                 Therapy Communication Note    Patient Name: Radha Torres  MRN: [id number]  Department: [facility]  Room: 15/15-A  Today's Date: 8/20/2025     Discipline: Occupational Therapy    Missed Visit: OT Missed Visit: Yes     Missed Visit Reason: Missed Visit Reason: Patient placed on medical hold (Upon entry to room, pt observed with increased WOB and SpO2 on nc in low-mid 80s. RN present and aware and switching pt to CPAP. Not approrpiate for therapy at this time.)    Missed Time: Attempt 1247    Comment:

## 2025-08-20 NOTE — PROCEDURES
CLINICAL NUTRITION PROCEDURE NOTE       Small Bore Feeding Tube Placement  Patient with dysphagia secondary to altered mental status, requiring small bore feeding tube placement for medication and nutrition administration.  All labs and images examined for appropriateness of tube placement. Procedure explained to the patient and/or family. Lidocaine jelly 2% was used for tube insertion for patient comfort.     Feeding Tube Placement       Active Feeding Tube Placement    NG/OG/Feeding Tube Small bore feeding tube 12 Fr Left nostril     Properties       Placement date 08/20/25  Site Left nostril     Placement time 0943  Days less than 1    Placed by: Preethi Stevens RD, LD  Hand Hygiene Completed: Yes     Tube Length (cm): 81 cm  Tube Type: Small bore feeding tube     Type of Small Bore Tube: Electromagnetic feeding tube  Initial Placement Verification on Monitor: Post-pyloric     Specify Post-pyloric Placement: Duodenum  NG/OG Tube Size: 12 Fr     Difficulty with Placement: No                 Assessments       Row Name 08/20/25 0944    Present on Admission to Healthcare Facility N     Tube Status Clamped     Placement Verification X-ray     Distal Tube Measurement (cm) 81 cm     Tube Securement Nasal bridle                            Appropriate imaging orders have been placed to check tube placement.

## 2025-08-20 NOTE — PROGRESS NOTES
Physical Therapy                 Therapy Communication Note    Patient Name: Radha Torres  MRN: [id number]  Department: [facility]  Room: 15/15-A  Today's Date: 8/20/2025     Discipline: Physical Therapy    Missed Visit:   Yes    Missed Visit Reason:   See flowsheet. Medical decline with worsening respiratory status.    Missed Time: Attempt    Comment:

## 2025-08-20 NOTE — CONSULTS
"Nutrition Initial Assessment:   Nutrition Assessment    Reason for Assessment: Provider consult order, Initiate and manage tube feeding, Enteral assessment/recommendation (TF)    Patient is a 85 y.o. female presenting with chest pain, severe aortic stenosis.    Presented on 8/9/25 as a transfer from [facility] for epigastric pain, now transferred to CICU for possible flash pulmonary edema and hyponatremia. Suspect GI symptoms and hyponatremia due to severe fluid overload iso ADHF. Received diuresis and on CPAP    PMH HFrEF (EF 25-30%), aortic stenosis, Afib, s/p AV node ablation and leadless pacemaker in 2025, HTN, renal cell carcinoma     Nutrition History:  Energy Intake: Poor < 50 % (Pt NPO)  Food and Nutrient History: Attempted to speak w/ patient at bedside however pt refused to answer questions. Per sitter, pt has a stomach ache. Pt is currently NPO since yesterday after being on cardiac diet x 2 days. Per one documented meal on 8/17, 25% intake. Per last nutr visit on 8/15, pt had decreased PO intake/appetite/hunger and was drinking ensure max 1x/day. NG tube was placed this morning.  Vitamin/Herbal Supplement Use: potassium, vit c, ferrous sulfate, citracal+D3 per home meds. Inpt vit: ferrous sulfate, vit C, D3  Food Allergy: Other (Comment) (NKA)     Date/Time Percent Meals Eaten (%)   08/17/25 2000 25   08/15/25 1700 75   08/15/25 0900 25   08/14/25 1700 50   08/14/25 1508 0   08/14/25 1300 0   08/13/25 1352 100   08/12/25 2000 25 08/12/25 0854 25   08/10/25 1207 75   08/09/25 1250 10     Anthropometrics:  Height: 165 cm (5' 4.96\")   Weight: 78.8 kg (173 lb 12.8 oz)   BMI (Calculated): 28.96  IBW/kg (Dietitian Calculated): 56.8 kg  Percent of IBW: 151 %                      Weight History:   Wt Readings from Last 10 Encounters:   08/20/25 78.8 kg (173 lb 12.8 oz)   08/08/25 80 kg (176 lb 5.9 oz)   07/22/25 74.4 kg (164 lb)   06/30/25 75.1 kg (165 lb 9.6 oz)   05/13/25 76.2 kg (168 lb)   05/06/25 74.2 kg " (163 lb 9.3 oz)   04/16/25 77.1 kg (170 lb)   03/31/25 77.6 kg (171 lb)   12/18/24 76.2 kg (168 lb)   10/25/24 78.5 kg (173 lb)     Per inpt wts:   Date/Time Weight   08/20/25 0600 78.8 kg   08/20/25 0400 79.8 kg   08/19/25 0600 75.4 kg   08/18/25 0800 79.7 kg   08/17/25 0800 79.7 kg   08/15/25 0547 85.7 kg   08/14/25 0319 81.9 kg   08/13/25 0628 80.2 kg   08/12/25 0152 78.9 kg   08/10/25 0100 79.4 kg   08/09/25 0330 79.4 kg   08/08/25 2315 80 kg       Weight Change %:  Weight History / % Weight Change: Weight fluctuating during admission likely r/t shifts in fluids. 6.9 kg decrease in weight since 8/15. 1.2 kg wt decrease since 8/8. pt on diuretics.    Nutrition Focused Physical Exam Findings:    Subcutaneous Fat Loss:   Orbital Fat Pads: Mild-Moderate (slight dark circles and slight hollowing)  Buccal Fat Pads: Mild-Moderate (flat cheeks, minimal bounce)  Triceps: Defer  Ribs: Defer  Muscle Wasting:  Temporalis: Mild-Moderate (slight depression)  Pectoralis (Clavicular Region): Mild-Moderate (some protrusion of clavicle)  Deltoid/Trapezius: Defer  Interosseous: Mild-Moderate (slightly depressed area between thumb and forefinger)  Trapezius/Infraspinatus/Supraspinatus (Scapular Region): Defer  Quadriceps: Defer  Gastrocnemius: Defer  Edema:  Edema: +1 trace  Edema Location: generalized  Physical Findings:  Hair: Negative  Eyes: Negative  Nails: Negative  Skin: Negative  Respiratory : Negative  Digestive System Findings: Nausea    Nutrition Significant Labs:  CBC Trend:   Results from last 7 days   Lab Units 08/20/25  0256 08/19/25  0505 08/18/25  0412 08/17/25  0443   WBC AUTO x10*3/uL 16.5* 20.1* 15.0* 12.5*   RBC AUTO x10*6/uL 3.33* 3.98* 3.65* 3.50*   HEMOGLOBIN g/dL 9.7* 11.3* 10.5* 10.2*   HEMATOCRIT % 30.3* 36.4 31.1* 29.4*   MCV fL 91 92 85 84   PLATELETS AUTO x10*3/uL 175 217 283 300    , BMP Trend:   Results from last 7 days   Lab Units 08/20/25  0256 08/19/25  2158 08/19/25  0505 08/18/25  1333   GLUCOSE  mg/dL 223* 196* 198* 240*   CALCIUM mg/dL 8.4* 8.3* 8.8 8.8   SODIUM mmol/L 130* 127* 125* 126*   POTASSIUM mmol/L 3.6 4.0 3.9 4.1   CO2 mmol/L 26 25 29 31   CHLORIDE mmol/L 89* 89* 86* 87*   BUN mg/dL 33* 29* 21 21   CREATININE mg/dL 1.73* 1.57* 1.32* 1.17*    , A1C:  Lab Results   Component Value Date    HGBA1C 7.9 (H) 08/15/2025   , BG POCT trend:   Results from last 7 days   Lab Units 08/20/25  0745 08/19/25  2013 08/19/25  1537 08/19/25  1131 08/19/25  0746   POCT GLUCOSE mg/dL 249* 176* 146* 178* 183*    , Renal Lab Trend:   Results from last 7 days   Lab Units 08/20/25  0256 08/19/25  2158 08/19/25  0505 08/18/25  1333   POTASSIUM mmol/L 3.6 4.0 3.9 4.1   PHOSPHORUS mg/dL 3.9 2.9 3.6 2.9   SODIUM mmol/L 130* 127* 125* 126*   MAGNESIUM mg/dL 2.19  --  2.30  --    EGFR mL/min/1.73m*2 29* 33* 40* 46*   BUN mg/dL 33* 29* 21 21   CREATININE mg/dL 1.73* 1.57* 1.32* 1.17*      Nutrition Specific Medications:  Scheduled medications  [Held by provider] amLODIPine, 5 mg, oral, Daily  [Held by provider] ascorbic acid, 500 mg, oral, Daily  [Held by provider] atorvastatin, 20 mg, oral, Daily  [Held by provider] carvedilol, 6.25 mg, oral, BID  [Held by provider] cholecalciferol, 25 mcg, oral, Daily  [Held by provider] ferrous sulfate, 1 tablet, oral, Daily  [Held by provider] gabapentin, 300 mg, oral, Daily  [Held by provider] hydrALAZINE, 50 mg, oral, TID  insulin lispro, 0-10 Units, subcutaneous, TID AC  [Held by provider] levothyroxine, 75 mcg, oral, Daily  [Held by provider] pantoprazole, 40 mg, oral, Daily before breakfast  [Held by provider] PARoxetine, 20 mg, oral, Daily  piperacillin-tazobactam, 3.375 g, intravenous, q6h  [Held by provider] polyethylene glycol, 17 g, oral, Daily  [Held by provider] urea, 15 g, oral, TID  vancomycin, 1,500 mg, intravenous, Once        I/O:   Last BM Date: 08/15/25; Stool Appearance: Unable to assess (08/17/25 2300)    Dietary Orders (From admission, onward)       Start     Ordered     08/19/25 0959  NPO Diet; Effective now  Diet effective now         08/19/25 0958    08/09/25 0333  May Participate in Room Service  ( ROOM SERVICE MAY PARTICIPATE)  Once        Question:  .  Answer:  Yes    08/09/25 0332                     Estimated Needs:   Total Energy Estimated Needs in 24 hours (kCal): 1572 kCal (3146-5038)  Method for Estimating Needs: 25-30 kcal/kg IBW  Total Protein Estimated Needs in 24 Hours (g): 69 g  Method for Estimating 24 Hour Protein Needs: 1.2 g pro/kg IBW  Total Fluid Estimated Needs in 24 Hours (mL): 1572 mL  Method for Estimating 24 Hour Fluid Needs: 1 ml/kg or per md        Nutrition Diagnosis   Malnutrition Diagnosis  Patient has Malnutrition Diagnosis: Yes  Diagnosis Status: New  Malnutrition Diagnosis: Moderate malnutrition related to acute disease or injury  As Evidenced by: <75% EER >7 days, mild muscle wasting/fat loss    Nutrition Diagnosis  Patient has Nutrition Diagnosis: Yes  Diagnosis Status (1): New  Nutrition Diagnosis 1: Inadequate oral intake  Related to (1): decreased appetite  As Evidenced by (1): need for TF, PO intake <50%       Nutrition Interventions/Recommendations   Nutrition prescription for enteral nutrition    Nutrition Recommendations:  Individualized Nutrition Prescription Provided for : Patient currently NPO. If unable to advance diet recommend TF. Recs: TwoCal HN at 15 ml/hr x 22 hrs (hold 1 hr pre/post levothyroxine) and increase by 10 ml q8-12 hours until goal rate of 35 ml/hr. Free water flushes per provider discretion. Recommend SLP eval for suspected dysphagia prior to advancing to PO diet.    Nutrition Interventions/Goals:   Enteral Intake: Management of delivery rate of enteral nutrition  Goal: TwoCal HN at 35 ml/hr provides 1540 kcal, 65g pro, 539 ml water      Education Documentation  No documentation found.    Patient not appropriate for education at this time.        Nutrition Monitoring and Evaluation   Enteral and Parenteral  Nutrition Intake Determination: Enteral nutrition intake - Tolerate TF at goal rate, Enteral nutrition intake - To meet > 75% estimated energy needs    Body Weight: Body weight - Weight reduction from fluids, as needed    Electrolyte and Renal Panel: Electrolytes within normal limits  Glucose/Endocrine Profile: Glucose within normal limits - ICU (140-180 mg/dL)         Goal Status: New goal(s) identified    Time Spent (min): 60 minutes

## 2025-08-20 NOTE — SIGNIFICANT EVENT
Radha Torres is a 85 y.o. female with PMH HFrEF (EF 25-30%), aortic stenosis, Afib, s/p AV node ablation and leadless pacemaker in 2025, HTN, renal cell carcinoma who presented on 8/9/25 as a transfer from [facility] for epigastric pain, now transferred to CICU for possible flash pulmonary edema and hyponatremia. Patient was originally schedule for the TAVR procedure [place] on 8/19/25 with Dr. Howell and Dr. Douglas, due to patient new admission, it was cancel.    Upon reviewing patient's chart and current health condition our team has determined that Mrs Torres is not a candidate for TAVR/BAV procedure at this time given patient's currently acute complications with delirium, MRSA bacteremia, Ecoli UTI, acute respiratory failure, frailty and quick medically deterioration.   Agree with medically management of her acute and chronic conditions and palliative care involvement.     We appreciate the ability to participate in the patient care,  at this point we will  sign off.   Please page [phone] if further questions and concerns.     Bethany WEEKS, [facility]  Acute Care Nurse Practitioner   [facility]  Advanced Interventional Cardiology  Office: [phone]  Fax: [phone]

## 2025-08-20 NOTE — PROGRESS NOTES
"Radha Torres is a 85 y.o. female on day 11 of admission presenting with Chest pain.      Palliative Medicine following for:  Complex medical decision making, symptom management, patient/family support    History obtained from chart review including ED note, H&P, patient's daily progress notes, review of lab/test results, and discussion with primary team and bedside RN.    Subjective    History of Present Illness  Radha Torres is a 85 y.o. female with PMH HFrEF (EF 25-30%), aortic stenosis, Afib, s/p AV node ablation and leadless pacemaker in 2025, HTN, renal cell carcinoma who presented on 8/9/25 as a transfer from [facility] for epigastric pain, now transferred to CICU for possible flash pulmonary edema and hyponatremia. Suspect GI symptoms and hyponatremia due to severe fluid overload iso ADHF. 8/20 labs c/f uti and bacteremia.       Symptoms  Pain: same low back pain  Dyspnea: better today  Fatigue: yes  Insomnia: yes  Drowsiness: yes  Constipation: unsure  Nausea: denies  Appetite: yes, thinks having some hunger pains  Anxiety: yes, r/t being in the bed so long  Depression: yes, r/t being in the bed so long    Objective    Last Recorded Vitals  BP (!) 130/100   Pulse 68   Temp 35.9 °C (96.6 °F) (Temporal)   Resp (!) 30   Ht 1.65 m (5' 4.96\")   Wt 78.8 kg (173 lb 12.8 oz)   SpO2 91%   BMI 28.96 kg/m²      Physical Exam   Constitutional:       Appearance: She is overweight. She is ill-appearing. No longer open mouth breathing.  HENT:      Head: Normocephalic.      Mouth/Throat:      Mouth: Mucous membranes are dry.    Cardiovascular:      Rate and Rhythm: Normal rate.      Heart sounds: Murmur heard.   Pulmonary:      Effort: Tachypnea present.      Breath sounds: Decreased breath sounds present.      Comments: Abdominal breathing mildly improved from yesterday.  Abdominal:      Palpations: Abdomen is soft.    Skin:     General: Skin is warm and dry.      Coloration: Skin is pale.   Neurological:      Mental " Status: She is easily aroused, cooperative. She is disoriented.      Relevant Results   Results for orders placed or performed during the hospital encounter of 08/09/25 (from the past 24 hours)   POCT GLUCOSE   Result Value Ref Range    POCT Glucose 146 (H) 74 - 99 mg/dL   POCT GLUCOSE   Result Value Ref Range    POCT Glucose 176 (H) 74 - 99 mg/dL   Renal function panel   Result Value Ref Range    Glucose 196 (H) 74 - 99 mg/dL    Sodium 127 (L) 136 - 145 mmol/L    Potassium 4.0 3.5 - 5.3 mmol/L    Chloride 89 (L) 98 - 107 mmol/L    Bicarbonate 25 21 - 32 mmol/L    Anion Gap 17 10 - 20 mmol/L    Urea Nitrogen 29 (H) 6 - 23 mg/dL    Creatinine 1.57 (H) 0.50 - 1.05 mg/dL    eGFR 33 (L) >60 mL/min/1.73m*2    Calcium 8.3 (L) 8.6 - 10.6 mg/dL    Phosphorus 2.9 2.5 - 4.9 mg/dL    Albumin 3.2 (L) 3.4 - 5.0 g/dL   CBC and Auto Differential   Result Value Ref Range    WBC 16.5 (H) 4.4 - 11.3 x10*3/uL    nRBC 0.0 0.0 - 0.0 /100 WBCs    RBC 3.33 (L) 4.00 - 5.20 x10*6/uL    Hemoglobin 9.7 (L) 12.0 - 16.0 g/dL    Hematocrit 30.3 (L) 36.0 - 46.0 %    MCV 91 80 - 100 fL    MCH 29.1 26.0 - 34.0 pg    MCHC 32.0 32.0 - 36.0 g/dL    RDW 15.9 (H) 11.5 - 14.5 %    Platelets 175 150 - 450 x10*3/uL    Neutrophils % 91.8 40.0 - 80.0 %    Immature Granulocytes %, Automated 1.2 (H) 0.0 - 0.9 %    Lymphocytes % 1.8 13.0 - 44.0 %    Monocytes % 4.9 2.0 - 10.0 %    Eosinophils % 0.2 0.0 - 6.0 %    Basophils % 0.1 0.0 - 2.0 %    Neutrophils Absolute 15.12 (H) 1.60 - 5.50 x10*3/uL    Immature Granulocytes Absolute, Automated 0.19 0.00 - 0.50 x10*3/uL    Lymphocytes Absolute 0.30 (L) 0.80 - 3.00 x10*3/uL    Monocytes Absolute 0.81 (H) 0.05 - 0.80 x10*3/uL    Eosinophils Absolute 0.03 0.00 - 0.40 x10*3/uL    Basophils Absolute 0.01 0.00 - 0.10 x10*3/uL   Magnesium   Result Value Ref Range    Magnesium 2.19 1.60 - 2.40 mg/dL   Renal Function Panel   Result Value Ref Range    Glucose 223 (H) 74 - 99 mg/dL    Sodium 130 (L) 136 - 145 mmol/L     Potassium 3.6 3.5 - 5.3 mmol/L    Chloride 89 (L) 98 - 107 mmol/L    Bicarbonate 26 21 - 32 mmol/L    Anion Gap 19 10 - 20 mmol/L    Urea Nitrogen 33 (H) 6 - 23 mg/dL    Creatinine 1.73 (H) 0.50 - 1.05 mg/dL    eGFR 29 (L) >60 mL/min/1.73m*2    Calcium 8.4 (L) 8.6 - 10.6 mg/dL    Phosphorus 3.9 2.5 - 4.9 mg/dL    Albumin 3.1 (L) 3.4 - 5.0 g/dL   POCT GLUCOSE   Result Value Ref Range    POCT Glucose 249 (H) 74 - 99 mg/dL   Transthoracic Echo (TTE) Limited   Result Value Ref Range    AV pk karsten 3.76 m/s    AV mn grad 30 mmHg    LVOT diam 2.10 cm    LA vol index A/L 51.4 ml/m2    Tricuspid annular plane systolic excursion 1.1 cm    LV EF 23 %    LVIDd 6.00 cm    RVSP 44 mmHg    Aortic Valve Area by Continuity of VTI 0.75 cm2    Aortic Valve Area by Continuity of Peak Velocity 0.80 cm2    AV pk grad 57 mmHg    LV A4C EF 20.5    MRSA Surveillance for Vancomycin De-escalation, PCR    Specimen: Anterior Nares; Swab   Result Value Ref Range    MRSA PCR Not Detected Not Detected   Blood Culture    Specimen: Peripheral Venipuncture; Blood culture   Result Value Ref Range    Blood Culture Loaded on Instrument - Culture in progress    Blood Culture    Specimen: Peripheral Venipuncture; Blood culture   Result Value Ref Range    Blood Culture Loaded on Instrument - Culture in progress    POCT GLUCOSE   Result Value Ref Range    POCT Glucose 176 (H) 74 - 99 mg/dL   BLOOD GAS VENOUS FULL PANEL   Result Value Ref Range    POCT pH, Venous 7.37 7.33 - 7.43 pH    POCT pCO2, Venous 43 41 - 51 mm Hg    POCT pO2, Venous 51 (H) 35 - 45 mm Hg    POCT SO2, Venous 76 (H) 45 - 75 %    POCT Oxy Hemoglobin, Venous 74.5 45.0 - 75.0 %    POCT Hematocrit Calculated, Venous 34.0 (L) 36.0 - 46.0 %    POCT Sodium, Venous 126 (L) 136 - 145 mmol/L    POCT Potassium, Venous 3.9 3.5 - 5.3 mmol/L    POCT Chloride, Venous 96 (L) 98 - 107 mmol/L    POCT Ionized Calicum, Venous 1.06 (L) 1.10 - 1.33 mmol/L    POCT Glucose, Venous 195 (H) 74 - 99 mg/dL    POCT  Lactate, Venous 2.4 (H) 0.4 - 2.0 mmol/L    POCT Base Excess, Venous -0.5 -2.0 - 3.0 mmol/L    POCT HCO3 Calculated, Venous 24.9 22.0 - 26.0 mmol/L    POCT Hemoglobin, Venous 11.4 (L) 12.0 - 16.0 g/dL    POCT Anion Gap, Venous 9.0 (L) 10.0 - 25.0 mmol/L    Patient Temperature 37.0 degrees Celsius    FiO2 40 %        Allergies  Patient has no known allergies.  Medications  Scheduled medications  Scheduled Medications[1]  Continuous medications  Continuous Medications[2]  PRN medications  PRN Medications[3]     Assessment/Plan    Radha Torres is a 85 y.o. female with PMH HFrEF (EF 25-30%), aortic stenosis, Afib, s/p AV node ablation and leadless pacemaker in 2025, HTN, renal cell carcinoma who presented on 8/9/25 as a transfer from [facility] for epigastric pain, now transferred to CICU for possible flash pulmonary edema and hyponatremia. Suspect GI symptoms and hyponatremia due to severe fluid overload iso ADHF. 8/20 labs c/f uti and bacteremia.      8/19: Palliative consultation. Met with spouse at bedside, spoke to dtr on the phone. Agreeable to DNR/DNI to honor pt best and her previously stated health preferences.    8/20: Pt with pleural effusions, elevated lactate, UTI, and bacteremia. Plan to diurese further and started on Dopamine for cardiac support. No plan for balloon valvuloplasty until pt shows improvements. Supported family at bedside.    Palliative Performance Scale (PPS): 30    ----------------------------------------------------------------------------------------------------------------------------------------------------------------------------------------------------------------------------------------------------------------------------------------------------------------------------------------------------------------------      I spent 7 minutes in providing separately identifiable ACP services with the patient and/or surrogate decision maker in a voluntary conversation discussing the patient's  "wishes and goals as detailed in the above note.   ----------------------------------------------------------------------------------------------------------------------------------------------------------------------------------------------------------------------------------------------------------------------------------------------------------------------------------------------------------------------    #Complex Medical Decision Making  #Goals of Care  #Advanced Care Planning  - Code status: DNR/DNI 8/19  - Surrogate decision maker: Stefanie Roblero (Daughter)  [phone] ([place]) primary. Spouse secondary.  - Goals are mix of survival and time and improved quality of life  -8/19: Met with pt at bedside. Able to glean more information r/t pt's life, wishes, goals, worries, and health preferences. See above/consult note for additional supportive detail.      In short, discussed hopes to stabilize pt for tavr, but expressed worries of further decompensation in the interim. Discussed DNR/DNI in detail in which family supports and aligns with what pt would deem \"non beneficial\" care. Family states pt has said all along, she does not want to be kept alive by machines. Supported family and answered questions.     - 8/20: This NP, Pall SW, Stefanie and Caleb discussed continuing to treat her and clearing reversible factors. We discussed that she still may be able to have the balloon provided things clear up and she can stabilize again. The uncertainty of whether or not pt is going to continue to decline is weighing on their decisions on what to do. They are trying to remain hopeful that she can make it to the balloon.     Pall team will reassess tomorrow as we may have a better understanding of how patient responds to new treatments.      #ADHF  #HFrEF  #Severe AS  #AMS  #Psychosocial Support  - Ongoing pt/family support and care navigation. 8/19 Established DNR/DNI to honor pt's wishes. Family still hopeful for TAVR " if/when pt can stabilize. Structural following to assist with candidacy for tavr vs balloon valvuloplasty.   - 8/20 pt with pleural effusions, elevated lactate, UTI, and bacteremia. Plan to diurese further and started on Dopamine for cardiac support. No plan for balloon valvuloplasty until pt shows improvements. Returned to pt's room, family at bedside. Pt back on cpap. Family understanding and realistic that pt is very ill and that she could die and are really contemplating what they should do if death may occur regardless. Expressed frustration that pt did not get TAVR when she was healthier and now she is dying. Supported family.  - Family requesting daily updates. Please notify Stefanie first/primary contact.  - Music Therapy- added  - Spiritual Care Support  - SW support     Plan of Care discussed with: Updated primary and bedside RN on goals of care decision, medication adjustments, and code status      Medical Decision Making was high level due to high complexity of problems, extensive data review, and high risk of management/treatment.     - ADHF, severe aortic stenosis, AMS, pleural effusions, bacteremia, posing threat to life and function.  - Reviewed external notes from   - Reviewed results from  which were used in decision making for   - Recommended the following tests:   - Assessment required independent historian: primary, family  - Independent interpretation of test: labs and imaging  - Discussion of management with: primary, family  - Drug therapy requiring intensive monitoring for toxicity: insulin  - Decision regarding elective major surgery with identified patient or procedure risk factors:   - Decision regarding emergency major surgery:   - Decision regarding hospitalization or escalation of hospital-level care:   - Decision not to resuscitate or to de-escalate care because of poor prognosis:   - Parenteral controlled substances: Hydromorphone    Thank you for allowing us to care for this patient.  Palliative Team will continue to follow as needed. Please contact team with any questions or concerns.   Team pager [phone] (weekdays)      LINDSEY Cole-CNP           [1] acetazolamide, 1,000 mg, intravenous, Once  [Held by provider] amLODIPine, 5 mg, oral, Daily  [Held by provider] ascorbic acid, 500 mg, oral, Daily  [Held by provider] atorvastatin, 20 mg, oral, Daily  [Held by provider] carvedilol, 6.25 mg, oral, BID  [Held by provider] cholecalciferol, 25 mcg, oral, Daily  [Held by provider] ferrous sulfate, 1 tablet, oral, Daily  [Held by provider] gabapentin, 300 mg, oral, Daily  [Held by provider] hydrALAZINE, 50 mg, oral, TID  insulin lispro, 0-10 Units, subcutaneous, TID AC  [Held by provider] levothyroxine, 75 mcg, oral, Daily  [Held by provider] pantoprazole, 40 mg, oral, Daily before breakfast  [Held by provider] PARoxetine, 20 mg, oral, Daily  piperacillin-tazobactam, 3.375 g, intravenous, q6h  [Held by provider] polyethylene glycol, 17 g, oral, Daily  [Held by provider] urea, 15 g, oral, TID  [2] DOPamine, 2.5 mcg/kg/min, Last Rate: 2.5 mcg/kg/min (08/20/25 1300)  [3] PRN medications: acetaminophen, calcium carbonate, dextrose, dextrose, glucagon, glucagon, HYDROmorphone, metoclopramide, OLANZapine, oxyCODONE-acetaminophen, oxygen, oxygen, temazepam, trimethobenzamide, vancomycin

## 2025-08-20 NOTE — CONSULTS
Inpatient consult to Infectious Diseases  Consult performed by: Shekhar Antonio MD  Consult ordered by: Sekou Horton MD  Reason for consult: Staph aureus bacteremia      Subjective    Radha Torres is a 85 y.o. female presenting for abdominal pain. ID was consulted for S aureus bacteremia. She has a history of aortic stenosis, systolic heart failure EF 25-30%, atrial fibrillation s/p AV emiliano ablation and leadless pacer in 5/2025, HTN, renal cell carcinoma, and fall complicated by rib fractures. She has surgical hardware in bilateral thighs after femoral fractures years ago as well as surgical hardware in her spine, again, placed some time ago.   Ms Torres presented to [facility] ED with intractable burning epigastric abdominal pain present for 2 months which worsened acutely on 8/8, at which time it was accompanied by nausea and one episode of vomiting. It does not radiate. She was evaluated and diagnosed with atypical chest pain concerning for acute coronary syndrome given an increase in troponins from 23 to 27. She was transferred to [facility] 8/9 because she was scheduled to have an aortic valve replacement on 8/19. At [facility], she was found to be in a respiratory failure secondary to pulmonary edema caused by an acute on chronic exacerbation of her known systolic heart failure. Her other symptoms include waxing and waning encephalopathy, burning with urination, back pain, and picking/restlessness. Her family notes episodes of confusion and wandering, concerning for cognitive deficits. Structural heart at [facility] does not recommend operative intervention, and palliative care is involved in the patient's care.  On 8/19 had worsening SOB requiring placing back on CPAP and sepsis work-up for AMS with blood culture, UA. Pt started on ceftriaxone and today BC+ S. aureus.    Objective    Physical Exam  Constitutional:       General: She is awake. She is not in acute distress.     Appearance: She is overweight. She is ill-appearing. She  is not toxic-appearing or diaphoretic.      Interventions: Nasal cannula in place.   HENT:      Head: Normocephalic and atraumatic.      Nose: Nose normal.      Mouth/Throat:      Mouth: Mucous membranes are moist.      Pharynx: Oropharynx is clear.     Cardiovascular:      Rate and Rhythm: Normal rate and regular rhythm.   Pulmonary:      Effort: Pulmonary effort is normal. No respiratory distress.      Breath sounds: Normal breath sounds. No wheezing, rhonchi or rales.   Abdominal:      General: Abdomen is flat. There is no distension.      Palpations: Abdomen is soft.      Tenderness: There is abdominal tenderness. There is no guarding.      Hernia: No hernia is present.     Musculoskeletal:         General: No swelling. Normal range of motion.      Right lower leg: No edema.      Left lower leg: No edema.     Skin:     General: Skin is warm and dry.      Capillary Refill: Capillary refill takes 2 to 3 seconds.      Findings: Erythema, lesion and rash present.      Comments: Bilateral rash with some skin breakdown at axillary fold under both breasts  No purulence or exudate  White plaques around borders of erythema     Neurological:      Mental Status: She is easily aroused. She is lethargic and disoriented.      Motor: Weakness present.     Temp:  [36 °C (96.8 °F)-36.6 °C (97.9 °F)] 36.3 °C (97.3 °F)  Heart Rate:  [] 82  Resp:  [17-35] 21  BP: ()/() 102/74  FiO2 (%):  [40 %] 40 %  Vitals:    08/20/25 0600   Weight: 78.8 kg (173 lb 12.8 oz)     FiO2 (%):  [40 %] 40 %     I/Os    Intake/Output Summary (Last 24 hours) at 8/20/2025 1101  Last data filed at 8/20/2025 1028  Gross per 24 hour   Intake 150 ml   Output 200 ml   Net -50 ml     Labs:   CBC:  Recent Labs     08/20/25  0256 08/19/25  0505 08/18/25  0412   WBC 16.5* 20.1* 15.0*   HGB 9.7* 11.3* 10.5*   HCT 30.3* 36.4 31.1*    217 283   MCV 91 92 85     CMP:  Recent Labs     08/20/25  0256 08/19/25  2158 08/19/25  0505   * 127*  125*   K 3.6 4.0 3.9   CL 89* 89* 86*   CO2 26 25 29   ANIONGAP 19 17 14   BUN 33* 29* 21   CREATININE 1.73* 1.57* 1.32*   EGFR 29* 33* 40*   GLUCOSE 223* 196* 198*     Recent Labs     08/20/25  0256 08/19/25  2158 08/19/25  0505 08/17/25  1958 08/12/25  0623   ALBUMIN 3.1* 3.2* 3.8 3.4 3.6   LIPASE  --   --   --  20 11    < > = values in this interval not displayed.     Calcium/Phos:   Lab Results   Component Value Date    CALCIUM 8.4 (L) 08/20/2025    PHOS 3.9 08/20/2025      COAG:   Recent Labs     05/06/25  1110   INR 1.0     CRP:   Lab Results   Component Value Date    CRP 0.87 08/09/2025      ENDO:  Recent Labs     08/15/25  0727 08/13/25  0705   TSH  --  3.30   HGBA1C 7.9*  --       CARDIAC:   Recent Labs     08/18/25  0412 08/16/25  0532 08/12/25  1743 08/09/25  0538   TROPHS 21 34 26 38*   BNP  --   --   --  2,422*    < > = values in this interval not displayed.     Micro/ID:   Susceptibility data from last 90 days.  Collected Specimen Info Organism   08/19/25 Urine from Clean Catch/Voided Escherichia coli   08/19/25 Blood culture from Peripheral Venipuncture Staphylococcus aureus     Lab Results   Component Value Date    URINECULTURE >=100,000 CFU/mL Escherichia coli (A) 08/19/2025    BLOODCULT Staphylococcus aureus (A) 08/19/2025     Imaging:  XR chest 1 view  Result Date: 8/19/2025  1. Unchanged small right and moderate left pleural effusions with left basilar consolidation/atelectasis.   2. Unchanged diffuse interstitial edema.    XR chest 1 view  Result Date: 8/19/2025  1.  Small right pleural effusion.   2. Similar appearance of left basilar consolidation.   3. Similar appearance of bilateral perihilar interstitial edema.    CT head wo IV contrast  Result Date: 8/19/2025  No acute intracranial hemorrhage, mass effect, or CT apparent acute infarct.   Moderate chronic small vessel ischemic disease and brain atrophy. Questionable disproportionate volume loss within the anterior temporal lobes, correlate  for Alzheimer's.     XR chest 1 view  Result Date: 8/18/2025  1. Unchanged small right and moderate left pleural effusions with left basilar consolidation/atelectasis. Superimposed left basilar pneumonia is not excluded. 2. Unchanged diffuse bilateral interstitial edema.    XR chest 1 view  Result Date: 8/16/2025  1. Slightly decreased hazy alveolar opacities throughout the right mid to lower lung, which may reflect improving atelectasis versus developing infectious/inflammatory infiltrate.   2. Stable moderate left and small right pleural effusion.   3. Stable right-sided chest wall deformity with adjacent pleural thickening.    XR chest 1 view  Result Date: 8/15/2025  1. Increased hazy alveolar opacities throughout the right mid to lower lung, which may reflect enlarging pleural effusion with worsening atelectasis versus developing infectious/inflammatory infiltrate.   2. Similar cardiomegaly with bilateral pleural effusions and diffuse interstitial pulmonary edema.    XR chest 1 view  Result Date: 8/14/2025  1. Unchanged small left and moderate right pleural effusions with associated bibasilar consolidation/atelectasis, on a background of unchanged pulmonary edema.   2. Leadless pacemaker overlying the cardiac silhouette.    XR abdomen 1 view  Result Date: 8/12/2025  1.  Nonacute abdominal findings on today's exam.    XR chest 1 view  Result Date: 8/12/2025  1. Unchanged small left and moderate right pleural effusions with associated basilar consolidation/atelectasis. 2. Leadless pacemaker overlying the cardiac silhouette.    XR chest 2 views  Result Date: 8/9/2025  1.  Interval increase in bilateral pleural effusions with basilar edema. Correlate with cardiac and fluid status.    CT TAVR low contrast chest abdomen pelvis  Result Date: 7/31/2025  1. Moderate atherosclerotic changes involving the thoracoabdominal aorta and its branches. Access vessels are patent.   2. Severe calcifications of the aortic valve  correlating with history of aortic stenosis.   3. Left ventricular and left atrial enlargement and correlate with recent echocardiogram.   4. Mildly dilated pulmonary artery and correlate with concern for pulmonary hypertension.   5. Severe coronary artery calcifications. Please note that, the present study is not tailored for evaluation of coronary arteries and correlate with patient's symptoms and cardiovascular risk factors.   6. Small bilateral pleural effusions, left larger than right.   7. Right-sided chest wall deformity with adjacent pleural thickening and atelectasis, also seen on prior imaging.   8. Status post L3-L5 spinal fusion. Retrolisthesis of L1-L2 and L2-L3.     Meds:  Scheduled medications  Scheduled Medications[1]  Continuous medications  Continuous Medications[2]  PRN medications  PRN Medications[3]     Assessment    Radha Torres is a 85 y.o. female presenting for epigastric pain, admitted for heart failure and respiratory failure since 8/8. ID consulted for S aureus bacteremia in the aerobic vial of one peripheral venipuncture blood culture, mecC and mecA negative. Repeat cultures (two sets) sent on 8/20. Patient's encephalopathy is likely multifactorial with acute on chronic illness superimposed on suspected underlying cognitive deficits.     Her S aureus bacteremia should be treated. The organism is an unlikely contaminant, especially in the setting of implanted surgical hardware and severe AS. Cutaneous candidiasis under the breasts with low grade cellulitis may have provided a portal of entry. No evidence for septic thrombophlebitis. Staph pneumonia unlikely. She has had persistent leukocytosis, so cannot endocarditis remains a concern.     Dysuria with + U/A and culture + E coli c/w cystitis    Recommendations  - Await sensitivities of S. Aureus and E. coli  - Check blood cultures drawn on 8/20 (before vancomycin but already on ceftriaxone) to document clearance  - Continue vancomycin  pharmacy to dose  - Continue piperacillin/tazobactam for E coli UTI (will also cover MSSA)  - Give 75mg fluconazole once orally for infra-breast cutaneous candidiasis    Shekhar Antonio MD   Internal Medicine PGY3  Epic Chat Preferred       I saw and evaluated the patient. I personally obtained the key and critical portions of the history and physical exam or was physically present for key and critical portions performed by the resident/fellow. I reviewed the resident/fellow's documentation and discussed the patient with the resident/fellow. I agree with the resident/fellow's medical decision making as documented in the note.     NINA Rodriguez MD  ID attending       [1] [Held by provider] amLODIPine, 5 mg, oral, Daily  [Held by provider] ascorbic acid, 500 mg, oral, Daily  [Held by provider] atorvastatin, 20 mg, oral, Daily  [Held by provider] carvedilol, 6.25 mg, oral, BID  [Held by provider] cholecalciferol, 25 mcg, oral, Daily  [Held by provider] ferrous sulfate, 1 tablet, oral, Daily  [Held by provider] gabapentin, 300 mg, oral, Daily  [Held by provider] hydrALAZINE, 50 mg, oral, TID  insulin lispro, 0-10 Units, subcutaneous, TID AC  [Held by provider] levothyroxine, 75 mcg, oral, Daily  LORazepam, 0.5 mg, intravenous, Once  [Held by provider] pantoprazole, 40 mg, oral, Daily before breakfast  [Held by provider] PARoxetine, 20 mg, oral, Daily  piperacillin-tazobactam, 3.375 g, intravenous, q6h  [Held by provider] polyethylene glycol, 17 g, oral, Daily  [Held by provider] urea, 15 g, oral, TID  vancomycin, 1,500 mg, intravenous, Once  [2]    [3] PRN medications: acetaminophen, calcium carbonate, dextrose, dextrose, glucagon, glucagon, HYDROmorphone, metoclopramide, OLANZapine, oxyCODONE-acetaminophen, oxygen, oxygen, temazepam, trimethobenzamide, vancomycin

## 2025-08-20 NOTE — PROGRESS NOTES
Met with pt's  Caleb, daughter Stefanie and grandson Anish with Rosmery Rocha CNP from palliative care.  Medical teams had updated family that pt's condition is worrisome, and they are concerned that pt may need more aggressive interventions in order to maintain pt's stability.  Family understands pt's severity of illness, and they are upset that we are in this situation now, when she had been in better health months ago when this issue was first addressed.  Discussed with primary team.  Family hoping for some reversals in pt's symptoms and hope for improvements.  Will follow.                VINITA Canales

## 2025-08-20 NOTE — PROGRESS NOTES
" Cardiac Intensive Care - Daily Progress Note   Subjective    Radha Torres is a 85 y.o. year old female patient admitted on 8/9/2025 with following ICU needs: Severe Aortic Stenosis    Interval History:    Afebrile and vitals stable overnight. Modest urine output yesterday in total < 1L, overnight with 200cc after IV Lasix 60mg. Cr increasing. Remains on CPAP. Still delirious in room. WBC dropped after starting empiric IV Ceftriaxone.       Meds    Scheduled medications  Scheduled Medications[1]  Continuous medications  Continuous Medications[2]  PRN medications  PRN Medications[3]     Objective    Blood pressure 118/71, pulse 77, temperature 36.3 °C (97.3 °F), temperature source Temporal, resp. rate 19, height 1.65 m (5' 4.96\"), weight 78.8 kg (173 lb 12.8 oz), SpO2 100%.     Physical Exam   Constitutional:       General: She is not in acute distress.     Appearance: Normal appearance.   HENT:      Head: Normocephalic and atraumatic.      Mouth/Throat:      Mouth: Mucous membranes are moist.      Eyes:      Extraocular Movements: Extraocular movements intact.      Pupils: Pupils are equal, round, and reactive to light.         Cardiovascular:      Rate and Rhythm: Normal rate and regular rhythm.      Heart sounds: Murmur heard.      No friction rub. No gallop.   Pulmonary:      Effort: Pulmonary effort is normal.      Breath sounds: Wheezing and rales present.   Abdominal:      General: Abdomen is flat. There is no distension.      Palpations: Abdomen is soft.      Tenderness: There is abdominal tenderness.      Comments: Epigastric tenderness      Musculoskeletal:      Right lower leg: No edema.      Left lower leg: No edema.      Skin:     General: Skin is dry.      Neurological:      General: No focal deficit present.      Psychiatric:         Mood and Affect: Mood normal.         Behavior: Behavior normal.       Intake/Output Summary (Last 24 hours) at 8/20/2025 0815  Last data filed at 8/19/2025 1900  Gross " per 24 hour   Intake 150 ml   Output 200 ml   Net -50 ml     Labs:   Results from last 72 hours   Lab Units 08/20/25  0256 08/19/25  2158 08/19/25  0505   SODIUM mmol/L 130* 127* 125*   POTASSIUM mmol/L 3.6 4.0 3.9   CHLORIDE mmol/L 89* 89* 86*   CO2 mmol/L 26 25 29   BUN mg/dL 33* 29* 21   CREATININE mg/dL 1.73* 1.57* 1.32*   GLUCOSE mg/dL 223* 196* 198*   CALCIUM mg/dL 8.4* 8.3* 8.8   ANION GAP mmol/L 19 17 14   EGFR mL/min/1.73m*2 29* 33* 40*   PHOSPHORUS mg/dL 3.9 2.9 3.6      Results from last 72 hours   Lab Units 08/20/25  0256 08/19/25  0505 08/18/25  0412   WBC AUTO x10*3/uL 16.5* 20.1* 15.0*   HEMOGLOBIN g/dL 9.7* 11.3* 10.5*   HEMATOCRIT % 30.3* 36.4 31.1*   PLATELETS AUTO x10*3/uL 175 217 283   NEUTROS PCT AUTO % 91.8 93.7 91.6   LYMPHS PCT AUTO % 1.8 1.2 1.6   MONOS PCT AUTO % 4.9 4.5 5.7   EOS PCT AUTO % 0.2 0.0 0.5            Results from last 72 hours   Lab Units 08/19/25  1234 08/18/25  1549   POCT PH, VENOUS pH 7.42 7.45*   POCT PCO2, VENOUS mm Hg 46 44   POCT PO2, VENOUS mm Hg 76* 49*        Micro/ID:     Lab Results   Component Value Date    BLOODCULT  08/19/2025     Identification and susceptibility testing to follow         Assessment and Plan     Radha Torres is a 85 y.o. female with PMH HFrEF (EF 25-30%), aortic stenosis, Afib, s/p AV node ablation and leadless pacemaker in 2025, HTN, renal cell carcinoma who presented on 8/9/25 as a transfer from [facility] for epigastric pain, now transferred to CICU for possible flash pulmonary edema and hyponatremia. Suspect GI symptoms and hyponatremia due to severe fluid overload iso ADHF.      Update 8/20:  - Overnight concerning drop in urine output with concomitant increase in Cr  - WBC dropped to 16 on Ceftriaxone  - Remains on CPAP  - At bedside acutely delirious  - Structural continuing to follow, likely no TAVR considering BAV  - Will POCUS this AM, has been net negative throughout CICU stay, so worsening Cr and UOP may be that she is dry--if  plethoric concerned for development of cardiorenal syndrome iso of ADHF and severe aortic stenosis     CNS  #agitation  #Acute Encephalopathy  -1:1 sitter  -PRN zyprexa  - Etiology most likely infectious vs. Hyponatremia vs. Delirium  - Empiric IV Ceftriaxone daily  - Delirium precautions  - Hyponatremia has demonstrated improvement with diuresis     PULM  #flash pulmonary edema  -CPAP as tolerated, SpO2>92%  -nipride gtt, caution iso severe aortic stenosis, SBP goal 130-150  -Hold diuresis with IV lasix, caution iso severe aortic stenosis     CV  #Severe aortic stenosis  #HFrEF (EF 25-30%)  #HTN  :: Echo 7/11/25 - EF 25-30%, left ventricle mildly dilated, mild concentric LV hypertrophy, leadless pacemaker in right atria and RV, aortic valve is heavily calcified with appearance of severe aortic stenosis (peak gradient 52mmHg, mean gradient 25mmHg, aortic valve area 0.8cm^2), trace AR, mild MR and TR, RVSP 50-60mmHg,   ::Scheduled for TAVR 8/19/25 with Dr. Howell  ::EKG with no ischemic changes  ::Troponin 23>27  Plan:  - Diurese with IV Lasix 60mg as needed  -strict I/Os, redose as needed  -holding home coreg 6.25mg, amlodipine 5mg  - Watching BP closely, currently normotensive     #Atrial fibrillation  #S/p AV emiliano ablation and leadless pacemaker placement 5/6/25  :: Home regimen - coreg 6.25 BID, amlodipine 2.5 mg, Eliquis 5mg BID  Plan:  -Holding eliquis given possible TAVR, consider heparin gtt if needed for stroke risk reduction inpatient  -fu heparin assay  -holding home coreg     GI  #Epigastric pain  #Nausea/GI upset  :: EGG performed 2018 at [facility] notable for PUD  ::Possibly due to volume overload/ADHF vs GERD  Plan:   - Tigan PRN ordered for nausea   - KUB with no acute findings; ECG unchanged   - Continue pantoprazole 40 mg daily, tums PRN  - Continue home Percocet 5-325 mg Q6 PRN  - NG tube placement     RENAL/  #Acute severe hypo-osmolar hyponatremia  :: Na 131 on admission; Na on AM labs 8/14 very  low at 117  ::Has received diuresis and 500cc NS bolus this admission  ::serum osm 255, urine Na 70, urine osm 248  ::TSH, morning cortisol within normal limits  Plan:   - Repeat Urine studies  - Continue with IV Lasix 60mg as needed     #CKD 3b  :: Baseline Cr appears to be around 1.2  ::Cr 1.22 in ED  Plan:  - Daily RFP   - Avoid nephrotoxic agents  - Strict I/O     ID  DANIELE     HEME/ONC  DANIELE     ENDO  #Hypothyroidism  ::TSH wnl  - Continue levothyroxine 75 mcg     #suspected DM  -fu A1c  -ISS #1, hypoglycemia protocol     MSK/Skin  DANIELE     F: PRN  E: K>4 Phos>3 Mag>2  N: NPO  A: PIV, L radial a-line (8/16)  O2: CPAP 30% FIO2  Drips: nipride  Abx: None  DVT PPx: pending heparin assay  GI PPx: PPI     CODE STATUS: FULL (confirmed with patient on admission)  SURROGATE DECISION MAKER: Stefanie Roblero, [phone]       Konstantin Hunter MD   08/20/25 at 8:15 AM     Disclaimer: Documentation completed with the information available at the time of input. The times in the chart may not be reflective of actual patient care times, interventions, or procedures. Documentation occurs after the physical care of the patient.              [1] [Held by provider] amLODIPine, 5 mg, oral, Daily  [Held by provider] ascorbic acid, 500 mg, oral, Daily  [Held by provider] atorvastatin, 20 mg, oral, Daily  [Held by provider] carvedilol, 6.25 mg, oral, BID  cefTRIAXone, 1 g, intravenous, q24h  [Held by provider] cholecalciferol, 25 mcg, oral, Daily  [Held by provider] ferrous sulfate, 1 tablet, oral, Daily  [Held by provider] gabapentin, 300 mg, oral, Daily  [Held by provider] hydrALAZINE, 50 mg, oral, TID  insulin lispro, 0-10 Units, subcutaneous, TID AC  [Held by provider] levothyroxine, 75 mcg, oral, Daily  [Held by provider] pantoprazole, 40 mg, oral, Daily before breakfast  [Held by provider] PARoxetine, 20 mg, oral, Daily  [Held by provider] polyethylene glycol, 17 g, oral, Daily  [Held by provider] urea, 15 g, oral, TID     [2]    [3]  PRN medications: acetaminophen, calcium carbonate, dextrose, dextrose, glucagon, glucagon, HYDROmorphone, lidocaine, metoclopramide, OLANZapine, oxyCODONE-acetaminophen, oxygen, oxygen, temazepam, trimethobenzamide

## 2025-08-21 ENCOUNTER — APPOINTMENT (OUTPATIENT)
Dept: RADIOLOGY | Facility: HOSPITAL | Age: 85
DRG: 306 | End: 2025-08-21
Payer: MEDICARE

## 2025-08-21 LAB
ALBUMIN SERPL BCP-MCNC: 2.9 G/DL (ref 3.4–5)
ALBUMIN SERPL BCP-MCNC: 2.9 G/DL (ref 3.4–5)
ANION GAP BLDA CALCULATED.4IONS-SCNC: 10 MMO/L (ref 10–25)
ANION GAP BLDA CALCULATED.4IONS-SCNC: 6 MMO/L (ref 10–25)
ANION GAP BLDV CALCULATED.4IONS-SCNC: ABNORMAL MMOL/L
ANION GAP SERPL CALC-SCNC: 12 MMOL/L (ref 10–20)
ANION GAP SERPL CALC-SCNC: 16 MMOL/L (ref 10–20)
APTT PPP: 28 SECONDS (ref 26–36)
ATRIAL RATE: 77 BPM
BACTERIA BLD AEROBE CULT: ABNORMAL
BACTERIA BLD CULT: ABNORMAL
BACTERIA UR CULT: ABNORMAL
BASE EXCESS BLDA CALC-SCNC: 4.1 MMOL/L (ref -2–3)
BASE EXCESS BLDA CALC-SCNC: 8.7 MMOL/L (ref -2–3)
BASE EXCESS BLDV CALC-SCNC: 4.4 MMOL/L (ref -2–3)
BASOPHILS # BLD AUTO: 0.03 X10*3/UL (ref 0–0.1)
BASOPHILS NFR BLD AUTO: 0.2 %
BODY TEMPERATURE: 37 DEGREES CELSIUS
BUN SERPL-MCNC: 43 MG/DL (ref 6–23)
BUN SERPL-MCNC: 53 MG/DL (ref 6–23)
CA-I BLDA-SCNC: 1.09 MMOL/L (ref 1.1–1.33)
CA-I BLDA-SCNC: 1.09 MMOL/L (ref 1.1–1.33)
CA-I BLDV-SCNC: 0.96 MMOL/L (ref 1.1–1.33)
CALCIUM SERPL-MCNC: 7.9 MG/DL (ref 8.6–10.6)
CALCIUM SERPL-MCNC: 8.1 MG/DL (ref 8.6–10.6)
CHLORIDE BLDA-SCNC: 92 MMOL/L (ref 98–107)
CHLORIDE BLDA-SCNC: 93 MMOL/L (ref 98–107)
CHLORIDE BLDV-SCNC: 101 MMOL/L (ref 98–107)
CHLORIDE SERPL-SCNC: 90 MMOL/L (ref 98–107)
CHLORIDE SERPL-SCNC: 91 MMOL/L (ref 98–107)
CLARITY FLD: ABNORMAL
CO2 SERPL-SCNC: 30 MMOL/L (ref 21–32)
CO2 SERPL-SCNC: 36 MMOL/L (ref 21–32)
COLOR FLD: ABNORMAL
CREAT SERPL-MCNC: 1.68 MG/DL (ref 0.5–1.05)
CREAT SERPL-MCNC: 1.69 MG/DL (ref 0.5–1.05)
EGFRCR SERPLBLD CKD-EPI 2021: 30 ML/MIN/1.73M*2
EGFRCR SERPLBLD CKD-EPI 2021: 30 ML/MIN/1.73M*2
EOSINOPHIL # BLD AUTO: 0.09 X10*3/UL (ref 0–0.4)
EOSINOPHIL NFR BLD AUTO: 0.5 %
EOSINOPHIL NFR FLD MANUAL: NORMAL %
ERYTHROCYTE [DISTWIDTH] IN BLOOD BY AUTOMATED COUNT: 14.9 % (ref 11.5–14.5)
GLUCOSE BLD MANUAL STRIP-MCNC: 194 MG/DL (ref 74–99)
GLUCOSE BLD MANUAL STRIP-MCNC: 196 MG/DL (ref 74–99)
GLUCOSE BLD MANUAL STRIP-MCNC: 209 MG/DL (ref 74–99)
GLUCOSE BLD MANUAL STRIP-MCNC: 226 MG/DL (ref 74–99)
GLUCOSE BLDA-MCNC: 188 MG/DL (ref 74–99)
GLUCOSE BLDA-MCNC: 262 MG/DL (ref 74–99)
GLUCOSE BLDV-MCNC: 206 MG/DL (ref 74–99)
GLUCOSE SERPL-MCNC: 191 MG/DL (ref 74–99)
GLUCOSE SERPL-MCNC: 192 MG/DL (ref 74–99)
GRAM STN SPEC: ABNORMAL
HCO3 BLDA-SCNC: 29.7 MMOL/L (ref 22–26)
HCO3 BLDA-SCNC: 33.5 MMOL/L (ref 22–26)
HCO3 BLDV-SCNC: 30.2 MMOL/L (ref 22–26)
HCT VFR BLD AUTO: 29.2 % (ref 36–46)
HCT VFR BLD EST: 27 % (ref 36–46)
HCT VFR BLD EST: 33 % (ref 36–46)
HCT VFR BLD EST: 36 % (ref 36–46)
HGB BLD-MCNC: 10.2 G/DL (ref 12–16)
HGB BLDA-MCNC: 11 G/DL (ref 12–16)
HGB BLDA-MCNC: 12.1 G/DL (ref 12–16)
HGB BLDV-MCNC: 9.1 G/DL (ref 12–16)
IMM GRANULOCYTES # BLD AUTO: 0.11 X10*3/UL (ref 0–0.5)
IMM GRANULOCYTES NFR BLD AUTO: 0.6 % (ref 0–0.9)
INHALED O2 CONCENTRATION: 50 %
INHALED O2 CONCENTRATION: 50 %
INR PPP: 1.3 (ref 0.9–1.1)
LACTATE BLDA-SCNC: 1.1 MMOL/L (ref 0.4–2)
LACTATE BLDA-SCNC: 2.4 MMOL/L (ref 0.4–2)
LACTATE BLDV-SCNC: 1.9 MMOL/L (ref 0.4–2)
LDH FLD L TO P-CCNC: 81 U/L
LYMPHOCYTES # BLD AUTO: 0.54 X10*3/UL (ref 0.8–3)
LYMPHOCYTES NFR BLD AUTO: 2.8 %
LYMPHOCYTES NFR FLD MANUAL: 47 %
MAGNESIUM SERPL-MCNC: 2.1 MG/DL (ref 1.6–2.4)
MCH RBC QN AUTO: 29.5 PG (ref 26–34)
MCHC RBC AUTO-ENTMCNC: 34.9 G/DL (ref 32–36)
MCV RBC AUTO: 84 FL (ref 80–100)
MONOCYTES # BLD AUTO: 1.41 X10*3/UL (ref 0.05–0.8)
MONOCYTES NFR BLD AUTO: 7.3 %
MONOS+MACROS NFR FLD MANUAL: 18 %
NEUTROPHILS # BLD AUTO: 17.21 X10*3/UL (ref 1.6–5.5)
NEUTROPHILS NFR BLD AUTO: 88.6 %
NEUTROPHILS NFR FLD MANUAL: 35 %
NRBC BLD-RTO: 0 /100 WBCS (ref 0–0)
OXYHGB MFR BLDA: 90 % (ref 94–98)
OXYHGB MFR BLDA: 97 % (ref 94–98)
OXYHGB MFR BLDV: 41 % (ref 45–75)
PCO2 BLDA: 46 MM HG (ref 38–42)
PCO2 BLDA: 48 MM HG (ref 38–42)
PCO2 BLDV: 51 MM HG (ref 41–51)
PH BLDA: 7.4 PH (ref 7.38–7.42)
PH BLDA: 7.47 PH (ref 7.38–7.42)
PH BLDV: 7.38 PH (ref 7.33–7.43)
PH FLD: 8.08 [PH]
PHOSPHATE SERPL-MCNC: 3.2 MG/DL (ref 2.5–4.9)
PHOSPHATE SERPL-MCNC: 3.8 MG/DL (ref 2.5–4.9)
PLATELET # BLD AUTO: 147 X10*3/UL (ref 150–450)
PO2 BLDA: 188 MM HG (ref 85–95)
PO2 BLDA: 67 MM HG (ref 85–95)
PO2 BLDV: 30 MM HG (ref 35–45)
POTASSIUM BLDA-SCNC: 3.1 MMOL/L (ref 3.5–5.3)
POTASSIUM BLDA-SCNC: 3.3 MMOL/L (ref 3.5–5.3)
POTASSIUM BLDV-SCNC: >10 MMOL/L (ref 3.5–5.3)
POTASSIUM SERPL-SCNC: 2.8 MMOL/L (ref 3.5–5.3)
POTASSIUM SERPL-SCNC: 3 MMOL/L (ref 3.5–5.3)
PROT FLD-MCNC: 1.9 G/DL
PROT SERPL-MCNC: 5 G/DL (ref 6.4–8.2)
PROTHROMBIN TIME: 14.9 SECONDS (ref 9.8–12.4)
Q ONSET: 187 MS
QRS COUNT: 13 BEATS
QRS DURATION: 204 MS
QT INTERVAL: 498 MS
QTC CALCULATION(BAZETT): 560 MS
QTC FREDERICIA: 538 MS
R AXIS: -45 DEGREES
RBC # BLD AUTO: 3.46 X10*6/UL (ref 4–5.2)
RBC # FLD AUTO: 7000 /UL
SAO2 % BLDA: 100 % (ref 94–100)
SAO2 % BLDA: 92 % (ref 94–100)
SAO2 % BLDV: 42 % (ref 45–75)
SODIUM BLDA-SCNC: 128 MMOL/L (ref 136–145)
SODIUM BLDA-SCNC: 129 MMOL/L (ref 136–145)
SODIUM BLDV-SCNC: 118 MMOL/L (ref 136–145)
SODIUM SERPL-SCNC: 134 MMOL/L (ref 136–145)
SODIUM SERPL-SCNC: 135 MMOL/L (ref 136–145)
T AXIS: 110 DEGREES
T OFFSET: 436 MS
TOTAL CELLS COUNTED FLD: 100
UFH PPP CHRO-ACNC: 0.6 IU/ML (ref ?–1.1)
VANCOMYCIN SERPL-MCNC: 11.7 UG/ML (ref 5–20)
VENTRICULAR RATE: 76 BPM
WBC # BLD AUTO: 19.4 X10*3/UL (ref 4.4–11.3)
WBC # FLD AUTO: 276 /UL

## 2025-08-21 PROCEDURE — 32555 ASPIRATE PLEURA W/ IMAGING: CPT

## 2025-08-21 PROCEDURE — 94660 CPAP INITIATION&MGMT: CPT

## 2025-08-21 PROCEDURE — 80202 ASSAY OF VANCOMYCIN: CPT

## 2025-08-21 PROCEDURE — 85520 HEPARIN ASSAY: CPT

## 2025-08-21 PROCEDURE — 36600 WITHDRAWAL OF ARTERIAL BLOOD: CPT

## 2025-08-21 PROCEDURE — 2500000004 HC RX 250 GENERAL PHARMACY W/ HCPCS (ALT 636 FOR OP/ED)

## 2025-08-21 PROCEDURE — 2500000002 HC RX 250 W HCPCS SELF ADMINISTERED DRUGS (ALT 637 FOR MEDICARE OP, ALT 636 FOR OP/ED)

## 2025-08-21 PROCEDURE — 82947 ASSAY GLUCOSE BLOOD QUANT: CPT

## 2025-08-21 PROCEDURE — 83986 ASSAY PH BODY FLUID NOS: CPT

## 2025-08-21 PROCEDURE — 0W9B3ZZ DRAINAGE OF LEFT PLEURAL CAVITY, PERCUTANEOUS APPROACH: ICD-10-PCS

## 2025-08-21 PROCEDURE — 80069 RENAL FUNCTION PANEL: CPT

## 2025-08-21 PROCEDURE — 2500000001 HC RX 250 WO HCPCS SELF ADMINISTERED DRUGS (ALT 637 FOR MEDICARE OP)

## 2025-08-21 PROCEDURE — 1100000001 HC PRIVATE ROOM DAILY

## 2025-08-21 PROCEDURE — 85610 PROTHROMBIN TIME: CPT

## 2025-08-21 PROCEDURE — 83735 ASSAY OF MAGNESIUM: CPT

## 2025-08-21 PROCEDURE — 84132 ASSAY OF SERUM POTASSIUM: CPT

## 2025-08-21 PROCEDURE — 2500000004 HC RX 250 GENERAL PHARMACY W/ HCPCS (ALT 636 FOR OP/ED): Performed by: INTERNAL MEDICINE

## 2025-08-21 PROCEDURE — 83615 LACTATE (LD) (LDH) ENZYME: CPT

## 2025-08-21 PROCEDURE — 36415 COLL VENOUS BLD VENIPUNCTURE: CPT

## 2025-08-21 PROCEDURE — 87205 SMEAR GRAM STAIN: CPT

## 2025-08-21 PROCEDURE — 2500000005 HC RX 250 GENERAL PHARMACY W/O HCPCS

## 2025-08-21 PROCEDURE — 99291 CRITICAL CARE FIRST HOUR: CPT

## 2025-08-21 PROCEDURE — 71045 X-RAY EXAM CHEST 1 VIEW: CPT

## 2025-08-21 PROCEDURE — 85025 COMPLETE CBC W/AUTO DIFF WBC: CPT

## 2025-08-21 PROCEDURE — 84155 ASSAY OF PROTEIN SERUM: CPT

## 2025-08-21 PROCEDURE — 89051 BODY FLUID CELL COUNT: CPT

## 2025-08-21 PROCEDURE — 99233 SBSQ HOSP IP/OBS HIGH 50: CPT

## 2025-08-21 PROCEDURE — 99221 1ST HOSP IP/OBS SF/LOW 40: CPT | Performed by: NURSE PRACTITIONER

## 2025-08-21 PROCEDURE — 84157 ASSAY OF PROTEIN OTHER: CPT

## 2025-08-21 PROCEDURE — 87040 BLOOD CULTURE FOR BACTERIA: CPT

## 2025-08-21 PROCEDURE — 74018 RADEX ABDOMEN 1 VIEW: CPT | Performed by: RADIOLOGY

## 2025-08-21 PROCEDURE — 74018 RADEX ABDOMEN 1 VIEW: CPT

## 2025-08-21 RX ORDER — POTASSIUM CHLORIDE 14.9 MG/ML
20 INJECTION INTRAVENOUS ONCE
Status: COMPLETED | OUTPATIENT
Start: 2025-08-21 | End: 2025-08-21

## 2025-08-21 RX ORDER — POTASSIUM CHLORIDE 14.9 MG/ML
20 INJECTION INTRAVENOUS
Status: COMPLETED | OUTPATIENT
Start: 2025-08-21 | End: 2025-08-21

## 2025-08-21 RX ORDER — FUROSEMIDE 10 MG/ML
160 INJECTION INTRAMUSCULAR; INTRAVENOUS ONCE
Status: COMPLETED | OUTPATIENT
Start: 2025-08-21 | End: 2025-08-21

## 2025-08-21 RX ORDER — ENOXAPARIN SODIUM 100 MG/ML
30 INJECTION SUBCUTANEOUS EVERY 24 HOURS
Status: DISCONTINUED | OUTPATIENT
Start: 2025-08-21 | End: 2025-08-25

## 2025-08-21 RX ORDER — CEFAZOLIN SODIUM 2 G/100ML
2 INJECTION, SOLUTION INTRAVENOUS EVERY 8 HOURS
Status: DISCONTINUED | OUTPATIENT
Start: 2025-08-21 | End: 2025-08-27 | Stop reason: HOSPADM

## 2025-08-21 RX ORDER — LIDOCAINE 560 MG/1
1 PATCH PERCUTANEOUS; TOPICAL; TRANSDERMAL DAILY
Status: DISCONTINUED | OUTPATIENT
Start: 2025-08-21 | End: 2025-08-27 | Stop reason: HOSPADM

## 2025-08-21 RX ADMIN — Medication: at 08:15

## 2025-08-21 RX ADMIN — Medication: at 11:46

## 2025-08-21 RX ADMIN — CEFAZOLIN SODIUM 2 G: 2 INJECTION, SOLUTION INTRAVENOUS at 14:24

## 2025-08-21 RX ADMIN — HYDROMORPHONE HYDROCHLORIDE 0.2 MG: 0.2 INJECTION, SOLUTION INTRAMUSCULAR; INTRAVENOUS; SUBCUTANEOUS at 14:35

## 2025-08-21 RX ADMIN — VANCOMYCIN HYDROCHLORIDE 1500 MG: 1.5 INJECTION, POWDER, LYOPHILIZED, FOR SOLUTION INTRAVENOUS at 18:17

## 2025-08-21 RX ADMIN — ENOXAPARIN SODIUM 30 MG: 100 INJECTION SUBCUTANEOUS at 14:24

## 2025-08-21 RX ADMIN — Medication 25 MCG: at 10:48

## 2025-08-21 RX ADMIN — Medication: at 21:00

## 2025-08-21 RX ADMIN — Medication: at 11:55

## 2025-08-21 RX ADMIN — OXYCODONE HYDROCHLORIDE AND ACETAMINOPHEN 500 MG: 500 TABLET ORAL at 10:49

## 2025-08-21 RX ADMIN — PIPERACILLIN SODIUM AND TAZOBACTAM SODIUM 3.38 G: 3; .375 INJECTION, SOLUTION INTRAVENOUS at 04:08

## 2025-08-21 RX ADMIN — PAROXETINE 20 MG: 20 TABLET, FILM COATED ORAL at 10:46

## 2025-08-21 RX ADMIN — LIDOCAINE 4% 1 PATCH: 40 PATCH TOPICAL at 08:09

## 2025-08-21 RX ADMIN — ACETAMINOPHEN 650 MG: 325 TABLET, FILM COATED ORAL at 10:49

## 2025-08-21 RX ADMIN — POTASSIUM CHLORIDE 20 MEQ: 14.9 INJECTION, SOLUTION INTRAVENOUS at 10:36

## 2025-08-21 RX ADMIN — INSULIN LISPRO 2 UNITS: 100 INJECTION, SOLUTION INTRAVENOUS; SUBCUTANEOUS at 12:46

## 2025-08-21 RX ADMIN — PIPERACILLIN SODIUM AND TAZOBACTAM SODIUM 3.38 G: 3; .375 INJECTION, SOLUTION INTRAVENOUS at 08:33

## 2025-08-21 RX ADMIN — LEVOTHYROXINE SODIUM 75 MCG: 0.07 TABLET ORAL at 10:47

## 2025-08-21 RX ADMIN — FUROSEMIDE 160 MG: 10 INJECTION, SOLUTION INTRAMUSCULAR; INTRAVENOUS at 02:31

## 2025-08-21 RX ADMIN — POTASSIUM CHLORIDE 20 MEQ: 14.9 INJECTION, SOLUTION INTRAVENOUS at 08:09

## 2025-08-21 RX ADMIN — ACETAMINOPHEN 650 MG: 325 TABLET, FILM COATED ORAL at 18:36

## 2025-08-21 RX ADMIN — CEFAZOLIN SODIUM 2 G: 2 INJECTION, SOLUTION INTRAVENOUS at 21:07

## 2025-08-21 RX ADMIN — POTASSIUM CHLORIDE 40 MEQ: 1.5 POWDER, FOR SOLUTION ORAL at 00:01

## 2025-08-21 RX ADMIN — ATORVASTATIN CALCIUM 20 MG: 20 TABLET, FILM COATED ORAL at 10:49

## 2025-08-21 RX ADMIN — DOPAMINE HYDROCHLORIDE 2.5 MCG/KG/MIN: 160 INJECTION, SOLUTION INTRAVENOUS at 18:36

## 2025-08-21 RX ADMIN — INSULIN LISPRO 4 UNITS: 100 INJECTION, SOLUTION INTRAVENOUS; SUBCUTANEOUS at 08:08

## 2025-08-21 RX ADMIN — INSULIN LISPRO 2 UNITS: 100 INJECTION, SOLUTION INTRAVENOUS; SUBCUTANEOUS at 16:06

## 2025-08-21 RX ADMIN — FUROSEMIDE 160 MG: 10 INJECTION, SOLUTION INTRAMUSCULAR; INTRAVENOUS at 14:24

## 2025-08-21 RX ADMIN — POTASSIUM CHLORIDE 20 MEQ: 14.9 INJECTION, SOLUTION INTRAVENOUS at 15:06

## 2025-08-21 ASSESSMENT — PAIN SCALES - GENERAL
PAINLEVEL_OUTOF10: 0 - NO PAIN
PAINLEVEL_OUTOF10: 4

## 2025-08-21 ASSESSMENT — PAIN - FUNCTIONAL ASSESSMENT
PAIN_FUNCTIONAL_ASSESSMENT: CPOT (CRITICAL CARE PAIN OBSERVATION TOOL)
PAIN_FUNCTIONAL_ASSESSMENT: CPOT (CRITICAL CARE PAIN OBSERVATION TOOL)
PAIN_FUNCTIONAL_ASSESSMENT: 0-10
PAIN_FUNCTIONAL_ASSESSMENT: 0-10
PAIN_FUNCTIONAL_ASSESSMENT: CPOT (CRITICAL CARE PAIN OBSERVATION TOOL)

## 2025-08-21 ASSESSMENT — PAIN DESCRIPTION - LOCATION: LOCATION: HEAD

## 2025-08-21 NOTE — CONSULTS
Subjective   Interval History: has no complaint of due to altered mental status and oriented to self only.     Objective   Vital signs in last 24 hours:  /66   Pulse 73   Temp 36.1 °C (97 °F) (Temporal)   Resp 23   Wt 78.8 kg (173 lb 11.6 oz)   SpO2 100%     Intake/Output last 3 shifts:  I/O last 3 completed shifts:  In: 832.4 (10.6 mL/kg) [I.V.:132.4 (1.7 mL/kg); IV Piggyback:700]  Out: 1775 (22.5 mL/kg) [Urine:1775 (0.6 mL/kg/hr)]  Weight: 78.8 kg   Intake/Output this shift:  I/O this shift:  In: 137 [I.V.:37; NG/GT:100]  Out: 500 [Urine:500]    Physical Exam  Neuro: oriented to person  HEENT: normocephalic, atraumatic  Pulm: diminished at left low lobe  Cardiac: Regular rate and rhythm or without murmur or extra heart sounds  Abdomen: soft, nontender, normal bowel sounds  Pulses: 2+ and symmetric    Relevant Results  LABS:  Lab Results   Component Value Date    WBC 19.4 (H) 08/21/2025    HGB 10.2 (L) 08/21/2025    HCT 29.2 (L) 08/21/2025    MCV 84 08/21/2025     (L) 08/21/2025      Results from last 72 hours   Lab Units 08/21/25  0455   SODIUM mmol/L 134*   POTASSIUM mmol/L 2.8*   CHLORIDE mmol/L 91*   CO2 mmol/L 30   BUN mg/dL 43*   CREATININE mg/dL 1.69*   GLUCOSE mg/dL 192*   CALCIUM mg/dL 7.9*   ANION GAP mmol/L 16   EGFR mL/min/1.73m*2 30*     Results from last 72 hours   Lab Units 08/21/25  0455   ALBUMIN g/dL 2.9*     Results from last 72 hours   Lab Units 08/21/25  0455   INR  1.3*       MICRO:  Susceptibility data from last 14 days.  Collected Specimen Info Organism Clindamycin Erythromycin Oxacillin Tetracycline Trimethoprim/Sulfamethoxazole Vancomycin   08/19/25 Urine from Clean Catch/Voided Escherichia coli         08/19/25 Blood culture from Peripheral Venipuncture Methicillin Susceptible Staphylococcus aureus (MSSA)  S  S  S  S  S  S       IMAGING:  XR abdomen 1 view   Final Result   1. Distal tip of the enteric tube projects over the proximal duodenum.   2. Nonobstructive bowel  gas pattern. Air-filled colonic loops and   correlate with any concern for ileus.   3. Bilateral pleural effusions.             I personally reviewed the images/study and I agree with the findings   as stated. This study was interpreted at [place], [place].        MACRO:   None        Signed by: Yamil Fraser 8/21/2025 10:10 AM   Dictation workstation:   [id number]      CT chest abdomen pelvis wo IV contrast   Final Result   Chest   1.  Interval increase of left-sided pleural effusion with associated   pulmonary vascular congestion, likely in the setting of fluid   overload from severe aortic stenosis. This is likely contributing to   patient's hypoxic respiratory failure.        Abdomen-Pelvis   1. Body wall edema likely secondary to fluid retention along with   stable small ascites   2. No acute abdominopelvic abnormality.   3. Stable placement of Dobbhoff tube in the duodenum.        I personally reviewed the images/study and I agree with the findings   as stated by Rodrick Carrera DO. This study was interpreted at [facility], [place], [place].        MACRO:   None        Signed by: Nazario Porter 8/20/2025 7:41 PM   Dictation workstation:   [id number]      XR abdomen 1 view   Final Result   1. Interval placement of enteric tube coursing below the diaphragm   with distal tip projecting over the expected location of 1st part of   the duodenum.        MACRO:   None        Signed by: Shaw Suarez 8/20/2025 11:29 AM   Dictation workstation:   [id number]      Transthoracic Echo (TTE) Limited   Final Result      XR chest 1 view   Final Result   1. Unchanged small right and moderate left pleural effusions with   left basilar consolidation/atelectasis.   2. Unchanged diffuse interstitial edema.        I personally reviewed the images/study and I agree with the findings   as stated. This study was interpreted by radiology resident Pepe    Wilmar HEALY at [place], [place].        Signed by: Shaw Suarez 8/19/2025 10:48 AM   Dictation workstation:   [id number]      CT head wo IV contrast   Final Result   No acute intracranial hemorrhage, mass effect, or CT apparent acute   infarct.        Moderate chronic small vessel ischemic disease and brain atrophy.   Questionable disproportionate volume loss within the anterior   temporal lobes, correlate for Alzheimer's.        MACRO:   None        Signed by: Yamil Headley 8/19/2025 9:21 AM   Dictation workstation:   [id number]      XR chest 1 view   Final Result   1.  Small right pleural effusion.   2. Similar appearance of left basilar consolidation.   3. Similar appearance of bilateral perihilar interstitial edema.        I personally reviewed the images/study and I agree with the findings   as stated by Bhupinder Katz MD.        MACRO:   None        Signed by: Shaw Suarez 8/19/2025 10:34 AM   Dictation workstation:   [id number]      XR chest 1 view   Final Result   1. Unchanged small right and moderate left pleural effusions with   left basilar consolidation/atelectasis. Superimposed left basilar   pneumonia is not excluded.   2. Unchanged diffuse bilateral interstitial edema.        I personally reviewed the images/study and I agree with the findings   as stated. This study was interpreted by radiology resident Pepe Urban D.O. at [facility],   [place], [place].        Signed by: Levon Capps 8/18/2025 9:51 AM   Dictation workstation:   [id number]      XR chest 1 view   Final Result   1. Slightly decreased hazy alveolar opacities throughout the right   mid to lower lung, which may reflect improving atelectasis versus   developing infectious/inflammatory infiltrate.   2. Stable moderate left and small right pleural effusion.   3. Stable right-sided chest wall deformity with adjacent pleural   thickening.                   MACRO:   None        Signed by: Shaw Suarez 8/16/2025 11:52 AM   Dictation workstation:   [id number]      XR chest 1 view   Final Result   1. Increased hazy alveolar opacities throughout the right mid to   lower lung, which may reflect enlarging pleural effusion with   worsening atelectasis versus developing infectious/inflammatory   infiltrate.   2. Similar cardiomegaly with bilateral pleural effusions and diffuse   interstitial pulmonary edema.        I personally reviewed the image(s)/study and resident interpretation   as stated by Dr. Kimmy Sepulveda MD. I agree with the findings as   stated. This study was interpreted at [place], [place].        MACRO:   None        Signed by: Sarahy Suarez 8/15/2025 7:48 AM   Dictation workstation:   [id number]      XR chest 1 view   Final Result   1. Unchanged small left and moderate right pleural effusions with   associated bibasilar consolidation/atelectasis, on a background of   unchanged pulmonary edema.   2. Leadless pacemaker overlying the cardiac silhouette.        I personally reviewed the images/study and I agree with the findings   as stated. This study was interpreted by radiology resident Pepe Urban D.O. at [place], [place].        Signed by: Sarahy Suarez 8/14/2025 5:03 PM   Dictation workstation:   [id number]      XR abdomen 1 view   Final Result   1.  Nonacute abdominal findings on today's exam.        Signed by: Sarahy Suarez 8/12/2025 12:28 PM   Dictation workstation:   [id number]      XR chest 1 view   Final Result   1. Unchanged small left and moderate right pleural effusions with   associated basilar consolidation/atelectasis.   2. Leadless pacemaker overlying the cardiac silhouette.        I personally reviewed the images/study and I agree with the findings   as stated. This study was interpreted by radiology resident Pepe Urban  MONET at [place], [place].        Signed by: Sarahy Suarez 8/12/2025 10:51 AM   Dictation workstation:   [id number]      Cardiac device check - Inpatient         XR chest 2 views   Final Result   1.  Interval increase in bilateral pleural effusions with basilar   edema. Correlate with cardiac and fluid status.             Signed by: Yamil Fraser 8/9/2025 1:30 PM   Dictation workstation:   [id number]      US thoracentesis    (Results Pending)   XR chest 1 view    (Results Pending)       Assessment/Plan   Left thoracentesis performed as ordered, consent obtained via phone from daughter Stefanie Roblero, 750 serous pleural fluid removed, refer image study      LOS: 12 days     LEE Damon      I personally spent over half of a total 35 minutes in counseling and discussion with the patient and coordination of care as described above.

## 2025-08-21 NOTE — POST-PROCEDURE NOTE
INTERVENTIONAL RADIOLOGY ADVANCED PRACTICE PROCEDURE  [facility]    A time out was performed and Left Hemithorax was examined with US and appropriate entry point was confirmed and marked.   The patient was prepped and draped in a sterile manner, 1% lidocaine was used to anesthesize the skin and subcutaneous tissue.   A 5F Centesis needle was then introduced through the skin into the pleural space, the centesis catheter was then threaded without difficulty.   750 ml of serosanguineous fluid was removed without difficulty. The catheter was then removed.   No immediate complications were noted during and immediately following the procedure.

## 2025-08-21 NOTE — PROGRESS NOTES
"Radha Torres is a 85 y.o. female on day 12 of admission presenting with Chest pain.      Palliative Medicine following for:  Complex medical decision making, symptom management, patient/family support    History obtained from chart review including ED note, H&P, patient's daily progress notes, review of lab/test results, and discussion with primary team and bedside RN.    Subjective    History of Present Illness  Radha Torres is a 85 y.o. female with PMH HFrEF (EF 25-30%), aortic stenosis, Afib, s/p AV node ablation and leadless pacemaker in 2025, HTN, renal cell carcinoma who presented on 8/9/25 as a transfer from [facility] for epigastric pain, now transferred to CICU for possible flash pulmonary edema and hyponatremia. Suspect GI symptoms and hyponatremia due to severe fluid overload iso ADHF. 8/20 labs c/f uti and bacteremia.       Symptoms  Pt unable to contribute today.    Objective    Last Recorded Vitals  /60   Pulse 79   Temp 36.1 °C (97 °F) (Temporal)   Resp 17   Ht 1.65 m (5' 4.96\")   Wt 78.8 kg (173 lb 11.6 oz)   SpO2 98%   BMI 28.94 kg/m²      Physical Exam   Constitutional:       Appearance: She is overweight. She is ill-appearing. Lethargic.  HENT:      Head: Normocephalic.      Mouth/Throat:      Mouth: Mucous membranes are dry.    Cardiovascular:      Rate and Rhythm: Normal rate.      Heart sounds: Murmur heard.   Pulmonary:      Effort: Tachypnea present. On cpap.     Breath sounds: Decreased breath sounds present.   Abdominal:      Palpations: Abdomen is soft.    Skin:     General: Skin is warm and dry.      Coloration: Skin is pale.   Neurological:      Mental Status: She is lethargic, opening eyes to verbalization and not attempting to talk while on cpap. Not shaking head to respond.     Relevant Results   Results for orders placed or performed during the hospital encounter of 08/09/25 (from the past 24 hours)   POCT GLUCOSE   Result Value Ref Range    POCT Glucose 210 (H) 74 - 99 " mg/dL   Blood Gas Lactic Acid, Venous   Result Value Ref Range    POCT Lactate, Venous 2.0 0.4 - 2.0 mmol/L   Blood Gas Venous Full Panel   Result Value Ref Range    POCT pH, Venous 7.39 7.33 - 7.43 pH    POCT pCO2, Venous 44 41 - 51 mm Hg    POCT pO2, Venous 58 (H) 35 - 45 mm Hg    POCT SO2, Venous 87 (H) 45 - 75 %    POCT Oxy Hemoglobin, Venous 84.7 (H) 45.0 - 75.0 %    POCT Hematocrit Calculated, Venous 33.0 (L) 36.0 - 46.0 %    POCT Sodium, Venous 128 (L) 136 - 145 mmol/L    POCT Potassium, Venous 3.6 3.5 - 5.3 mmol/L    POCT Chloride, Venous 94 (L) 98 - 107 mmol/L    POCT Ionized Calicum, Venous 1.09 (L) 1.10 - 1.33 mmol/L    POCT Glucose, Venous 205 (H) 74 - 99 mg/dL    POCT Lactate, Venous 2.0 0.4 - 2.0 mmol/L    POCT Base Excess, Venous 1.3 -2.0 - 3.0 mmol/L    POCT HCO3 Calculated, Venous 26.6 (H) 22.0 - 26.0 mmol/L    POCT Hemoglobin, Venous 11.0 (L) 12.0 - 16.0 g/dL    POCT Anion Gap, Venous 11.0 10.0 - 25.0 mmol/L    Patient Temperature 37.0 degrees Celsius    FiO2 70 %   Urine electrolytes   Result Value Ref Range    Sodium, Urine Random 28 mmol/L    Sodium/Creatinine Ratio 30 Not established. mmol/g Creat    Potassium, Urine Random 49 mmol/L    Potassium/Creatinine Ratio 52 Not established mmol/g Creat    Chloride, Urine Random 41 mmol/L    Chloride/Creatinine Ratio 43 38 - 318 mmol/g creat    Creatinine, Urine Random 94.7 20.0 - 320.0 mg/dL   Urea Nitrogen, Urine Random   Result Value Ref Range    Urea Nitrogen, Urine Random 452 mg/dL    Creatinine, Urine Random 94.7 20.0 - 320.0 mg/dL    Urea Nitrogen/Creatinine Ratio 4.8 Not established. g/g creat   POCT GLUCOSE   Result Value Ref Range    POCT Glucose 185 (H) 74 - 99 mg/dL   Renal function panel   Result Value Ref Range    Glucose 183 (H) 74 - 99 mg/dL    Sodium 131 (L) 136 - 145 mmol/L    Potassium 3.2 (L) 3.5 - 5.3 mmol/L    Chloride 90 (L) 98 - 107 mmol/L    Bicarbonate 28 21 - 32 mmol/L    Anion Gap 16 10 - 20 mmol/L    Urea Nitrogen 46 (H) 6 -  23 mg/dL    Creatinine 1.89 (H) 0.50 - 1.05 mg/dL    eGFR 26 (L) >60 mL/min/1.73m*2    Calcium 8.4 (L) 8.6 - 10.6 mg/dL    Phosphorus 3.7 2.5 - 4.9 mg/dL    Albumin 3.2 (L) 3.4 - 5.0 g/dL   CBC and Auto Differential   Result Value Ref Range    WBC 19.4 (H) 4.4 - 11.3 x10*3/uL    nRBC 0.0 0.0 - 0.0 /100 WBCs    RBC 3.46 (L) 4.00 - 5.20 x10*6/uL    Hemoglobin 10.2 (L) 12.0 - 16.0 g/dL    Hematocrit 29.2 (L) 36.0 - 46.0 %    MCV 84 80 - 100 fL    MCH 29.5 26.0 - 34.0 pg    MCHC 34.9 32.0 - 36.0 g/dL    RDW 14.9 (H) 11.5 - 14.5 %    Platelets 147 (L) 150 - 450 x10*3/uL    Neutrophils % 88.6 40.0 - 80.0 %    Immature Granulocytes %, Automated 0.6 0.0 - 0.9 %    Lymphocytes % 2.8 13.0 - 44.0 %    Monocytes % 7.3 2.0 - 10.0 %    Eosinophils % 0.5 0.0 - 6.0 %    Basophils % 0.2 0.0 - 2.0 %    Neutrophils Absolute 17.21 (H) 1.60 - 5.50 x10*3/uL    Immature Granulocytes Absolute, Automated 0.11 0.00 - 0.50 x10*3/uL    Lymphocytes Absolute 0.54 (L) 0.80 - 3.00 x10*3/uL    Monocytes Absolute 1.41 (H) 0.05 - 0.80 x10*3/uL    Eosinophils Absolute 0.09 0.00 - 0.40 x10*3/uL    Basophils Absolute 0.03 0.00 - 0.10 x10*3/uL   Magnesium   Result Value Ref Range    Magnesium 2.10 1.60 - 2.40 mg/dL   Renal Function Panel   Result Value Ref Range    Glucose 192 (H) 74 - 99 mg/dL    Sodium 134 (L) 136 - 145 mmol/L    Potassium 2.8 (LL) 3.5 - 5.3 mmol/L    Chloride 91 (L) 98 - 107 mmol/L    Bicarbonate 30 21 - 32 mmol/L    Anion Gap 16 10 - 20 mmol/L    Urea Nitrogen 43 (H) 6 - 23 mg/dL    Creatinine 1.69 (H) 0.50 - 1.05 mg/dL    eGFR 30 (L) >60 mL/min/1.73m*2    Calcium 7.9 (L) 8.6 - 10.6 mg/dL    Phosphorus 3.8 2.5 - 4.9 mg/dL    Albumin 2.9 (L) 3.4 - 5.0 g/dL   Coagulation Screen   Result Value Ref Range    Protime 14.9 (H) 9.8 - 12.4 seconds    INR 1.3 (H) 0.9 - 1.1    aPTT 28 26 - 36 seconds   Heparin Assay   Result Value Ref Range    Heparin Unfractionated 0.6 See Comment Below for Therapeutic Ranges IU/mL   Blood Gas Arterial Full  Panel   Result Value Ref Range    POCT pH, Arterial 7.40 7.38 - 7.42 pH    POCT pCO2, Arterial 48 (H) 38 - 42 mm Hg    POCT pO2, Arterial 67 (L) 85 - 95 mm Hg    POCT SO2, Arterial 92 (L) 94 - 100 %    POCT Oxy Hemoglobin, Arterial 90.0 (L) 94.0 - 98.0 %    POCT Hematocrit Calculated, Arterial 36.0 36.0 - 46.0 %    POCT Sodium, Arterial 128 (L) 136 - 145 mmol/L    POCT Potassium, Arterial 3.3 (L) 3.5 - 5.3 mmol/L    POCT Chloride, Arterial 92 (L) 98 - 107 mmol/L    POCT Ionized Calcium, Arterial 1.09 (L) 1.10 - 1.33 mmol/L    POCT Glucose, Arterial 262 (H) 74 - 99 mg/dL    POCT Lactate, Arterial 2.4 (H) 0.4 - 2.0 mmol/L    POCT Base Excess, Arterial 4.1 (H) -2.0 - 3.0 mmol/L    POCT HCO3 Calculated, Arterial 29.7 (H) 22.0 - 26.0 mmol/L    POCT Hemoglobin, Arterial 12.1 12.0 - 16.0 g/dL    POCT Anion Gap, Arterial 10 10 - 25 mmo/L    Patient Temperature 37.0 degrees Celsius    FiO2 50 %   POCT GLUCOSE   Result Value Ref Range    POCT Glucose 226 (H) 74 - 99 mg/dL   Lactate Dehydrogenase, Body Fluid   Result Value Ref Range    LD, Fluid 81 Not established. U/L   Protein, Total, Body Fluid   Result Value Ref Range    Protein, Total Fluid 1.9 Not established g/dL   Vancomycin   Result Value Ref Range    Vancomycin 11.7 5.0 - 20.0 ug/mL   Blood Gas Venous Full Panel   Result Value Ref Range    POCT pH, Venous 7.38 7.33 - 7.43 pH    POCT pCO2, Venous 51 41 - 51 mm Hg    POCT pO2, Venous 30 (L) 35 - 45 mm Hg    POCT SO2, Venous 42 (L) 45 - 75 %    POCT Oxy Hemoglobin, Venous 41.0 (L) 45.0 - 75.0 %    POCT Hematocrit Calculated, Venous 27.0 (L) 36.0 - 46.0 %    POCT Sodium, Venous 118 (LL) 136 - 145 mmol/L    POCT Potassium, Venous >10.0 (HH) 3.5 - 5.3 mmol/L    POCT Chloride, Venous 101 98 - 107 mmol/L    POCT Ionized Calicum, Venous 0.96 (L) 1.10 - 1.33 mmol/L    POCT Glucose, Venous 206 (H) 74 - 99 mg/dL    POCT Lactate, Venous 1.9 0.4 - 2.0 mmol/L    POCT Base Excess, Venous 4.4 (H) -2.0 - 3.0 mmol/L    POCT HCO3  Calculated, Venous 30.2 (H) 22.0 - 26.0 mmol/L    POCT Hemoglobin, Venous 9.1 (L) 12.0 - 16.0 g/dL    POCT Anion Gap, Venous      Patient Temperature 37.0 degrees Celsius    FiO2 50 %   Protein, Total   Result Value Ref Range    Total Protein 5.0 (L) 6.4 - 8.2 g/dL   POCT GLUCOSE   Result Value Ref Range    POCT Glucose 194 (H) 74 - 99 mg/dL   Blood Gas Arterial Full Panel Unsolicited   Result Value Ref Range    POCT pH, Arterial 7.47 (H) 7.38 - 7.42 pH    POCT pCO2, Arterial 46 (H) 38 - 42 mm Hg    POCT pO2, Arterial 188 (H) 85 - 95 mm Hg    POCT SO2, Arterial 100 94 - 100 %    POCT Oxy Hemoglobin, Arterial 97.0 94.0 - 98.0 %    POCT Hematocrit Calculated, Arterial 33.0 (L) 36.0 - 46.0 %    POCT Sodium, Arterial 129 (L) 136 - 145 mmol/L    POCT Potassium, Arterial 3.1 (L) 3.5 - 5.3 mmol/L    POCT Chloride, Arterial 93 (L) 98 - 107 mmol/L    POCT Ionized Calcium, Arterial 1.09 (L) 1.10 - 1.33 mmol/L    POCT Glucose, Arterial 188 (H) 74 - 99 mg/dL    POCT Lactate, Arterial 1.1 0.4 - 2.0 mmol/L    POCT Base Excess, Arterial 8.7 (H) -2.0 - 3.0 mmol/L    POCT HCO3 Calculated, Arterial 33.5 (H) 22.0 - 26.0 mmol/L    POCT Hemoglobin, Arterial 11.0 (L) 12.0 - 16.0 g/dL    POCT Anion Gap, Arterial 6 (L) 10 - 25 mmo/L    Patient Temperature 37.0 degrees Celsius        Allergies  Patient has no known allergies.  Medications  Scheduled medications  Scheduled Medications[1]  Continuous medications  Continuous Medications[2]  PRN medications  PRN Medications[3]     Assessment/Plan    Radha Torres is a 85 y.o. female with PMH HFrEF (EF 25-30%), aortic stenosis, Afib, s/p AV node ablation and leadless pacemaker in 2025, HTN, renal cell carcinoma who presented on 8/9/25 as a transfer from [facility] for epigastric pain, now transferred to CICU for possible flash pulmonary edema and hyponatremia. Suspect GI symptoms and hyponatremia due to severe fluid overload iso ADHF. 8/20 labs c/f uti and bacteremia.      8/19: Palliative  consultation. Met with spouse at bedside, spoke to dtr on the phone. Agreeable to DNR/DNI to honor pt best and her previously stated health preferences.     8/20: Pt with pleural effusions, elevated lactate, UTI, and bacteremia. Plan to diurese further and started on Dopamine for cardiac support. No plan for balloon valvuloplasty until pt shows improvements. Supported family at bedside.    8/21: Pt seen s/p thoracentesis, 750ml removed. Pt on cpap, nad, lethargic, but per nursing, was very awake and cooperative for bedside procedure. Plan to continue diuresing and antibiotics at this time, day by day monitoring for progression/regression and ongoing GOC.     Palliative Performance Scale (PPS): 30-40    ----------------------------------------------------------------------------------------------------------------------------------------------------------------------------------------------------------------------------------------------------------------------------------------------------------------------------------------------------------------------      I spent  minutes in providing separately identifiable ACP services with the patient and/or surrogate decision maker in a voluntary conversation discussing the patient's wishes and goals as detailed in the above note.   ----------------------------------------------------------------------------------------------------------------------------------------------------------------------------------------------------------------------------------------------------------------------------------------------------------------------------------------------------------------------    #Complex Medical Decision Making  #Goals of Care  #Advanced Care Planning  - Code status: DNR/DNI 8/19  - Surrogate decision maker: Mike Robleroa (Daughter)  [phone] ([place]) primary. Spouse secondary.  - Goals are mix of survival and time and improved quality of life  -8/19: Met with  "pt at bedside. Able to glean more information r/t pt's life, wishes, goals, worries, and health preferences. See above/consult note for additional supportive detail.      In short, discussed hopes to stabilize pt for tavr, but expressed worries of further decompensation in the interim. Discussed DNR/DNI in detail in which family supports and aligns with what pt would deem \"non beneficial\" care. Family states pt has said all along, she does not want to be kept alive by machines. Supported family and answered questions.      - 8/20: This NP, Pall SW, Stefanie and Caleb discussed continuing to treat her and clearing reversible factors. We discussed that she still may be able to have the balloon provided things clear up and she can stabilize again. The uncertainty of whether or not pt is going to continue to decline is weighing on their decisions on what to do. They are trying to remain hopeful that she can make it to the balloon.      Pall team will reassess tomorrow as we may have a better understanding of how patient responds to new treatments.      #ADHF  #HFrEF  #Severe AS  #AMS  #Psychosocial Support  - Ongoing pt/family support and care navigation. 8/19 Established DNR/DNI to honor pt's wishes. Family still hopeful for TAVR if/when pt can stabilize. Structural following to assist with candidacy for tavr vs balloon valvuloplasty.   - 8/20 pt with pleural effusions, elevated lactate, UTI, and bacteremia. Plan to diurese further and started on Dopamine for cardiac support. No plan for balloon valvuloplasty until pt shows improvements. Returned to pt's room, family at bedside. Pt back on cpap. Family understanding and realistic that pt is very ill and that she could die and are really contemplating what they should do if death may occur regardless. Expressed frustration that pt did not get TAVR when she was healthier and now she is dying. Supported family.  - 8/21: Pt seen s/p thoracentesis, 750ml removed. Pt on cpap, " nad, lethargic, but per nursing, was very awake and cooperative for bedside procedure. Plan to continue diuresing and antibiotics at this time, day by day monitoring for progression/regression and ongoing GOC.   - Family requesting daily updates. Please notify Stefanie first/primary contact.  - Music Therapy- added  - Spiritual Care Support  - SW support     Plan of Care discussed with: Updated primary and bedside RN on goals of care decision, medication adjustments, and code status      Medical Decision Making was high level due to high complexity of problems, extensive data review, and high risk of management/treatment.     - ADHF, severe aortic stenosis, AMS, pleural effusions, bacteremia, posing threat to life and function.  - Reviewed external notes from   - Reviewed results from  which were used in decision making for   - Recommended the following tests:   - Assessment required independent historian: primary, nursing  - Independent interpretation of test: labs and imaging  - Discussion of management with: primary, nursing  - Drug therapy requiring intensive monitoring for toxicity: insulin  - Decision regarding elective major surgery with identified patient or procedure risk factors:   - Decision regarding emergency major surgery:   - Decision regarding hospitalization or escalation of hospital-level care:   - Decision not to resuscitate or to de-escalate care because of poor prognosis:   - Parenteral controlled substances: Hydromorphone    Thank you for allowing us to care for this patient. Palliative Team will continue to follow as needed. Please contact team with any questions or concerns.   Team pager [phone] (weekdays)    LINDSEY Cloe-CNP           [1] [Held by provider] amLODIPine, 5 mg, oral, Daily  ascorbic acid, 500 mg, oral, Daily  atorvastatin, 20 mg, oral, Daily  [Held by provider] carvedilol, 6.25 mg, oral, BID  ceFAZolin, 2 g, intravenous, q8h  cholecalciferol, 25 mcg, oral, Daily  ferrous  sulfate, 1 tablet, oral, Daily  [Held by provider] gabapentin, 300 mg, oral, Daily  [Held by provider] hydrALAZINE, 50 mg, oral, TID  insulin lispro, 0-10 Units, subcutaneous, TID AC  levothyroxine, 75 mcg, oral, Daily  lidocaine, 1 patch, transdermal, Daily  pantoprazole, 40 mg, oral, Daily before breakfast  PARoxetine, 20 mg, oral, Daily  polyethylene glycol, 17 g, oral, Daily  [2] DOPamine, 2.5 mcg/kg/min, Last Rate: 2.5 mcg/kg/min (08/21/25 1100)  [3] PRN medications: acetaminophen, calcium carbonate, dextrose, dextrose, glucagon, glucagon, HYDROmorphone, metoclopramide, OLANZapine, oxyCODONE-acetaminophen, oxygen, oxygen, temazepam, trimethobenzamide, vancomycin

## 2025-08-21 NOTE — PROGRESS NOTES
" Cardiac Intensive Care - Daily Progress Note   Subjective    Radha Torres is a 85 y.o. year old female patient admitted on 8/9/2025 with following ICU needs: Severe Aortic Stenosis    Interval History:    Failed HFNC overnight. Slight bump in lactate. Urine output responded well to large diuresis dose. Engaging consult teams for thora to improve AHRF. Mental status still delirious however able to follow commands.       Meds    Scheduled medications  Scheduled Medications[1]  Continuous medications  Continuous Medications[2]  PRN medications  PRN Medications[3]     Objective    Blood pressure 112/58, pulse 70, temperature 36 °C (96.8 °F), temperature source Temporal, resp. rate 19, height 1.65 m (5' 4.96\"), weight 78.8 kg (173 lb 11.6 oz), SpO2 98%.     Physical Exam  Vitals reviewed.   Constitutional:       Appearance: She is ill-appearing.     Eyes:      General: No scleral icterus.     Extraocular Movements: Extraocular movements intact.      Pupils: Pupils are equal, round, and reactive to light.       Cardiovascular:      Rate and Rhythm: Normal rate and regular rhythm.      Pulses: Normal pulses.      Heart sounds: Murmur heard.   Pulmonary:      Effort: Respiratory distress present.      Breath sounds: Rhonchi and rales present.   Abdominal:      General: There is no distension.      Palpations: Abdomen is soft.      Tenderness: There is no guarding.     Musculoskeletal:      Right lower leg: No edema.      Left lower leg: No edema.     Skin:     General: Skin is warm.      Capillary Refill: Capillary refill takes 2 to 3 seconds.     Neurological:      Mental Status: She is disoriented.         Intake/Output Summary (Last 24 hours) at 8/21/2025 0746  Last data filed at 8/21/2025 0700  Gross per 24 hour   Intake 839.79 ml   Output 1450 ml   Net -610.21 ml     Labs:   Results from last 72 hours   Lab Units 08/21/25  0455 08/20/25  2038 08/20/25  0256   SODIUM mmol/L 134* 131* 130*   POTASSIUM mmol/L 2.8* 3.2* " 3.6   CHLORIDE mmol/L 91* 90* 89*   CO2 mmol/L 30 28 26   BUN mg/dL 43* 46* 33*   CREATININE mg/dL 1.69* 1.89* 1.73*   GLUCOSE mg/dL 192* 183* 223*   CALCIUM mg/dL 7.9* 8.4* 8.4*   ANION GAP mmol/L 16 16 19   EGFR mL/min/1.73m*2 30* 26* 29*   PHOSPHORUS mg/dL 3.8 3.7 3.9      Results from last 72 hours   Lab Units 08/21/25  0455 08/20/25  0256 08/19/25  0505   WBC AUTO x10*3/uL 19.4* 16.5* 20.1*   HEMOGLOBIN g/dL 10.2* 9.7* 11.3*   HEMATOCRIT % 29.2* 30.3* 36.4   PLATELETS AUTO x10*3/uL 147* 175 217   NEUTROS PCT AUTO % 88.6 91.8 93.7   LYMPHS PCT AUTO % 2.8 1.8 1.2   MONOS PCT AUTO % 7.3 4.9 4.5   EOS PCT AUTO % 0.5 0.2 0.0      Results from last 72 hours   Lab Units 08/21/25  0609   POCT PH, ARTERIAL pH 7.40   POCT PCO2, ARTERIAL mm Hg 48*   POCT PO2, ARTERIAL mm Hg 67*   POCT SO2, ARTERIAL % 92*       Results from last 72 hours   Lab Units 08/20/25  1547 08/20/25  1302 08/19/25  1234   POCT PH, VENOUS pH 7.39 7.37 7.42   POCT PCO2, VENOUS mm Hg 44 43 46   POCT PO2, VENOUS mm Hg 58* 51* 76*        Micro/ID:     Lab Results   Component Value Date    URINECULTURE >=100,000 CFU/mL Escherichia coli (A) 08/19/2025    BLOODCULT Loaded on Instrument - Culture in progress 08/20/2025         Assessment and Plan     Radha Torres is a 85 y.o. female with PMH HFrEF (EF 25-30%), aortic stenosis, Afib, s/p AV node ablation and leadless pacemaker in 2025, HTN, renal cell carcinoma who presented on 8/9/25 as a transfer from [facility] for epigastric pain, transferred to CICU for possible flash pulmonary edema and hyponatremia. Now managing with aggressive diuresis, considering thora, on Vanc/Zoyn for Staph A bacteremia. Urine output responding to diuresis and pressures stable, slight bump in lactate, watching closely for signs of development of shock.     Update 8/21:  - Overnight failed HFNC, had to be placed back on CPAP  - WBC stable, still on Vanc/Zosyn  - Engaging Pulm/IR regarding Thoracentesis  - At bedside acutely  delirious  - Structural continuing to follow, to be re-engaged if Bacteremia clears  - Slight bump in lactate overnight, had good urine output with diuresis overnight, pressures still normotensive  - Overall prognosis is very guarded--best hope is to try and address AHRF with Thora and diuresis, liberate from CPAP, and effectively clear bacteremia, with hope to re-engage structural.   - Follow up ID, Pulm recs     CNS  #agitation  #Acute Encephalopathy  -1:1 sitter  -PRN zyprexa  - Etiology most likely infectious vs. Hyponatremia vs. Delirium  - Vanc/Zosyn  - Delirium precautions  - Hyponatremia has demonstrated improvement with diuresis     PULM  #flash pulmonary edema  -CPAP as tolerated, SpO2>92%  -nipride gtt off, caution iso severe aortic stenosis, SBP goal 130-150  -Continuing to diurese     CV  #Severe aortic stenosis  #HFrEF (EF 25-30%)  #HTN  :: Echo 7/11/25 - EF 25-30%, left ventricle mildly dilated, mild concentric LV hypertrophy, leadless pacemaker in right atria and RV, aortic valve is heavily calcified with appearance of severe aortic stenosis (peak gradient 52mmHg, mean gradient 25mmHg, aortic valve area 0.8cm^2), trace AR, mild MR and TR, RVSP 50-60mmHg,   ::Scheduled for TAVR 8/19/25 with Dr. Howell  ::EKG with no ischemic changes  ::Troponin 23>27  Plan:  - Diurese with IV Lasix and Diuril as needed  -strict I/Os, redose as needed  -holding home coreg 6.25mg, amlodipine 5mg  - Watching BP closely, currently normotensive     #Atrial fibrillation  #S/p AV emiliano ablation and leadless pacemaker placement 5/6/25  :: Home regimen - coreg 6.25 BID, amlodipine 2.5 mg, Eliquis 5mg BID  Plan:  - Coag and Factor Xa levels still appropriate, patient has been off Heparin gtt, will remain off for procedure  -fu heparin assay  -holding home coreg     GI  #Epigastric pain  #Nausea/GI upset  :: EGG performed 2018 at [facility] notable for PUD  ::Possibly due to volume overload/ADHF vs GERD  Plan:   - Tigan PRN ordered  for nausea   - KUB with no acute findings; ECG unchanged   - Continue pantoprazole 40 mg daily, tums PRN  - Continue home Percocet 5-325 mg Q6 PRN  - NG tube placement     RENAL/  #Acute severe hypo-osmolar hyponatremia, resolving  :: Na 134   :: Diuresis improving Na levels  ::serum osm 255, urine Na 70, urine osm 248  ::TSH, morning cortisol within normal limits  Plan:   - Continue with IV Lasix as needed for hypervolemia     #CKD 3b  :: Baseline Cr appears to be around 1.2  ::Cr 1.22 in ED  Plan:  - Daily RFP   - Avoid nephrotoxic agents  - Strict I/O     ID  DANIELE     HEME/ONC  DANIELE     ENDO  #Hypothyroidism  ::TSH wnl  - Continue levothyroxine 75 mcg     #suspected DM  -fu A1c  -ISS #1, hypoglycemia protocol     MSK/Skin  DANIELE     F: PRN  E: K>4 Phos>3 Mag>2  N: NPO  A: PIV, L radial a-line (8/16)  O2: CPAP 30% FIO2  Drips: nipride  Abx: None  DVT PPx: pending heparin assay  GI PPx: PPI     CODE STATUS: FULL (confirmed with patient on admission)  SURROGATE DECISION MAKER: Stefanie Roblero, [phone]       Konstantin Hunter MD   08/21/25 at 7:46 AM     Disclaimer: Documentation completed with the information available at the time of input. The times in the chart may not be reflective of actual patient care times, interventions, or procedures. Documentation occurs after the physical care of the patient.                [1] [Held by provider] amLODIPine, 5 mg, oral, Daily  [Held by provider] ascorbic acid, 500 mg, oral, Daily  [Held by provider] atorvastatin, 20 mg, oral, Daily  [Held by provider] carvedilol, 6.25 mg, oral, BID  [Held by provider] cholecalciferol, 25 mcg, oral, Daily  [Held by provider] ferrous sulfate, 1 tablet, oral, Daily  [Held by provider] gabapentin, 300 mg, oral, Daily  [Held by provider] hydrALAZINE, 50 mg, oral, TID  insulin lispro, 0-10 Units, subcutaneous, TID AC  [Held by provider] levothyroxine, 75 mcg, oral, Daily  lidocaine, 1 patch, transdermal, Daily  [Held by provider] pantoprazole, 40  mg, oral, Daily before breakfast  [Held by provider] PARoxetine, 20 mg, oral, Daily  piperacillin-tazobactam, 3.375 g, intravenous, q6h  [Held by provider] polyethylene glycol, 17 g, oral, Daily  potassium chloride, 20 mEq, intravenous, q2h  [Held by provider] urea, 15 g, oral, TID     [2] DOPamine, 2.5 mcg/kg/min, Last Rate: 2.5 mcg/kg/min (08/21/25 0700)     [3] PRN medications: acetaminophen, calcium carbonate, dextrose, dextrose, glucagon, glucagon, HYDROmorphone, metoclopramide, OLANZapine, oxyCODONE-acetaminophen, oxygen, oxygen, temazepam, trimethobenzamide, vancomycin

## 2025-08-21 NOTE — SIGNIFICANT EVENT
Note: Latest labs drawn for VBG were from IV line where K is running, thus K is falsely elevated.     Not true K level.     Will follow up level on RFP as scheduled after repletion.     Konstantin Hunter MD  PGY-2, Internal Medicine  [facility]

## 2025-08-21 NOTE — PROGRESS NOTES
Vancomycin Dosing by Pharmacy- FOLLOW UP    Radha Torres is a 85 y.o. year old female who Pharmacy has been consulted for vancomycin dosing for pneumonia. Based on the patient's indication and renal status this patient is being dosed based on a goal trough/random level of 15-20.     Renal function is currently declining.    Current vancomycin dose: 1500 mg given one time dose.    Most recent random level: 11.7 mcg/mL    Visit Vitals  BP 96/55   Pulse 81   Temp 36.5 °C (97.7 °F) (Temporal)   Resp 21        Lab Results   Component Value Date    CREATININE 1.69 (H) 08/21/2025    CREATININE 1.89 (H) 08/20/2025    CREATININE 1.73 (H) 08/20/2025    CREATININE 1.57 (H) 08/19/2025        Patient weight is as follows:   Vitals:    08/21/25 0600   Weight: 78.8 kg (173 lb 11.6 oz)       Cultures:  Susceptibility data for the encounter in last 14 days.  Collected Specimen Info Organism Ampicillin Cefazolin Cefazolin (uncomplicated UTIs only) Ciprofloxacin Clindamycin Erythromycin Gentamicin Levofloxacin Nitrofurantoin Oxacillin Piperacillin/Tazobactam Tetracycline   08/20/25 Blood culture from Peripheral Venipuncture Staphylococcus aureus               08/19/25 Urine from Clean Catch/Voided Escherichia coli  S  S  S  S    S  S  S   S    08/19/25 Blood culture from Peripheral Venipuncture Methicillin Susceptible Staphylococcus aureus (MSSA)      S  S     S   S     Collected Specimen Info Organism Trimethoprim/Sulfamethoxazole Vancomycin   08/20/25 Blood culture from Peripheral Venipuncture Staphylococcus aureus     08/19/25 Urine from Clean Catch/Voided Escherichia coli  S    08/19/25 Blood culture from Peripheral Venipuncture Methicillin Susceptible Staphylococcus aureus (MSSA)  S  S        I/O last 3 completed shifts:  In: 832.4 (10.6 mL/kg) [I.V.:132.4 (1.7 mL/kg); IV Piggyback:700]  Out: 1775 (22.5 mL/kg) [Urine:1775 (0.6 mL/kg/hr)]  Weight: 78.8 kg   I/O during current shift:  I/O this shift:  In: 288.9 [I.V.:73.9;  NG/GT:115; IV Piggyback:100]  Out: 1350 [Urine:1350]    Temp (24hrs), Av.2 °C (97.1 °F), Min:36 °C (96.8 °F), Max:36.5 °C (97.7 °F)      Assessment/Plan    Due to patient's renal function, will continue to dose by level.   Continue 1,500 mg dose and will reevaluate tomorrow ()  The next level will be obtained on  at 1400. May be obtained sooner if clinically indicated.   Will continue to monitor renal function daily while on vancomycin and order serum creatinine at least every 48 hours if not already ordered.  Follow for continued vancomycin needs, clinical response, and signs/symptoms of toxicity.       Florence Rios, PharmD

## 2025-08-21 NOTE — SIGNIFICANT EVENT
Pulmonary signoff     Thora completed by YOLETTE Kimball 1.9, LDH 81, appears transudative . Primary team to follow culture   Culture NGTD. Please re-engage if needed or if culture positive    Thank you for this consult.

## 2025-08-21 NOTE — SIGNIFICANT EVENT
85 y.o. female with HFrEF, severe aortic stenosis, A fib s/p AV node ablation and leadless pacemaker insertion, HTN, RCC who presented with atypical angina and altered sensorium, and found to have MSSA bacteremia. Repeat cultures from 8/20 continue to grow MSSA.     This is a complicated MSSA bacteremia in the context of persistently positive blood cultures, presence of spinal and hip prosthesis and severe valvular Aortic stenosis.  utaneous candidiasis under the breasts with low grade cellulitis may have provided a portal of entry. In addition, patient has E coli UTI.     She has been on vancomycin and Piperacillin-tazobactam since 8/19.   TTE was negative for valvular vegetations.      Impression -   # Complicated MSSA Bacteremia   # E coli UTI/cystitis     Recommendations  - discontinue vancomycin and piperacillin-tazobactam.   - start IV cefazolin 2g q8h - this is an optimal agent for both MSSA and E coli.   - continue po fluconazole 75 mg weekly for infra-mammary cutaneous candidiasis.   - please get repeat blood cultures q48h to document clearance.   - consider MAKSIM if feasible and in alignment with patient's GOC.     ID will continue to follow.     Dr. Jayson Lozano MD.   Infectious diseases fellow, PGY-5  Team A  Available via Epic chat.

## 2025-08-21 NOTE — CARE PLAN
Pt was given opportunity to have a break off CPAP on 10L cool H2O NC.  Pt sats steadily dropped into the low 80's.  Pt was returned to CPAP with improvement to spo2 of 94%.  Pt clearly benefits from PAP.

## 2025-08-22 ENCOUNTER — PHARMACY VISIT (OUTPATIENT)
Dept: PHARMACY | Facility: CLINIC | Age: 85
End: 2025-08-22
Payer: COMMERCIAL

## 2025-08-22 ENCOUNTER — APPOINTMENT (OUTPATIENT)
Dept: RADIOLOGY | Facility: HOSPITAL | Age: 85
DRG: 306 | End: 2025-08-22
Payer: MEDICARE

## 2025-08-22 LAB
ALBUMIN SERPL BCP-MCNC: 3 G/DL (ref 3.4–5)
ALBUMIN SERPL BCP-MCNC: 3.3 G/DL (ref 3.4–5)
ANION GAP BLDV CALCULATED.4IONS-SCNC: 5 MMOL/L (ref 10–25)
ANION GAP SERPL CALC-SCNC: 12 MMOL/L (ref 10–20)
ANION GAP SERPL CALC-SCNC: 14 MMOL/L (ref 10–20)
BASE EXCESS BLDV CALC-SCNC: 7.8 MMOL/L (ref -2–3)
BASOPHILS # BLD AUTO: 0.03 X10*3/UL (ref 0–0.1)
BASOPHILS # BLD AUTO: 0.04 X10*3/UL (ref 0–0.1)
BASOPHILS NFR BLD AUTO: 0.1 %
BASOPHILS NFR BLD AUTO: 0.2 %
BODY TEMPERATURE: 37 DEGREES CELSIUS
BUN SERPL-MCNC: 50 MG/DL (ref 6–23)
BUN SERPL-MCNC: 51 MG/DL (ref 6–23)
CA-I BLDV-SCNC: 1 MMOL/L (ref 1.1–1.33)
CALCIUM SERPL-MCNC: 8.3 MG/DL (ref 8.6–10.6)
CALCIUM SERPL-MCNC: 8.8 MG/DL (ref 8.6–10.6)
CHLORIDE BLDV-SCNC: 95 MMOL/L (ref 98–107)
CHLORIDE SERPL-SCNC: 90 MMOL/L (ref 98–107)
CHLORIDE SERPL-SCNC: 93 MMOL/L (ref 98–107)
CO2 SERPL-SCNC: 36 MMOL/L (ref 21–32)
CO2 SERPL-SCNC: 37 MMOL/L (ref 21–32)
CREAT SERPL-MCNC: 1.67 MG/DL (ref 0.5–1.05)
CREAT SERPL-MCNC: 1.75 MG/DL (ref 0.5–1.05)
EGFRCR SERPLBLD CKD-EPI 2021: 29 ML/MIN/1.73M*2
EGFRCR SERPLBLD CKD-EPI 2021: 30 ML/MIN/1.73M*2
EOSINOPHIL # BLD AUTO: 0 X10*3/UL (ref 0–0.4)
EOSINOPHIL # BLD AUTO: 0.01 X10*3/UL (ref 0–0.4)
EOSINOPHIL NFR BLD AUTO: 0 %
EOSINOPHIL NFR BLD AUTO: 0 %
ERYTHROCYTE [DISTWIDTH] IN BLOOD BY AUTOMATED COUNT: 14.5 % (ref 11.5–14.5)
ERYTHROCYTE [DISTWIDTH] IN BLOOD BY AUTOMATED COUNT: 14.9 % (ref 11.5–14.5)
GLUCOSE BLD MANUAL STRIP-MCNC: 184 MG/DL (ref 74–99)
GLUCOSE BLD MANUAL STRIP-MCNC: 195 MG/DL (ref 74–99)
GLUCOSE BLD MANUAL STRIP-MCNC: 201 MG/DL (ref 74–99)
GLUCOSE BLD MANUAL STRIP-MCNC: 281 MG/DL (ref 74–99)
GLUCOSE BLDV-MCNC: 167 MG/DL (ref 74–99)
GLUCOSE SERPL-MCNC: 175 MG/DL (ref 74–99)
GLUCOSE SERPL-MCNC: 276 MG/DL (ref 74–99)
HCO3 BLDV-SCNC: 32 MMOL/L (ref 22–26)
HCT VFR BLD AUTO: 30.8 % (ref 36–46)
HCT VFR BLD AUTO: 31 % (ref 36–46)
HCT VFR BLD EST: 23 % (ref 36–46)
HEMOCCULT SP1 STL QL: NEGATIVE
HGB BLD-MCNC: 10.5 G/DL (ref 12–16)
HGB BLD-MCNC: 10.7 G/DL (ref 12–16)
HGB BLDV-MCNC: 7.7 G/DL (ref 12–16)
IMM GRANULOCYTES # BLD AUTO: 0.2 X10*3/UL (ref 0–0.5)
IMM GRANULOCYTES # BLD AUTO: 0.25 X10*3/UL (ref 0–0.5)
IMM GRANULOCYTES NFR BLD AUTO: 1 % (ref 0–0.9)
IMM GRANULOCYTES NFR BLD AUTO: 1 % (ref 0–0.9)
INHALED O2 CONCENTRATION: 36 %
LACTATE BLDV-SCNC: 1.9 MMOL/L (ref 0.4–2)
LYMPHOCYTES # BLD AUTO: 0.71 X10*3/UL (ref 0.8–3)
LYMPHOCYTES # BLD AUTO: 0.82 X10*3/UL (ref 0.8–3)
LYMPHOCYTES NFR BLD AUTO: 2.9 %
LYMPHOCYTES NFR BLD AUTO: 4.1 %
MAGNESIUM SERPL-MCNC: 2.01 MG/DL (ref 1.6–2.4)
MAGNESIUM SERPL-MCNC: 2.08 MG/DL (ref 1.6–2.4)
MCH RBC QN AUTO: 28.5 PG (ref 26–34)
MCH RBC QN AUTO: 28.5 PG (ref 26–34)
MCHC RBC AUTO-ENTMCNC: 33.9 G/DL (ref 32–36)
MCHC RBC AUTO-ENTMCNC: 34.7 G/DL (ref 32–36)
MCV RBC AUTO: 82 FL (ref 80–100)
MCV RBC AUTO: 84 FL (ref 80–100)
MONOCYTES # BLD AUTO: 1.31 X10*3/UL (ref 0.05–0.8)
MONOCYTES # BLD AUTO: 1.37 X10*3/UL (ref 0.05–0.8)
MONOCYTES NFR BLD AUTO: 5.7 %
MONOCYTES NFR BLD AUTO: 6.5 %
NEUTROPHILS # BLD AUTO: 17.73 X10*3/UL (ref 1.6–5.5)
NEUTROPHILS # BLD AUTO: 21.76 X10*3/UL (ref 1.6–5.5)
NEUTROPHILS NFR BLD AUTO: 88.3 %
NEUTROPHILS NFR BLD AUTO: 90.2 %
NRBC BLD-RTO: 0 /100 WBCS (ref 0–0)
NRBC BLD-RTO: 0 /100 WBCS (ref 0–0)
OXYHGB MFR BLDV: 89.3 % (ref 45–75)
PCO2 BLDV: 43 MM HG (ref 41–51)
PH BLDV: 7.48 PH (ref 7.33–7.43)
PHOSPHATE SERPL-MCNC: 1.8 MG/DL (ref 2.5–4.9)
PHOSPHATE SERPL-MCNC: 2.3 MG/DL (ref 2.5–4.9)
PLATELET # BLD AUTO: 133 X10*3/UL (ref 150–450)
PLATELET # BLD AUTO: 139 X10*3/UL (ref 150–450)
PO2 BLDV: 64 MM HG (ref 35–45)
POTASSIUM BLDV-SCNC: 2.6 MMOL/L (ref 3.5–5.3)
POTASSIUM SERPL-SCNC: 2.5 MMOL/L (ref 3.5–5.3)
POTASSIUM SERPL-SCNC: 3.3 MMOL/L (ref 3.5–5.3)
RBC # BLD AUTO: 3.68 X10*6/UL (ref 4–5.2)
RBC # BLD AUTO: 3.75 X10*6/UL (ref 4–5.2)
SAO2 % BLDV: 93 % (ref 45–75)
SODIUM BLDV-SCNC: 129 MMOL/L (ref 136–145)
SODIUM SERPL-SCNC: 137 MMOL/L (ref 136–145)
SODIUM SERPL-SCNC: 139 MMOL/L (ref 136–145)
WBC # BLD AUTO: 20.1 X10*3/UL (ref 4.4–11.3)
WBC # BLD AUTO: 24.1 X10*3/UL (ref 4.4–11.3)

## 2025-08-22 PROCEDURE — 2500000004 HC RX 250 GENERAL PHARMACY W/ HCPCS (ALT 636 FOR OP/ED)

## 2025-08-22 PROCEDURE — 2500000002 HC RX 250 W HCPCS SELF ADMINISTERED DRUGS (ALT 637 FOR MEDICARE OP, ALT 636 FOR OP/ED)

## 2025-08-22 PROCEDURE — 82947 ASSAY GLUCOSE BLOOD QUANT: CPT

## 2025-08-22 PROCEDURE — 83735 ASSAY OF MAGNESIUM: CPT

## 2025-08-22 PROCEDURE — 94660 CPAP INITIATION&MGMT: CPT

## 2025-08-22 PROCEDURE — 2500000005 HC RX 250 GENERAL PHARMACY W/O HCPCS

## 2025-08-22 PROCEDURE — 82270 OCCULT BLOOD FECES: CPT

## 2025-08-22 PROCEDURE — 2500000001 HC RX 250 WO HCPCS SELF ADMINISTERED DRUGS (ALT 637 FOR MEDICARE OP)

## 2025-08-22 PROCEDURE — 80069 RENAL FUNCTION PANEL: CPT

## 2025-08-22 PROCEDURE — 85025 COMPLETE CBC W/AUTO DIFF WBC: CPT

## 2025-08-22 PROCEDURE — 71045 X-RAY EXAM CHEST 1 VIEW: CPT

## 2025-08-22 PROCEDURE — 84132 ASSAY OF SERUM POTASSIUM: CPT | Performed by: INTERNAL MEDICINE

## 2025-08-22 PROCEDURE — 36415 COLL VENOUS BLD VENIPUNCTURE: CPT

## 2025-08-22 PROCEDURE — 84132 ASSAY OF SERUM POTASSIUM: CPT

## 2025-08-22 PROCEDURE — 2500000005 HC RX 250 GENERAL PHARMACY W/O HCPCS: Performed by: INTERNAL MEDICINE

## 2025-08-22 PROCEDURE — 1100000001 HC PRIVATE ROOM DAILY

## 2025-08-22 PROCEDURE — 36415 COLL VENOUS BLD VENIPUNCTURE: CPT | Performed by: INTERNAL MEDICINE

## 2025-08-22 PROCEDURE — 99291 CRITICAL CARE FIRST HOUR: CPT | Performed by: INTERNAL MEDICINE

## 2025-08-22 PROCEDURE — 2500000004 HC RX 250 GENERAL PHARMACY W/ HCPCS (ALT 636 FOR OP/ED): Performed by: STUDENT IN AN ORGANIZED HEALTH CARE EDUCATION/TRAINING PROGRAM

## 2025-08-22 PROCEDURE — 85025 COMPLETE CBC W/AUTO DIFF WBC: CPT | Performed by: INTERNAL MEDICINE

## 2025-08-22 PROCEDURE — 99232 SBSQ HOSP IP/OBS MODERATE 35: CPT

## 2025-08-22 RX ORDER — POTASSIUM CHLORIDE 1.5 G/1.58G
40 POWDER, FOR SOLUTION ORAL ONCE
Status: COMPLETED | OUTPATIENT
Start: 2025-08-22 | End: 2025-08-22

## 2025-08-22 RX ORDER — FUROSEMIDE 10 MG/ML
100 INJECTION INTRAMUSCULAR; INTRAVENOUS ONCE
Status: COMPLETED | OUTPATIENT
Start: 2025-08-22 | End: 2025-08-22

## 2025-08-22 RX ORDER — SODIUM NITROPRUSSIDE IN 0.9% SODIUM CHLORIDE 0.5 MG/ML
.25-5 INJECTION INTRAVENOUS CONTINUOUS
Status: DISCONTINUED | OUTPATIENT
Start: 2025-08-22 | End: 2025-08-22

## 2025-08-22 RX ORDER — POTASSIUM CHLORIDE 14.9 MG/ML
20 INJECTION INTRAVENOUS
Status: DISPENSED | OUTPATIENT
Start: 2025-08-22 | End: 2025-08-22

## 2025-08-22 RX ORDER — OLANZAPINE 5 MG/1
10 TABLET, FILM COATED ORAL ONCE
Status: COMPLETED | OUTPATIENT
Start: 2025-08-22 | End: 2025-08-22

## 2025-08-22 RX ORDER — POTASSIUM CHLORIDE 14.9 MG/ML
20 INJECTION INTRAVENOUS
Status: DISCONTINUED | OUTPATIENT
Start: 2025-08-22 | End: 2025-08-22

## 2025-08-22 RX ADMIN — INSULIN LISPRO 6 UNITS: 100 INJECTION, SOLUTION INTRAVENOUS; SUBCUTANEOUS at 08:54

## 2025-08-22 RX ADMIN — TRIMETHOBENZAMIDE HYDROCHLORIDE 200 MG: 100 INJECTION INTRAMUSCULAR at 12:33

## 2025-08-22 RX ADMIN — Medication 25 MCG: at 08:51

## 2025-08-22 RX ADMIN — Medication: at 08:12

## 2025-08-22 RX ADMIN — CEFAZOLIN SODIUM 2 G: 2 INJECTION, SOLUTION INTRAVENOUS at 13:57

## 2025-08-22 RX ADMIN — CALCIUM CARBONATE (ANTACID) CHEW TAB 500 MG 1 TABLET: 500 CHEW TAB at 18:32

## 2025-08-22 RX ADMIN — POTASSIUM CHLORIDE 40 MEQ: 1.5 POWDER, FOR SOLUTION ORAL at 11:22

## 2025-08-22 RX ADMIN — CEFAZOLIN SODIUM 2 G: 2 INJECTION, SOLUTION INTRAVENOUS at 05:33

## 2025-08-22 RX ADMIN — CEFAZOLIN SODIUM 2 G: 2 INJECTION, SOLUTION INTRAVENOUS at 20:47

## 2025-08-22 RX ADMIN — SODIUM NITROPRUSSIDE IN 0.9% SODIUM CHLORIDE 0.25 MCG/KG/MIN: 0.5 INJECTION INTRAVENOUS at 04:38

## 2025-08-22 RX ADMIN — POTASSIUM PHOSPHATE, MONOBASIC POTASSIUM PHOSPHATE, DIBASIC 21 MMOL: 224; 236 INJECTION, SOLUTION, CONCENTRATE INTRAVENOUS at 18:31

## 2025-08-22 RX ADMIN — POTASSIUM CHLORIDE 40 MEQ: 1.5 POWDER, FOR SOLUTION ORAL at 20:15

## 2025-08-22 RX ADMIN — ATORVASTATIN CALCIUM 20 MG: 20 TABLET, FILM COATED ORAL at 08:51

## 2025-08-22 RX ADMIN — POTASSIUM CHLORIDE 20 MEQ: 14.9 INJECTION, SOLUTION INTRAVENOUS at 12:33

## 2025-08-22 RX ADMIN — POTASSIUM CHLORIDE 40 MEQ: 1.5 POWDER, FOR SOLUTION ORAL at 08:51

## 2025-08-22 RX ADMIN — ACETAMINOPHEN 650 MG: 325 TABLET, FILM COATED ORAL at 01:48

## 2025-08-22 RX ADMIN — FUROSEMIDE 100 MG: 10 INJECTION, SOLUTION INTRAMUSCULAR; INTRAVENOUS at 16:55

## 2025-08-22 RX ADMIN — TRIMETHOBENZAMIDE HYDROCHLORIDE 200 MG: 100 INJECTION INTRAMUSCULAR at 20:39

## 2025-08-22 RX ADMIN — Medication 1 DOSE: at 12:00

## 2025-08-22 RX ADMIN — PANTOPRAZOLE SODIUM 40 MG: 40 TABLET, DELAYED RELEASE ORAL at 08:55

## 2025-08-22 RX ADMIN — LIDOCAINE 4% 1 PATCH: 40 PATCH TOPICAL at 08:51

## 2025-08-22 RX ADMIN — POTASSIUM CHLORIDE 40 MEQ: 1.5 POWDER, FOR SOLUTION ORAL at 16:48

## 2025-08-22 RX ADMIN — FERROUS SULFATE TAB 325 MG (65 MG ELEMENTAL FE) 1 TABLET: 325 (65 FE) TAB at 08:51

## 2025-08-22 RX ADMIN — OXYCODONE HYDROCHLORIDE AND ACETAMINOPHEN 500 MG: 500 TABLET ORAL at 08:51

## 2025-08-22 RX ADMIN — OXYCODONE AND ACETAMINOPHEN 1 TABLET: 5; 325 TABLET ORAL at 09:26

## 2025-08-22 RX ADMIN — Medication: at 09:10

## 2025-08-22 RX ADMIN — PAROXETINE 20 MG: 20 TABLET, FILM COATED ORAL at 08:51

## 2025-08-22 RX ADMIN — OLANZAPINE 10 MG: 5 TABLET, FILM COATED ORAL at 02:06

## 2025-08-22 RX ADMIN — INSULIN LISPRO 4 UNITS: 100 INJECTION, SOLUTION INTRAVENOUS; SUBCUTANEOUS at 17:07

## 2025-08-22 RX ADMIN — POLYETHYLENE GLYCOL 3350 17 G: 17 POWDER, FOR SOLUTION ORAL at 08:51

## 2025-08-22 RX ADMIN — Medication: at 04:43

## 2025-08-22 RX ADMIN — ENOXAPARIN SODIUM 30 MG: 100 INJECTION SUBCUTANEOUS at 13:57

## 2025-08-22 RX ADMIN — Medication: at 20:22

## 2025-08-22 RX ADMIN — OXYCODONE AND ACETAMINOPHEN 1 TABLET: 5; 325 TABLET ORAL at 20:15

## 2025-08-22 RX ADMIN — OLANZAPINE 5 MG: 10 INJECTION, POWDER, LYOPHILIZED, FOR SOLUTION INTRAMUSCULAR at 20:15

## 2025-08-22 RX ADMIN — ACETAMINOPHEN 650 MG: 325 TABLET, FILM COATED ORAL at 14:22

## 2025-08-22 RX ADMIN — LEVOTHYROXINE SODIUM 75 MCG: 0.07 TABLET ORAL at 09:25

## 2025-08-22 ASSESSMENT — PAIN SCALES - GENERAL
PAINLEVEL_OUTOF10: 0 - NO PAIN
PAINLEVEL_OUTOF10: 5 - MODERATE PAIN
PAINLEVEL_OUTOF10: 5 - MODERATE PAIN
PAINLEVEL_OUTOF10: 0 - NO PAIN
PAINLEVEL_OUTOF10: 5 - MODERATE PAIN
PAINLEVEL_OUTOF10: 8

## 2025-08-22 ASSESSMENT — PAIN DESCRIPTION - DESCRIPTORS: DESCRIPTORS: ACHING;CRAMPING

## 2025-08-22 ASSESSMENT — PAIN - FUNCTIONAL ASSESSMENT
PAIN_FUNCTIONAL_ASSESSMENT: CPOT (CRITICAL CARE PAIN OBSERVATION TOOL)
PAIN_FUNCTIONAL_ASSESSMENT: 0-10
PAIN_FUNCTIONAL_ASSESSMENT: CPOT (CRITICAL CARE PAIN OBSERVATION TOOL)
PAIN_FUNCTIONAL_ASSESSMENT: 0-10
PAIN_FUNCTIONAL_ASSESSMENT: 0-10
PAIN_FUNCTIONAL_ASSESSMENT: CPOT (CRITICAL CARE PAIN OBSERVATION TOOL)

## 2025-08-22 NOTE — PROGRESS NOTES
Subjective    Radha Torres is a 85 y.o. female presenting for heart failure exacerbation and encephalopathy. ID consulted for MSSA bacteremia. Patient received hydromorphone prior to interview. Thoracentesis on 8/21 with suspected transudative effusion. According to nursing staff, patient was more oriented and able to speak. I was told she complained of back and knee pain.    Objective    Physical Exam  Constitutional:       General: She is sleeping. She is not in acute distress.     Appearance: She is normal weight. She is ill-appearing. She is not toxic-appearing.   HENT:      Head: Normocephalic and atraumatic.      Mouth/Throat:      Mouth: Mucous membranes are moist.      Pharynx: Oropharynx is clear.     Cardiovascular:      Rate and Rhythm: Normal rate.      Pulses: Normal pulses.   Pulmonary:      Effort: No respiratory distress.      Breath sounds: Normal breath sounds. No wheezing or rhonchi.   Abdominal:      General: Abdomen is flat.      Palpations: Abdomen is soft.     Musculoskeletal:      Right lower leg: No edema.      Left lower leg: No edema.     Skin:     General: Skin is warm and dry.      Capillary Refill: Capillary refill takes 2 to 3 seconds.     Neurological:      Mental Status: She is lethargic.     Temp:  [35.3 °C (95.5 °F)-36.5 °C (97.7 °F)] 36.5 °C (97.7 °F)  Heart Rate:  [63-94] 74  Resp:  [16-28] 28  BP: ()/() 100/63  FiO2 (%):  [40 %] 40 %  Vitals:    08/22/25 0600   Weight: 78.8 kg (173 lb 11.6 oz)     FiO2 (%):  [40 %] 40 %     I/Os    Intake/Output Summary (Last 24 hours) at 8/22/2025 1246  Last data filed at 8/22/2025 1233  Gross per 24 hour   Intake 1433.64 ml   Output 2215 ml   Net -781.36 ml     Labs:   CBC:  Recent Labs     08/22/25  0320 08/21/25  0455 08/20/25  0256   WBC 24.1* 19.4* 16.5*   HGB 10.7* 10.2* 9.7*   HCT 30.8* 29.2* 30.3*   * 147* 175   MCV 82 84 91     CMP:  Recent Labs     08/22/25  0320 08/21/25  1826 08/21/25  0455    135* 134*   K  2.5* 3.0* 2.8*   CL 90* 90* 91*   CO2 36* 36* 30   ANIONGAP 14 12 16   BUN 51* 53* 43*   CREATININE 1.75* 1.68* 1.69*   EGFR 29* 30* 30*   GLUCOSE 276* 191* 192*     Recent Labs     08/22/25  0320 08/21/25  1826 08/21/25  0455 08/17/25  1958 08/12/25  0623   ALBUMIN 3.0* 2.9* 2.9* 3.4 3.6   LIPASE  --   --   --  20 11    < > = values in this interval not displayed.     Calcium/Phos:   Lab Results   Component Value Date    CALCIUM 8.3 (L) 08/22/2025    PHOS 2.3 (L) 08/22/2025      COAG:   Recent Labs     08/21/25  0455 05/06/25  1110   INR 1.3* 1.0     CRP:   Lab Results   Component Value Date    CRP 0.87 08/09/2025     ENDO:  Recent Labs     08/15/25  0727 08/13/25  0705 06/12/19  1430 04/08/19  1150   TSH  --  3.30 0.59 30.47*   HGBA1C 7.9*  --   --   --       CARDIAC:   Recent Labs     08/18/25  0412 08/16/25  0532 08/12/25  1743 08/09/25  1948 08/09/25  0538   TROPHS 21 34 26   < > 38*   BNP  --   --   --   --  2,422*    < > = values in this interval not displayed.     Micro/ID:  Susceptibility data from last 90 days.  Collected Specimen Info Organism Ampicillin Cefazolin Cefazolin (uncomplicated UTIs only) Ciprofloxacin Clindamycin Erythromycin Gentamicin Levofloxacin Nitrofurantoin Oxacillin Piperacillin/Tazobactam Tetracycline   08/20/25 Blood culture from Peripheral Venipuncture Staphylococcus aureus               08/20/25 Blood culture from Peripheral Venipuncture Staphylococcus aureus               08/19/25 Urine from Clean Catch/Voided Escherichia coli  S  S  S  S    S  S  S   S    08/19/25 Blood culture from Peripheral Venipuncture Methicillin Susceptible Staphylococcus aureus (MSSA)      S  S     S   S     Collected Specimen Info Organism Trimethoprim/Sulfamethoxazole Vancomycin   08/20/25 Blood culture from Peripheral Venipuncture Staphylococcus aureus     08/20/25 Blood culture from Peripheral Venipuncture Staphylococcus aureus     08/19/25 Urine from Clean Catch/Voided Escherichia coli  S    08/19/25  Blood culture from Peripheral Venipuncture Methicillin Susceptible Staphylococcus aureus (MSSA)  S  S     Lab Results   Component Value Date    URINECULTURE >=100,000 CFU/mL Escherichia coli (A) 08/19/2025    BLOODCULT Loaded on Instrument - Culture in progress 08/21/2025       Imaging:  XR chest 1 view  Result Date: 8/22/2025  1. Similar bilateral small pleural effusions.   2. Similar bilateral interstitial edema.    US thoracentesis  Result Date: 8/21/2025  Uneventful  leftt-sided thoracentesis, as detailed above.    Meds:  Scheduled medications  Scheduled Medications[1]  Continuous medications  Continuous Medications[2]  PRN medications  PRN Medications[3]     Assessment    Radha Torres is a 85 y.o. female presenting for epigastric pain, admitted for heart failure and respiratory failure since 8/8. ID consulted for S aureus bacteremia in the aerobic vial of one peripheral venipuncture blood culture, mecC and mecA negative. Repeat cultures (two sets) sent on 8/20 grew 4/4 MSSA. Patient's encephalopathy is likely multifactorial with acute on chronic illness superimposed on suspected underlying cognitive deficits, mild improvement.      Her S aureus bacteremia should be treated. The organism is an unlikely contaminant, especially in the setting of implanted surgical hardware and severe AS. Cutaneous candidiasis under the breasts with low grade cellulitis may have provided a portal of entry. No evidence for septic thrombophlebitis. Staph pneumonia unlikely. She has had persistent leukocytosis, so cannot endocarditis remains a concern.      Dysuria with + U/A and culture + E coli c/w cystitis    Impression  MSSA with cutaneous wounds as point of entry  [place] susceptible E coli UTI      Recommendations  - Repeat blood cultures every 48 hours until documented clearance  - Continue cefazolin 2g every 8 hours anticipate 6 week culture for endocarditis  - Continue 75mg fluconazole weekly  - Follow up in ID clinic with   becky Antonio MD MS  PGY3 Internal Medicine             [1] [Held by provider] amLODIPine, 5 mg, oral, Daily  ascorbic acid, 500 mg, oral, Daily  atorvastatin, 20 mg, oral, Daily  [Held by provider] carvedilol, 6.25 mg, oral, BID  ceFAZolin, 2 g, intravenous, q8h  cholecalciferol, 25 mcg, oral, Daily  enoxaparin, 30 mg, subcutaneous, q24h  [Held by provider] ferrous sulfate, 1 tablet, oral, Daily  furosemide, 100 mg, intravenous, Once  [Held by provider] gabapentin, 300 mg, oral, Daily  [Held by provider] hydrALAZINE, 50 mg, oral, TID  insulin lispro, 0-10 Units, subcutaneous, TID AC  levothyroxine, 75 mcg, oral, Daily  lidocaine, 1 patch, transdermal, Daily  pantoprazole, 40 mg, oral, Daily before breakfast  PARoxetine, 20 mg, oral, Daily  polyethylene glycol, 17 g, oral, Daily  potassium chloride, 20 mEq, intravenous, q2h  [2] DOPamine, 2.5 mcg/kg/min, Last Rate: 2.5 mcg/kg/min (08/22/25 0600)  [3] PRN medications: acetaminophen, calcium carbonate, dextrose, dextrose, glucagon, glucagon, HYDROmorphone, OLANZapine, oxyCODONE-acetaminophen, oxygen, oxygen, temazepam, trimethobenzamide

## 2025-08-22 NOTE — CARE PLAN
Problem: Chronic Conditions and Co-morbidities  Goal: Patient's chronic conditions and co-morbidity symptoms are monitored and maintained or improved  Outcome: Progressing  Flowsheets (Taken 8/22/2025 1346)  Care Plan - Patient's Chronic Conditions and Co-Morbidity Symptoms are Monitored and Maintained or Improved: Monitor and assess patient's chronic conditions and comorbid symptoms for stability, deterioration, or improvement     Problem: Skin  Goal: Decreased wound size/increased tissue granulation at next dressing change  Outcome: Progressing  Flowsheets (Taken 8/22/2025 1346)  Decreased wound size/increased tissue granulation at next dressing change:   Promote sleep for wound healing   Protective dressings over bony prominences  Goal: Participates in plan/prevention/treatment measures  Outcome: Progressing  Goal: Prevent/manage excess moisture  Outcome: Progressing  Flowsheets (Taken 8/22/2025 1346)  Prevent/manage excess moisture: Cleanse incontinence/protect with barrier cream

## 2025-08-22 NOTE — PROGRESS NOTES
Vancomycin Dosing by Pharmacy- Cessation of Therapy    Consult to pharmacy for vancomycin dosing has been discontinued by the prescriber, pharmacy will sign off at this time.    Please call pharmacy if there are further questions or re-enter a consult if vancomycin is resumed.     CHINYERE BARNETT

## 2025-08-22 NOTE — PROGRESS NOTES
" Cardiac Intensive Care - Daily Progress Note   Subjective    Radha Torres is a 85 y.o. year old female patient admitted on 8/9/2025 with following ICU needs: Severe Aortic Stenosis    Interval History:    Overnight nipride restarted, placed on cpap for concern of flash pulmonary edema.     Seen this AM, she is wearing cpap, says that her breathing is \"ok.\" No chest pain, palpitations, fevers, chills.       Meds    Scheduled medications  Scheduled Medications[1]  Continuous medications  Continuous Medications[2]  PRN medications  PRN Medications[3]     Objective    Blood pressure (!) 119/103, pulse 63, temperature 35.6 °C (96.1 °F), resp. rate 20, height 1.65 m (5' 4.96\"), weight 78.8 kg (173 lb 11.6 oz), SpO2 100%.     Physical Exam  Vitals reviewed.   Constitutional:       Appearance: She is ill-appearing.     Eyes:      General: No scleral icterus.     Extraocular Movements: Extraocular movements intact.      Pupils: Pupils are equal, round, and reactive to light.       Cardiovascular:      Rate and Rhythm: Normal rate and regular rhythm.      Pulses: Normal pulses.      Heart sounds: Murmur heard.   Pulmonary:      Effort: No respiratory distress.      Breath sounds: Rhonchi and rales present.   Abdominal:      General: There is no distension.      Palpations: Abdomen is soft.      Tenderness: There is no guarding.     Musculoskeletal:      Right lower leg: No edema.      Left lower leg: No edema.     Skin:     General: Skin is warm.      Capillary Refill: Capillary refill takes 2 to 3 seconds.     Neurological:      Mental Status: She is disoriented.         Intake/Output Summary (Last 24 hours) at 8/22/2025 0916  Last data filed at 8/22/2025 0611  Gross per 24 hour   Intake 1448.42 ml   Output 2715 ml   Net -1266.58 ml     Labs:   Results from last 72 hours   Lab Units 08/22/25  0320 08/21/25  1826 08/21/25  0455   SODIUM mmol/L 137 135* 134*   POTASSIUM mmol/L 2.5* 3.0* 2.8*   CHLORIDE mmol/L 90* 90* 91* "   CO2 mmol/L 36* 36* 30   BUN mg/dL 51* 53* 43*   CREATININE mg/dL 1.75* 1.68* 1.69*   GLUCOSE mg/dL 276* 191* 192*   CALCIUM mg/dL 8.3* 8.1* 7.9*   ANION GAP mmol/L 14 12 16   EGFR mL/min/1.73m*2 29* 30* 30*   PHOSPHORUS mg/dL 2.3* 3.2 3.8      Results from last 72 hours   Lab Units 08/22/25  0320 08/21/25  0455 08/20/25  0256   WBC AUTO x10*3/uL 24.1* 19.4* 16.5*   HEMOGLOBIN g/dL 10.7* 10.2* 9.7*   HEMATOCRIT % 30.8* 29.2* 30.3*   PLATELETS AUTO x10*3/uL 139* 147* 175   NEUTROS PCT AUTO % 90.2 88.6 91.8   LYMPHS PCT AUTO % 2.9 2.8 1.8   MONOS PCT AUTO % 5.7 7.3 4.9   EOS PCT AUTO % 0.0 0.5 0.2      Results from last 72 hours   Lab Units 08/21/25  1153 08/21/25  0609   POCT PH, ARTERIAL pH 7.47* 7.40   POCT PCO2, ARTERIAL mm Hg 46* 48*   POCT PO2, ARTERIAL mm Hg 188* 67*   POCT SO2, ARTERIAL % 100 92*       Results from last 72 hours   Lab Units 08/21/25  1058 08/20/25  1547 08/20/25  1302   POCT PH, VENOUS pH 7.38 7.39 7.37   POCT PCO2, VENOUS mm Hg 51 44 43   POCT PO2, VENOUS mm Hg 30* 58* 51*        Micro/ID:     Lab Results   Component Value Date    URINECULTURE >=100,000 CFU/mL Escherichia coli (A) 08/19/2025    BLOODCULT Loaded on Instrument - Culture in progress 08/21/2025         Assessment and Plan     Radha Torres is a 85 y.o. female with PMH HFrEF (EF 25-30%), aortic stenosis, Afib, s/p AV node ablation and leadless pacemaker in 2025, HTN, renal cell carcinoma who presented on 8/9/25 as a transfer from [facility] for epigastric pain, transferred to CICU for possible flash pulmonary edema and hyponatremia. Now managing with aggressive diuresis, considering thora, on Vanc/Zoyn for Staph A bacteremia. Urine output responding to diuresis and pressures stable, slight bump in lactate, watching closely for signs of development of shock.     Update 8/22:  - Overnight failed HFNC, had to be placed back on CPAP  - repeat bcx from 8/20 growing MSSA  - stopped vanc/zosyn, continue cefazolin per ID  -pleural studies  indicative of transudative effusion  - Structural continuing to follow, to be re-engaged if Bacteremia clears       CNS  #agitation  #Acute Encephalopathy  -1:1 sitter  -PRN zyprexa  - Etiology most likely infectious vs. Hyponatremia vs. Delirium  - Vanc/Zosyn  - Delirium precautions  - Hyponatremia has demonstrated improvement with diuresis     PULM  #flash pulmonary edema  -CPAP as tolerated, SpO2>92%  -nipride gtt restarted overnight 8/21-22, caution iso severe aortic stenosis, SBP goal 130-150  -Continuing to diurese     CV  #Severe aortic stenosis  #HFrEF (EF 25-30%)  #HTN  :: Echo 7/11/25 - EF 25-30%, left ventricle mildly dilated, mild concentric LV hypertrophy, leadless pacemaker in right atria and RV, aortic valve is heavily calcified with appearance of severe aortic stenosis (peak gradient 52mmHg, mean gradient 25mmHg, aortic valve area 0.8cm^2), trace AR, mild MR and TR, RVSP 50-60mmHg,   ::Scheduled for TAVR 8/19/25 with Dr. Howell  ::EKG with no ischemic changes  ::Troponin 23>27  Plan:  - Diurese with IV Lasix and Diuril as needed  -strict I/Os, redose as needed  -holding home coreg 6.25mg, amlodipine 5mg  - Watching BP closely, currently normotensive     #Atrial fibrillation  #S/p AV emiliano ablation and leadless pacemaker placement 5/6/25  :: Home regimen - coreg 6.25 BID, amlodipine 2.5 mg, Eliquis 5mg BID  Plan:  - Coag and Factor Xa levels still appropriate, patient has been off Heparin gtt, will remain off for procedure  -fu heparin assay  -holding home coreg     GI  #Epigastric pain  #Nausea/GI upset  :: EGG performed 2018 at [facility] notable for PUD  ::Possibly due to volume overload/ADHF vs GERD  Plan:   - Tigan PRN ordered for nausea   - KUB with no acute findings; ECG unchanged   - Continue pantoprazole 40 mg daily, tums PRN  - Continue home Percocet 5-325 mg Q6 PRN  - NG tube placement     RENAL/  #Acute severe hypo-osmolar hyponatremia, resolved  :: Na 134   :: Diuresis improving Na  levels  ::serum osm 255, urine Na 70, urine osm 248  ::TSH, morning cortisol within normal limits  Plan:   - Continue with IV Lasix as needed for hypervolemia     #CKD 3b  :: Baseline Cr appears to be around 1.2  ::Cr 1.22 in ED  Plan:  - Daily RFP   - Avoid nephrotoxic agents  - Strict I/O     ID  #MSSA bacteremia  -blood cultures gotten from 8/19 for persistent leukocytosis were positive for MSSA  -repeat cultures from 8/20 also positive  -unclear source  Plan:   -ID consulted, appreciate recommendations  -continue cefazolin   -consider MAKSIM if w/in goals of care w/ family, however given tenuous resp status, will defer for now      HEME/ONC  DANIELE     ENDO  #Hypothyroidism  ::TSH wnl  - Continue levothyroxine 75 mcg     #suspected DM  -fu A1c  -ISS #1, hypoglycemia protocol     MSK/Skin  DANIELE     F: PRN  E: K>4 Phos>3 Mag>2  N: NPO  A: PIV  O2: CPAP 30% FIO2  Drips: nipride, dopamine  Abx: None  DVT PPx: pending heparin assay  GI PPx: PPI     CODE STATUS: FULL (confirmed with patient on admission)  SURROGATE DECISION MAKER: Stefanie Roblero, [phone]       Lázaro Hernandez MD   08/22/25 at 9:16 AM     Disclaimer: Documentation completed with the information available at the time of input. The times in the chart may not be reflective of actual patient care times, interventions, or procedures. Documentation occurs after the physical care of the patient.                  [1] [Held by provider] amLODIPine, 5 mg, oral, Daily  ascorbic acid, 500 mg, oral, Daily  atorvastatin, 20 mg, oral, Daily  [Held by provider] carvedilol, 6.25 mg, oral, BID  ceFAZolin, 2 g, intravenous, q8h  cholecalciferol, 25 mcg, oral, Daily  enoxaparin, 30 mg, subcutaneous, q24h  ferrous sulfate, 1 tablet, oral, Daily  [Held by provider] gabapentin, 300 mg, oral, Daily  [Held by provider] hydrALAZINE, 50 mg, oral, TID  insulin lispro, 0-10 Units, subcutaneous, TID AC  levothyroxine, 75 mcg, oral, Daily  lidocaine, 1 patch, transdermal,  Daily  pantoprazole, 40 mg, oral, Daily before breakfast  PARoxetine, 20 mg, oral, Daily  polyethylene glycol, 17 g, oral, Daily  potassium chloride, 20 mEq, intravenous, q2h     [2] DOPamine, 2.5 mcg/kg/min, Last Rate: 2.5 mcg/kg/min (08/22/25 0600)  nitroprusside, 0.25-5 mcg/kg/min, Last Rate: 0.25 mcg/kg/min (08/22/25 0600)     [3] PRN medications: acetaminophen, calcium carbonate, dextrose, dextrose, glucagon, glucagon, HYDROmorphone, OLANZapine, oxyCODONE-acetaminophen, oxygen, oxygen, temazepam, trimethobenzamide, vancomycin

## 2025-08-22 NOTE — PROGRESS NOTES
Social Work Progress Note   - ICU TREATMENT PLAN: Urine output responding to diuresis and pressures stable. Overnight failed HFNC, had to be placed back on CPAP. Pall care is following.  ?- Payer: [employer]   -Support System: daughter, spouse   - Planned Disposition: Pending medical outcome. Rehab recommends Mod intensity.     - Barriers to discharge: None at this time. SW will continue to follow.   ALEJANDRA REGAN

## 2025-08-22 NOTE — PROGRESS NOTES
Infectious Diseases Progress Note  Subjective   Interval History  - remains afebrile  - WBC 21.3 today from 24.1 yesterday  - BCx 8/21 collected @ 11:13am are NGTD       Objective   Physical Examination  Vital signs in the last 24 hours:  Temp:  [35.3 °C (95.5 °F)-36.5 °C (97.7 °F)] 35.9 °C (96.6 °F)  Heart Rate:  [72-93] 82  Resp:  [12-32] 26  BP: ()/() 122/58  FiO2 (%):  [40 %] 40 %    Oxygen Therapy  Medical Gas Therapy: Supplemental oxygen  Medical Gas Delivery Method: CPAP/Bi-PAP mask  SpO2: 95 %    Constitutional: in NAD  HEENT: sclerae anicteric  CV: RRR, systolic murmur noted  Pulm: no increased work of breathing  GI: abd ND  : external urethral catheter in place   Skin: warm and dry  Ext: bilateral LE without pitting edema   Neuro: alert but confused  Psych: affect calm    Laboratory/Imaging  Results from last 7 days   Lab Units 08/23/25  0501 08/22/25  1501 08/22/25  0320   WBC AUTO x10*3/uL 21.3* 20.1* 24.1*   HEMOGLOBIN g/dL 10.9* 10.5* 10.7*   HEMATOCRIT % 33.7* 31.0* 30.8*   PLATELETS AUTO x10*3/uL 142* 133* 139*   MCV fL 86 84 82   NEUTROS PCT AUTO % 88.3 88.3 90.2   NEUTROS ABS x10*3/uL 18.81* 17.73* 21.76*   LYMPHS PCT AUTO % 4.2 4.1 2.9   LYMPHS ABS AUTO x10*3/uL 0.90 0.82 0.71*   EOS PCT AUTO % 0.1 0.0 0.0   EOS ABS AUTO x10*3/uL 0.02 0.01 0.00     Results from last 7 days   Lab Units 08/23/25  0501 08/22/25  1501 08/22/25  0320   SODIUM mmol/L 138 139 137   POTASSIUM mmol/L 4.3 3.3* 2.5*   CHLORIDE mmol/L 94* 93* 90*   CO2 mmol/L 35* 37* 36*   BUN mg/dL 49* 50* 51*   CREATININE mg/dL 1.68* 1.67* 1.75*   GLUCOSE mg/dL 374* 175* 276*     Results from last 7 days   Lab Units 08/23/25  0501 08/22/25  1501 08/22/25  0320 08/21/25  1826 08/21/25  1058   PROTEIN TOTAL g/dL  --   --   --   --  5.0*   ALBUMIN g/dL 3.1* 3.3* 3.0*   < >  --     < > = values in this interval not displayed.     Imaging  XR chest 1 view  Result Date: 8/22/2025  1. Similar bilateral small pleural effusions. 2.  Similar bilateral interstitial edema.   I personally reviewed the images/study and I agree with the findings as stated by Bhupinder Katz MD.   MACRO: None   Signed by: Shaw Suarez 8/22/2025 10:04 AM Dictation workstation:   [id number]    US thoracentesis  Result Date: 8/21/2025  Uneventful  leftt-sided thoracentesis, as detailed above.   I personally performed and/or directly supervised this study and was present for the entire procedure.   I personally reviewed the study and resident interpretation. I agree with the findings as stated.   Performed and dictated at [facility].   MACRO: None   Signed by: Betty Horton 8/21/2025 5:24 PM Dictation workstation:   [id number]    XR chest 1 view  Result Date: 8/21/2025  1. Status post thoracentesis, with interval large decrease in left basilar effusion/atelectasis. Residual trace left pleural effusion. 2. Small right pleural effusion with right basilar consolidation/atelectasis. 3. Unchanged diffuse bilateral interstitial edema. 4. Medical devices as noted above.   I personally reviewed the images/study and I agree with the findings as stated. This study was interpreted by radiology resident Pepe Urban D.O. at [place], [place].   Signed by: Yamil Fraser 8/21/2025 11:43 AM Dictation workstation:   [id number]    XR abdomen 1 view  Result Date: 8/21/2025  1. Distal tip of the enteric tube projects over the proximal duodenum. 2. Nonobstructive bowel gas pattern. Air-filled colonic loops and correlate with any concern for ileus. 3. Bilateral pleural effusions.     I personally reviewed the images/study and I agree with the findings as stated. This study was interpreted at [place], [place].   MACRO: None   Signed by: Yamil Fraser 8/21/2025 10:10 AM Dictation workstation:   [id number]    CT chest abdomen pelvis wo IV contrast  Result Date:  8/20/2025  Chest 1.  Interval increase of left-sided pleural effusion with associated pulmonary vascular congestion, likely in the setting of fluid overload from severe aortic stenosis. This is likely contributing to patient's hypoxic respiratory failure.   Abdomen-Pelvis 1. Body wall edema likely secondary to fluid retention along with stable small ascites 2. No acute abdominopelvic abnormality. 3. Stable placement of Dobbhoff tube in the duodenum.   I personally reviewed the images/study and I agree with the findings as stated by Rodrick Carrera DO. This study was interpreted at [facility], [place], [place].   MACRO: None   Signed by: Nazario Porter 8/20/2025 7:41 PM Dictation workstation:   [id number]    XR abdomen 1 view  Result Date: 8/20/2025  1. Interval placement of enteric tube coursing below the diaphragm with distal tip projecting over the expected location of 1st part of the duodenum.   MACRO: None   Signed by: Shaw Suarez 8/20/2025 11:29 AM Dictation workstation:   [id number]    XR chest 1 view  Result Date: 8/19/2025  1. Unchanged small right and moderate left pleural effusions with left basilar consolidation/atelectasis. 2. Unchanged diffuse interstitial edema.   I personally reviewed the images/study and I agree with the findings as stated. This study was interpreted by radiology resident Pepe Urban D.O. at [facility], [place], [place].   Signed by: Shaw Suarez 8/19/2025 10:48 AM Dictation workstation:   [id number]    XR chest 1 view  Result Date: 8/19/2025  1.  Small right pleural effusion. 2. Similar appearance of left basilar consolidation. 3. Similar appearance of bilateral perihilar interstitial edema.   I personally reviewed the images/study and I agree with the findings as stated by Bhupinder Katz MD.   MACRO: None   Signed by: Shaw Suarez 8/19/2025 10:34 AM Dictation workstation:   [id number]    CT head wo IV  contrast  Result Date: 8/19/2025  No acute intracranial hemorrhage, mass effect, or CT apparent acute infarct.   Moderate chronic small vessel ischemic disease and brain atrophy. Questionable disproportionate volume loss within the anterior temporal lobes, correlate for Alzheimer's.   MACRO: None   Signed by: Yamil Headley 8/19/2025 9:21 AM Dictation workstation:   [id number]    XR chest 1 view  Result Date: 8/18/2025  1. Unchanged small right and moderate left pleural effusions with left basilar consolidation/atelectasis. Superimposed left basilar pneumonia is not excluded. 2. Unchanged diffuse bilateral interstitial edema.   I personally reviewed the images/study and I agree with the findings as stated. This study was interpreted by radiology resident Pepe Urban D.O. at [facility], [place], [place].   Signed by: Levon Capps 8/18/2025 9:51 AM Dictation workstation:   [id number]      Cardiology, Vascular, and Other Imaging  Electrocardiogram, 12-lead PRN ACS symptoms  Result Date: 8/21/2025  Ventricular-paced rhythm Abnormal ECG When compared with ECG of 12-AUG-2025 11:11, Vent. rate has decreased BY   8 BPM Confirmed by Axel Shaw ([id number]) on 8/21/2025 12:21:23 PM    Transthoracic Echo (TTE) Limited  Result Date: 8/20/2025   [facility], [address] [place] [address]                Tel [phone] and Fax [phone] TRANSTHORACIC ECHOCARDIOGRAM REPORT  Patient Name:       CHARISSE Bush Physician:    [id number] Nicky Oseguera MD Study Date:         8/20/2025           Ordering Provider:    [id number] BRIANA JUSTICE MRN/PID:            [id number]            Fellow: Accession#:         [id number]        Nurse: Date of Birth/Age:  1940 / 85      Sonographer:          Karley Carreon RDCS                     years Gender assigned at  F                   Additional Staff:     Kassidy  Albert Birth:                                                        SHAUNA HURLEY Height:             165.00 cm           Admit Date:           8/9/2025 Weight:             78.80 kg            Admission Status:     Inpatient - STAT BSA / BMI:          1.86 m2 / 28.94     Encounter#:           [id number]                     kg/m2 Blood Pressure:     118/71 mmHg         Department Location:  [facility] Study Type:    TRANSTHORACIC ECHO (TTE) LIMITED Diagnosis/ICD: Chest pain, unspecified-R07.9 Indication:    Assess AS CPT Code:      Echo Limited-50354; Doppler Limited-53794; Color Doppler-55164 Patient History: Valve Disorders:   Aortic Stenosis. Diabetes:          Yes Pertinent History: HTN, Cardiomyopathy, Hyperlipidemia, A-Fib and Chest Pain.                    Leadless Pacemaker. Study Detail: The following Echo studies were performed: 2D, M-Mode, Doppler and               color flow. Technically challenging study due to prominent lung               artifact and patient lying in supine position. Definity used as a               contrast agent for endocardial border definition. Total contrast               used for this procedure was 3 mL via IV push. Unable to obtain               suprasternal notch view.  PHYSICIAN INTERPRETATION: Left Ventricle: Left ventricular ejection fraction is severely decreased by visual estimate at 20-25%. There is mild eccentric left ventricular hypertrophy. There is global hypokinesis of the left ventricle with minor regional variations. The left ventricular cavity size is severely dilated. There is normal septal and normal posterior left ventricular wall thickness. Left ventricular diastolic filling cannot be determined due to right ventricular pacing. There is no definite left ventricular thrombus visualized. There is severe systolic dysfunction with global hypokinesis but with the basal lateral and inferolateral walls working best. Left Atrium: The left atrial size is severely  dilated. Right Ventricle: The right ventricle is normal in size. There is reduced right ventricular systolic function. Right Atrium: The right atrial size was not assessed. Aortic Valve: The aortic valve was not well visualized. The aortic valve area by VTI is 0.75 cmÂ² with a peak velocity of 3.76 m/s. The peak and mean gradients are 57 mmHg and 30 mmHg, respectively with a dimensionless index of 0.22. There is severe aortic valve cusp calcification. There is trace aortic valve regurgitation. AV appears heavily calcified and restricted with gradienets of 57/30mmHg with DI of 0.21. The DI of 0.21 is consistent with severe AS though the gradients are lower than expected due to reduced LV systolic function ( ie low flow low gradient AS). There is only trivial AI. Mitral Valve: The mitral valve is normal in structure. The doppler estimated peak and mean diastolic pressure gradients are 6.0 mmHg and 2 mmHg, respectively. The mean gradient of the mitral valve is 2 mmHg. There is trace to mild mitral valve regurgitation. Tricuspid Valve: The tricuspid valve is structurally normal. There is mild tricuspid regurgitation. The Doppler estimated right ventricular systolic pressure (RVSP) is moderately elevated at 56 mmHg. Reported right ventricular systolic pressure may be underestimated due to incomplete or suboptimal Doppler envelope. Pulmonic Valve: The pulmonic valve is not well visualized. There is physiologic pulmonic valve regurgitation. Pericardium: Trivial pericardial effusion. Pleural: There is left pleural effusion. Aorta: The aortic root is normal. Systemic Veins: The inferior vena cava appears dilated, with IVC inspiratory collapse less than 50%. In comparison to the previous echocardiogram(s): Compared with study dated 12/8/2022, the LV systolic function was normal at that time with LVEF 65% with normal sized cavity. The LV is now dilated with severely reduced systolic function. The prior AV gradients were  46.2/27mmHg with DI of 0.3 consistent with moderate AS at that time. Although the gradients are only a little higher now, that is in the setting of much worse LV systolic function and her aortic stenosis has progressed to severe.  ` CONCLUSIONS:  1. Left ventricular ejection fraction is severely decreased by visual estimate at 20-25%.  2. There is global hypokinesis of the left ventricle with minor regional variations.  3. Left ventricular cavity size is severely dilated.  4. No left ventricular thrombus visualized.  5. There is severe systolic dysfunction with global hypokinesis but with the basal lateral and inferolateral walls working best.  6. There is reduced right ventricular systolic function.  7. The left atrial size is severely dilated.  8. The Doppler estimated RVSP is moderately elevated at 56 mmHg.  9. AV appears heavily calcified and restricted with gradienets of 57/30mmHg with DI of 0.21. The DI of 0.21 is consistent with severe AS though the gradients are lower than expected due to reduced LV systolic function ( ie low flow low gradient AS). There is only trivial AI. 10. Primary service notified of findings at the time of reporting. 11. Compared with study dated 12/8/2022, the LV systolic function was normal at that time with LVEF 65% with normal sized cavity. The LV is now dilated with severely reduced systolic function. The prior AV gradients were 46.2/27mmHg with DI of 0.3 consistent with moderate AS at that time. Although the gradients are only a little higher now, that is in the setting of much worse LV systolic function and her aortic stenosis has progressed to severe. `  QUANTITATIVE DATA SUMMARY:  2D MEASUREMENTS:          Normal Ranges: Ao Root d:       3.20 cm  (2.0-3.7cm) LAs:             4.10 cm  (2.7-4.0cm) IVSd:            0.90 cm  (0.6-1.1cm) LVPWd:           0.70 cm  (0.6-1.1cm) LVIDd:           6.00 cm  (3.9-5.9cm) LVIDs:           4.10 cm LV Mass Index:   100 g/m2 LV % FS           31.7 %  LEFT ATRIUM:                  Normal Ranges: LA Vol A4C:        73.0 ml    (22+/-6mL/m2) LA Vol A2C:        123.5 ml LA Vol BP:         95.7 ml LA Vol Index A4C:  39.2ml/m2 LA Vol Index A2C:  66.3 ml/m2 LA Vol Index BP:   51.4 ml/m2 LA Area A4C:       23.0 cm2 LA Area A2C:       29.7 cm2 LA Major Axis A4C: 6.2 cm LA Major Axis A2C: 6.1 cm  AORTA MEASUREMENTS:         Normal Ranges: Ao Sinus, d:        3.30 cm (2.1-3.5cm)  LV SYSTOLIC FUNCTION:                      Normal Ranges: EF-A4C View:    21 % (>=55%) EF-Visual:      23 % LV EF Reported: 23 %  MITRAL VALVE:          Normal Ranges: MV Vmax:      1.22 m/s (<=1.3m/s) MV peak P.0 mmHg (<5mmHg) MV mean P.0 mmHg (<2mmHg)  AORTIC VALVE:                      Normal Ranges: AoV Vmax:                3.76 m/s  (<=1.7m/s) AoV Peak P.6 mmHg (<20mmHg) AoV Mean P.0 mmHg (1.7-11.5mmHg) LVOT Max Timo:            0.87 m/s  (<=1.1m/s) AoV VTI:                 72.30 cm  (18-25cm) LVOT VTI:                15.60 cm LVOT Diameter:           2.10 cm   (1.8-2.4cm) AoV Area, VTI:           0.75 cm2  (2.5-5.5cm2) AoV Area,Vmax:           0.80 cm2  (2.5-4.5cm2) AoV Dimensionless Index: 0.22  RIGHT VENTRICLE: TAPSE: 11.2 mm  TRICUSPID VALVE/RVSP:          Normal Ranges: Peak TR Velocity:     3.22 m/s Est. RA Pressure:     15 RV Syst Pressure:     56       (< 30mmHg) IVC Diam:             2.40 cm  [id number] Nicky Oseguera MD Electronically signed on 2025 at 1:19:11 PM  ** Final **       Microbiology  Susceptibility data from last 73134 days.  Collected Specimen Info Organism Ampicillin Cefazolin Cefazolin (uncomplicated UTIs only) Ciprofloxacin Clindamycin Erythromycin Gentamicin Levofloxacin Nitrofurantoin Oxacillin Piperacillin/Tazobactam Tetracycline   25 Blood culture from Peripheral Venipuncture Staphylococcus aureus               25 Blood culture from Peripheral Venipuncture Staphylococcus aureus               25  Urine from Clean Catch/Voided Escherichia coli  S  S  S  S    S  S  S   S    08/19/25 Blood culture from Peripheral Venipuncture Methicillin Susceptible Staphylococcus aureus (MSSA)      S  S     S   S     Collected Specimen Info Organism Trimethoprim/Sulfamethoxazole Vancomycin   08/20/25 Blood culture from Peripheral Venipuncture Staphylococcus aureus     08/20/25 Blood culture from Peripheral Venipuncture Staphylococcus aureus     08/19/25 Urine from Clean Catch/Voided Escherichia coli  S    08/19/25 Blood culture from Peripheral Venipuncture Methicillin Susceptible Staphylococcus aureus (MSSA)  S  S        Assessment   Radha Torres is a 85 y.o. woman with a relevant PMH of HTN, HFrEF, severe AS, afib s/p AVN ablation and leadless pacemaker insertion, RCC who was transferred to [facility] on 8/9/2025 (today is hospital day 14) for chest pain with concern for flash pulmonary edema. Hospitalization c/b episode of AMS/SOB on 8/19 for which BCx demonstrated MSSA.     Found to have MSSA bacteremia and E. coli UTI. Infectious diseases has been consulted for assistance with the above infections. Her MSSA is at least complicated given persistently positive Bcx, presence of spinal and hip prostheses, and severe valvular disease. Cutaneous candidiasis under the breasts with cellulitis may have provided a portal of entry. TTE was negative for vegetations.     Plan   Recommendations  Diagnostics  - f/u BCx 8/21 to ensure clearance, if still positive, then please repeat  - f/u pleural fluid cx 8/21  - if MAKSIM is within GOC then can proceed but due to high pre-test probability for endovascular infection, I favor at least a 6 week course regardless     Therapeutics  - continue cefazolin 2g q8h for optimized treatment against MSSA, which also covers E. coli   - anticipate 6 weeks of antibiotics, end date pending clearance of BCx  - continue fluconazole 75 mg weekly (renally dosed) for 4 weeks for infra-mammary cutaneous candidiasis      Thank you for allowing us to participate in this patient's care.  Infectious diseases will continue to follow.    Arlene Cam MD    Division of Infectious Diseases and HIV Medicine    __________________________________________________  I spent 30 minutes in the professional and overall care of this patient.

## 2025-08-23 ENCOUNTER — APPOINTMENT (OUTPATIENT)
Dept: RADIOLOGY | Facility: HOSPITAL | Age: 85
DRG: 306 | End: 2025-08-23
Payer: MEDICARE

## 2025-08-23 LAB
ALBUMIN SERPL BCP-MCNC: 3.1 G/DL (ref 3.4–5)
ALBUMIN SERPL BCP-MCNC: 3.1 G/DL (ref 3.4–5)
ANION GAP SERPL CALC-SCNC: 13 MMOL/L (ref 10–20)
ANION GAP SERPL CALC-SCNC: 13 MMOL/L (ref 10–20)
BASOPHILS # BLD AUTO: 0.03 X10*3/UL (ref 0–0.1)
BASOPHILS NFR BLD AUTO: 0.1 %
BUN SERPL-MCNC: 46 MG/DL (ref 6–23)
BUN SERPL-MCNC: 49 MG/DL (ref 6–23)
CALCIUM SERPL-MCNC: 8.4 MG/DL (ref 8.6–10.6)
CALCIUM SERPL-MCNC: 8.5 MG/DL (ref 8.6–10.6)
CHLORIDE SERPL-SCNC: 94 MMOL/L (ref 98–107)
CHLORIDE SERPL-SCNC: 94 MMOL/L (ref 98–107)
CO2 SERPL-SCNC: 34 MMOL/L (ref 21–32)
CO2 SERPL-SCNC: 35 MMOL/L (ref 21–32)
CREAT SERPL-MCNC: 1.59 MG/DL (ref 0.5–1.05)
CREAT SERPL-MCNC: 1.68 MG/DL (ref 0.5–1.05)
EGFRCR SERPLBLD CKD-EPI 2021: 30 ML/MIN/1.73M*2
EGFRCR SERPLBLD CKD-EPI 2021: 32 ML/MIN/1.73M*2
EOSINOPHIL # BLD AUTO: 0.02 X10*3/UL (ref 0–0.4)
EOSINOPHIL NFR BLD AUTO: 0.1 %
ERYTHROCYTE [DISTWIDTH] IN BLOOD BY AUTOMATED COUNT: 15.2 % (ref 11.5–14.5)
GLUCOSE BLD MANUAL STRIP-MCNC: 208 MG/DL (ref 74–99)
GLUCOSE BLD MANUAL STRIP-MCNC: 231 MG/DL (ref 74–99)
GLUCOSE BLD MANUAL STRIP-MCNC: 274 MG/DL (ref 74–99)
GLUCOSE BLD MANUAL STRIP-MCNC: 355 MG/DL (ref 74–99)
GLUCOSE SERPL-MCNC: 201 MG/DL (ref 74–99)
GLUCOSE SERPL-MCNC: 374 MG/DL (ref 74–99)
HCT VFR BLD AUTO: 33.7 % (ref 36–46)
HGB BLD-MCNC: 10.9 G/DL (ref 12–16)
IMM GRANULOCYTES # BLD AUTO: 0.17 X10*3/UL (ref 0–0.5)
IMM GRANULOCYTES NFR BLD AUTO: 0.8 % (ref 0–0.9)
LYMPHOCYTES # BLD AUTO: 0.9 X10*3/UL (ref 0.8–3)
LYMPHOCYTES NFR BLD AUTO: 4.2 %
MAGNESIUM SERPL-MCNC: 2.16 MG/DL (ref 1.6–2.4)
MCH RBC QN AUTO: 27.9 PG (ref 26–34)
MCHC RBC AUTO-ENTMCNC: 32.3 G/DL (ref 32–36)
MCV RBC AUTO: 86 FL (ref 80–100)
MONOCYTES # BLD AUTO: 1.38 X10*3/UL (ref 0.05–0.8)
MONOCYTES NFR BLD AUTO: 6.5 %
NEUTROPHILS # BLD AUTO: 18.81 X10*3/UL (ref 1.6–5.5)
NEUTROPHILS NFR BLD AUTO: 88.3 %
NRBC BLD-RTO: 0 /100 WBCS (ref 0–0)
PHOSPHATE SERPL-MCNC: 2 MG/DL (ref 2.5–4.9)
PHOSPHATE SERPL-MCNC: 3.2 MG/DL (ref 2.5–4.9)
PLATELET # BLD AUTO: 142 X10*3/UL (ref 150–450)
POTASSIUM SERPL-SCNC: 3.6 MMOL/L (ref 3.5–5.3)
POTASSIUM SERPL-SCNC: 4.3 MMOL/L (ref 3.5–5.3)
RBC # BLD AUTO: 3.9 X10*6/UL (ref 4–5.2)
SODIUM SERPL-SCNC: 137 MMOL/L (ref 136–145)
SODIUM SERPL-SCNC: 138 MMOL/L (ref 136–145)
WBC # BLD AUTO: 21.3 X10*3/UL (ref 4.4–11.3)

## 2025-08-23 PROCEDURE — 2500000001 HC RX 250 WO HCPCS SELF ADMINISTERED DRUGS (ALT 637 FOR MEDICARE OP)

## 2025-08-23 PROCEDURE — 2500000002 HC RX 250 W HCPCS SELF ADMINISTERED DRUGS (ALT 637 FOR MEDICARE OP, ALT 636 FOR OP/ED)

## 2025-08-23 PROCEDURE — 2500000004 HC RX 250 GENERAL PHARMACY W/ HCPCS (ALT 636 FOR OP/ED)

## 2025-08-23 PROCEDURE — 83735 ASSAY OF MAGNESIUM: CPT

## 2025-08-23 PROCEDURE — 94660 CPAP INITIATION&MGMT: CPT

## 2025-08-23 PROCEDURE — 99231 SBSQ HOSP IP/OBS SF/LOW 25: CPT | Performed by: STUDENT IN AN ORGANIZED HEALTH CARE EDUCATION/TRAINING PROGRAM

## 2025-08-23 PROCEDURE — 82947 ASSAY GLUCOSE BLOOD QUANT: CPT

## 2025-08-23 PROCEDURE — 1100000001 HC PRIVATE ROOM DAILY

## 2025-08-23 PROCEDURE — 71045 X-RAY EXAM CHEST 1 VIEW: CPT | Performed by: RADIOLOGY

## 2025-08-23 PROCEDURE — 71045 X-RAY EXAM CHEST 1 VIEW: CPT

## 2025-08-23 PROCEDURE — 36415 COLL VENOUS BLD VENIPUNCTURE: CPT

## 2025-08-23 PROCEDURE — 92526 ORAL FUNCTION THERAPY: CPT | Mod: GN

## 2025-08-23 PROCEDURE — 92610 EVALUATE SWALLOWING FUNCTION: CPT | Mod: GN

## 2025-08-23 PROCEDURE — 80069 RENAL FUNCTION PANEL: CPT

## 2025-08-23 PROCEDURE — 99291 CRITICAL CARE FIRST HOUR: CPT

## 2025-08-23 PROCEDURE — 84100 ASSAY OF PHOSPHORUS: CPT

## 2025-08-23 PROCEDURE — 2500000005 HC RX 250 GENERAL PHARMACY W/O HCPCS

## 2025-08-23 PROCEDURE — 85025 COMPLETE CBC W/AUTO DIFF WBC: CPT

## 2025-08-23 RX ORDER — ACETAMINOPHEN 325 MG/1
975 TABLET ORAL EVERY 8 HOURS PRN
Status: DISCONTINUED | OUTPATIENT
Start: 2025-08-23 | End: 2025-08-27 | Stop reason: HOSPADM

## 2025-08-23 RX ORDER — INSULIN LISPRO 100 [IU]/ML
0-5 INJECTION, SOLUTION INTRAVENOUS; SUBCUTANEOUS
Status: DISCONTINUED | OUTPATIENT
Start: 2025-08-23 | End: 2025-08-23

## 2025-08-23 RX ORDER — POTASSIUM CHLORIDE 1.5 G/1.58G
40 POWDER, FOR SOLUTION ORAL ONCE
Status: COMPLETED | OUTPATIENT
Start: 2025-08-23 | End: 2025-08-23

## 2025-08-23 RX ORDER — FUROSEMIDE 10 MG/ML
100 INJECTION INTRAMUSCULAR; INTRAVENOUS ONCE
Status: COMPLETED | OUTPATIENT
Start: 2025-08-23 | End: 2025-08-23

## 2025-08-23 RX ORDER — INSULIN LISPRO 100 [IU]/ML
0-15 INJECTION, SOLUTION INTRAVENOUS; SUBCUTANEOUS
Status: DISCONTINUED | OUTPATIENT
Start: 2025-08-23 | End: 2025-08-25

## 2025-08-23 RX ORDER — POTASSIUM CHLORIDE 1.5 G/1.58G
20 POWDER, FOR SOLUTION ORAL ONCE
Status: DISCONTINUED | OUTPATIENT
Start: 2025-08-23 | End: 2025-08-23

## 2025-08-23 RX ORDER — DICLOFENAC SODIUM 10 MG/G
4 GEL TOPICAL 3 TIMES DAILY PRN
Status: DISCONTINUED | OUTPATIENT
Start: 2025-08-23 | End: 2025-08-27 | Stop reason: HOSPADM

## 2025-08-23 RX ORDER — OXYCODONE HYDROCHLORIDE 5 MG/1
5 TABLET ORAL ONCE
Refills: 0 | Status: COMPLETED | OUTPATIENT
Start: 2025-08-23 | End: 2025-08-23

## 2025-08-23 RX ADMIN — ACETAMINOPHEN 975 MG: 325 TABLET ORAL at 17:00

## 2025-08-23 RX ADMIN — DOPAMINE HYDROCHLORIDE 2.5 MCG/KG/MIN: 160 INJECTION, SOLUTION INTRAVENOUS at 06:06

## 2025-08-23 RX ADMIN — TEMAZEPAM 7.5 MG: 7.5 CAPSULE ORAL at 00:09

## 2025-08-23 RX ADMIN — INSULIN LISPRO 6 UNITS: 100 INJECTION, SOLUTION INTRAVENOUS; SUBCUTANEOUS at 12:29

## 2025-08-23 RX ADMIN — OXYCODONE AND ACETAMINOPHEN 1 TABLET: 5; 325 TABLET ORAL at 20:33

## 2025-08-23 RX ADMIN — CALCIUM CARBONATE (ANTACID) CHEW TAB 500 MG 1 TABLET: 500 CHEW TAB at 11:12

## 2025-08-23 RX ADMIN — Medication 25 MCG: at 08:34

## 2025-08-23 RX ADMIN — CEFAZOLIN SODIUM 2 G: 2 INJECTION, SOLUTION INTRAVENOUS at 14:54

## 2025-08-23 RX ADMIN — ACETAMINOPHEN 650 MG: 325 TABLET, FILM COATED ORAL at 08:44

## 2025-08-23 RX ADMIN — DICLOFENAC SODIUM 4 G: 10 GEL TOPICAL at 17:01

## 2025-08-23 RX ADMIN — TRIMETHOBENZAMIDE HYDROCHLORIDE 200 MG: 100 INJECTION INTRAMUSCULAR at 11:12

## 2025-08-23 RX ADMIN — INSULIN LISPRO 10 UNITS: 100 INJECTION, SOLUTION INTRAVENOUS; SUBCUTANEOUS at 08:34

## 2025-08-23 RX ADMIN — ATORVASTATIN CALCIUM 20 MG: 20 TABLET, FILM COATED ORAL at 08:34

## 2025-08-23 RX ADMIN — PAROXETINE 20 MG: 20 TABLET, FILM COATED ORAL at 08:34

## 2025-08-23 RX ADMIN — PANTOPRAZOLE SODIUM 40 MG: 40 TABLET, DELAYED RELEASE ORAL at 08:36

## 2025-08-23 RX ADMIN — FUROSEMIDE 100 MG: 10 INJECTION, SOLUTION INTRAMUSCULAR; INTRAVENOUS at 19:17

## 2025-08-23 RX ADMIN — OLANZAPINE 5 MG: 10 INJECTION, POWDER, LYOPHILIZED, FOR SOLUTION INTRAMUSCULAR at 20:33

## 2025-08-23 RX ADMIN — FUROSEMIDE 100 MG: 10 INJECTION, SOLUTION INTRAMUSCULAR; INTRAVENOUS at 12:29

## 2025-08-23 RX ADMIN — CEFAZOLIN SODIUM 2 G: 2 INJECTION, SOLUTION INTRAVENOUS at 04:59

## 2025-08-23 RX ADMIN — TEMAZEPAM 7.5 MG: 7.5 CAPSULE ORAL at 20:33

## 2025-08-23 RX ADMIN — LEVOTHYROXINE SODIUM 75 MCG: 0.07 TABLET ORAL at 08:34

## 2025-08-23 RX ADMIN — POTASSIUM CHLORIDE 40 MEQ: 1.5 POWDER, FOR SOLUTION ORAL at 19:17

## 2025-08-23 RX ADMIN — ENOXAPARIN SODIUM 30 MG: 100 INJECTION SUBCUTANEOUS at 14:54

## 2025-08-23 RX ADMIN — LIDOCAINE 4% 1 PATCH: 40 PATCH TOPICAL at 08:34

## 2025-08-23 RX ADMIN — Medication: at 04:58

## 2025-08-23 RX ADMIN — CEFAZOLIN SODIUM 2 G: 2 INJECTION, SOLUTION INTRAVENOUS at 20:33

## 2025-08-23 RX ADMIN — OXYCODONE AND ACETAMINOPHEN 1 TABLET: 5; 325 TABLET ORAL at 00:09

## 2025-08-23 RX ADMIN — INSULIN LISPRO 6 UNITS: 100 INJECTION, SOLUTION INTRAVENOUS; SUBCUTANEOUS at 17:21

## 2025-08-23 RX ADMIN — Medication: at 08:13

## 2025-08-23 RX ADMIN — OXYCODONE HYDROCHLORIDE 5 MG: 5 TABLET ORAL at 14:54

## 2025-08-23 RX ADMIN — OXYCODONE HYDROCHLORIDE AND ACETAMINOPHEN 500 MG: 500 TABLET ORAL at 08:34

## 2025-08-23 ASSESSMENT — PAIN DESCRIPTION - DESCRIPTORS
DESCRIPTORS: ACHING;CRAMPING

## 2025-08-23 ASSESSMENT — PAIN - FUNCTIONAL ASSESSMENT
PAIN_FUNCTIONAL_ASSESSMENT: 0-10
PAIN_FUNCTIONAL_ASSESSMENT: CPOT (CRITICAL CARE PAIN OBSERVATION TOOL)
PAIN_FUNCTIONAL_ASSESSMENT: 0-10
PAIN_FUNCTIONAL_ASSESSMENT: CPOT (CRITICAL CARE PAIN OBSERVATION TOOL)

## 2025-08-23 ASSESSMENT — PAIN SCALES - GENERAL
PAINLEVEL_OUTOF10: 0 - NO PAIN
PAINLEVEL_OUTOF10: 6
PAINLEVEL_OUTOF10: 7
PAINLEVEL_OUTOF10: 5 - MODERATE PAIN
PAINLEVEL_OUTOF10: 2
PAINLEVEL_OUTOF10: 6
PAINLEVEL_OUTOF10: 0 - NO PAIN
PAINLEVEL_OUTOF10: 7
PAINLEVEL_OUTOF10: 5 - MODERATE PAIN
PAINLEVEL_OUTOF10: 0 - NO PAIN

## 2025-08-23 NOTE — CARE PLAN
Problem: Safety - Adult  Goal: Free from fall injury  Outcome: Progressing  Flowsheets (Taken 8/23/2025 1254)  Free from fall injury: Instruct family/caregiver on patient safety     Problem: Chronic Conditions and Co-morbidities  Goal: Patient's chronic conditions and co-morbidity symptoms are monitored and maintained or improved  Outcome: Progressing  Flowsheets (Taken 8/23/2025 1254)  Care Plan - Patient's Chronic Conditions and Co-Morbidity Symptoms are Monitored and Maintained or Improved: Monitor and assess patient's chronic conditions and comorbid symptoms for stability, deterioration, or improvement     Problem: Skin  Goal: Decreased wound size/increased tissue granulation at next dressing change  Outcome: Progressing  Flowsheets (Taken 8/23/2025 1254)  Decreased wound size/increased tissue granulation at next dressing change:   Promote sleep for wound healing   Protective dressings over bony prominences  Goal: Participates in plan/prevention/treatment measures  Outcome: Progressing  Flowsheets (Taken 8/23/2025 1254)  Participates in plan/prevention/treatment measures: Elevate heels  Goal: Prevent/manage excess moisture  Outcome: Progressing  Flowsheets (Taken 8/23/2025 1254)  Prevent/manage excess moisture:   Moisturize dry skin   Cleanse incontinence/protect with barrier cream

## 2025-08-23 NOTE — PROGRESS NOTES
SLP Adult Inpatient Speech-Language Pathology Clinical Swallow Evaluation    Patient Name: Radha Torres  MRN: [id number]  Today's Date: 8/23/2025   Time Calculation  Start Time: 1108  Stop Time: 1132  Time Calculation (min): 24 min     Recommendations:  NPO  Ice chips okay following oral care  --Aggressive oral care is strongly recommended to reduce dental plaque and bacteria on oropharyngeal surfaces, which may increase the risk of nosocomial infections, including pneumonia.     Assessment:  #suspected dysphagia     Pt presents with clinical signs of dysphagia, likely Acute in nature r/t AMS vs. other. Pt yields a FAIL  on the [place] Swallow Protocol, which has a high sensitivity for airway invasion. On further PO Oral phase is seemingly intact with overt s/s of airway compromise. Pt will likely require instrumental assessment of swallowing prior to initiation of PO diet.  Not yet appropriate given current severity of swallowing deficits and current mental status.  Given dependence for feeding and oral care, limited or infrequent ambulation , compromised respiratory system , impaired cough function , and compromised immune system , pt is not appropriate for oral diet prior to instrumental swallow study. Recommend NPO     Plan:  Inpatient/Swing Bed or Outpatient: Inpatient  Treatment/Interventions: Bolus trials, Assess diet tolerance, Diet recommendations  SLP Plan: Skilled SLP  SLP Frequency: 2x per week  Duration: 30 days  SLP Discharge Recommendations: Continue skilled SLP services at the next level of care  Diet Recommendations: Solid, Liquid  Solid Consistency: NPO  Liquid Consistency: Ice chips after oral care, NPO  Discussed POC: Patient  Discussed Risks/Benefits: Yes, Patient  Patient/Caregiver Agreeable: Yes  SLP - OK to Discharge: Yes     Goals:  Encounter Problems       Encounter Problems (Active)       Swallowing       LTG - Patient will tolerate the least restrictive diet without overt difficulty by time  of discharge.       Start:  08/23/25    Expected End:  09/27/25             The patient/caregiver/family will demonstrate adequate return of knowledge of all compensatory strategy/instruction w/ 100% acc. to effectively assist the patient in immediate safety with oral intake and swallowing.         Start:  08/23/25    Expected End:  09/27/25                 Subjective     Baseline Assessment:  Temperature Spikes Noted: No  Behavior/Cognition: Alert, Confused, Impulsive  Vision: Functional for self-feeding  Patient Positioning: Upright in Bed    General Visit Information:  Patient Class: Inpatient  Reason for Referral: concern for dysphagia/ aspiration  Prior to Session Communication: Bedside nurse  Reviewed Procedures and Risks: Yes  Date of Order: 08/23/25  BaseLine Diet: Regular Diet/Thin Liquids    Vital Signs:  Vitals:    08/23/25 1600   BP: 136/67   Pulse: 70   Resp: 23   Temp:    SpO2: 98%       History Of Present Illness  Radha Torres is a 85 y.o. woman with a relevant PMH of HTN, HFrEF, severe AS, afib s/p AVN ablation and leadless pacemaker insertion, RCC who was transferred to [facility] on 8/9/2025 (today is hospital day 14) for chest pain with concern for flash pulmonary edema. Hospitalization c/b episode of AMS/SOB on 8/19 for which BCx demonstrated MSSA.      Dysphagia History:   None Reported    Allergies[1]    Relevant Results  XR chest 1 view 08/23/2025    Narrative  Interpreted By:  Eric Reza and Stevens Alex  STUDY:  XR CHEST 1 VIEW;  8/23/2025 8:38 am    INDICATION:  Signs/Symptoms:NG tube.      COMPARISON:  XR chest 08/22/2025    ACCESSION NUMBER(S):  [id number]    ORDERING CLINICIAN:  BRIANA JUSTICE    FINDINGS:  AP radiograph of the chest was provided.    Enteric tube courses past the diaphragm and terminates outside the  field of view. Leadless device projects over the heart.    CARDIOMEDIASTINAL SILHOUETTE:  Cardiomediastinal silhouette is unchanged in size and configuration.    LUNGS:  Stable  opacities within the right mid to lower lung, likely a  combination of pulmonary edema and atelectasis. No pneumothorax.  Blunting of the bilateral costophrenic angles,  left-greater-than-right.    ABDOMEN:  No remarkable upper abdominal findings.    BONES:  Re-demonstration of multiple, displaced right posterolateral rib  fractures which appear old.    Impression  1. Enteric tube courses inferiorly below the diaphragm and terminates  outside the field of view.  2. Otherwise, stable appearance of the chest with similar degree of  pulmonary edema and small bilateral pleural effusions.    I personally reviewed the images/study and I agree with the findings  as stated by Raymond Adam DO PGY-3. This study was interpreted at  [facility], [place], [place].    MACRO:  None    Signed by: Eric Reza 8/23/2025 1:20 PM  Dictation workstation:   [id number]      Oxygen settings:  FiO2 (%):  [40 %] 40 %    Objective     Dysphagia Risk Factors:  Predisposing: NA  Clinical signs of possible chronic dysphagia: NA  Precipitating: AMS    Oral/Motor Assessment:  Oral Hygiene: Education completed re: importance of oral care  Facial Symmetry: Within Functional Limits  Labial ROM: Within Functional Limits  Labial Strength: Within Functional Limits  Labial Symmetry: Within Functional Limits  Lingual ROM: Within Functional Limits  Lingual Strength: Within Functional Limits  Lingual Symmetry: Within Functional Limits  Palatal Elevation: Within Functional Limits    Clinical Assessment:  Patient Positioning: Upright in Bed  Was The 3 oz Swallow Protocol Completed: Yes  Signs/Symptoms of Aspiration: Cough, Throat Clearing  Immediate Cough: Thin (IDDSI Level 0) - Straw, Pureed/Extremely Thick (IDDSI Level 4)    Consistencies Trialed: Yes  Consistencies Trialed: Thin (IDDSI Level 0) - Spoon, Thin (IDDSI Level 0) - Straw, Ice Chips, Pureed/extremely thick (IDDSI Level 4)    Inpatient Education:  Adult  Outpatient Education  Individual(s) Educated: Patient  Risk and Benefits Discussed with Patient/Caregiver/Other: yes  Patient/Caregiver Demonstrated Understanding: yes  Plan of Care Discussed and Agreed Upon: yes  Patient Response to Education: Patient/Caregiver Verbalized Understanding of Information, Patient/Caregiver Asked Appropriate Questions         [1] No Known Allergies

## 2025-08-23 NOTE — PROGRESS NOTES
" Cardiac Intensive Care - Daily Progress Note   Subjective    Radha Torres is a 85 y.o. year old female patient admitted on 8/9/2025 with following ICU needs: Severe Aortic Stenosis    Interval History:    Remains on CPAP. No changes in clinical status. Roughly 1050cc of urine output.       Meds    Scheduled medications  Scheduled Medications[1]  Continuous medications  Continuous Medications[2]  PRN medications  PRN Medications[3]     Objective    Blood pressure 148/85, pulse 72, temperature 35.5 °C (95.9 °F), temperature source Temporal, resp. rate 17, height 1.65 m (5' 4.96\"), weight 71.3 kg (157 lb 3 oz), SpO2 100%.     Physical Exam  Vitals reviewed.   Constitutional:       Appearance: She is ill-appearing.     Eyes:      General: No scleral icterus.     Extraocular Movements: Extraocular movements intact.      Pupils: Pupils are equal, round, and reactive to light.       Cardiovascular:      Rate and Rhythm: Normal rate and regular rhythm.      Pulses: Normal pulses.      Heart sounds: Murmur heard.   Pulmonary:      Effort: No respiratory distress.      Breath sounds: Rhonchi and rales present.   Abdominal:      General: There is no distension.      Palpations: Abdomen is soft.      Tenderness: There is no guarding.     Musculoskeletal:      Right lower leg: No edema.      Left lower leg: No edema.     Skin:     General: Skin is warm.      Capillary Refill: Capillary refill takes 2 to 3 seconds.     Neurological:      Mental Status: She is disoriented.         Intake/Output Summary (Last 24 hours) at 8/23/2025 0718  Last data filed at 8/23/2025 0600  Gross per 24 hour   Intake 707.82 ml   Output 1150 ml   Net -442.18 ml     Labs:   Results from last 72 hours   Lab Units 08/23/25  0501 08/22/25  1501 08/22/25  0320   SODIUM mmol/L 138 139 137   POTASSIUM mmol/L 4.3 3.3* 2.5*   CHLORIDE mmol/L 94* 93* 90*   CO2 mmol/L 35* 37* 36*   BUN mg/dL 49* 50* 51*   CREATININE mg/dL 1.68* 1.67* 1.75*   GLUCOSE mg/dL 374* " 175* 276*   CALCIUM mg/dL 8.5* 8.8 8.3*   ANION GAP mmol/L 13 12 14   EGFR mL/min/1.73m*2 30* 30* 29*   PHOSPHORUS mg/dL 3.2 1.8* 2.3*      Results from last 72 hours   Lab Units 08/23/25  0501 08/22/25  1501 08/22/25  0320   WBC AUTO x10*3/uL 21.3* 20.1* 24.1*   HEMOGLOBIN g/dL 10.9* 10.5* 10.7*   HEMATOCRIT % 33.7* 31.0* 30.8*   PLATELETS AUTO x10*3/uL 142* 133* 139*   NEUTROS PCT AUTO % 88.3 88.3 90.2   LYMPHS PCT AUTO % 4.2 4.1 2.9   MONOS PCT AUTO % 6.5 6.5 5.7   EOS PCT AUTO % 0.1 0.0 0.0      Results from last 72 hours   Lab Units 08/21/25  1153 08/21/25  0609   POCT PH, ARTERIAL pH 7.47* 7.40   POCT PCO2, ARTERIAL mm Hg 46* 48*   POCT PO2, ARTERIAL mm Hg 188* 67*   POCT SO2, ARTERIAL % 100 92*       Results from last 72 hours   Lab Units 08/22/25  1105 08/21/25  1058 08/20/25  1547   POCT PH, VENOUS pH 7.48* 7.38 7.39   POCT PCO2, VENOUS mm Hg 43 51 44   POCT PO2, VENOUS mm Hg 64* 30* 58*        Micro/ID:     Lab Results   Component Value Date    URINECULTURE >=100,000 CFU/mL Escherichia coli (A) 08/19/2025    BLOODCULT No growth at 1 day 08/21/2025         Assessment and Plan     Radha Torres is a 85 y.o. female with PMH HFrEF (EF 25-30%), aortic stenosis, Afib, s/p AV node ablation and leadless pacemaker in 2025, HTN, renal cell carcinoma who presented on 8/9/25 as a transfer from [facility] for epigastric pain, transferred to CICU for possible flash pulmonary edema and hyponatremia. Now managing with aggressive diuresis, considering thora, on Vanc/Zoyn for Staph A bacteremia. Urine output responding to diuresis and pressures stable, slight bump in lactate, watching closely for signs of development of shock.     Update 8/23:  - Remains on CPAP  - repeat bcx from 8/20 growing MSSA  - Continue cefazolin per ID  -pleural studies indicative of transudative effusion  - No changes in WBC, blood cx still 1/2 positive from 8/22  - Structural continuing to follow, to be re-engaged if Bacteremia clears        CNS  #agitation  #Acute Encephalopathy  -1:1 sitter  -PRN zyprexa  - Etiology most likely infectious vs. Hyponatremia vs. Delirium  - Vanc/Zosyn  - Delirium precautions  - Hyponatremia has demonstrated improvement with diuresis     PULM  #flash pulmonary edema  -CPAP as tolerated, SpO2>92%  -nipride gtt restarted overnight 8/21-22, caution iso severe aortic stenosis, SBP goal 130-150  -Continuing to diurese     CV  #Severe aortic stenosis  #HFrEF (EF 25-30%)  #HTN  :: Echo 7/11/25 - EF 25-30%, left ventricle mildly dilated, mild concentric LV hypertrophy, leadless pacemaker in right atria and RV, aortic valve is heavily calcified with appearance of severe aortic stenosis (peak gradient 52mmHg, mean gradient 25mmHg, aortic valve area 0.8cm^2), trace AR, mild MR and TR, RVSP 50-60mmHg,   ::Scheduled for TAVR 8/19/25 with Dr. Howell  ::EKG with no ischemic changes  ::Troponin 23>27  Plan:  - Diurese with IV Lasix and Diuril as needed  -strict I/Os, redose as needed  -holding home coreg 6.25mg, amlodipine 5mg  - Watching BP closely, currently normotensive     #Atrial fibrillation  #S/p AV emiliano ablation and leadless pacemaker placement 5/6/25  :: Home regimen - coreg 6.25 BID, amlodipine 2.5 mg, Eliquis 5mg BID  Plan:  - Coag and Factor Xa levels still appropriate, patient has been off Heparin gtt, will remain off for procedure  -fu heparin assay  -holding home coreg     GI  #Epigastric pain  #Nausea/GI upset  :: EGG performed 2018 at [facility] notable for PUD  ::Possibly due to volume overload/ADHF vs GERD  Plan:   - Tigan PRN ordered for nausea   - KUB with no acute findings; ECG unchanged   - Continue pantoprazole 40 mg daily, tums PRN  - Continue home Percocet 5-325 mg Q6 PRN  - NG tube placement     RENAL/  #Acute severe hypo-osmolar hyponatremia, resolved  :: Na 134   :: Diuresis improving Na levels  ::serum osm 255, urine Na 70, urine osm 248  ::TSH, morning cortisol within normal limits  Plan:   - Continue with  IV Lasix as needed for hypervolemia     #CKD 3b  :: Baseline Cr appears to be around 1.2  ::Cr 1.22 in ED  Plan:  - Daily RFP   - Avoid nephrotoxic agents  - Strict I/O     ID  #MSSA bacteremia  -blood cultures gotten from 8/19 for persistent leukocytosis were positive for MSSA  -repeat cultures from 8/20 also positive  -unclear source  Plan:   -ID consulted, appreciate recommendations  -continue cefazolin   -consider MAKSIM if w/in goals of care w/ family, however given tenuous resp status, will defer for now      HEME/ONC  DANIELE     ENDO  #Hypothyroidism  ::TSH wnl  - Continue levothyroxine 75 mcg     #suspected DM  -fu A1c  -ISS #1, hypoglycemia protocol     MSK/Skin  DANIELE     F: PRN  E: K>4 Phos>3 Mag>2  N: NPO  A: PIV  O2: CPAP 30% FIO2  Drips: nipride, dopamine  Abx: None  DVT PPx: pending heparin assay  GI PPx: PPI     CODE STATUS: FULL (confirmed with patient on admission)  SURROGATE DECISION MAKER: Stefanie Roblero, [phone]       Konstantin Hunter MD   08/23/25 at 7:18 AM     Disclaimer: Documentation completed with the information available at the time of input. The times in the chart may not be reflective of actual patient care times, interventions, or procedures. Documentation occurs after the physical care of the patient.                    [1] [Held by provider] amLODIPine, 5 mg, oral, Daily  ascorbic acid, 500 mg, oral, Daily  atorvastatin, 20 mg, oral, Daily  [Held by provider] carvedilol, 6.25 mg, oral, BID  ceFAZolin, 2 g, intravenous, q8h  cholecalciferol, 25 mcg, oral, Daily  enoxaparin, 30 mg, subcutaneous, q24h  [Held by provider] ferrous sulfate, 1 tablet, oral, Daily  [Held by provider] gabapentin, 300 mg, oral, Daily  [Held by provider] hydrALAZINE, 50 mg, oral, TID  insulin lispro, 0-10 Units, subcutaneous, TID AC  levothyroxine, 75 mcg, oral, Daily  lidocaine, 1 patch, transdermal, Daily  pantoprazole, 40 mg, oral, Daily before breakfast  PARoxetine, 20 mg, oral, Daily  polyethylene glycol, 17 g,  oral, Daily     [2] DOPamine, 2.5 mcg/kg/min, Last Rate: 2.5 mcg/kg/min (08/23/25 0606)     [3] PRN medications: acetaminophen, calcium carbonate, dextrose, dextrose, glucagon, glucagon, HYDROmorphone, OLANZapine, oxyCODONE-acetaminophen, oxygen, oxygen, temazepam, trimethobenzamide

## 2025-08-24 LAB
ALBUMIN SERPL BCP-MCNC: 2.4 G/DL (ref 3.4–5)
ANION GAP SERPL CALC-SCNC: 11 MMOL/L (ref 10–20)
BACTERIA BLD AEROBE CULT: ABNORMAL
BACTERIA BLD AEROBE CULT: ABNORMAL
BACTERIA BLD CULT: ABNORMAL
BACTERIA BLD CULT: ABNORMAL
BACTERIA BLD CULT: NORMAL
BACTERIA BLD CULT: NORMAL
BACTERIA FLD CULT: NORMAL
BASOPHILS # BLD AUTO: 0.04 X10*3/UL (ref 0–0.1)
BASOPHILS NFR BLD AUTO: 0.2 %
BUN SERPL-MCNC: 47 MG/DL (ref 6–23)
CALCIUM SERPL-MCNC: 6.8 MG/DL (ref 8.6–10.6)
CHLORIDE SERPL-SCNC: 104 MMOL/L (ref 98–107)
CO2 SERPL-SCNC: 30 MMOL/L (ref 21–32)
CREAT SERPL-MCNC: 1.3 MG/DL (ref 0.5–1.05)
EGFRCR SERPLBLD CKD-EPI 2021: 41 ML/MIN/1.73M*2
EOSINOPHIL # BLD AUTO: 0.04 X10*3/UL (ref 0–0.4)
EOSINOPHIL NFR BLD AUTO: 0.2 %
ERYTHROCYTE [DISTWIDTH] IN BLOOD BY AUTOMATED COUNT: 15.5 % (ref 11.5–14.5)
GLUCOSE BLD MANUAL STRIP-MCNC: 204 MG/DL (ref 74–99)
GLUCOSE BLD MANUAL STRIP-MCNC: 223 MG/DL (ref 74–99)
GLUCOSE BLD MANUAL STRIP-MCNC: 235 MG/DL (ref 74–99)
GLUCOSE SERPL-MCNC: 184 MG/DL (ref 74–99)
GRAM STN SPEC: ABNORMAL
GRAM STN SPEC: ABNORMAL
GRAM STN SPEC: NORMAL
GRAM STN SPEC: NORMAL
HCT VFR BLD AUTO: 29.4 % (ref 36–46)
HGB BLD-MCNC: 9.7 G/DL (ref 12–16)
IMM GRANULOCYTES # BLD AUTO: 0.18 X10*3/UL (ref 0–0.5)
IMM GRANULOCYTES NFR BLD AUTO: 0.8 % (ref 0–0.9)
LYMPHOCYTES # BLD AUTO: 1.2 X10*3/UL (ref 0.8–3)
LYMPHOCYTES NFR BLD AUTO: 5.4 %
MAGNESIUM SERPL-MCNC: 1.76 MG/DL (ref 1.6–2.4)
MCH RBC QN AUTO: 28.6 PG (ref 26–34)
MCHC RBC AUTO-ENTMCNC: 33 G/DL (ref 32–36)
MCV RBC AUTO: 87 FL (ref 80–100)
MONOCYTES # BLD AUTO: 1.44 X10*3/UL (ref 0.05–0.8)
MONOCYTES NFR BLD AUTO: 6.5 %
NEUTROPHILS # BLD AUTO: 19.35 X10*3/UL (ref 1.6–5.5)
NEUTROPHILS NFR BLD AUTO: 86.9 %
NRBC BLD-RTO: 0 /100 WBCS (ref 0–0)
PHOSPHATE SERPL-MCNC: 2 MG/DL (ref 2.5–4.9)
PLATELET # BLD AUTO: 122 X10*3/UL (ref 150–450)
POTASSIUM SERPL-SCNC: 3.4 MMOL/L (ref 3.5–5.3)
RBC # BLD AUTO: 3.39 X10*6/UL (ref 4–5.2)
SODIUM SERPL-SCNC: 142 MMOL/L (ref 136–145)
WBC # BLD AUTO: 22.3 X10*3/UL (ref 4.4–11.3)

## 2025-08-24 PROCEDURE — 99232 SBSQ HOSP IP/OBS MODERATE 35: CPT | Performed by: STUDENT IN AN ORGANIZED HEALTH CARE EDUCATION/TRAINING PROGRAM

## 2025-08-24 PROCEDURE — 82947 ASSAY GLUCOSE BLOOD QUANT: CPT

## 2025-08-24 PROCEDURE — 2500000001 HC RX 250 WO HCPCS SELF ADMINISTERED DRUGS (ALT 637 FOR MEDICARE OP)

## 2025-08-24 PROCEDURE — G0545 PR INHERENT VISIT TO INPT: HCPCS | Performed by: STUDENT IN AN ORGANIZED HEALTH CARE EDUCATION/TRAINING PROGRAM

## 2025-08-24 PROCEDURE — 36415 COLL VENOUS BLD VENIPUNCTURE: CPT

## 2025-08-24 PROCEDURE — 2500000004 HC RX 250 GENERAL PHARMACY W/ HCPCS (ALT 636 FOR OP/ED)

## 2025-08-24 PROCEDURE — 2500000002 HC RX 250 W HCPCS SELF ADMINISTERED DRUGS (ALT 637 FOR MEDICARE OP, ALT 636 FOR OP/ED)

## 2025-08-24 PROCEDURE — 87040 BLOOD CULTURE FOR BACTERIA: CPT

## 2025-08-24 PROCEDURE — 2500000005 HC RX 250 GENERAL PHARMACY W/O HCPCS

## 2025-08-24 PROCEDURE — 85025 COMPLETE CBC W/AUTO DIFF WBC: CPT

## 2025-08-24 PROCEDURE — 99291 CRITICAL CARE FIRST HOUR: CPT

## 2025-08-24 PROCEDURE — 83735 ASSAY OF MAGNESIUM: CPT

## 2025-08-24 PROCEDURE — 94660 CPAP INITIATION&MGMT: CPT

## 2025-08-24 PROCEDURE — 80069 RENAL FUNCTION PANEL: CPT

## 2025-08-24 PROCEDURE — 1100000001 HC PRIVATE ROOM DAILY

## 2025-08-24 RX ORDER — POTASSIUM CHLORIDE 1.5 G/1.58G
20 POWDER, FOR SOLUTION ORAL ONCE
Status: DISCONTINUED | OUTPATIENT
Start: 2025-08-24 | End: 2025-08-24

## 2025-08-24 RX ORDER — POTASSIUM CHLORIDE 1.5 G/1.58G
40 POWDER, FOR SOLUTION ORAL ONCE
Status: COMPLETED | OUTPATIENT
Start: 2025-08-24 | End: 2025-08-24

## 2025-08-24 RX ORDER — FUROSEMIDE 10 MG/ML
100 INJECTION INTRAMUSCULAR; INTRAVENOUS ONCE
Status: COMPLETED | OUTPATIENT
Start: 2025-08-24 | End: 2025-08-24

## 2025-08-24 RX ORDER — OXYCODONE HYDROCHLORIDE 5 MG/1
10 TABLET ORAL EVERY 6 HOURS PRN
Refills: 0 | Status: DISCONTINUED | OUTPATIENT
Start: 2025-08-24 | End: 2025-08-25

## 2025-08-24 RX ORDER — MAGNESIUM SULFATE HEPTAHYDRATE 40 MG/ML
2 INJECTION, SOLUTION INTRAVENOUS ONCE
Status: COMPLETED | OUTPATIENT
Start: 2025-08-24 | End: 2025-08-24

## 2025-08-24 RX ADMIN — CEFAZOLIN SODIUM 2 G: 2 INJECTION, SOLUTION INTRAVENOUS at 12:41

## 2025-08-24 RX ADMIN — Medication: at 08:00

## 2025-08-24 RX ADMIN — POTASSIUM CHLORIDE 40 MEQ: 1.5 POWDER, FOR SOLUTION ORAL at 16:47

## 2025-08-24 RX ADMIN — MAGNESIUM SULFATE HEPTAHYDRATE 2 G: 40 INJECTION, SOLUTION INTRAVENOUS at 16:47

## 2025-08-24 RX ADMIN — LEVOTHYROXINE SODIUM 75 MCG: 0.07 TABLET ORAL at 09:11

## 2025-08-24 RX ADMIN — CEFAZOLIN SODIUM 2 G: 2 INJECTION, SOLUTION INTRAVENOUS at 21:58

## 2025-08-24 RX ADMIN — PAROXETINE 20 MG: 20 TABLET, FILM COATED ORAL at 09:11

## 2025-08-24 RX ADMIN — CEFAZOLIN SODIUM 2 G: 2 INJECTION, SOLUTION INTRAVENOUS at 05:39

## 2025-08-24 RX ADMIN — FUROSEMIDE 100 MG: 10 INJECTION, SOLUTION INTRAMUSCULAR; INTRAVENOUS at 16:41

## 2025-08-24 RX ADMIN — ACETAMINOPHEN 975 MG: 325 TABLET ORAL at 14:46

## 2025-08-24 RX ADMIN — ATORVASTATIN CALCIUM 20 MG: 20 TABLET, FILM COATED ORAL at 09:11

## 2025-08-24 RX ADMIN — Medication 25 MCG: at 09:11

## 2025-08-24 RX ADMIN — LIDOCAINE 4% 1 PATCH: 40 PATCH TOPICAL at 09:13

## 2025-08-24 RX ADMIN — INSULIN LISPRO 6 UNITS: 100 INJECTION, SOLUTION INTRAVENOUS; SUBCUTANEOUS at 09:10

## 2025-08-24 RX ADMIN — OXYCODONE AND ACETAMINOPHEN 1 TABLET: 5; 325 TABLET ORAL at 03:51

## 2025-08-24 RX ADMIN — OXYCODONE HYDROCHLORIDE 10 MG: 5 TABLET ORAL at 10:21

## 2025-08-24 RX ADMIN — INSULIN LISPRO 6 UNITS: 100 INJECTION, SOLUTION INTRAVENOUS; SUBCUTANEOUS at 17:30

## 2025-08-24 RX ADMIN — INSULIN LISPRO 6 UNITS: 100 INJECTION, SOLUTION INTRAVENOUS; SUBCUTANEOUS at 12:41

## 2025-08-24 RX ADMIN — PANTOPRAZOLE SODIUM 40 MG: 40 TABLET, DELAYED RELEASE ORAL at 09:11

## 2025-08-24 RX ADMIN — Medication: at 20:00

## 2025-08-24 RX ADMIN — OXYCODONE HYDROCHLORIDE 10 MG: 5 TABLET ORAL at 16:41

## 2025-08-24 RX ADMIN — OXYCODONE HYDROCHLORIDE AND ACETAMINOPHEN 500 MG: 500 TABLET ORAL at 09:11

## 2025-08-24 RX ADMIN — ENOXAPARIN SODIUM 30 MG: 100 INJECTION SUBCUTANEOUS at 14:46

## 2025-08-24 ASSESSMENT — PAIN SCALES - GENERAL
PAINLEVEL_OUTOF10: 7
PAINLEVEL_OUTOF10: 5 - MODERATE PAIN
PAINLEVEL_OUTOF10: 0 - NO PAIN
PAINLEVEL_OUTOF10: 7
PAINLEVEL_OUTOF10: 4
PAINLEVEL_OUTOF10: 0 - NO PAIN
PAINLEVEL_OUTOF10: 0 - NO PAIN
PAINLEVEL_OUTOF10: 5 - MODERATE PAIN
PAINLEVEL_OUTOF10: 7
PAINLEVEL_OUTOF10: 1

## 2025-08-24 ASSESSMENT — PAIN - FUNCTIONAL ASSESSMENT
PAIN_FUNCTIONAL_ASSESSMENT: CPOT (CRITICAL CARE PAIN OBSERVATION TOOL)
PAIN_FUNCTIONAL_ASSESSMENT: CPOT (CRITICAL CARE PAIN OBSERVATION TOOL)
PAIN_FUNCTIONAL_ASSESSMENT: 0-10
PAIN_FUNCTIONAL_ASSESSMENT: CPOT (CRITICAL CARE PAIN OBSERVATION TOOL)
PAIN_FUNCTIONAL_ASSESSMENT: 0-10
PAIN_FUNCTIONAL_ASSESSMENT: CPOT (CRITICAL CARE PAIN OBSERVATION TOOL)
PAIN_FUNCTIONAL_ASSESSMENT: 0-10

## 2025-08-24 ASSESSMENT — PAIN DESCRIPTION - DESCRIPTORS
DESCRIPTORS: ACHING;CRAMPING

## 2025-08-24 NOTE — PROGRESS NOTES
" Cardiac Intensive Care - Daily Progress Note   Subjective    Radha Torres is a 85 y.o. year old female patient admitted on 8/9/2025 with following ICU needs: Severe Aortic Stenosis    Interval History:    On nasal cannula. No changes in clinical status. Roughly 1025cc of urine output.       Meds    Scheduled medications  Scheduled Medications[1]  Continuous medications  Continuous Medications[2]  PRN medications  PRN Medications[3]     Objective    Blood pressure 114/58, pulse 82, temperature 35.5 °C (95.9 °F), temperature source Temporal, resp. rate 19, height 1.65 m (5' 4.96\"), weight 71.1 kg (156 lb 12 oz), SpO2 95%.     Physical Exam  Vitals reviewed.   Constitutional:       Appearance: She is ill-appearing.     Eyes:      General: No scleral icterus.     Extraocular Movements: Extraocular movements intact.      Pupils: Pupils are equal, round, and reactive to light.       Cardiovascular:      Rate and Rhythm: Normal rate and regular rhythm.      Pulses: Normal pulses.      Heart sounds: Murmur heard.   Pulmonary:      Effort: No respiratory distress.      Breath sounds: Rhonchi and rales present.   Abdominal:      General: There is no distension.      Palpations: Abdomen is soft.      Tenderness: There is no guarding.     Musculoskeletal:      Right lower leg: No edema.      Left lower leg: No edema.     Skin:     General: Skin is warm.      Capillary Refill: Capillary refill takes 2 to 3 seconds.     Neurological:      Mental Status: She is disoriented.         Intake/Output Summary (Last 24 hours) at 8/24/2025 0649  Last data filed at 8/24/2025 0400  Gross per 24 hour   Intake 691.78 ml   Output 1025 ml   Net -333.22 ml     Labs:   Results from last 72 hours   Lab Units 08/23/25  1717 08/23/25  0501 08/22/25  1501   SODIUM mmol/L 137 138 139   POTASSIUM mmol/L 3.6 4.3 3.3*   CHLORIDE mmol/L 94* 94* 93*   CO2 mmol/L 34* 35* 37*   BUN mg/dL 46* 49* 50*   CREATININE mg/dL 1.59* 1.68* 1.67*   GLUCOSE mg/dL 201* " 374* 175*   CALCIUM mg/dL 8.4* 8.5* 8.8   ANION GAP mmol/L 13 13 12   EGFR mL/min/1.73m*2 32* 30* 30*   PHOSPHORUS mg/dL 2.0* 3.2 1.8*      Results from last 72 hours   Lab Units 08/23/25  0501 08/22/25  1501 08/22/25  0320   WBC AUTO x10*3/uL 21.3* 20.1* 24.1*   HEMOGLOBIN g/dL 10.9* 10.5* 10.7*   HEMATOCRIT % 33.7* 31.0* 30.8*   PLATELETS AUTO x10*3/uL 142* 133* 139*   NEUTROS PCT AUTO % 88.3 88.3 90.2   LYMPHS PCT AUTO % 4.2 4.1 2.9   MONOS PCT AUTO % 6.5 6.5 5.7   EOS PCT AUTO % 0.1 0.0 0.0      Results from last 72 hours   Lab Units 08/21/25  1153   POCT PH, ARTERIAL pH 7.47*   POCT PCO2, ARTERIAL mm Hg 46*   POCT PO2, ARTERIAL mm Hg 188*   POCT SO2, ARTERIAL % 100       Results from last 72 hours   Lab Units 08/22/25  1105 08/21/25  1058   POCT PH, VENOUS pH 7.48* 7.38   POCT PCO2, VENOUS mm Hg 43 51   POCT PO2, VENOUS mm Hg 64* 30*        Micro/ID:     Lab Results   Component Value Date    URINECULTURE >=100,000 CFU/mL Escherichia coli (A) 08/19/2025    BLOODCULT No growth at 2 days 08/21/2025         Assessment and Plan     Radha Torres is a 85 y.o. female with PMH HFrEF (EF 25-30%), aortic stenosis, Afib, s/p AV node ablation and leadless pacemaker in 2025, HTN, renal cell carcinoma who presented on 8/9/25 as a transfer from [facility] for epigastric pain, transferred to CICU for possible flash pulmonary edema and hyponatremia. Now managing with aggressive diuresis, considering thora, on Vanc/Zoyn for Staph A bacteremia. Urine output responding to diuresis and pressures stable, slight bump in lactate, watching closely for signs of development of shock.     Update 8/24:  - Satting appropriate on nasal cannula  - Blood cx negative  - Continue cefazolin per ID  - No changes in WBC  - Structural continuing to follow, to be re-engaged if Bacteremia clears    CNS  #agitation  #Acute Encephalopathy  -1:1 sitter  -PRN zyprexa  - Etiology most likely infectious vs. Hyponatremia vs. Delirium  - Vanc/Zosyn  - Delirium  precautions  - Hyponatremia has demonstrated improvement with diuresis     PULM  #flash pulmonary edema  -CPAP as tolerated, SpO2>92%  -nipride gtt restarted overnight 8/21-22, caution iso severe aortic stenosis, SBP goal 130-150  -Continuing to diurese     CV  #Severe aortic stenosis  #HFrEF (EF 25-30%)  #HTN  :: Echo 7/11/25 - EF 25-30%, left ventricle mildly dilated, mild concentric LV hypertrophy, leadless pacemaker in right atria and RV, aortic valve is heavily calcified with appearance of severe aortic stenosis (peak gradient 52mmHg, mean gradient 25mmHg, aortic valve area 0.8cm^2), trace AR, mild MR and TR, RVSP 50-60mmHg,   ::Scheduled for TAVR 8/19/25 with Dr. Howell  ::EKG with no ischemic changes  ::Troponin 23>27  Plan:  - Diurese with IV Lasix and Diuril as needed  -strict I/Os, redose as needed  -holding home coreg 6.25mg, amlodipine 5mg  - Watching BP closely, currently normotensive     #Atrial fibrillation  #S/p AV emiliano ablation and leadless pacemaker placement 5/6/25  :: Home regimen - coreg 6.25 BID, amlodipine 2.5 mg, Eliquis 5mg BID  Plan:  - Coag and Factor Xa levels still appropriate, patient has been off Heparin gtt, will remain off for procedure  -fu heparin assay  -holding home coreg     GI  #Epigastric pain  #Nausea/GI upset  :: EGG performed 2018 at [facility] notable for PUD  ::Possibly due to volume overload/ADHF vs GERD  Plan:   - Tigan PRN ordered for nausea   - KUB with no acute findings; ECG unchanged   - Continue pantoprazole 40 mg daily, tums PRN  - Continue home Percocet 5-325 mg Q6 PRN  - NG tube placement     RENAL/  #Acute severe hypo-osmolar hyponatremia, resolved  :: Na 134   :: Diuresis improving Na levels  ::serum osm 255, urine Na 70, urine osm 248  ::TSH, morning cortisol within normal limits  Plan:   - Continue with IV Lasix as needed for hypervolemia     #CKD 3b  :: Baseline Cr appears to be around 1.2  ::Cr 1.22 in ED  Plan:  - Daily RFP   - Avoid nephrotoxic agents  -  Strict I/O     ID  #MSSA bacteremia  -blood cultures gotten from 8/19 for persistent leukocytosis were positive for MSSA  -repeat cultures from 8/20 also positive  -unclear source  Plan:   -ID consulted, appreciate recommendations  -continue cefazolin   -consider MAKSIM if w/in goals of care w/ family, however given tenuous resp status, will defer for now      HEME/ONC  DANIELE     ENDO  #Hypothyroidism  ::TSH wnl  - Continue levothyroxine 75 mcg     #suspected DM  -fu A1c  -ISS #1, hypoglycemia protocol     MSK/Skin  DANIELE     F: PRN  E: K>4 Phos>3 Mag>2  N: NPO  A: PIV  O2: CPAP 30% FIO2  Drips: nipride, dopamine  Abx: None  DVT PPx: pending heparin assay  GI PPx: PPI     CODE STATUS: FULL (confirmed with patient on admission)  SURROGATE DECISION MAKER: Stefanie Osbaldo, [phone]       Konstantin Hunter MD   08/24/25 at 6:49 AM     Disclaimer: Documentation completed with the information available at the time of input. The times in the chart may not be reflective of actual patient care times, interventions, or procedures. Documentation occurs after the physical care of the patient.        [1] [Held by provider] amLODIPine, 5 mg, oral, Daily  ascorbic acid, 500 mg, oral, Daily  atorvastatin, 20 mg, oral, Daily  [Held by provider] carvedilol, 6.25 mg, oral, BID  ceFAZolin, 2 g, intravenous, q8h  cholecalciferol, 25 mcg, oral, Daily  enoxaparin, 30 mg, subcutaneous, q24h  [Held by provider] ferrous sulfate, 1 tablet, oral, Daily  [Held by provider] gabapentin, 300 mg, oral, Daily  [Held by provider] hydrALAZINE, 50 mg, oral, TID  insulin lispro, 0-15 Units, subcutaneous, TID AC  levothyroxine, 75 mcg, oral, Daily  lidocaine, 1 patch, transdermal, Daily  pantoprazole, 40 mg, oral, Daily before breakfast  PARoxetine, 20 mg, oral, Daily  polyethylene glycol, 17 g, oral, Daily     [2]    [3] PRN medications: acetaminophen, calcium carbonate, dextrose, dextrose, diclofenac sodium, glucagon, glucagon, HYDROmorphone, OLANZapine,  oxyCODONE-acetaminophen, oxygen, oxygen, temazepam, trimethobenzamide

## 2025-08-24 NOTE — PROGRESS NOTES
Infectious Diseases Progress Note  Subjective   Interval History  - remains afebrile  - WBC 22.3 today from 21.3 yesterday  - BCx 8/21 collected @ 11:13am are NGTD     Objective   Physical Examination  Vital signs in the last 24 hours:  Temp:  [35.4 °C (95.7 °F)-35.9 °C (96.6 °F)] 35.4 °C (95.7 °F)  Heart Rate:  [70-85] 77  Resp:  [12-45] 21  BP: ()/() 129/67    Oxygen Therapy  Medical Gas Therapy: Supplemental oxygen  Medical Gas Delivery Method: Nasal cannula  SpO2: 96 %    Constitutional: in NAD  HEENT: sclerae anicteric  CV: RRR, systolic murmur noted  Pulm: no increased work of breathing  GI: abd ND  : external urethral catheter in place   Skin: warm and dry  Ext: bilateral LE without pitting edema   Neuro: alert but confused  Psych: affect calm    Laboratory/Imaging  Results from last 7 days   Lab Units 08/24/25  0557 08/23/25  0501 08/22/25  1501   WBC AUTO x10*3/uL 22.3* 21.3* 20.1*   HEMOGLOBIN g/dL 9.7* 10.9* 10.5*   HEMATOCRIT % 29.4* 33.7* 31.0*   PLATELETS AUTO x10*3/uL 122* 142* 133*   MCV fL 87 86 84   NEUTROS PCT AUTO % 86.9 88.3 88.3   NEUTROS ABS x10*3/uL 19.35* 18.81* 17.73*   LYMPHS PCT AUTO % 5.4 4.2 4.1   LYMPHS ABS AUTO x10*3/uL 1.20 0.90 0.82   EOS PCT AUTO % 0.2 0.1 0.0   EOS ABS AUTO x10*3/uL 0.04 0.02 0.01     Results from last 7 days   Lab Units 08/24/25  0557 08/23/25  1717 08/23/25  0501   SODIUM mmol/L 142 137 138   POTASSIUM mmol/L 3.4* 3.6 4.3   CHLORIDE mmol/L 104 94* 94*   CO2 mmol/L 30 34* 35*   BUN mg/dL 47* 46* 49*   CREATININE mg/dL 1.30* 1.59* 1.68*   GLUCOSE mg/dL 184* 201* 374*     Results from last 7 days   Lab Units 08/24/25  0557 08/23/25  1717 08/23/25  0501 08/21/25  1826 08/21/25  1058   PROTEIN TOTAL g/dL  --   --   --   --  5.0*   ALBUMIN g/dL 2.4* 3.1* 3.1*   < >  --     < > = values in this interval not displayed.     Imaging  XR chest 1 view  Result Date: 8/23/2025  1. Enteric tube courses inferiorly below the diaphragm and terminates outside the  field of view. 2. Otherwise, stable appearance of the chest with similar degree of pulmonary edema and small bilateral pleural effusions.   I personally reviewed the images/study and I agree with the findings as stated by Raymond Adam DO PGY-3. This study was interpreted at [place], [place].   MACRO: None   Signed by: Eric Reza 8/23/2025 1:20 PM Dictation workstation:   [id number]    XR chest 1 view  Result Date: 8/22/2025  1. Similar bilateral small pleural effusions. 2. Similar bilateral interstitial edema.   I personally reviewed the images/study and I agree with the findings as stated by Bhupinder Katz MD.   MACRO: None   Signed by: Shaw Suarez 8/22/2025 10:04 AM Dictation workstation:   [id number]    US thoracentesis  Result Date: 8/21/2025  Uneventful  leftt-sided thoracentesis, as detailed above.   I personally performed and/or directly supervised this study and was present for the entire procedure.   I personally reviewed the study and resident interpretation. I agree with the findings as stated.   Performed and dictated at [facility].   MACRO: None   Signed by: Betty Horton 8/21/2025 5:24 PM Dictation workstation:   [id number]    XR chest 1 view  Result Date: 8/21/2025  1. Status post thoracentesis, with interval large decrease in left basilar effusion/atelectasis. Residual trace left pleural effusion. 2. Small right pleural effusion with right basilar consolidation/atelectasis. 3. Unchanged diffuse bilateral interstitial edema. 4. Medical devices as noted above.   I personally reviewed the images/study and I agree with the findings as stated. This study was interpreted by radiology resident Pepe Urban D.O. at [place], [place].   Signed by: Yamil Fraser 8/21/2025 11:43 AM Dictation workstation:   [id number]    XR abdomen 1 view  Result Date: 8/21/2025  1. Distal  tip of the enteric tube projects over the proximal duodenum. 2. Nonobstructive bowel gas pattern. Air-filled colonic loops and correlate with any concern for ileus. 3. Bilateral pleural effusions.     I personally reviewed the images/study and I agree with the findings as stated. This study was interpreted at [place], [place].   MACRO: None   Signed by: Yamil Fraser 8/21/2025 10:10 AM Dictation workstation:   [id number]    CT chest abdomen pelvis wo IV contrast  Result Date: 8/20/2025  Chest 1.  Interval increase of left-sided pleural effusion with associated pulmonary vascular congestion, likely in the setting of fluid overload from severe aortic stenosis. This is likely contributing to patient's hypoxic respiratory failure.   Abdomen-Pelvis 1. Body wall edema likely secondary to fluid retention along with stable small ascites 2. No acute abdominopelvic abnormality. 3. Stable placement of Dobbhoff tube in the duodenum.   I personally reviewed the images/study and I agree with the findings as stated by Rodrick Carrera DO. This study was interpreted at [facility], [place], [place].   MACRO: None   Signed by: Nazario Porter 8/20/2025 7:41 PM Dictation workstation:   [id number]    XR abdomen 1 view  Result Date: 8/20/2025  1. Interval placement of enteric tube coursing below the diaphragm with distal tip projecting over the expected location of 1st part of the duodenum.   MACRO: None   Signed by: Shaw Suarez 8/20/2025 11:29 AM Dictation workstation:   [id number]    XR chest 1 view  Result Date: 8/19/2025  1. Unchanged small right and moderate left pleural effusions with left basilar consolidation/atelectasis. 2. Unchanged diffuse interstitial edema.   I personally reviewed the images/study and I agree with the findings as stated. This study was interpreted by radiology resident Pepe Urban D.O. at [facility]  [place], [place].   Signed by: Shaw Suarez 8/19/2025 10:48 AM Dictation workstation:   [id number]    XR chest 1 view  Result Date: 8/19/2025  1.  Small right pleural effusion. 2. Similar appearance of left basilar consolidation. 3. Similar appearance of bilateral perihilar interstitial edema.   I personally reviewed the images/study and I agree with the findings as stated by Bhupinder Katz MD.   MACRO: None   Signed by: Shaw Suarez 8/19/2025 10:34 AM Dictation workstation:   [id number]    CT head wo IV contrast  Result Date: 8/19/2025  No acute intracranial hemorrhage, mass effect, or CT apparent acute infarct.   Moderate chronic small vessel ischemic disease and brain atrophy. Questionable disproportionate volume loss within the anterior temporal lobes, correlate for Alzheimer's.   MACRO: None   Signed by: Yamil Headley 8/19/2025 9:21 AM Dictation workstation:   [id number]    XR chest 1 view  Result Date: 8/18/2025  1. Unchanged small right and moderate left pleural effusions with left basilar consolidation/atelectasis. Superimposed left basilar pneumonia is not excluded. 2. Unchanged diffuse bilateral interstitial edema.   I personally reviewed the images/study and I agree with the findings as stated. This study was interpreted by radiology resident Pepe Urban D.O. at [facility], [place], [place].   Signed by: Levon Capps 8/18/2025 9:51 AM Dictation workstation:   [id number]      Cardiology, Vascular, and Other Imaging  Electrocardiogram, 12-lead PRN ACS symptoms  Result Date: 8/21/2025  Ventricular-paced rhythm Abnormal ECG When compared with ECG of 12-AUG-2025 11:11, Vent. rate has decreased BY   8 BPM Confirmed by Axel Shaw ([id number]) on 8/21/2025 12:21:23 PM    Transthoracic Echo (TTE) Limited  Result Date: 8/20/2025   [facility], [address] [place] [address]                Tel [phone] and Fax [phone]  TRANSTHORACIC ECHOCARDIOGRAM REPORT  Patient Name:       CHARISSE DOUGHERTY        Reading Physician:    [id number] Nicky Oseguera MD Study Date:         8/20/2025           Ordering Provider:    [id number] BRIANA JUSTICE MRN/PID:            [id number]            Fellow: Accession#:         [id number]        Nurse: Date of Birth/Age:  1940 / 85      Sonographer:          Karley Carreon RDCS                     years Gender assigned at  F                   Additional Staff:     Kassidy Price Birth:                                                        RDCS, AMEEE Height:             165.00 cm           Admit Date:           8/9/2025 Weight:             78.80 kg            Admission Status:     Inpatient - STAT BSA / BMI:          1.86 m2 / 28.94     Encounter#:           [id number]                     kg/m2 Blood Pressure:     118/71 mmHg         Department Location:  [facility] Study Type:    TRANSTHORACIC ECHO (TTE) LIMITED Diagnosis/ICD: Chest pain, unspecified-R07.9 Indication:    Assess AS CPT Code:      Echo Limited-85888; Doppler Limited-30187; Color Doppler-32531 Patient History: Valve Disorders:   Aortic Stenosis. Diabetes:          Yes Pertinent History: HTN, Cardiomyopathy, Hyperlipidemia, A-Fib and Chest Pain.                    Leadless Pacemaker. Study Detail: The following Echo studies were performed: 2D, M-Mode, Doppler and               color flow. Technically challenging study due to prominent lung               artifact and patient lying in supine position. Definity used as a               contrast agent for endocardial border definition. Total contrast               used for this procedure was 3 mL via IV push. Unable to obtain               suprasternal notch view.  PHYSICIAN INTERPRETATION: Left Ventricle: Left ventricular ejection fraction is severely decreased by visual estimate at 20-25%. There is mild eccentric left ventricular hypertrophy. There is  global hypokinesis of the left ventricle with minor regional variations. The left ventricular cavity size is severely dilated. There is normal septal and normal posterior left ventricular wall thickness. Left ventricular diastolic filling cannot be determined due to right ventricular pacing. There is no definite left ventricular thrombus visualized. There is severe systolic dysfunction with global hypokinesis but with the basal lateral and inferolateral walls working best. Left Atrium: The left atrial size is severely dilated. Right Ventricle: The right ventricle is normal in size. There is reduced right ventricular systolic function. Right Atrium: The right atrial size was not assessed. Aortic Valve: The aortic valve was not well visualized. The aortic valve area by VTI is 0.75 cmÂ² with a peak velocity of 3.76 m/s. The peak and mean gradients are 57 mmHg and 30 mmHg, respectively with a dimensionless index of 0.22. There is severe aortic valve cusp calcification. There is trace aortic valve regurgitation. AV appears heavily calcified and restricted with gradienets of 57/30mmHg with DI of 0.21. The DI of 0.21 is consistent with severe AS though the gradients are lower than expected due to reduced LV systolic function ( ie low flow low gradient AS). There is only trivial AI. Mitral Valve: The mitral valve is normal in structure. The doppler estimated peak and mean diastolic pressure gradients are 6.0 mmHg and 2 mmHg, respectively. The mean gradient of the mitral valve is 2 mmHg. There is trace to mild mitral valve regurgitation. Tricuspid Valve: The tricuspid valve is structurally normal. There is mild tricuspid regurgitation. The Doppler estimated right ventricular systolic pressure (RVSP) is moderately elevated at 56 mmHg. Reported right ventricular systolic pressure may be underestimated due to incomplete or suboptimal Doppler envelope. Pulmonic Valve: The pulmonic valve is not well visualized. There is  physiologic pulmonic valve regurgitation. Pericardium: Trivial pericardial effusion. Pleural: There is left pleural effusion. Aorta: The aortic root is normal. Systemic Veins: The inferior vena cava appears dilated, with IVC inspiratory collapse less than 50%. In comparison to the previous echocardiogram(s): Compared with study dated 12/8/2022, the LV systolic function was normal at that time with LVEF 65% with normal sized cavity. The LV is now dilated with severely reduced systolic function. The prior AV gradients were 46.2/27mmHg with DI of 0.3 consistent with moderate AS at that time. Although the gradients are only a little higher now, that is in the setting of much worse LV systolic function and her aortic stenosis has progressed to severe.  ` CONCLUSIONS:  1. Left ventricular ejection fraction is severely decreased by visual estimate at 20-25%.  2. There is global hypokinesis of the left ventricle with minor regional variations.  3. Left ventricular cavity size is severely dilated.  4. No left ventricular thrombus visualized.  5. There is severe systolic dysfunction with global hypokinesis but with the basal lateral and inferolateral walls working best.  6. There is reduced right ventricular systolic function.  7. The left atrial size is severely dilated.  8. The Doppler estimated RVSP is moderately elevated at 56 mmHg.  9. AV appears heavily calcified and restricted with gradienets of 57/30mmHg with DI of 0.21. The DI of 0.21 is consistent with severe AS though the gradients are lower than expected due to reduced LV systolic function ( ie low flow low gradient AS). There is only trivial AI. 10. Primary service notified of findings at the time of reporting. 11. Compared with study dated 12/8/2022, the LV systolic function was normal at that time with LVEF 65% with normal sized cavity. The LV is now dilated with severely reduced systolic function. The prior AV gradients were 46.2/27mmHg with DI of 0.3  consistent with moderate AS at that time. Although the gradients are only a little higher now, that is in the setting of much worse LV systolic function and her aortic stenosis has progressed to severe. `  QUANTITATIVE DATA SUMMARY:  2D MEASUREMENTS:          Normal Ranges: Ao Root d:       3.20 cm  (2.0-3.7cm) LAs:             4.10 cm  (2.7-4.0cm) IVSd:            0.90 cm  (0.6-1.1cm) LVPWd:           0.70 cm  (0.6-1.1cm) LVIDd:           6.00 cm  (3.9-5.9cm) LVIDs:           4.10 cm LV Mass Index:   100 g/m2 LV % FS          31.7 %  LEFT ATRIUM:                  Normal Ranges: LA Vol A4C:        73.0 ml    (22+/-6mL/m2) LA Vol A2C:        123.5 ml LA Vol BP:         95.7 ml LA Vol Index A4C:  39.2ml/m2 LA Vol Index A2C:  66.3 ml/m2 LA Vol Index BP:   51.4 ml/m2 LA Area A4C:       23.0 cm2 LA Area A2C:       29.7 cm2 LA Major Axis A4C: 6.2 cm LA Major Axis A2C: 6.1 cm  AORTA MEASUREMENTS:         Normal Ranges: Ao Sinus, d:        3.30 cm (2.1-3.5cm)  LV SYSTOLIC FUNCTION:                      Normal Ranges: EF-A4C View:    21 % (>=55%) EF-Visual:      23 % LV EF Reported: 23 %  MITRAL VALVE:          Normal Ranges: MV Vmax:      1.22 m/s (<=1.3m/s) MV peak P.0 mmHg (<5mmHg) MV mean P.0 mmHg (<2mmHg)  AORTIC VALVE:                      Normal Ranges: AoV Vmax:                3.76 m/s  (<=1.7m/s) AoV Peak P.6 mmHg (<20mmHg) AoV Mean P.0 mmHg (1.7-11.5mmHg) LVOT Max Timo:            0.87 m/s  (<=1.1m/s) AoV VTI:                 72.30 cm  (18-25cm) LVOT VTI:                15.60 cm LVOT Diameter:           2.10 cm   (1.8-2.4cm) AoV Area, VTI:           0.75 cm2  (2.5-5.5cm2) AoV Area,Vmax:           0.80 cm2  (2.5-4.5cm2) AoV Dimensionless Index: 0.22  RIGHT VENTRICLE: TAPSE: 11.2 mm  TRICUSPID VALVE/RVSP:          Normal Ranges: Peak TR Velocity:     3.22 m/s Est. RA Pressure:     15 RV Syst Pressure:     56       (< 30mmHg) IVC Diam:             2.40 cm  [id number] Nicky Oseguera  MD Electronically signed on 8/20/2025 at 1:19:11 PM  ** Final **       Microbiology  Susceptibility data from last 14705 days.  Collected Specimen Info Organism Ampicillin Cefazolin Cefazolin (uncomplicated UTIs only) Ciprofloxacin Clindamycin Erythromycin Gentamicin Levofloxacin Nitrofurantoin Oxacillin Piperacillin/Tazobactam Tetracycline   08/20/25 Blood culture from Peripheral Venipuncture Staphylococcus aureus               08/20/25 Blood culture from Peripheral Venipuncture Staphylococcus aureus               08/19/25 Urine from Clean Catch/Voided Escherichia coli  S  S  S  S    S  S  S   S    08/19/25 Blood culture from Peripheral Venipuncture Methicillin Susceptible Staphylococcus aureus (MSSA)      S  S     S   S     Collected Specimen Info Organism Trimethoprim/Sulfamethoxazole Vancomycin   08/20/25 Blood culture from Peripheral Venipuncture Staphylococcus aureus     08/20/25 Blood culture from Peripheral Venipuncture Staphylococcus aureus     08/19/25 Urine from Clean Catch/Voided Escherichia coli  S    08/19/25 Blood culture from Peripheral Venipuncture Methicillin Susceptible Staphylococcus aureus (MSSA)  S  S        Assessment   Radha Torres is a 85 y.o. woman with a relevant PMH of HTN, HFrEF, severe AS, afib s/p AVN ablation and leadless pacemaker insertion, RCC who was transferred to [facility] on 8/9/2025 (today is hospital day 15) for chest pain with concern for flash pulmonary edema. Hospitalization c/b episode of AMS/SOB on 8/19 for which BCx demonstrated MSSA.     Found to have MSSA bacteremia and E. coli UTI. Infectious diseases has been consulted for assistance with the above infections. Her MSSA is at least complicated given persistently positive Bcx, presence of spinal and hip prostheses, and severe valvular disease. Cutaneous candidiasis under the breasts with cellulitis may have provided a portal of entry. TTE was negative for vegetations.    As BCx have been negative for essentially 72h, and  in my discussions with the CICU team that I do not think the probability is high that tagged WBC or PET/CT can definitely rule out a deep-seated infection, given her significant underlying risk factors, I favor completion of a 6 week course of antibiotics.     Plan   Recommendations  - continue cefazolin 2g q8h to complete 6 weeks of therapy since negative BCx (8/21-10/1)  - continue fluconazole 75 mg weekly (renally dosed) for 4 weeks for infra-mammary cutaneous candidiasis (first dose 8/20, last dose 9/10)    - we will schedule outpatient ID follow-up for the patient; if not available at the time of discharge, have family call [phone], option 3  - after discharge, the following labs will need to be collected every Monday: CBC with diff, BMP, hepatic function panel, ESR, quantitative CRP  - fax all results to [phone], attn. Dr. NINA Rodriguez     Thank you for allowing us to participate in this patient's care.  Infectious diseases will sign off at this time. Please contact us with any additional questions.    Arlene Cam MD    Division of Infectious Diseases and HIV Medicine    __________________________________________________  I spent 40 minutes in the professional and overall care of this patient.    High complexity medical decision making related to acute infection or illness that poses a threat to life or bodily function.    This is a complex infectious disease issue and the following was performed today (for more details please see the above note):   Management decisions reflecting the added complexity (e.g., changes in antimicrobial therapy, infection control strategies).

## 2025-08-24 NOTE — CARE PLAN
Problem: Safety - Adult  Goal: Free from fall injury  Outcome: Progressing     Problem: Chronic Conditions and Co-morbidities  Goal: Patient's chronic conditions and co-morbidity symptoms are monitored and maintained or improved  Outcome: Progressing     Problem: Skin  Goal: Decreased wound size/increased tissue granulation at next dressing change  Outcome: Progressing  Flowsheets (Taken 8/24/2025 1631)  Decreased wound size/increased tissue granulation at next dressing change:   Protective dressings over bony prominences   Promote sleep for wound healing  Goal: Participates in plan/prevention/treatment measures  Outcome: Progressing  Flowsheets (Taken 8/24/2025 1631)  Participates in plan/prevention/treatment measures: Elevate heels  Goal: Prevent/manage excess moisture  Outcome: Progressing  Flowsheets (Taken 8/24/2025 1631)  Prevent/manage excess moisture:   Moisturize dry skin   Cleanse incontinence/protect with barrier cream   Follow provider orders for dressing changes     Problem: Safety - Medical Restraint  Goal: Remains free of injury from restraints (Restraint for Interference with Medical Device)  Flowsheets (Taken 8/24/2025 1632)  Remains free of injury from restraints (restraint for interference with medical device):   Every 2 hours: Monitor safety, psychosocial status, comfort, nutrition and hydration   Inform patient/family regarding the reason for restraint  Goal: Free from restraint(s) (Restraint for Interference with Medical Device)  Outcome: Progressing  Flowsheets (Taken 8/24/2025 1632)  Free from restraint(s) (restraint for interference with medical device):   Every 24 hours: Continued use of restraint requires Licensed Independent Practitioner to perform face to face examination and written order   ONCE/SHIFT or MINIMUM Every 12 hours: Assess and document the continuing need for restraints

## 2025-08-25 ENCOUNTER — APPOINTMENT (OUTPATIENT)
Dept: CARDIOLOGY | Facility: HOSPITAL | Age: 85
DRG: 306 | End: 2025-08-25
Payer: MEDICARE

## 2025-08-25 LAB
ALBUMIN SERPL BCP-MCNC: 2.9 G/DL (ref 3.4–5)
ALBUMIN SERPL BCP-MCNC: 3 G/DL (ref 3.4–5)
ANION GAP SERPL CALC-SCNC: 12 MMOL/L (ref 10–20)
ANION GAP SERPL CALC-SCNC: 15 MMOL/L
BACTERIA BLD CULT: NORMAL
BASOPHILS # BLD AUTO: 0.04 X10*3/UL (ref 0–0.1)
BASOPHILS NFR BLD AUTO: 0.1 %
BUN SERPL-MCNC: 53 MG/DL (ref 6–23)
BUN SERPL-MCNC: 53 MG/DL (ref 6–23)
CALCIUM SERPL-MCNC: 8.4 MG/DL (ref 8.6–10.6)
CALCIUM SERPL-MCNC: 8.5 MG/DL (ref 8.6–10.6)
CHLORIDE SERPL-SCNC: 97 MMOL/L (ref 98–107)
CHLORIDE SERPL-SCNC: 98 MMOL/L (ref 98–107)
CO2 SERPL-SCNC: 32 MMOL/L (ref 21–32)
CO2 SERPL-SCNC: 37 MMOL/L (ref 21–32)
CREAT SERPL-MCNC: 1.5 MG/DL (ref 0.5–1.05)
CREAT SERPL-MCNC: 1.5 MG/DL (ref 0.5–1.05)
EGFRCR SERPLBLD CKD-EPI 2021: 34 ML/MIN/1.73M*2
EGFRCR SERPLBLD CKD-EPI 2021: 34 ML/MIN/1.73M*2
EOSINOPHIL # BLD AUTO: 0.2 X10*3/UL (ref 0–0.4)
EOSINOPHIL NFR BLD AUTO: 0.7 %
ERYTHROCYTE [DISTWIDTH] IN BLOOD BY AUTOMATED COUNT: 15.4 % (ref 11.5–14.5)
GLUCOSE BLD MANUAL STRIP-MCNC: 152 MG/DL (ref 74–99)
GLUCOSE BLD MANUAL STRIP-MCNC: 152 MG/DL (ref 74–99)
GLUCOSE BLD MANUAL STRIP-MCNC: 174 MG/DL (ref 74–99)
GLUCOSE BLD MANUAL STRIP-MCNC: 365 MG/DL (ref 74–99)
GLUCOSE SERPL-MCNC: 144 MG/DL (ref 74–99)
GLUCOSE SERPL-MCNC: 314 MG/DL (ref 74–99)
HCT VFR BLD AUTO: 30.9 % (ref 36–46)
HGB BLD-MCNC: 9.9 G/DL (ref 12–16)
IMM GRANULOCYTES # BLD AUTO: 0.25 X10*3/UL (ref 0–0.5)
IMM GRANULOCYTES NFR BLD AUTO: 0.9 % (ref 0–0.9)
LYMPHOCYTES # BLD AUTO: 1.16 X10*3/UL (ref 0.8–3)
LYMPHOCYTES NFR BLD AUTO: 4.3 %
MAGNESIUM SERPL-MCNC: 2.58 MG/DL (ref 1.6–2.4)
MAGNESIUM SERPL-MCNC: 2.73 MG/DL (ref 1.6–2.4)
MCH RBC QN AUTO: 27.9 PG (ref 26–34)
MCHC RBC AUTO-ENTMCNC: 32 G/DL (ref 32–36)
MCV RBC AUTO: 87 FL (ref 80–100)
MONOCYTES # BLD AUTO: 1.4 X10*3/UL (ref 0.05–0.8)
MONOCYTES NFR BLD AUTO: 5.2 %
NEUTROPHILS # BLD AUTO: 23.98 X10*3/UL (ref 1.6–5.5)
NEUTROPHILS NFR BLD AUTO: 88.8 %
NRBC BLD-RTO: 0 /100 WBCS (ref 0–0)
PHOSPHATE SERPL-MCNC: 2.4 MG/DL (ref 2.5–4.9)
PHOSPHATE SERPL-MCNC: 2.8 MG/DL (ref 2.5–4.9)
PLATELET # BLD AUTO: 157 X10*3/UL (ref 150–450)
POTASSIUM SERPL-SCNC: 4.1 MMOL/L (ref 3.5–5.3)
POTASSIUM SERPL-SCNC: 4.1 MMOL/L (ref 3.5–5.3)
RBC # BLD AUTO: 3.55 X10*6/UL (ref 4–5.2)
SODIUM SERPL-SCNC: 140 MMOL/L (ref 136–145)
SODIUM SERPL-SCNC: 143 MMOL/L (ref 136–145)
WBC # BLD AUTO: 27 X10*3/UL (ref 4.4–11.3)

## 2025-08-25 PROCEDURE — 99232 SBSQ HOSP IP/OBS MODERATE 35: CPT | Performed by: NURSE PRACTITIONER

## 2025-08-25 PROCEDURE — 2500000002 HC RX 250 W HCPCS SELF ADMINISTERED DRUGS (ALT 637 FOR MEDICARE OP, ALT 636 FOR OP/ED)

## 2025-08-25 PROCEDURE — 83735 ASSAY OF MAGNESIUM: CPT

## 2025-08-25 PROCEDURE — 80069 RENAL FUNCTION PANEL: CPT

## 2025-08-25 PROCEDURE — 2500000004 HC RX 250 GENERAL PHARMACY W/ HCPCS (ALT 636 FOR OP/ED)

## 2025-08-25 PROCEDURE — 92526 ORAL FUNCTION THERAPY: CPT | Mod: GN

## 2025-08-25 PROCEDURE — 99291 CRITICAL CARE FIRST HOUR: CPT

## 2025-08-25 PROCEDURE — 1200000002 HC GENERAL ROOM WITH TELEMETRY DAILY

## 2025-08-25 PROCEDURE — 2500000005 HC RX 250 GENERAL PHARMACY W/O HCPCS

## 2025-08-25 PROCEDURE — 36415 COLL VENOUS BLD VENIPUNCTURE: CPT

## 2025-08-25 PROCEDURE — 99233 SBSQ HOSP IP/OBS HIGH 50: CPT

## 2025-08-25 PROCEDURE — 85025 COMPLETE CBC W/AUTO DIFF WBC: CPT

## 2025-08-25 PROCEDURE — 97530 THERAPEUTIC ACTIVITIES: CPT | Mod: GO

## 2025-08-25 PROCEDURE — 99497 ADVNCD CARE PLAN 30 MIN: CPT

## 2025-08-25 PROCEDURE — 97530 THERAPEUTIC ACTIVITIES: CPT | Mod: GP

## 2025-08-25 PROCEDURE — 93005 ELECTROCARDIOGRAM TRACING: CPT

## 2025-08-25 PROCEDURE — 82947 ASSAY GLUCOSE BLOOD QUANT: CPT

## 2025-08-25 PROCEDURE — 2500000001 HC RX 250 WO HCPCS SELF ADMINISTERED DRUGS (ALT 637 FOR MEDICARE OP)

## 2025-08-25 RX ORDER — INSULIN LISPRO 100 [IU]/ML
0-15 INJECTION, SOLUTION INTRAVENOUS; SUBCUTANEOUS EVERY 6 HOURS
Status: DISCONTINUED | OUTPATIENT
Start: 2025-08-25 | End: 2025-08-26

## 2025-08-25 RX ORDER — FUROSEMIDE 10 MG/ML
100 INJECTION INTRAMUSCULAR; INTRAVENOUS ONCE
Status: COMPLETED | OUTPATIENT
Start: 2025-08-25 | End: 2025-08-25

## 2025-08-25 RX ORDER — FUROSEMIDE 10 MG/ML
100 INJECTION INTRAMUSCULAR; INTRAVENOUS ONCE
Status: CANCELLED | OUTPATIENT
Start: 2025-08-25 | End: 2025-08-25

## 2025-08-25 RX ORDER — OXYCODONE HYDROCHLORIDE 5 MG/1
15 TABLET ORAL EVERY 6 HOURS PRN
Refills: 0 | Status: DISCONTINUED | OUTPATIENT
Start: 2025-08-25 | End: 2025-08-26

## 2025-08-25 RX ADMIN — Medication: at 23:00

## 2025-08-25 RX ADMIN — ACETAMINOPHEN 975 MG: 325 TABLET ORAL at 14:59

## 2025-08-25 RX ADMIN — PAROXETINE 20 MG: 20 TABLET, FILM COATED ORAL at 08:20

## 2025-08-25 RX ADMIN — PANTOPRAZOLE SODIUM 40 MG: 40 TABLET, DELAYED RELEASE ORAL at 08:20

## 2025-08-25 RX ADMIN — APIXABAN 5 MG: 5 TABLET, FILM COATED ORAL at 22:27

## 2025-08-25 RX ADMIN — CEFAZOLIN SODIUM 2 G: 2 INJECTION, SOLUTION INTRAVENOUS at 14:28

## 2025-08-25 RX ADMIN — OXYCODONE HYDROCHLORIDE 15 MG: 5 TABLET ORAL at 10:19

## 2025-08-25 RX ADMIN — Medication 25 MCG: at 08:20

## 2025-08-25 RX ADMIN — HYDROMORPHONE HYDROCHLORIDE 0.4 MG: 1 INJECTION, SOLUTION INTRAMUSCULAR; INTRAVENOUS; SUBCUTANEOUS at 21:55

## 2025-08-25 RX ADMIN — OXYCODONE HYDROCHLORIDE AND ACETAMINOPHEN 500 MG: 500 TABLET ORAL at 08:20

## 2025-08-25 RX ADMIN — ATORVASTATIN CALCIUM 20 MG: 20 TABLET, FILM COATED ORAL at 08:21

## 2025-08-25 RX ADMIN — OXYCODONE HYDROCHLORIDE 10 MG: 5 TABLET ORAL at 05:06

## 2025-08-25 RX ADMIN — Medication: at 08:00

## 2025-08-25 RX ADMIN — CEFAZOLIN SODIUM 2 G: 2 INJECTION, SOLUTION INTRAVENOUS at 05:06

## 2025-08-25 RX ADMIN — TRIMETHOBENZAMIDE HYDROCHLORIDE 200 MG: 100 INJECTION INTRAMUSCULAR at 22:53

## 2025-08-25 RX ADMIN — OXYCODONE HYDROCHLORIDE 15 MG: 5 TABLET ORAL at 18:47

## 2025-08-25 RX ADMIN — HYDROMORPHONE HYDROCHLORIDE 0.4 MG: 1 INJECTION, SOLUTION INTRAMUSCULAR; INTRAVENOUS; SUBCUTANEOUS at 08:21

## 2025-08-25 RX ADMIN — APIXABAN 5 MG: 5 TABLET, FILM COATED ORAL at 14:28

## 2025-08-25 RX ADMIN — OLANZAPINE 5 MG: 10 INJECTION, POWDER, LYOPHILIZED, FOR SOLUTION INTRAMUSCULAR at 23:14

## 2025-08-25 RX ADMIN — LIDOCAINE 4% 1 PATCH: 40 PATCH TOPICAL at 08:21

## 2025-08-25 RX ADMIN — INSULIN LISPRO 15 UNITS: 100 INJECTION, SOLUTION INTRAVENOUS; SUBCUTANEOUS at 08:19

## 2025-08-25 RX ADMIN — HYDROMORPHONE HYDROCHLORIDE 0.2 MG: 0.5 INJECTION, SOLUTION INTRAMUSCULAR; INTRAVENOUS; SUBCUTANEOUS at 00:42

## 2025-08-25 RX ADMIN — FUROSEMIDE 100 MG: 10 INJECTION, SOLUTION INTRAMUSCULAR; INTRAVENOUS at 13:01

## 2025-08-25 RX ADMIN — CEFAZOLIN SODIUM 2 G: 2 INJECTION, SOLUTION INTRAVENOUS at 21:48

## 2025-08-25 RX ADMIN — INSULIN LISPRO 3 UNITS: 100 INJECTION, SOLUTION INTRAVENOUS; SUBCUTANEOUS at 16:54

## 2025-08-25 RX ADMIN — HYDROMORPHONE HYDROCHLORIDE 0.4 MG: 1 INJECTION, SOLUTION INTRAMUSCULAR; INTRAVENOUS; SUBCUTANEOUS at 15:37

## 2025-08-25 RX ADMIN — LEVOTHYROXINE SODIUM 75 MCG: 0.07 TABLET ORAL at 08:20

## 2025-08-25 RX ADMIN — INSULIN LISPRO 3 UNITS: 100 INJECTION, SOLUTION INTRAVENOUS; SUBCUTANEOUS at 13:01

## 2025-08-25 RX ADMIN — TEMAZEPAM 7.5 MG: 7.5 CAPSULE ORAL at 00:34

## 2025-08-25 ASSESSMENT — PAIN - FUNCTIONAL ASSESSMENT
PAIN_FUNCTIONAL_ASSESSMENT: PAINAD (PAIN ASSESSMENT IN ADVANCED DEMENTIA SCALE)
PAIN_FUNCTIONAL_ASSESSMENT: CPOT (CRITICAL CARE PAIN OBSERVATION TOOL)
PAIN_FUNCTIONAL_ASSESSMENT: 0-10
PAIN_FUNCTIONAL_ASSESSMENT: CPOT (CRITICAL CARE PAIN OBSERVATION TOOL)
PAIN_FUNCTIONAL_ASSESSMENT: 0-10
PAIN_FUNCTIONAL_ASSESSMENT: 0-10
PAIN_FUNCTIONAL_ASSESSMENT: CPOT (CRITICAL CARE PAIN OBSERVATION TOOL)
PAIN_FUNCTIONAL_ASSESSMENT: 0-10
PAIN_FUNCTIONAL_ASSESSMENT: CPOT (CRITICAL CARE PAIN OBSERVATION TOOL)
PAIN_FUNCTIONAL_ASSESSMENT: 0-10
PAIN_FUNCTIONAL_ASSESSMENT: PAINAD (PAIN ASSESSMENT IN ADVANCED DEMENTIA SCALE)
PAIN_FUNCTIONAL_ASSESSMENT: PAINAD (PAIN ASSESSMENT IN ADVANCED DEMENTIA SCALE)
PAIN_FUNCTIONAL_ASSESSMENT: CPOT (CRITICAL CARE PAIN OBSERVATION TOOL)
PAIN_FUNCTIONAL_ASSESSMENT: 0-10
PAIN_FUNCTIONAL_ASSESSMENT: CPOT (CRITICAL CARE PAIN OBSERVATION TOOL)

## 2025-08-25 ASSESSMENT — PAIN SCALES - PAIN ASSESSMENT IN ADVANCED DEMENTIA (PAINAD)
TOTALSCORE: REPOSITIONED;REST
TOTALSCORE: MEDICATION (SEE MAR)
TOTALSCORE: 0
BREATHING: NORMAL
BODYLANGUAGE: TENSE, DISTRESSED PACING, FIDGETING
FACIALEXPRESSION: FACIAL GRIMACING
TOTALSCORE: 8
TOTALSCORE: MEDICATION (SEE MAR)
BODYLANGUAGE: RELAXED
FACIALEXPRESSION: FACIAL GRIMACING
TOTALSCORE: MEDICATION (SEE MAR)
TOTALSCORE: 5
BREATHING: NORMAL
CONSOLABILITY: NO NEED TO CONSOLE
NEGVOCALIZATION: REPEATED TROUBLED CALLING OUT, LOUD MOANING/GROANING, CRYING
CONSOLABILITY: UNABLE TO CONSOLE, DISTRACT OR REASSURE
TOTALSCORE: 6
FACIALEXPRESSION: SMILING OR INEXPRESSIVE
NEGVOCALIZATION: REPEATED TROUBLED CALLING OUT, LOUD MOANING/GROANING, CRYING
CONSOLABILITY: DISTRACTED OR REASSURED BY VOICE/TOUCH
FACIALEXPRESSION: SMILING OR INEXPRESSIVE
BREATHING: NORMAL
NEGVOCALIZATION: REPEATED TROUBLED CALLING OUT, LOUD MOANING/GROANING, CRYING
BODYLANGUAGE: TENSE, DISTRESSED PACING, FIDGETING
TOTALSCORE: 7
FACIALEXPRESSION: FACIAL GRIMACING
BREATHING: NORMAL
CONSOLABILITY: UNABLE TO CONSOLE, DISTRACT OR REASSURE
NEGVOCALIZATION: REPEATED TROUBLED CALLING OUT, LOUD MOANING/GROANING, CRYING
BREATHING: NORMAL
TOTALSCORE: MEDICATION (SEE MAR)
BODYLANGUAGE: TENSE, DISTRESSED PACING, FIDGETING
CONSOLABILITY: UNABLE TO CONSOLE, DISTRACT OR REASSURE
BODYLANGUAGE: RIGID, FISTS CLENCHED, KNEES UP, PUSHING/PULLING AWAY, STRIKES OUT

## 2025-08-25 ASSESSMENT — PAIN SCALES - GENERAL
PAINLEVEL_OUTOF10: 0 - NO PAIN
PAINLEVEL_OUTOF10: 4
PAINLEVEL_OUTOF10: 0 - NO PAIN
PAINLEVEL_OUTOF10: 0 - NO PAIN
PAINLEVEL_OUTOF10: 7
PAINLEVEL_OUTOF10: 7
PAINLEVEL_OUTOF10: 2
PAINLEVEL_OUTOF10: 10 - WORST POSSIBLE PAIN
PAINLEVEL_OUTOF10: 2
PAINLEVEL_OUTOF10: 0 - NO PAIN
PAINLEVEL_OUTOF10: 7
PAINLEVEL_OUTOF10: 0 - NO PAIN
PAINLEVEL_OUTOF10: 6
PAINLEVEL_OUTOF10: 10 - WORST POSSIBLE PAIN

## 2025-08-25 ASSESSMENT — COGNITIVE AND FUNCTIONAL STATUS - GENERAL
EATING MEALS: TOTAL
MOVING TO AND FROM BED TO CHAIR: TOTAL
MOBILITY SCORE: 6
DRESSING REGULAR UPPER BODY CLOTHING: TOTAL
WALKING IN HOSPITAL ROOM: TOTAL
CLIMB 3 TO 5 STEPS WITH RAILING: TOTAL
DRESSING REGULAR LOWER BODY CLOTHING: TOTAL
PERSONAL GROOMING: TOTAL
TURNING FROM BACK TO SIDE WHILE IN FLAT BAD: TOTAL
TOILETING: TOTAL
DAILY ACTIVITIY SCORE: 6
HELP NEEDED FOR BATHING: TOTAL
STANDING UP FROM CHAIR USING ARMS: TOTAL
MOVING FROM LYING ON BACK TO SITTING ON SIDE OF FLAT BED WITH BEDRAILS: TOTAL

## 2025-08-25 ASSESSMENT — PAIN DESCRIPTION - DESCRIPTORS: DESCRIPTORS: ACHING;CRAMPING

## 2025-08-25 ASSESSMENT — PAIN DESCRIPTION - LOCATION: LOCATION: BACK

## 2025-08-25 NOTE — ACP (ADVANCE CARE PLANNING)
Confirming Previous Code Status: DNR, DNI, OK for ICU    Advance Care Planning Note     Discussion Date: 08/25/25   Discussion Participants: patient, spouse, and daughter    The patient wishes to discuss Advance Care Planning today and the following is a brief summary of our discussion.     Patient has capacity to make their own medical decisions: No  Health Care Agent/Surrogate Decision Maker documented in chart: Yes    Documents on file and valid:  Advance Directive/Living Will: Yes   Health Care Power of : Yes    Communication of Medical Status/Prognosis:     Patient and family made aware not needing ICU requirements, but is very ill with multiple medical comorbidities including severe aortic stenosis, resolving staph bacteremia, delirium, and high aspiration risk.      Communication of Treatment Goals/Options:     Family expressed wish to go to floor for further medical care, but if needs ICU care again, DO NOT ESCALATE TO ICU.      Treatment Decisions  Goals of Care: survival is prioritized, if goals for quality or survival can reasonably be achieved     Patient is to be DNR/DNI, No ICU Escalation.     Follow Up Plan    DNR/DNI, No ICU Escalation     Team Members    MD Rachel Jama APRN-CNP, Palliative Medicine Team    Time Statement: Total face to face time spent on advance care planning was 30 minutes with 30 minutes spent in counseling, including the explanation.    Konstantin Hunter MD  8/25/2025 4:12 PM

## 2025-08-25 NOTE — PROGRESS NOTES
SLP Adult Inpatient Speech-Language Pathology Clinical Swallow Evaluation    Patient Name: Radha Torres  MRN: [id number]  Today's Date: 8/25/2025   Time Calculation  Start Time: 0840  Stop Time: 0855  Time Calculation (min): 15 min     Recommendations:  NPO  Ice chips okay following oral care  --Aggressive oral care is strongly recommended to reduce dental plaque and bacteria on oropharyngeal surfaces, which may increase the risk of nosocomial infections, including pneumonia.     Assessment:  #suspected dysphagia     Pt continues to present with clinical signs of dysphagia. On this date, pt yields a FAIL  on the [place] Swallow Protocol, which has a high sensitivity for airway invasion. On further PO Oral phase is seemingly intact with overt s/s of airway compromise. Pt will likely require instrumental assessment of swallowing prior to initiation of PO diet.  Not yet appropriate given current severity of swallowing deficits and current mental status.  Given dependence for feeding and oral care, limited or infrequent ambulation , compromised respiratory system , impaired cough function , and compromised immune system , pt is not appropriate for oral diet prior to instrumental swallow study. Recommend NPO     Plan:  Inpatient/Swing Bed or Outpatient: Inpatient  Treatment/Interventions: Bolus trials, Assess diet tolerance, Diet recommendations  SLP Plan: Skilled SLP  SLP Frequency: 2x per week  Duration: 30 days  SLP Discharge Recommendations: Continue skilled SLP services at the next level of care  Diet Recommendations: Solid, Liquid  Solid Consistency: NPO  Liquid Consistency: Ice chips after oral care, NPO  Discussed POC: Patient  Discussed Risks/Benefits: Yes, Patient  Patient/Caregiver Agreeable: Yes  SLP - OK to Discharge: Yes     Goals:  Encounter Problems       Encounter Problems (Active)       Swallowing       LTG - Patient will tolerate the least restrictive diet without overt difficulty by time of discharge.  (Progressing)       Start:  08/23/25    Expected End:  09/27/25             The patient/caregiver/family will demonstrate adequate return of knowledge of all compensatory strategy/instruction w/ 100% acc. to effectively assist the patient in immediate safety with oral intake and swallowing.   (Progressing)       Start:  08/23/25    Expected End:  09/27/25                 Subjective     Baseline Assessment:  Temperature Spikes Noted: No  Behavior/Cognition: Alert, Confused, Impulsive  Vision: Functional for self-feeding  Patient Positioning: Upright in Bed    General Visit Information:  Patient Class: Inpatient  Reason for Referral: concern for dysphagia/ aspiration  Prior to Session Communication: Bedside nurse  Reviewed Procedures and Risks: Yes  Date of Order: 08/23/25  Base[employer] Diet: Regular Diet/Thin Liquids    Vital Signs:  Vitals:    08/25/25 1100   BP: 107/59   Pulse: 80   Resp: 13   Temp:    SpO2: 96%       History Of Present Illness  Radha Torres is a 85 y.o. woman with a relevant PMH of HTN, HFrEF, severe AS, afib s/p AVN ablation and leadless pacemaker insertion, RCC who was transferred to [facility] on 8/9/2025 (today is hospital day 14) for chest pain with concern for flash pulmonary edema. Hospitalization c/b episode of AMS/SOB on 8/19 for which BCx demonstrated MSSA.      Dysphagia History:   None Reported    Allergies[1]    Relevant Results  XR chest 1 view 08/23/2025    Narrative  Interpreted By:  Eric Reza and Stevens Alex  STUDY:  XR CHEST 1 VIEW;  8/23/2025 8:38 am    INDICATION:  Signs/Symptoms:NG tube.      COMPARISON:  XR chest 08/22/2025    ACCESSION NUMBER(S):  [id number]    ORDERING CLINICIAN:  BRIANA JUSTICE    FINDINGS:  AP radiograph of the chest was provided.    Enteric tube courses past the diaphragm and terminates outside the  field of view. Leadless device projects over the heart.    CARDIOMEDIASTINAL SILHOUETTE:  Cardiomediastinal silhouette is unchanged in size and  configuration.    LUNGS:  Stable opacities within the right mid to lower lung, likely a  combination of pulmonary edema and atelectasis. No pneumothorax.  Blunting of the bilateral costophrenic angles,  left-greater-than-right.    ABDOMEN:  No remarkable upper abdominal findings.    BONES:  Re-demonstration of multiple, displaced right posterolateral rib  fractures which appear old.    Impression  1. Enteric tube courses inferiorly below the diaphragm and terminates  outside the field of view.  2. Otherwise, stable appearance of the chest with similar degree of  pulmonary edema and small bilateral pleural effusions.    I personally reviewed the images/study and I agree with the findings  as stated by Raymond Adam DO PGY-3. This study was interpreted at  [place], [place].    MACRO:  None    Signed by: Eric Reza 8/23/2025 1:20 PM  Dictation workstation:   [id number]      Oxygen settings:       Objective     Dysphagia Risk Factors:  Predisposing: NA  Clinical signs of possible chronic dysphagia: NA  Precipitating: AMS    Oral/Motor Assessment:  Oral Hygiene: Education completed re: importance of oral care  Facial Symmetry: Within Functional Limits  Labial ROM: Within Functional Limits  Labial Strength: Within Functional Limits  Labial Symmetry: Within Functional Limits  Lingual ROM: Within Functional Limits  Lingual Strength: Within Functional Limits  Lingual Symmetry: Within Functional Limits  Palatal Elevation: Within Functional Limits    Clinical Assessment:  Patient Positioning: Upright in Bed  Management of Oral Secretions: Adequate  Was The 3 oz Swallow Protocol Completed: Yes  Signs/Symptoms of Aspiration: Cough, Throat Clearing  Poor Management of Oral Secretions: Yes  Immediate Cough: Thin (IDDSI Level 0) - Straw    Consistencies Trialed: Yes  Consistencies Trialed: Thin (IDDSI Level 0) - Straw, Ice Chips    Inpatient Education:  Adult Outpatient  Education  Individual(s) Educated: Patient  Risk and Benefits Discussed with Patient/Caregiver/Other: yes  Patient/Caregiver Demonstrated Understanding: yes  Plan of Care Discussed and Agreed Upon: yes  Patient Response to Education: Patient/Caregiver Verbalized Understanding of Information, Patient/Caregiver Asked Appropriate Questions         [1] No Known Allergies

## 2025-08-25 NOTE — CARE PLAN
Problem: Chronic Conditions and Co-morbidities  Goal: Patient's chronic conditions and co-morbidity symptoms are monitored and maintained or improved  Outcome: Progressing  Flowsheets (Taken 8/25/2025 0841)  Care Plan - Patient's Chronic Conditions and Co-Morbidity Symptoms are Monitored and Maintained or Improved: Monitor and assess patient's chronic conditions and comorbid symptoms for stability, deterioration, or improvement     Problem: Skin  Goal: Decreased wound size/increased tissue granulation at next dressing change  Outcome: Progressing  Goal: Participates in plan/prevention/treatment measures  Outcome: Progressing  Flowsheets (Taken 8/25/2025 0841)  Participates in plan/prevention/treatment measures: Elevate heels  Goal: Prevent/manage excess moisture  Outcome: Progressing  Goal: Prevent/minimize sheer/friction injuries  Outcome: Progressing  Flowsheets (Taken 8/18/2025 0939 by Latoya Holloway RN)  Prevent/minimize sheer/friction injuries:   Use pull sheet   Turn/reposition every 2 hours/use positioning/transfer devices  Goal: Promote/optimize nutrition  Outcome: Progressing  Goal: Promote skin healing  Flowsheets (Taken 8/25/2025 0841)  Promote skin healing:   Assess skin/pad under line(s)/device(s)   Turn/reposition every 2 hours/use positioning/transfer devices     Problem: Safety - Medical Restraint  Goal: Remains free of injury from restraints (Restraint for Interference with Medical Device)  Outcome: Progressing  Flowsheets (Taken 8/24/2025 2349 by Vish Reveles RN)  Remains free of injury from restraints (restraint for interference with medical device): Every 2 hours: Monitor safety, psychosocial status, comfort, nutrition and hydration  Goal: Free from restraint(s) (Restraint for Interference with Medical Device)  Outcome: Progressing

## 2025-08-25 NOTE — PROGRESS NOTES
"Radha Torres is a 85 y.o. female on day 16 of admission presenting with Chest pain.      Palliative Medicine following for:  Complex medical decision making, symptom management, patient/family support    History obtained from chart review including ED note, H&P, patient's daily progress notes, review of lab/test results, and discussion with primary team and bedside RN.    Subjective    History of Present Illness  Radha Torres is a 85 y.o. female with PMH HFrEF (EF 25-30%), aortic stenosis, Afib, s/p AV node ablation and leadless pacemaker in 2025, HTN, renal cell carcinoma who presented on 8/9/25 as a transfer from [facility] for epigastric pain, now transferred to CICU for possible flash pulmonary edema and hyponatremia. Suspect GI symptoms and hyponatremia due to severe fluid overload iso ADHF. 8/20 labs showing uti and bacteremia, ID following along with Structural Heart on candidacy for BAV.       Symptoms  Pt unable to contribute.    Objective    Last Recorded Vitals  BP 93/56   Pulse 80   Temp 35.1 °C (95.2 °F) (Temporal)   Resp 16   Ht 1.65 m (5' 4.96\")   Wt 73 kg (161 lb)   SpO2 96%   BMI 26.82 kg/m²      Physical Exam   Constitutional:       Appearance: She is overweight. She is ill-appearing. Lethargic, mostly unresponsive. Later upon assessment, demanding water, kicking off heel boots, and cussing.  HENT:      Head: Normocephalic.      Mouth/Throat:      Mouth: Mucous membranes are dry.    Cardiovascular:      Rate and Rhythm: Normal rate.      Heart sounds: Murmur heard.   Pulmonary:      Effort: Apneic breathing pattern between 14-17 bpm while sleeping.     Breath sounds: Decreased breath sounds present.   Abdominal:      Palpations: Abdomen is soft.    Skin:     General: Skin is warm and dry.      Coloration: Skin is pale.   Neurological:      Mental Status: She is lethargic, can not keep eyes open to verbal stimuli. Mostly unresponsive. Later upon assessment, demanding water, kicking off heel " boots, and cussing.      Relevant Results   Results for orders placed or performed during the hospital encounter of 08/09/25 (from the past 24 hours)   POCT GLUCOSE   Result Value Ref Range    POCT Glucose 204 (H) 74 - 99 mg/dL   CBC and Auto Differential   Result Value Ref Range    WBC 27.0 (H) 4.4 - 11.3 x10*3/uL    nRBC 0.0 0.0 - 0.0 /100 WBCs    RBC 3.55 (L) 4.00 - 5.20 x10*6/uL    Hemoglobin 9.9 (L) 12.0 - 16.0 g/dL    Hematocrit 30.9 (L) 36.0 - 46.0 %    MCV 87 80 - 100 fL    MCH 27.9 26.0 - 34.0 pg    MCHC 32.0 32.0 - 36.0 g/dL    RDW 15.4 (H) 11.5 - 14.5 %    Platelets 157 150 - 450 x10*3/uL    Neutrophils % 88.8 40.0 - 80.0 %    Immature Granulocytes %, Automated 0.9 0.0 - 0.9 %    Lymphocytes % 4.3 13.0 - 44.0 %    Monocytes % 5.2 2.0 - 10.0 %    Eosinophils % 0.7 0.0 - 6.0 %    Basophils % 0.1 0.0 - 2.0 %    Neutrophils Absolute 23.98 (H) 1.60 - 5.50 x10*3/uL    Immature Granulocytes Absolute, Automated 0.25 0.00 - 0.50 x10*3/uL    Lymphocytes Absolute 1.16 0.80 - 3.00 x10*3/uL    Monocytes Absolute 1.40 (H) 0.05 - 0.80 x10*3/uL    Eosinophils Absolute 0.20 0.00 - 0.40 x10*3/uL    Basophils Absolute 0.04 0.00 - 0.10 x10*3/uL   Magnesium   Result Value Ref Range    Magnesium 2.73 (H) 1.60 - 2.40 mg/dL   Renal Function Panel   Result Value Ref Range    Glucose 314 (H) 74 - 99 mg/dL    Sodium 140 136 - 145 mmol/L    Potassium 4.1 3.5 - 5.3 mmol/L    Chloride 97 (L) 98 - 107 mmol/L    Bicarbonate 32 21 - 32 mmol/L    Anion Gap 15 mmol/L    Urea Nitrogen 53 (H) 6 - 23 mg/dL    Creatinine 1.50 (H) 0.50 - 1.05 mg/dL    eGFR 34 (L) >60 mL/min/1.73m*2    Calcium 8.4 (L) 8.6 - 10.6 mg/dL    Phosphorus 2.4 (L) 2.5 - 4.9 mg/dL    Albumin 2.9 (L) 3.4 - 5.0 g/dL   POCT GLUCOSE   Result Value Ref Range    POCT Glucose 365 (H) 74 - 99 mg/dL   POCT GLUCOSE   Result Value Ref Range    POCT Glucose 174 (H) 74 - 99 mg/dL        Allergies  Patient has no known allergies.  Medications  Scheduled medications  Scheduled  Medications[1]  Continuous medications  Continuous Medications[2]  PRN medications  PRN Medications[3]     Assessment/Plan    Radha Torres is a 85 y.o. female with PMH HFrEF (EF 25-30%), aortic stenosis, Afib, s/p AV node ablation and leadless pacemaker in 2025, HTN, renal cell carcinoma who presented on 8/9/25 as a transfer from [facility] for epigastric pain, now transferred to CICU for possible flash pulmonary edema and hyponatremia. Suspect GI symptoms and hyponatremia due to severe fluid overload iso ADHF. 8/20 labs showing uti and bacteremia, ID following along with Structural Heart on candidacy for BAV.      8/19: Palliative consultation. Met with spouse at bedside, spoke to dtr on the phone. Agreeable to DNR/DNI to honor pt best and her previously stated health preferences.     8/20: Pt with pleural effusions, elevated lactate, UTI, and bacteremia. Plan to diurese further and started on Dopamine for cardiac support. No plan for balloon valvuloplasty until pt shows improvements. Supported family at bedside.     8/21: Pt seen s/p thoracentesis, 750ml removed. Pt on cpap, nad, lethargic, but per nursing, was very awake and cooperative for bedside procedure. Plan to continue diuresing and antibiotics at this time, day by day monitoring for progression/regression and ongoing GOC.    8/25: Not a candidate for BAV r/t ongoing risks. Pt still with mitts and sitter. Treating more aggressively for back pain. Met with family at bedside, c/f suffering, pt demanding water. Agreeable to transfer off the unit but no further ICU  if declines. If so, agreeable to other arrangements to try to get pt to hospice closer to home ([facility]).     Palliative Performance Scale (PPS):  "10    ----------------------------------------------------------------------------------------------------------------------------------------------------------------------------------------------------------------------------------------------------------------------------------------------------------------------------------------------------------------------      I spent 35 minutes in providing separately identifiable ACP services with the patient and/or surrogate decision maker in a voluntary conversation discussing the patient's wishes and goals as detailed in the above note.   ----------------------------------------------------------------------------------------------------------------------------------------------------------------------------------------------------------------------------------------------------------------------------------------------------------------------------------------------------------------------    #Complex Medical Decision Making  #Goals of Care  #Advanced Care Planning  - Code status: DNR/DNI no ICU 8/25  - Surrogate decision maker: Stefanie Roblero (Daughter)  [phone] ([place]) primary. Spouse secondary.  - Goals are mix of survival and time and improved quality of life  -8/19: Met with pt at bedside. Able to glean more information r/t pt's life, wishes, goals, worries, and health preferences. See above/consult note for additional supportive detail.      In short, discussed hopes to stabilize pt for tavr, but expressed worries of further decompensation in the interim. Discussed DNR/DNI in detail in which family supports and aligns with what pt would deem \"non beneficial\" care. Family states pt has said all along, she does not want to be kept alive by machines. Supported family and answered questions.      - 8/20: This NP, Pall SW, Stefanie and Caleb discussed continuing to treat her and clearing reversible factors. We discussed that she still may be able to have the " balloon provided things clear up and she can stabilize again. The uncertainty of whether or not pt is going to continue to decline is weighing on their decisions on what to do. They are trying to remain hopeful that she can make it to the balloon.      Pall team will reassess tomorrow as we may have a better understanding of how patient responds to new treatments.      - 8/25: Met spouse and dtr along with Ray Dyer, and Dr. Hunter. Discussed how pt is currently not a candidate for balloon r/t anesthesia and risk of perforation/worsening her valve and death. Discussed pt needs about 6 weeks of antibiotics and optimization to be a candidate. Aware pt now meeting criteria for floor level, which may also improve her delirium. Discussed their thought of pt's current state. Family expressed they feels she is suffering, lying in bed, losing weight, not eating or drinking. Discussed what else pt would be willing to go through and family agrees, no icu if she further declines. If pt decompensates, family wishes to attempt to get her closer to home ([place]) to [facility] new facility.   Supported family and pt in this difficult time.      #Back pain  - Sx history known, was on 1 percocet BID prn severe pain prior to admission. Likely exacerbated by bed bound state.   - Now on Oxy 15mg Q6H prn severe pain, Hydromorphone 0.4mg IV Q4H prn severe pain (second line), acetaminophen 975mg Q8H prn moderate pain, Voltaren gel 4g TID Prn mild pain.      #ADHF  #HFrEF  #Severe AS  #AMS  #Psychosocial Support  - Ongoing pt/family support and care navigation. 8/19 Established DNR/DNI to honor pt's wishes. Family still hopeful for TAVR if/when pt can stabilize. Structural following to assist with candidacy for tavr vs balloon valvuloplasty.   - 8/20 pt with pleural effusions, elevated lactate, UTI, and bacteremia. Plan to diurese further and started on Dopamine for cardiac support. No plan for balloon valvuloplasty until pt shows  improvements. Returned to pt's room, family at bedside. Pt back on cpap. Family understanding and realistic that pt is very ill and that she could die and are really contemplating what they should do if death may occur regardless. Expressed frustration that pt did not get TAVR when she was healthier and now she is dying. Supported family.  - 8/25: Pt seen, on 6l nc, still with sitter and mitts. Pt makes eye contact momentarily, unable to participate in ROS. Apneic breathing pattern, RR about 14-17 per minute. Valvuloplasty not offered at this time r/t risks with likely general anesthesia and risk for AI during. Met with family, additional GOC discussed (above). Will transfer to the floor.     - Family requesting daily updates. Please notify Stefanie first/primary contact.  - Music Therapy- added  - Spiritual Care Support  - SW support     Plan of Care discussed with: Updated primary and bedside RN on goals of care decision, medication adjustments, and code status      Medical Decision Making was high level due to high complexity of problems, extensive data review, and high risk of management/treatment.     - ADHF, severe aortic stenosis, AMS, pleural effusions, bacteremia, posing threat to life and function.  - Reviewed external notes from   - Reviewed results from  which were used in decision making for   - Recommended the following tests:   - Assessment required independent historian: primary, nursing  - Independent interpretation of test: labs and imaging  - Discussion of management with: primary, nursing  - Drug therapy requiring intensive monitoring for toxicity: insulin  - Decision regarding elective major surgery with identified patient or procedure risk factors:   - Decision regarding emergency major surgery:   - Decision regarding hospitalization or escalation of hospital-level care:   - Decision not to resuscitate or to de-escalate care because of poor prognosis: no icu 8/25  - Parenteral controlled  substances: Hydromorphone    Thank you for allowing us to care for this patient. Palliative Team will continue to follow as needed. Please contact team with any questions or concerns.   Team pager [phone] (weekdays)    LINDSEY Cole-ELENI           [1] [Held by provider] amLODIPine, 5 mg, oral, Daily  ascorbic acid, 500 mg, oral, Daily  atorvastatin, 20 mg, oral, Daily  [Held by provider] carvedilol, 6.25 mg, oral, BID  ceFAZolin, 2 g, intravenous, q8h  cholecalciferol, 25 mcg, oral, Daily  enoxaparin, 30 mg, subcutaneous, q24h  [Held by provider] ferrous sulfate, 1 tablet, oral, Daily  furosemide, 100 mg, intravenous, Once  [Held by provider] gabapentin, 300 mg, oral, Daily  [Held by provider] hydrALAZINE, 50 mg, oral, TID  insulin lispro, 0-15 Units, subcutaneous, TID AC  levothyroxine, 75 mcg, oral, Daily  lidocaine, 1 patch, transdermal, Daily  pantoprazole, 40 mg, oral, Daily before breakfast  PARoxetine, 20 mg, oral, Daily  polyethylene glycol, 17 g, oral, Daily  [2]    [3] PRN medications: acetaminophen, calcium carbonate, dextrose, dextrose, diclofenac sodium, glucagon, glucagon, HYDROmorphone, OLANZapine, oxyCODONE, oxygen, oxygen, temazepam, trimethobenzamide     no

## 2025-08-25 NOTE — PROGRESS NOTES
Structural Heart Progress Note    HPI   Radha Torres is a 85 y.o. female with PMH HFrEF (EF 25-30%), aortic stenosis, Afib, s/p AV node ablation and leadless pacemaker in 2025, HTN, renal cell carcinoma who presented on 8/9/25 as a transfer from [facility] for epigastric pain, now transferred to CICU for possible flash pulmonary edema and hyponatremia. Suspect GI symptoms and hyponatremia due to severe fluid overload iso ADHF. Received diuresis and on CPAP. Also received a dose of tolvaptan 8/15 per nephrology. Per nephrology will hold IV Lasix and Urea.  Pt originally planned TAVR at [place] on 8/19/25 with Dr. Howell and Dr. Douglas.     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    Body mass index (BMI) of 27.0 to 27.9 in adult 10/25/2024    Never smoked cigarettes 08/12/2024    Chronic anticoagulation 08/12/2024    High risk medication use 08/12/2024    At high risk for injury related to fall 08/12/2024    Sinus bradycardia 08/12/2024    Medication course changed 08/12/2024    Aortic stenosis, severe 11/09/2023    Benign essential hypertension 11/09/2023    Cardiomyopathy 11/09/2023    Diabetes mellitus (Multi) 11/09/2023    Hyperlipidemia 11/09/2023    Paroxysmal atrial fibrillation (Multi) 11/09/2023    Nonrheumatic aortic (valve) stenosis 08/07/2025        LOS: 16 days     Objective     Vitals 24 hour ranges:  Temp:  [35.1 °C (95.2 °F)-36 °C (96.8 °F)] 35.1 °C (95.2 °F)  Heart Rate:  [70-81] 80  Resp:  [9-23] 16  BP: ()/() 93/56  SpO2:  [95 %-100 %] 96 %  Medical Gas Therapy: Supplemental oxygen  Medical Gas Delivery Method: Nasal  cannula  FiO2 (%): 40 %     Intake/Output last 3 Shifts:    Intake/Output Summary (Last 24 hours) at 8/25/2025 1207  Last data filed at 8/25/2025 0800  Gross per 24 hour   Intake 1059.17 ml   Output 425 ml   Net 634.17 ml       LDA:  Peripheral IV 08/19/25 22 G Anterior;Right Forearm (Active)   Placement Date/Time: 08/19/25 0500   Size (Gauge): 22 G  Orientation: Anterior;Right  Location: Forearm  Technique: Ultrasound guidance   Number of days: 6       Peripheral IV 08/24/25 18 G Anterior;Left;Upper Arm (Active)   Placement Date/Time: 08/24/25 0618   Earliest Known Present: 08/24/25  Size (Gauge): 18 G  Orientation: Anterior;Left;Upper  Location: Arm  Technique: Ultrasound guidance  Insertion attempts: 2   Number of days: 1       NG/OG/Feeding Tube Small bore feeding tube 12 Fr Left nostril (Active)   Placement Date/Time: 08/20/25 0943   Placed by: Preethi Stevens RD, LD  Hand Hygiene Completed: Yes  Tube Length (cm): 81 cm  Tube Type: Small bore feeding tube  Type of Small Bore Tube: Electromagnetic feeding tube  Initial Placement Verification on Mo...   Number of days: 5       External Urinary Catheter Female (Active)   Placement Date/Time: 08/15/25 1900   External Catheter Type: Female   Number of days: 9        Vent settings:       Lab Results:  Recent Results (from the past 48 hours)   POCT GLUCOSE    Collection Time: 08/23/25 12:16 PM   Result Value Ref Range    POCT Glucose 231 (H) 74 - 99 mg/dL   POCT GLUCOSE    Collection Time: 08/23/25  5:11 PM   Result Value Ref Range    POCT Glucose 208 (H) 74 - 99 mg/dL   Renal function panel    Collection Time: 08/23/25  5:17 PM   Result Value Ref Range    Glucose 201 (H) 74 - 99 mg/dL    Sodium 137 136 - 145 mmol/L    Potassium 3.6 3.5 - 5.3 mmol/L    Chloride 94 (L) 98 - 107 mmol/L    Bicarbonate 34 (H) 21 - 32 mmol/L    Anion Gap 13 10 - 20 mmol/L    Urea Nitrogen 46 (H) 6 - 23 mg/dL    Creatinine 1.59 (H) 0.50 - 1.05 mg/dL    eGFR 32 (L) >60 mL/min/1.73m*2     Calcium 8.4 (L) 8.6 - 10.6 mg/dL    Phosphorus 2.0 (L) 2.5 - 4.9 mg/dL    Albumin 3.1 (L) 3.4 - 5.0 g/dL   CBC and Auto Differential    Collection Time: 08/24/25  5:57 AM   Result Value Ref Range    WBC 22.3 (H) 4.4 - 11.3 x10*3/uL    nRBC 0.0 0.0 - 0.0 /100 WBCs    RBC 3.39 (L) 4.00 - 5.20 x10*6/uL    Hemoglobin 9.7 (L) 12.0 - 16.0 g/dL    Hematocrit 29.4 (L) 36.0 - 46.0 %    MCV 87 80 - 100 fL    MCH 28.6 26.0 - 34.0 pg    MCHC 33.0 32.0 - 36.0 g/dL    RDW 15.5 (H) 11.5 - 14.5 %    Platelets 122 (L) 150 - 450 x10*3/uL    Neutrophils % 86.9 40.0 - 80.0 %    Immature Granulocytes %, Automated 0.8 0.0 - 0.9 %    Lymphocytes % 5.4 13.0 - 44.0 %    Monocytes % 6.5 2.0 - 10.0 %    Eosinophils % 0.2 0.0 - 6.0 %    Basophils % 0.2 0.0 - 2.0 %    Neutrophils Absolute 19.35 (H) 1.60 - 5.50 x10*3/uL    Immature Granulocytes Absolute, Automated 0.18 0.00 - 0.50 x10*3/uL    Lymphocytes Absolute 1.20 0.80 - 3.00 x10*3/uL    Monocytes Absolute 1.44 (H) 0.05 - 0.80 x10*3/uL    Eosinophils Absolute 0.04 0.00 - 0.40 x10*3/uL    Basophils Absolute 0.04 0.00 - 0.10 x10*3/uL   Magnesium    Collection Time: 08/24/25  5:57 AM   Result Value Ref Range    Magnesium 1.76 1.60 - 2.40 mg/dL   Renal Function Panel    Collection Time: 08/24/25  5:57 AM   Result Value Ref Range    Glucose 184 (H) 74 - 99 mg/dL    Sodium 142 136 - 145 mmol/L    Potassium 3.4 (L) 3.5 - 5.3 mmol/L    Chloride 104 98 - 107 mmol/L    Bicarbonate 30 21 - 32 mmol/L    Anion Gap 11 10 - 20 mmol/L    Urea Nitrogen 47 (H) 6 - 23 mg/dL    Creatinine 1.30 (H) 0.50 - 1.05 mg/dL    eGFR 41 (L) >60 mL/min/1.73m*2    Calcium 6.8 (L) 8.6 - 10.6 mg/dL    Phosphorus 2.0 (L) 2.5 - 4.9 mg/dL    Albumin 2.4 (L) 3.4 - 5.0 g/dL   POCT GLUCOSE    Collection Time: 08/24/25  8:16 AM   Result Value Ref Range    POCT Glucose 223 (H) 74 - 99 mg/dL   POCT GLUCOSE    Collection Time: 08/24/25 11:33 AM   Result Value Ref Range    POCT Glucose 235 (H) 74 - 99 mg/dL   Blood Culture    Collection  Time: 08/24/25 11:34 AM    Specimen: Peripheral Venipuncture; Blood culture   Result Value Ref Range    Blood Culture Loaded on Instrument - Culture in progress    POCT GLUCOSE    Collection Time: 08/24/25  5:22 PM   Result Value Ref Range    POCT Glucose 204 (H) 74 - 99 mg/dL   CBC and Auto Differential    Collection Time: 08/25/25  3:07 AM   Result Value Ref Range    WBC 27.0 (H) 4.4 - 11.3 x10*3/uL    nRBC 0.0 0.0 - 0.0 /100 WBCs    RBC 3.55 (L) 4.00 - 5.20 x10*6/uL    Hemoglobin 9.9 (L) 12.0 - 16.0 g/dL    Hematocrit 30.9 (L) 36.0 - 46.0 %    MCV 87 80 - 100 fL    MCH 27.9 26.0 - 34.0 pg    MCHC 32.0 32.0 - 36.0 g/dL    RDW 15.4 (H) 11.5 - 14.5 %    Platelets 157 150 - 450 x10*3/uL    Neutrophils % 88.8 40.0 - 80.0 %    Immature Granulocytes %, Automated 0.9 0.0 - 0.9 %    Lymphocytes % 4.3 13.0 - 44.0 %    Monocytes % 5.2 2.0 - 10.0 %    Eosinophils % 0.7 0.0 - 6.0 %    Basophils % 0.1 0.0 - 2.0 %    Neutrophils Absolute 23.98 (H) 1.60 - 5.50 x10*3/uL    Immature Granulocytes Absolute, Automated 0.25 0.00 - 0.50 x10*3/uL    Lymphocytes Absolute 1.16 0.80 - 3.00 x10*3/uL    Monocytes Absolute 1.40 (H) 0.05 - 0.80 x10*3/uL    Eosinophils Absolute 0.20 0.00 - 0.40 x10*3/uL    Basophils Absolute 0.04 0.00 - 0.10 x10*3/uL   Magnesium    Collection Time: 08/25/25  3:07 AM   Result Value Ref Range    Magnesium 2.73 (H) 1.60 - 2.40 mg/dL   Renal Function Panel    Collection Time: 08/25/25  3:07 AM   Result Value Ref Range    Glucose 314 (H) 74 - 99 mg/dL    Sodium 140 136 - 145 mmol/L    Potassium 4.1 3.5 - 5.3 mmol/L    Chloride 97 (L) 98 - 107 mmol/L    Bicarbonate 32 21 - 32 mmol/L    Anion Gap 15 mmol/L    Urea Nitrogen 53 (H) 6 - 23 mg/dL    Creatinine 1.50 (H) 0.50 - 1.05 mg/dL    eGFR 34 (L) >60 mL/min/1.73m*2    Calcium 8.4 (L) 8.6 - 10.6 mg/dL    Phosphorus 2.4 (L) 2.5 - 4.9 mg/dL    Albumin 2.9 (L) 3.4 - 5.0 g/dL   POCT GLUCOSE    Collection Time: 08/25/25  8:02 AM   Result Value Ref Range    POCT Glucose 365  (H) 74 - 99 mg/dL   POCT GLUCOSE    Collection Time: 08/25/25 11:34 AM   Result Value Ref Range    POCT Glucose 174 (H) 74 - 99 mg/dL       Medications:  Scheduled medications  Scheduled Medications[1]  Continuous medications  Continuous Medications[2]  PRN medications  PRN Medications[3]       Physical Exam:  Constitutional: deconditioned, frail/sick appearing, awake/drowsy/oriented x1-2 (oriented to self and part of the situation.  Reoriented to Time and Place.  Eyes: PERRL, EOMI, clear sclera  ENMT: mucous membranes moist  Head/Neck: Neck supple  Respiratory/Thorax: adequate chest expansion, thorax symmetric, lung sounds clear but with diminished bases  Cardiovascular: Regular rate and rhythm, 4/6 RIKA  Gastrointestinal: Nondistended, soft, non-tender, no rebound tenderness or guarding, no masses palpable, no organomegaly, +BS with high-pitched tinkering in LLQ  Extremities: trace BLE edema, no cyanosis  Neurological: alert and oriented x1-2, profound generalized weakness  Psychological: Wax/waning mood and behavior   Skin: Warm and dry, intact..       Assessment/Plan   Radha Torres is a 85 y.o. female with PMH of HFrEF (EF 25-30%), aortic stenosis, Afib, s/p AV node ablation and leadless pacemaker in 2025, HTN, renal cell carcinoma who presented on 8/9/25 as a transfer from [facility] for epigastric pain, now transferred to CICU for possible flash pulmonary edema and hyponatremia.  Primary service suspected ct GI symptoms and hyponatremia due to severe fluid overload iso ADHF. Received diuresis and on CPAP. Also received a dose of tolvaptan 8/15 per nephrology.  Pt had been fully worked up for TAVR and originally planned TAVR at [place] on 8/19/25 with Dr. Howell and Dr. Douglas.    As of last week the plan per Dr. Howell after discussing with the family and Dr. Rodriguez (ID) was to abstain from valve intervention until cultures are clear and pt completes the needed course for antibiotics of the S aureus bacteremia and  E. Coli cystitis.  The CICU team re-engaged our team now that her repeat cultures have been cleared if infection, however she is still to undergo 6 weeks of antibiotics.    Per a discussion with Dr. Howell after assessing the patient this morning 8/25/2025, he feels that her current problem continues to be with the infection (WBCs remain elevated) and is not decompensated from a cardiovascular standpoint that justifies emergency BAV.  Though the risk profile is low for a BAV, it is not a harmless procedure.  Full cooperation and ability to lay flat during/after the BAV is imperative for success and safety.     - Given her currently level of disorientation, not consistently following commands, and difficultly laying still due to chronic back pain, she is at a very high risk of developing a vascular complication, cardiac perforation, and/or need for more aggressive sedation potentially leading to respiratory compromise/intubation.    - It is often difficult to be aggressive with a BAV at the risk of producing acute severe AI (which often acutely decompensates the patient leading to cardiac arrest and/or death), thus we cannot guarantee a meaningful reduction in her aortic valve gradients.     Unfortunately Ms. Torres is not a TAVR candidate at this time d/t infection and frailty.  For the above stated reasons, we believe the risks of performing a BAV out-weighs the benefits at this time.    Recs:  - Agree with goals of care conversation this afternoon  - Continue to optimize from a heart failure standpoint  - Complete Antibiotic course as per ID's recs  - TAVR candidacy will be re-evaluated in 6 weeks after Antibiotic course and improvement in physical/cognitive conditioning  - Structural Heart will sign-off, please feel free to contact us with any further questions      D/w Dr. Howell and CICU service    I spent 60 minutes in the professional and overall care of this patient.     Marco WEEKS,  AGACNSt. [facility] Hospital  Acute Care Nurse Practitioner  Structural Heart / TAVR Team  Structural Pager: [phone]  (Nights) ED fellow pager: [phone]          [1] [Held by provider] amLODIPine, 5 mg, oral, Daily  ascorbic acid, 500 mg, oral, Daily  atorvastatin, 20 mg, oral, Daily  [Held by provider] carvedilol, 6.25 mg, oral, BID  ceFAZolin, 2 g, intravenous, q8h  cholecalciferol, 25 mcg, oral, Daily  enoxaparin, 30 mg, subcutaneous, q24h  [Held by provider] ferrous sulfate, 1 tablet, oral, Daily  [Held by provider] gabapentin, 300 mg, oral, Daily  [Held by provider] hydrALAZINE, 50 mg, oral, TID  insulin lispro, 0-15 Units, subcutaneous, TID AC  levothyroxine, 75 mcg, oral, Daily  lidocaine, 1 patch, transdermal, Daily  pantoprazole, 40 mg, oral, Daily before breakfast  PARoxetine, 20 mg, oral, Daily  polyethylene glycol, 17 g, oral, Daily  [2]    [3] PRN medications: acetaminophen, calcium carbonate, dextrose, dextrose, diclofenac sodium, glucagon, glucagon, HYDROmorphone, OLANZapine, oxyCODONE, oxygen, oxygen, temazepam, trimethobenzamide

## 2025-08-25 NOTE — PROGRESS NOTES
Radha Torres is a 85 y.o. female on day 16 of admission presenting with Chest pain.      Subjective   85 y.o. female with history significant for HFrEF (EF 25-30%), aortic stenosis, Afib, s/p AV node ablation and leadless pacemaker in 2025, HTN, renal cell carcinoma who presented on 8/9/25 as a transfer from [facility] for epigastric pain, nonradiating, not associated with eating. Hemodynamically stable, afebrile on arrival in no acute distress with some mild epigastric burning. Trop 23>27, lipase within normal limits. EKG with no ischemic changes. CTAP negative for inflammatory process or bowel obstruction. She was scheduled for upcoming TAVR on 8/19/25 with Dr. Howell at [facility].     Labs were significant for BNP of 2422 on 8/9/25, and patient was diuresed with IV lasix 40 mg. Pacemaker was interrogated which showed no notable events. Dr. Howell coordination team was contacted regarding the possibility of expediting TAVR to an earlier date. TAVR was unable to be moved to an earlier date and remained scheduled for 8/19. On 8/11, patient was hyponatremic at 125, and lasix was held. Urine osm 250, urine sodium 29, and Na increased to 127 by 8/12. Patient continued to endorse epigastric pain and nausea. KUB was unremarkable, ECG remained unchanged. Patient experienced some relief with Tigan, Tums and home pain medication; epigastric pain most likely due to reflux/GERD, as it was burning in nature, worse after eating, worse with lying down, and improvement with Tums.  However, by 8/13, serum sodium resulted severely low at 120; repeat was also 120. Serum osm, morning cortisol, and 500 ml NS was ordered. Likely hypervolemia hyponatremia.      Per recommendations of nephrology, 15 mg tolvaptan was given on 8/15 and 8/18. Lasix and urea also held. Hyponatremia continued to improve. On 8/18, nipride was discontinued and hydralazine was increased to 50 mg TID. In discussion with cardiology- structural heart, TAVR will  not be done on 8/19. WBCs mildly elevated.      On 8/19 WBC vonda and mental status not improved, with UA pending and CXR concerning for atelectasis vs. Pneumonia, started on empiric IV Ceftriaxone 1mg q24h. Concern for aspiration so held PO meds, started IV Hydralazine 5mg TID. Infectious workup for AMS pending. 8/20 blood cx positive for staph aureus. Started on Vanc/Zosyn. CT chest abdomen and pelvis obtained and shows large left sided pleural effusion concerning for fluid overload with superimposed pneumonia vs. Atelectasis. No abscess. Started Dopamine gtt at 2.5mcg/kg/min and additional diuresis with Lasix 100mg and Diamox 1000mg. On 8/21. Thoracentesis performed early in AM on L pleural effusion. Vanc/Zosyn changed to IV cefazolin and ID signed off on 8/25. Thoracentesis was performed early in AM of 8/21 on L pleural effusion. Along with diuresis with 160 mg Lasix. Patient had repeat blood cultures which were negative. She was taken off bipap and put on baseline nasal cannula and continued to get IV Lasix 100 mg diuresis.     On evaluation of the patient when transferred to the floors, patient is not alert or oriented. She is sleeping during interview, but opens eyes to voice. She follows some commands. Patient's daughter and  in the room. They note this is far from patient's baseline and have noticed altered mental status primarily while patient has been hospitalized.    Objective     Last Recorded Vitals  /66 (Patient Position: Lying)   Pulse 80   Temp 36 °C (96.8 °F) (Temporal)   Resp 17   Wt 73 kg (161 lb)   SpO2 98%   Intake/Output last 3 Shifts:    Intake/Output Summary (Last 24 hours) at 8/25/2025 1617  Last data filed at 8/25/2025 1428  Gross per 24 hour   Intake 1094.17 ml   Output --   Net 1094.17 ml       Admission Weight  Weight: 79.4 kg (175 lb) (08/09/25 0330)    Daily Weight  08/25/25 : 73 kg (161 lb)    Image Results  XR chest 1 view  Narrative: Interpreted By:  Eric Reza,   Romina Andrade   STUDY:  XR CHEST 1 VIEW;  8/23/2025 8:38 am      INDICATION:  Signs/Symptoms:NG tube.          COMPARISON:  XR chest 08/22/2025      ACCESSION NUMBER(S):  [id number]      ORDERING CLINICIAN:  BRIANA JUSTICE      FINDINGS:  AP radiograph of the chest was provided.      Enteric tube courses past the diaphragm and terminates outside the  field of view. Leadless device projects over the heart.      CARDIOMEDIASTINAL SILHOUETTE:  Cardiomediastinal silhouette is unchanged in size and configuration.      LUNGS:  Stable opacities within the right mid to lower lung, likely a  combination of pulmonary edema and atelectasis. No pneumothorax.  Blunting of the bilateral costophrenic angles,  left-greater-than-right.      ABDOMEN:  No remarkable upper abdominal findings.      BONES:  Re-demonstration of multiple, displaced right posterolateral rib  fractures which appear old.      Impression: 1. Enteric tube courses inferiorly below the diaphragm and terminates  outside the field of view.  2. Otherwise, stable appearance of the chest with similar degree of  pulmonary edema and small bilateral pleural effusions.      I personally reviewed the images/study and I agree with the findings  as stated by Raymond Adam DO PGY-3. This study was interpreted at  [place], [place].      MACRO:  None      Signed by: Eric Reza 8/23/2025 1:20 PM  Dictation workstation:   [id number]      Physical Exam  General: No acute distress. A&Ox0. Patient resting, opens eyes to voice. Not verbal. Follows some commands, like raise hand and wiggle toes, but minimally in left leg. Able to move all other three extremities spontaneously.   Cardiac: Normal rate and rhythm.   Lungs: Clear to auscultation bilaterally  Extremities: Trace edema of lower extremities up to knee, non-pitting.       Scheduled medications  Scheduled Medications[1]  Continuous medications  Continuous Medications[2]  PRN  medications  PRN Medications[3]     Results for orders placed or performed during the hospital encounter of 08/09/25 (from the past 24 hours)   CBC and Auto Differential   Result Value Ref Range    WBC 27.0 (H) 4.4 - 11.3 x10*3/uL    nRBC 0.0 0.0 - 0.0 /100 WBCs    RBC 3.55 (L) 4.00 - 5.20 x10*6/uL    Hemoglobin 9.9 (L) 12.0 - 16.0 g/dL    Hematocrit 30.9 (L) 36.0 - 46.0 %    MCV 87 80 - 100 fL    MCH 27.9 26.0 - 34.0 pg    MCHC 32.0 32.0 - 36.0 g/dL    RDW 15.4 (H) 11.5 - 14.5 %    Platelets 157 150 - 450 x10*3/uL    Neutrophils % 88.8 40.0 - 80.0 %    Immature Granulocytes %, Automated 0.9 0.0 - 0.9 %    Lymphocytes % 4.3 13.0 - 44.0 %    Monocytes % 5.2 2.0 - 10.0 %    Eosinophils % 0.7 0.0 - 6.0 %    Basophils % 0.1 0.0 - 2.0 %    Neutrophils Absolute 23.98 (H) 1.60 - 5.50 x10*3/uL    Immature Granulocytes Absolute, Automated 0.25 0.00 - 0.50 x10*3/uL    Lymphocytes Absolute 1.16 0.80 - 3.00 x10*3/uL    Monocytes Absolute 1.40 (H) 0.05 - 0.80 x10*3/uL    Eosinophils Absolute 0.20 0.00 - 0.40 x10*3/uL    Basophils Absolute 0.04 0.00 - 0.10 x10*3/uL   Magnesium   Result Value Ref Range    Magnesium 2.73 (H) 1.60 - 2.40 mg/dL   Renal Function Panel   Result Value Ref Range    Glucose 314 (H) 74 - 99 mg/dL    Sodium 140 136 - 145 mmol/L    Potassium 4.1 3.5 - 5.3 mmol/L    Chloride 97 (L) 98 - 107 mmol/L    Bicarbonate 32 21 - 32 mmol/L    Anion Gap 15 mmol/L    Urea Nitrogen 53 (H) 6 - 23 mg/dL    Creatinine 1.50 (H) 0.50 - 1.05 mg/dL    eGFR 34 (L) >60 mL/min/1.73m*2    Calcium 8.4 (L) 8.6 - 10.6 mg/dL    Phosphorus 2.4 (L) 2.5 - 4.9 mg/dL    Albumin 2.9 (L) 3.4 - 5.0 g/dL   POCT GLUCOSE   Result Value Ref Range    POCT Glucose 365 (H) 74 - 99 mg/dL   POCT GLUCOSE   Result Value Ref Range    POCT Glucose 174 (H) 74 - 99 mg/dL   Renal function panel   Result Value Ref Range    Glucose 144 (H) 74 - 99 mg/dL    Sodium 143 136 - 145 mmol/L    Potassium 4.1 3.5 - 5.3 mmol/L    Chloride 98 98 - 107 mmol/L    Bicarbonate  37 (H) 21 - 32 mmol/L    Anion Gap 12 10 - 20 mmol/L    Urea Nitrogen 53 (H) 6 - 23 mg/dL    Creatinine 1.50 (H) 0.50 - 1.05 mg/dL    eGFR 34 (L) >60 mL/min/1.73m*2    Calcium 8.5 (L) 8.6 - 10.6 mg/dL    Phosphorus 2.8 2.5 - 4.9 mg/dL    Albumin 3.0 (L) 3.4 - 5.0 g/dL   Magnesium   Result Value Ref Range    Magnesium 2.58 (H) 1.60 - 2.40 mg/dL   POCT GLUCOSE   Result Value Ref Range    POCT Glucose 152 (H) 74 - 99 mg/dL   POCT GLUCOSE   Result Value Ref Range    POCT Glucose 152 (H) 74 - 99 mg/dL      Assessment & Plan  Chest pain    Radha Torres is a 85 y.o. female with PMH HFrEF (EF 25-30%), aortic stenosis, Afib, s/p AV node ablation and leadless pacemaker in 2025, HTN, renal cell carcinoma who presented on 8/9/25 as a transfer from [facility] for epigastric pain, transferred to CICU for possible flash pulmonary edema and hyponatremia. Now managing with aggressive diuresis, s/p thoracentesis, on IV Cefazolin for Staph A bacteremia, most recent cultures are negative. Structural not offering TAVR or BAV at this time due to complicating medical co-morbidities. Patient transferred to floor. Palliative consulted and code status changed to DNR, DNI, No ICU. Awaiting further discussion of goals of care.     Update 8/25:  - Further goals of care discussion needed with family, considering SNF vs hospice  - Satting appropriate on nasal cannula  - Last Blood cx negative  - Continue cefazolin per ID for six weeks total, ID signed off    #Acute Encephalopathy  #agitation  - Likely toxic-metabolic in nature, in setting of infection, hyponatremia, with some contribution of delirium.  Plan  -1:1 sitter  -PRN zyprexa  - IV Cefazolin for 6 weeks for suspected endovascular infection  - Delirium precautions     #Severe aortic stenosis  #HFrEF (EF 25-30%)  #HTN  :: Echo 7/11/25 - EF 25-30%, left ventricle mildly dilated, mild concentric LV hypertrophy, leadless pacemaker in right atria and RV, aortic valve is heavily calcified with  appearance of severe aortic stenosis (peak gradient 52mmHg, mean gradient 25mmHg, aortic valve area 0.8cm^2), trace AR, mild MR and TR, RVSP 50-60mmHg,   ::Scheduled for TAVR 8/19/25 with Dr. Howell  ::EKG with no ischemic changes  ::Troponin 23>27  Plan:  - Diurese with IV Lasix as needed with fluid status, net neutral  -strict I/Os, redose as needed  -holding home coreg 6.25mg, amlodipine 5mg  - Watching BP closely, currently normotensive     #Atrial fibrillation  #S/p AV emiliano ablation and leadless pacemaker placement 5/6/25  :: Home regimen - coreg 6.25 BID, amlodipine 2.5 mg, Eliquis 5mg BID  Plan:  - Restarted eliquis  -holding home coreg     #Epigastric pain  #Nausea/GI upset  :: EGG performed 2018 at [facility] notable for PUD  ::Possibly due to volume overload/ADHF vs GERD  Plan:   - Tigan PRN ordered for nausea   - Continue pantoprazole 40 mg daily, tums PRN  - Multimodal pain regimen      #Acute severe hypo-osmolar hyponatremia, resolved  :: Na 134   :: Diuresis improving Na levels  ::serum osm 255, urine Na 70, urine osm 248  ::TSH, morning cortisol within normal limits  Plan:   - Continue with IV Lasix as needed for hypervolemia     #CKD 3b  :: Baseline Cr appears to be around 1.2  ::Cr 1.22 in ED  Plan:  - Daily RFP, Mg  - Avoid nephrotoxic agents  - Strict I/O     #MSSA bacteremia  -blood cultures gotten from 8/19 for persistent leukocytosis were positive for MSSA  -repeat cultures from 8/20 also positive, but cultures 8/21 with no growth at four days  -unclear source, could be associated with current hardware or stenosis of aorta. Could be associated with cutaneous candidiasis as well.  Plan:   -ID consulted, appreciate recommendations  -continue cefazolin for six weeks until 9/10  -consider MAKSIM if w/in goals of care w/ family, however given tenuous resp status, will defer for now      #Hypothyroidism  ::TSH wnl  - Continue levothyroxine 75 mcg     #DM  -A1c 7.9  -ISS q6hrs, hypoglycemia protocol  -  Consider changing to glucerna tube feeds with nutrition recs        F: PRN  E: K>4 Phos>3 Mag>2  N: NPO, NG tube feeds  A: PIV  O2: NC  Abx: None  DVT PPx: restarted on eliquis  GI PPx: PPI     CODE STATUS: DNR, DNI, No ICU  SURROGATE DECISION MAKER: Stefanie Roblero, [phone]   *Spouse, Caleb, deferred decision-making to daughter listed above. Both family members making decisions together.     Will discuss and staff plan with attending, Yusuf Santana MD, in the AM. Patient staffed with senior resident, Nayeli Frances MD.     Frieda Bolivar MD      Cardiology Staff Addendum:    I evaluated the patient on 8/25/2025.   I agree with the resident’s medical decision making as documented/amended in the note.    Yusuf Santana MD             [1] [Held by provider] amLODIPine, 5 mg, oral, Daily  apixaban, 5 mg, oral, q12h  ascorbic acid, 500 mg, oral, Daily  atorvastatin, 20 mg, oral, Daily  [Held by provider] carvedilol, 6.25 mg, oral, BID  ceFAZolin, 2 g, intravenous, q8h  cholecalciferol, 25 mcg, oral, Daily  [Held by provider] ferrous sulfate, 1 tablet, oral, Daily  [Held by provider] gabapentin, 300 mg, oral, Daily  [Held by provider] hydrALAZINE, 50 mg, oral, TID  insulin lispro, 0-15 Units, subcutaneous, TID AC  levothyroxine, 75 mcg, oral, Daily  lidocaine, 1 patch, transdermal, Daily  pantoprazole, 40 mg, oral, Daily before breakfast  PARoxetine, 20 mg, oral, Daily  polyethylene glycol, 17 g, oral, Daily  [2]    [3] PRN medications: acetaminophen, calcium carbonate, dextrose, dextrose, diclofenac sodium, glucagon, glucagon, HYDROmorphone, OLANZapine, oxyCODONE, oxygen, oxygen, temazepam, trimethobenzamide

## 2025-08-25 NOTE — NURSING NOTE
Patient admitted to LT5 from CICU. Sitter in place, mits on. Patient HDS. Alert to self only. VSS. Family at bedside. Patient tearful. Bed low and locked, call light within reach.

## 2025-08-25 NOTE — PROGRESS NOTES
Spiritual Care Visit  Spiritual Care Request    Reason for Visit:  Routine Visit: Introduction  Continue Visiting: Yes  Crisis Visit: Critical care     Focus of Care:  Visited With: Patient and family together     Met with patient (who seemed a little confused, kept asking for water), patient's , Caleb and daughterStefanie along with Primary Resident and Palliative CNP to discuss patient's condition, potential medical trajectories and associated discharge plans and coping. Provided non-anxious, supportive presence, reflective listening, affirmation and normalization of experience. Will continue to follow.

## 2025-08-25 NOTE — PROGRESS NOTES
Physical Therapy    Physical Therapy Treatment    Patient Name: Radha Torres  MRN: [id number]  Department: [facility]  Room: 15/15-A  Today's Date: 8/25/2025  Time Calculation  Start Time: 1159  Stop Time: 1222  Time Calculation (min): 23 min    Assessment/Plan   PT Assessment  Barriers to Discharge Home: Caregiver assistance, Cognition needs, Physical needs  Caregiver Assistance: Caregiver assistance needed per identified barriers - however, level of patient's required assistance exceeds assistance available at home  Cognition Needs: Insight of patient limited regarding functional ability/needs, Cognition-related high falls risk  Physical Needs: Stair navigation into home limited by function/safety, Ambulating household distances limited by function/safety, 24hr mobility assistance needed, 24hr ADL assistance needed, High falls risk due to function or environment  End of Session Communication: Bedside nurse  Assessment Comment: Pt with decline in respiratory status since last PT session ad having increased agitation and confusion. Pt needs continued theray to address deficits in cognition, strength, endurance, balance, ROM, and functional mobility.  End of Session Patient Position: Bed, 3 rail up, Alarm off, caregiver present (sitter present, B mitts donned)     PT Plan  Treatment/Interventions: Transfer training, Bed mobility, Gait training, Stair training, Balance training, Strengthening, Endurance training, Range of motion, Therapeutic exercise, Therapeutic activity, Postural re-education, Positioning  PT Plan: Ongoing PT  PT Frequency for Current Admission: 3 times per week during this acute inpatient hospitalization  PT Discharge Recommendations: Moderate intensity level of continued care, Based on current functional status and rehab potential, patient is anticipated to tolerate and benefit from 5 or more days per week of skilled rehabilitative therapy after discharge from this acute inpatient hospitalization  PT  Recommended Transfer Status: Total assist  PT - OK to Discharge: Yes    PT Visit Info:  PT Received On: 08/25/25  Response to Previous Treatment: Other (Comment) (Pt has had ongoing tenuous respiratory statusrequiring CPAP on/off since last seen from PT. Pt underwent L thoracentesis on 8/21.)     General Visit Information:   General  Family/Caregiver Present: No  Co-Treatment: OT  Co-Treatment Reason: sitter present and pt with fluctuating agitation, B mitts, tenuous resiratory status  Prior to Session Communication: Bedside nurse  Patient Position Received: Bed, 3 rail up, Alarm off, caregiver present (sitter present, B mitts donned)  General Comment: Pt mostly lethargic. Per RN, pt had just received sedative medication 2/2 agitation. Pt with minimal ability to participate, with upright positioning pt mostly moaning in pain.    Subjective     Precautions:  Precautions  Medical Precautions: Cardiac precautions, Fall precautions, Oxygen therapy device and L/min  Precautions Comment: SBP <150    Lines/Tubes:   Telemetry   Dobhoff   8 L O2 NC     Vital Signs     Vitals Session Pre PT During PT Post PT   Heart Rate 81  90   Resp 12  21   SpO2 98 >/ 98% 97   BP 93/56 123/74 134/80      Objective     Pain:  Pain Assessment  Pain Assessment: 0-10  0-10 (Numeric) Pain Score:  (Pt reporting back pain, unable to rate 2/2 impaired level of arousal. Pt groaning in worse pain when attempting to mobilize in upward fashion)    Cognition:  Cognition  Overall Cognitive Status: Impaired  Arousal/Alertness: Localized responses  Orientation Level: Disoriented X4  Following Commands: Other (Comment) (pt did not follow commands during session despite repositioning and various stimulaton provided to engage patient)    Activity Tolerance:  Activity Tolerance  Early Mobility/Exercise Safety Screen: Proceed with mobilization - No exclusion criteria met    Treatments:     Therapeutic Activity  Therapeutic Activity Performed: Yes  Therapeutic  Activity 1: Pt tolerated chair position for ~10 minutes. Stable hemodynamic response; however no improvement in participation with therapy. Intermittent attempts from pt to reposition self with B UEs 2/2 pain in her back.    Bed Mobility  Bed Mobility: Yes  Bed Mobility 1  Bed Mobility 1: Forward lean (from chair position)  Level of Assistance 1: Dependent (x2 person; cues for sequencing; B arm-in-arm assist)  Bed Mobility Comments 1: use of draw sheet behind pt's back. Pt almost resistive to forward lean (likely 2/2 pain). Pt able to hold head in neutral position.  Bed Mobility 2  Bed Mobility  2: Rolling left  Level of Assistance 2: Dependent (x1 person; cues for sequencing)  Bed Mobility Comments 2: use of draw sheet for rolling    Outcome Measures:  [facility] Basic Mobility  Turning from your back to your side while in a flat bed without using bedrails: Total  Moving from lying on your back to sitting on the side of a flat bed without using bedrails: Total  Moving to and from bed to chair (including a wheelchair): Total  Standing up from a chair using your arms (e.g. wheelchair or bedside chair): Total  To walk in hospital room: Total  Climbing 3-5 steps with railing: Total  Basic Mobility - Total Score: 6    Confusion Assessment Method-ICU (CAM-ICU)  Feature 1: Acute Onset or Fluctuating Course: Positive  Feature 2: Inattention:  (unable to complete 2/2 impaired command following)  Feature 3: Altered Level of Consciousness: Positive  Feature 4: Disorganized Thinking: Positive    FSS-ICU  Ambulation: Unable to attempt due to weakness  Rolling: Total assistance (performs 25% or requires another person)  Sitting: Unable to perform  Transfer Sit-to-Stand: Unable to perform  Transfer Supine-to-Sit: Unable to perform  Total Score: 1    Early Mobility/Exercise Safety Screen: Proceed with mobilization - No exclusion criteria met  ICU Mobility Scale: Sitting in bed, exercises in bed [1]  E = Exercise and Early  Mobility  Early Mobility/Exercise Safety Screen: Proceed with mobilization - No exclusion criteria met  ICU Mobility Scale: Sitting in bed, exercises in bed    Education Documentation  Mobility Training, taught by Jovita Sands PT at 8/25/2025  3:39 PM.  Learner: Patient  Readiness: Nonacceptance  Method: Explanation  Response: No Evidence of Learning  Comment: mobility precautions, orientation    Education Comments  No comments found.      Encounter Problems       Encounter Problems (Active)       Balance       Pt will be able to sit EOB dynamically without UE support >10 minutes with supervision only for improved postural control (Not Progressing)       Start:  08/18/25    Expected End:  09/01/25               Balance       Pt will be able to complete standing alternating marches x 10 each leg with LRAD and CGA only without LE buckling and complete foor clearance each leg (Not Progressing)       Start:  08/18/25    Expected End:  09/01/25               Mobility       Patient will ambulate with LRAD >10 ft with CGA with stable vitals (Not Progressing)       Start:  08/18/25    Expected End:  09/01/25            Patient will ascend and descend 2 stairs with LRAD and no handrail with minAx1 (Not Progressing)       Start:  08/18/25    Expected End:  09/01/25               PT Transfers       Patient to transfer to and from sit to supine indep (Not Progressing)       Start:  08/18/25    Expected End:  09/01/25            Patient will transfer sit to and from stand with LRAD and supervision (Not Progressing)       Start:  08/18/25    Expected End:  09/01/25               PT Transfers       Transfer from bed to chair with LRAD and CGA (Not Progressing)       Start:  08/18/25    Expected End:  09/01/25                 Signed by Jovita Sands DPT

## 2025-08-25 NOTE — PROGRESS NOTES
CICU to [facility] Transfer Summary     I:  ICU Admission Reason & Brief ICU Course:      85 y.o. female with history significant for HFrEF (EF 25-30%), aortic stenosis, Afib, s/p AV node ablation and leadless pacemaker in 2025, HTN, renal cell carcinoma who presented on 8/9/25 as a transfer from [facility] for epigastric pain, nonradiating, not associated with eating. Hemodynamically stable, afebrile on arrival in no acute distress with some mild epigastric burning. Trop 23>27, lipase within normal limits. EKG with no ischemic changes. CTAP negative for inflammatory process or bowel obstruction. She was scheduled for upcoming TAVR on 8/19/25 with Dr. Howell at [facility].    Labs were significant for BNP of 2422 on 8/9/25, and patient was diuresed with IV lasix 40 mg. Pacemaker was interrogated which showed no notable events. Dr. Howell coordination team was contacted regarding the possibility of expediting TAVR to an earlier date. TAVR was unable to be moved to an earlier date and remained scheduled for 8/19. On 8/11, patient was hyponatremic at 125, and lasix was held. Urine osm 250, urine sodium 29, and Na increased to 127 by 8/12. Patient continued to endorse epigastric pain and nausea. KUB was unremarkable, ECG remained unchanged. Patient experienced some relief with Tigan, Tums and home pain medication; epigastric pain most likely due to reflux/GERD, as it was burning in nature, worse after eating, worse with lying down, and improvement with Tums.  However, by 8/13, serum sodium resulted severely low at 120; repeat was also 120. Serum osm, morning cortisol, and 500 ml NS was ordered. Likely hypervolemia hyponatremia.     Per recommendations of nephrology, 15 mg tolvaptan was given on 8/15 and 8/18. Lasix and urea also held. Hyponatremia continued to improve. On 8/18, nipride was discontinued and hydralazine was increased to 50 mg TID. In discussion with cardiology- structural heart, TAVR will not be done on 8/19.  WBCs mildly elevated and will continue to monitor when transferred to the floor.    On 8/19 WBC vonda and mental status not improved, with UA pending and CXR concerning for atelectasis vs. Pneumonia, started on empiric IV Ceftriaxone 1mg q24h. Concern for aspiration so held PO meds, started IV Hydralazine 5mg TID. Infectious workup for AMS pending.     8/20 blood cx positive for staph aureus. Started on Vanc/Zosyn. CT chest abdomen and pelvis obtained and shows large left sided pleural effusion concerning for fluid overload with superimposed pneumonia vs. Atelectasis. No abscess. Starting Dopamine gtt at 2.5mcg/kg/min and additional diuresis with Lasix 100mg and Diamox 1000mg.     8/21- Thoracentesis performed early in AM on L pleural effusion. Exchanged Vanc/Zosyn for IV Cefazolin for better MSSA coverage. Additional diuresis with 160mg Lasix.     8/22-8/23 - diuresis to goal net -2L, continuing feeds through NG tube, managing back pain. Dopamine gtt discontinued.     8/24- repeat blood cx, IV diuresis with IV Lasix 100mg, increasing pain medication for back pain. On baseline nasal cannula off BiPAP.    8/25 - ID signed off. Structural signed off.     Current Management Plan:    - Needs daily diuresis, currently doing IV Lasix 100mg with daily POCUS, would recommend either standing IV or PO diuresis as see fit. Prone to flash without regular diuresis. However, with high pain med needs for arthritic back pain, have to be careful not to drop pre-load too much as the pain meds do make her BP drop.     - Holding all home PO anti-HTN meds iso of severe Aortic stenosis with soft pressures while in ICU    - Off of BiPAP, on 2-4L nasal cannula for past 24-36 hours    - IV Cefazolin q8h per ID recs, will need PICC pending GOC    - Can restart her home Eliquis for A Fib, was being held for possible procedure but has been supertherapeutic,     - Multimodal pain regimen for back pain with Tylenol, Oxycodone, Dilaudid, Lidocaine  Patch, Voltaren Gel    - NG tube for tube feeds per nutrition recs    - Waxing and waning delirium, has PRN's ordered but has been difficult to control in ICU due to poor sleep and back pain. Recommend a Geriatric consult on the floor for help with delirium pending [facility]. She currently has mitts on, and nursing staff here felt that she needed them to reduce pulling at lines.       C: Code Status/DPOA Info/Goals of Care/ACP Note    DNR and No Intubation  DPOA/Contact Number:   Stefanie Roblero (Daughter)  [phone] (Mobile)       U: Unprescribing & Pertinent High-Risk Medications    Changes to home meds: Hold Amlodipine, Coreg     Anticoagulation: Yes - S[facility]    Antibiotics:   [x] IV Cefazolin per ID for 6 weeks      P: Pending Tests at the Time of Transfer   If patient is to improve clinically and complete course of IV antibiotics for 6 weeks, TAVR may be reconsidered.       A: Active consultants, including Rehab:   [x]  Subspecialty Consultants: Infectious Disease, Palliative Care  --Structural Heart  --ID signed off, recommend 6 weeks of IV Cefazolin q8h  [x]  PT  [x]  OT  [x]  SLP  []  Wound Care    U: Uncertainty Measure/Diagnostic Pause:    Working diagnosis at the time of transfer severe aortic stenosis,   Diagnosis Degree of Certainty: 1. High degree of certainty about the clinical diagnosis.     S: Summary of Major Problems and To-Dos:     Radha Torres is a 85 y.o. female with PMH HFrEF (EF 25-30%), aortic stenosis, Afib, s/p AV node ablation and leadless pacemaker in 2025, HTN, renal cell carcinoma who presented on 8/9/25 as a transfer from [facility] for epigastric pain, transferred to CICU for possible flash pulmonary edema and hyponatremia. Now managing with aggressive diuresis, s/p thoracentesis, on IV Cefazolin for Staph A bacteremia, most recent cultures are negative. Structural not offering TAVR or BAV at this time due to complicating medical co-morbidities.      Update 8/25:  - Satting appropriate on  nasal cannula  - Blood cx negative  - Continue cefazolin per ID, ID signed off  - No changes in WBC, increase to 27  - Structural re-engaged, signed off     CNS  #agitation  #Acute Encephalopathy  -1:1 sitter  -PRN zyprexa  - Etiology most likely infectious vs. Hyponatremia vs. Delirium  - IV Cefazolin for 6 weeks for suspected endovascular infection  - Delirium precautions  - Hyponatremia improved with diuresis     PULM  #flash pulmonary edema, resolved  - Now on nasal cannula  -Continuing to diurese     CV  #Severe aortic stenosis  #HFrEF (EF 25-30%)  #HTN  :: Echo 7/11/25 - EF 25-30%, left ventricle mildly dilated, mild concentric LV hypertrophy, leadless pacemaker in right atria and RV, aortic valve is heavily calcified with appearance of severe aortic stenosis (peak gradient 52mmHg, mean gradient 25mmHg, aortic valve area 0.8cm^2), trace AR, mild MR and TR, RVSP 50-60mmHg,   ::Scheduled for TAVR 8/19/25 with Dr. Howell  ::EKG with no ischemic changes  ::Troponin 23>27  Plan:  - Diurese with IV Lasix and Diuril as needed  -strict I/Os, redose as needed  -holding home coreg 6.25mg, amlodipine 5mg  - Watching BP closely, currently normotensive     #Atrial fibrillation  #S/p AV emiliano ablation and leadless pacemaker placement 5/6/25  :: Home regimen - coreg 6.25 BID, amlodipine 2.5 mg, Eliquis 5mg BID  Plan:  - On DVT ppx  - She was previously on Eliquis, was being held in CICU as heparin assays were supra therapeutic  -holding home coreg     GI  #Epigastric pain  #Nausea/GI upset  :: EGG performed 2018 at [facility] notable for PUD  ::Possibly due to volume overload/ADHF vs GERD  Plan:   - Tigan PRN ordered for nausea   - Continue pantoprazole 40 mg daily, tums PRN  - Multimodal pain regimen  - NG tube placement     RENAL/  #Acute severe hypo-osmolar hyponatremia, resolved  :: Na 134   :: Diuresis improving Na levels  ::serum osm 255, urine Na 70, urine osm 248  ::TSH, morning cortisol within normal limits  Plan:   -  Continue with IV Lasix as needed for hypervolemia     #CKD 3b  :: Baseline Cr appears to be around 1.2  ::Cr 1.22 in ED  Plan:  - Daily RFP   - Avoid nephrotoxic agents  - Strict I/O     ID  #MSSA bacteremia  -blood cultures gotten from 8/19 for persistent leukocytosis were positive for MSSA  -repeat cultures from 8/20 also positive  -unclear source  Plan:   -ID consulted, appreciate recommendations  -continue cefazolin   -consider MAKSIM if w/in goals of care w/ family, however given tenuous resp status, will defer for now      HEME/ONC  DANIELE     ENDO  #Hypothyroidism  ::TSH wnl  - Continue levothyroxine 75 mcg     #suspected DM  -fu A1c  -ISS #2, hypoglycemia protocol     MSK/Skin  DANIELE     F: PRN  E: K>4 Phos>3 Mag>2  N: NPO  A: PIV  O2: CPAP 30% FIO2  Drips: nipride, dopamine  Abx: None  DVT PPx: pending heparin assay  GI PPx: PPI     CODE STATUS: FULL (confirmed with patient on admission)  SURROGATE DECISION MAKER: Stefanie Roblero, [phone]     To-do list prior to transfer:    Current Management Plan:    - Needs daily diuresis, currently doing IV Lasix 100mg with daily POCIS, would recommend either standing IV or PO diuresis as see fit. Prone to flash without regular diuresis. However, with high pain med needs for arthritic back pain, have to be careful not to drop pre-load too much as the pain meds do make her BP drop.     - Holding all home PO anti-HTN meds iso of severe Aortic stenosis with soft pressures while in ICU    - Off of BiPAP, on 2-4L nasal cannula for past 24-36 hours    - IV Cefazolin q8h per ID recs, will need PICC pending GOC    - Can restart her home Eliquis for A Fib, was being held for possible procedure but has been supertherapeutic,    - Multimodal pain regimen for back pain with Tylenol, Oxycodone, Dilaudid, Lidocaine Patch, Voltaren Gel    - NG tube for tube feeds per nutrition recs    - Waxing and waning delirium, has PRN's ordered but has been difficult to control in ICU due to poor  sleep and back pain. Recommend a Geriatric consult on the floor for help with delirium pending C. She currently has mitts on, and nursing staff here felt that she needed them to reduce pulling at lines.     E: Exam, including Lines/Drains/Airways & Data Review:   Vitals reviewed.   Constitutional:       Appearance: She is ill-appearing.      Eyes:      General: No scleral icterus.     Extraocular Movements: Extraocular movements intact.      Pupils: Pupils are equal, round, and reactive to light.         Cardiovascular:      Rate and Rhythm: Normal rate and regular rhythm.      Pulses: Normal pulses.      Heart sounds: Murmur heard.   Pulmonary:      Effort: No respiratory distress.      Breath sounds: Rhonchi and rales present.   Abdominal:      General: There is no distension.      Palpations: Abdomen is soft.      Tenderness: There is no guarding.      Musculoskeletal:      Right lower leg: No edema.      Left lower leg: No edema.      Skin:     General: Skin is warm.      Capillary Refill: Capillary refill takes 2 to 3 seconds.      Neurological:      Mental Status: She is disoriented.   Difficult airway? N/A  Lines/drains assessed for removal? Yes, describe: No lines necessary for removal    Within 30 minutes of the patient physically leaving the floor, a Floor Readiness Note needs to be placed with updated vitals.

## 2025-08-25 NOTE — PROGRESS NOTES
Occupational Therapy    Occupational Therapy Treatment    Name: Radha Torres  MRN: [id number]  Department: [facility]  Room: 15/15-A  Date: 08/25/25  Time Calculation  Start Time: 1159  Stop Time: 1222  Time Calculation (min): 23 min    Assessment:  OT Assessment: Pt demo's deficits in ADLs, IADLs, functional activity tolerance, transfers, functional mobility, and cognition. Pt would benefit from skilled OT intervention to return to PLOF safely  Prognosis: Good  Barriers to Discharge Home: Caregiver assistance, Cognition needs, Physical needs  Caregiver Assistance: Caregiver assistance needed per identified barriers - however, level of patient's required assistance exceeds assistance available at home  Cognition Needs: 24hr supervision for safety awareness needed, Insight of patient limited regarding functional ability/needs  Physical Needs: 24hr mobility assistance needed, 24hr ADL assistance needed, High falls risk due to function or environment  Evaluation/Treatment Tolerance: Patient limited by fatigue  Medical Staff Made Aware: Yes  End of Session Communication: Bedside nurse  End of Session Patient Position: Bed, 3 rail up, Alarm off, caregiver present (B soft mitts, sitter present)  Plan:  Treatment Interventions: ADL retraining, Functional transfer training, UE strengthening/ROM, Endurance training, Cognitive reorientation, Neuromuscular reeducation, Fine motor coordination activities, Compensatory technique education  OT Frequency for Current Admission: 1 time per week during this acute inpatient hospitalization  OT Discharge Recommendations: Moderate intensity level of continued care, Based on current functional status and rehab potential, patient is anticipated to tolerate and benefit from 5 or more days per week of skilled rehabilitative therapy after discharge from this acute inpatient hospitalization  Equipment Recommended upon Discharge: Wheeled walker  OT Recommended Transfer Status: Dependent  OT - OK  to Discharge: Yes    Subjective     OT Visit Info:  OT Received On: 08/25/25  General:  General  Reason for Referral: Presented to OSH  on 8/9/2025 for epigastric pain, transferred to [facility] for Possible flash pulmonary edema and hyponatremia; suspect GI symptoms and hyponatremia due to severe fluid overload i/s/o ADHF  Family/Caregiver Present: No  Co-Treatment: PT  Co-Treatment Reason: sitter present and pt with fluctuating agitation, B mitts, tenuous resiratory status  Prior to Session Communication: Bedside nurse  Patient Position Received: Bed, 3 rail up, Alarm off, caregiver present (B mitts in place, sitter present)  General Comment: Pt lethargic and not able to actively follow commands despite max cues. Per RN, pt recently recieived sedation medications. Moaning thoughout intermittently.  Precautions:  Medical Precautions: Cardiac precautions, Fall precautions, Oxygen therapy device and L/min (8L HFNC)  Precautions Comment: SBP <150          Vital Signs       Vitals Session Pre OT During OT Post OT   Heart Rate 82   88   Resp  17   12   SpO2 100   99   BP 93/56 123/74 134/80   MAP 68 90 97               Pain Assessment:  Pain Assessment  Pain Assessment: CPOT (Critical Care Pain Observation Tool)  Critical-Care Pain Observation Tool   Facial Expression: Relaxed, neutral  Body Movements: Absence of movements or normal position  Complicance with the Ventilator (Intubated Patients): Tolerating ventilator or movement  Vocalization (Extubated Patients): Talking in normal tone or no sound  Muscle Tension: Relaxed  Critical-Care Pain Observation Score: 0    Objective   Cognition:  Overall Cognitive Status: Impaired  Arousal/Alertness: Localized responses  Orientation Level: Oriented X4  Following Commands:  (No active command following. Unable to actively engage in session despite stim and encouragement.)  Cognition Comments: CAM (+). Lethargic during session.       Bed Mobility/Transfers: Bed Mobility  Bed  Mobility: Yes  Bed Mobility 1  Bed Mobility 1: Forward lean  Level of Assistance 1: Dependent, +2  Bed Mobility Comments 1: Only able to tolerate short duration d/t moaning in pain. Did indicate at that time that she had pain in her back.  Bed Mobility 2  Bed Mobility  2: Rolling left  Level of Assistance 2: Dependent, +2       Therapy/Activity: Therapeutic Activity  Therapeutic Activity 1: Pt tolerated bed in chair position for approx 10min. Stable vitals throughout. No improvement in alertness or ability to participate. Encouraged BUE reach and to visually attend to targets. Unable to complete.        Outcome Measures:  [facility] Daily Activity  Putting on and taking off regular lower body clothing: Total  Bathing (including washing, rinsing, drying): Total  Putting on and taking off regular upper body clothing: Total  Toileting, which includes using toilet, bedpan or urinal: Total  Taking care of personal grooming such as brushing teeth: Total  Eating Meals: Total  Daily Activity - Total Score: 6        Education Documentation  Body Mechanics, taught by Diamante Giordano OT at 8/25/2025  4:02 PM.  Learner: Patient  Readiness: Acceptance  Method: Explanation  Response: Needs Reinforcement    Precautions, taught by Diamante Giordano OT at 8/25/2025  4:02 PM.  Learner: Patient  Readiness: Acceptance  Method: Explanation  Response: Needs Reinforcement    ADL Training, taught by Diamante Giordano OT at 8/25/2025  4:02 PM.  Learner: Patient  Readiness: Acceptance  Method: Explanation  Response: Needs Reinforcement    Education Comments  No comments found.      Goals:  Encounter Problems       Encounter Problems (Active)       ADLs       Patient will complete UB dressing tasks with Sup in order to maximize functional Indep with task completion.        Start:  08/18/25    Expected End:  09/01/25            Patient will complete lower body dressing with SBA  for donning and doffing all LE clothes in order to increase Indep with  task participation.        Start:  08/18/25    Expected End:  09/01/25            Patient will complete toileting, including clothing management and hygiene, with SBA in order to maximize functional Indep with task completion.        Start:  08/18/25    Expected End:  09/01/25               COGNITION/SAFETY       Pt will demo good safety awareness during functional task completion with no more than min vc in order to maximize occupational performance.         Start:  08/18/25    Expected End:  09/01/25               EXERCISE/STRENGTHENING       Pt will increase overall BUE strength to 4/5 in order to increase functional task participation.        Start:  08/18/25    Expected End:  09/01/25            Pt will increase endurance to tolerate 15-20min of activity with no more than 1 rest break in order to increase ability to engage in ADL completion.        Start:  08/18/25    Expected End:  09/01/25               MOBILITY       Pt will demo increased functional mobility to tolerate tasks necessary to complete ADL routine with SBA and while observing good safety.        Start:  08/18/25    Expected End:  09/01/25               TRANSFERS       Patient will complete functional transfers using least restrictive device with SBA   in order to maximize functional potential and increase safety.        Start:  08/18/25    Expected End:  09/01/25

## 2025-08-26 ENCOUNTER — APPOINTMENT (OUTPATIENT)
Dept: RADIOLOGY | Facility: HOSPITAL | Age: 85
DRG: 306 | End: 2025-08-26
Payer: MEDICARE

## 2025-08-26 ENCOUNTER — APPOINTMENT (OUTPATIENT)
Dept: CARDIOLOGY | Facility: HOSPITAL | Age: 85
DRG: 306 | End: 2025-08-26
Payer: MEDICARE

## 2025-08-26 LAB
ALBUMIN SERPL BCP-MCNC: 3.1 G/DL (ref 3.4–5)
ANION GAP SERPL CALC-SCNC: 15 MMOL/L (ref 10–20)
BASOPHILS # BLD AUTO: 0.03 X10*3/UL (ref 0–0.1)
BASOPHILS NFR BLD AUTO: 0.1 %
BUN SERPL-MCNC: 47 MG/DL (ref 6–23)
CALCIUM SERPL-MCNC: 8.5 MG/DL (ref 8.6–10.6)
CHLORIDE SERPL-SCNC: 96 MMOL/L (ref 98–107)
CO2 SERPL-SCNC: 33 MMOL/L (ref 21–32)
CREAT SERPL-MCNC: 1.47 MG/DL (ref 0.5–1.05)
EGFRCR SERPLBLD CKD-EPI 2021: 35 ML/MIN/1.73M*2
EOSINOPHIL # BLD AUTO: 0.18 X10*3/UL (ref 0–0.4)
EOSINOPHIL NFR BLD AUTO: 0.9 %
ERYTHROCYTE [DISTWIDTH] IN BLOOD BY AUTOMATED COUNT: 15.9 % (ref 11.5–14.5)
GLUCOSE BLD MANUAL STRIP-MCNC: 198 MG/DL (ref 74–99)
GLUCOSE BLD MANUAL STRIP-MCNC: 274 MG/DL (ref 74–99)
GLUCOSE BLD MANUAL STRIP-MCNC: 278 MG/DL (ref 74–99)
GLUCOSE SERPL-MCNC: 279 MG/DL (ref 74–99)
HCT VFR BLD AUTO: 35 % (ref 36–46)
HGB BLD-MCNC: 10.4 G/DL (ref 12–16)
IMM GRANULOCYTES # BLD AUTO: 0.26 X10*3/UL (ref 0–0.5)
IMM GRANULOCYTES NFR BLD AUTO: 1.3 % (ref 0–0.9)
LYMPHOCYTES # BLD AUTO: 1.14 X10*3/UL (ref 0.8–3)
LYMPHOCYTES NFR BLD AUTO: 5.6 %
MAGNESIUM SERPL-MCNC: 2.57 MG/DL (ref 1.6–2.4)
MCH RBC QN AUTO: 28.9 PG (ref 26–34)
MCHC RBC AUTO-ENTMCNC: 29.7 G/DL (ref 32–36)
MCV RBC AUTO: 97 FL (ref 80–100)
MONOCYTES # BLD AUTO: 0.73 X10*3/UL (ref 0.05–0.8)
MONOCYTES NFR BLD AUTO: 3.6 %
NEUTROPHILS # BLD AUTO: 17.97 X10*3/UL (ref 1.6–5.5)
NEUTROPHILS NFR BLD AUTO: 88.5 %
NRBC BLD-RTO: 0 /100 WBCS (ref 0–0)
PHOSPHATE SERPL-MCNC: 3.2 MG/DL (ref 2.5–4.9)
PLATELET # BLD AUTO: 171 X10*3/UL (ref 150–450)
POTASSIUM SERPL-SCNC: 4.4 MMOL/L (ref 3.5–5.3)
RBC # BLD AUTO: 3.6 X10*6/UL (ref 4–5.2)
SODIUM SERPL-SCNC: 140 MMOL/L (ref 136–145)
WBC # BLD AUTO: 20.3 X10*3/UL (ref 4.4–11.3)

## 2025-08-26 PROCEDURE — 82947 ASSAY GLUCOSE BLOOD QUANT: CPT

## 2025-08-26 PROCEDURE — 1100000001 HC PRIVATE ROOM DAILY

## 2025-08-26 PROCEDURE — 36415 COLL VENOUS BLD VENIPUNCTURE: CPT

## 2025-08-26 PROCEDURE — 83735 ASSAY OF MAGNESIUM: CPT

## 2025-08-26 PROCEDURE — 99233 SBSQ HOSP IP/OBS HIGH 50: CPT

## 2025-08-26 PROCEDURE — 2500000004 HC RX 250 GENERAL PHARMACY W/ HCPCS (ALT 636 FOR OP/ED): Mod: TB

## 2025-08-26 PROCEDURE — 71045 X-RAY EXAM CHEST 1 VIEW: CPT

## 2025-08-26 PROCEDURE — 93005 ELECTROCARDIOGRAM TRACING: CPT

## 2025-08-26 PROCEDURE — 2500000001 HC RX 250 WO HCPCS SELF ADMINISTERED DRUGS (ALT 637 FOR MEDICARE OP)

## 2025-08-26 PROCEDURE — 2500000004 HC RX 250 GENERAL PHARMACY W/ HCPCS (ALT 636 FOR OP/ED)

## 2025-08-26 PROCEDURE — 2500000002 HC RX 250 W HCPCS SELF ADMINISTERED DRUGS (ALT 637 FOR MEDICARE OP, ALT 636 FOR OP/ED)

## 2025-08-26 PROCEDURE — 99232 SBSQ HOSP IP/OBS MODERATE 35: CPT | Performed by: STUDENT IN AN ORGANIZED HEALTH CARE EDUCATION/TRAINING PROGRAM

## 2025-08-26 PROCEDURE — 71045 X-RAY EXAM CHEST 1 VIEW: CPT | Performed by: RADIOLOGY

## 2025-08-26 PROCEDURE — 80069 RENAL FUNCTION PANEL: CPT

## 2025-08-26 PROCEDURE — 2500000005 HC RX 250 GENERAL PHARMACY W/O HCPCS

## 2025-08-26 PROCEDURE — 85025 COMPLETE CBC W/AUTO DIFF WBC: CPT

## 2025-08-26 RX ORDER — HALOPERIDOL LACTATE 5 MG/ML
1 INJECTION, SOLUTION INTRAMUSCULAR EVERY 4 HOURS PRN
Status: DISCONTINUED | OUTPATIENT
Start: 2025-08-26 | End: 2025-08-27 | Stop reason: HOSPADM

## 2025-08-26 RX ORDER — HYDRALAZINE HYDROCHLORIDE 25 MG/1
25 TABLET, FILM COATED ORAL 3 TIMES DAILY
Status: DISCONTINUED | OUTPATIENT
Start: 2025-08-26 | End: 2025-08-26

## 2025-08-26 RX ORDER — PANTOPRAZOLE SODIUM 40 MG/10ML
40 INJECTION, POWDER, LYOPHILIZED, FOR SOLUTION INTRAVENOUS DAILY
Status: DISCONTINUED | OUTPATIENT
Start: 2025-08-26 | End: 2025-08-26

## 2025-08-26 RX ORDER — DIAZEPAM 5 MG/ML
5 INJECTION, SOLUTION INTRAMUSCULAR; INTRAVENOUS EVERY 4 HOURS PRN
Status: DISCONTINUED | OUTPATIENT
Start: 2025-08-26 | End: 2025-08-27 | Stop reason: HOSPADM

## 2025-08-26 RX ORDER — GABAPENTIN 300 MG/1
300 CAPSULE ORAL NIGHTLY
Status: DISCONTINUED | OUTPATIENT
Start: 2025-08-26 | End: 2025-08-27 | Stop reason: HOSPADM

## 2025-08-26 RX ORDER — FUROSEMIDE 10 MG/ML
80 INJECTION INTRAMUSCULAR; INTRAVENOUS ONCE
Status: COMPLETED | OUTPATIENT
Start: 2025-08-26 | End: 2025-08-26

## 2025-08-26 RX ORDER — DIAZEPAM 5 MG/ML
5 INJECTION, SOLUTION INTRAMUSCULAR; INTRAVENOUS EVERY 6 HOURS PRN
Status: DISCONTINUED | OUTPATIENT
Start: 2025-08-26 | End: 2025-08-26

## 2025-08-26 RX ORDER — METHOCARBAMOL 500 MG/1
500 TABLET, FILM COATED ORAL EVERY 8 HOURS PRN
Status: DISCONTINUED | OUTPATIENT
Start: 2025-08-26 | End: 2025-08-27 | Stop reason: HOSPADM

## 2025-08-26 RX ORDER — HYDROMORPHONE HYDROCHLORIDE 1 MG/ML
0.6 INJECTION, SOLUTION INTRAMUSCULAR; INTRAVENOUS; SUBCUTANEOUS
Status: DISCONTINUED | OUTPATIENT
Start: 2025-08-26 | End: 2025-08-27 | Stop reason: HOSPADM

## 2025-08-26 RX ADMIN — FUROSEMIDE 80 MG: 10 INJECTION, SOLUTION INTRAMUSCULAR; INTRAVENOUS at 12:37

## 2025-08-26 RX ADMIN — HYDROMORPHONE HYDROCHLORIDE 0.6 MG: 1 INJECTION, SOLUTION INTRAMUSCULAR; INTRAVENOUS; SUBCUTANEOUS at 18:41

## 2025-08-26 RX ADMIN — Medication: at 08:00

## 2025-08-26 RX ADMIN — OXYCODONE HYDROCHLORIDE AND ACETAMINOPHEN 500 MG: 500 TABLET ORAL at 08:55

## 2025-08-26 RX ADMIN — HYDROMORPHONE HYDROCHLORIDE 0.2 MG: 1 INJECTION, SOLUTION INTRAMUSCULAR; INTRAVENOUS; SUBCUTANEOUS at 16:24

## 2025-08-26 RX ADMIN — PAROXETINE 20 MG: 20 TABLET, FILM COATED ORAL at 08:55

## 2025-08-26 RX ADMIN — HYDROMORPHONE HYDROCHLORIDE 0.4 MG: 1 INJECTION, SOLUTION INTRAMUSCULAR; INTRAVENOUS; SUBCUTANEOUS at 17:24

## 2025-08-26 RX ADMIN — INSULIN LISPRO 9 UNITS: 100 INJECTION, SOLUTION INTRAVENOUS; SUBCUTANEOUS at 06:01

## 2025-08-26 RX ADMIN — APIXABAN 5 MG: 5 TABLET, FILM COATED ORAL at 11:27

## 2025-08-26 RX ADMIN — HYDROMORPHONE HYDROCHLORIDE 0.2 MG: 1 INJECTION, SOLUTION INTRAMUSCULAR; INTRAVENOUS; SUBCUTANEOUS at 15:59

## 2025-08-26 RX ADMIN — CEFAZOLIN SODIUM 2 G: 2 INJECTION, SOLUTION INTRAVENOUS at 05:24

## 2025-08-26 RX ADMIN — LIDOCAINE 4% 1 PATCH: 40 PATCH TOPICAL at 08:55

## 2025-08-26 RX ADMIN — HYDROMORPHONE HYDROCHLORIDE 0.6 MG: 1 INJECTION, SOLUTION INTRAMUSCULAR; INTRAVENOUS; SUBCUTANEOUS at 22:34

## 2025-08-26 RX ADMIN — DIAZEPAM 5 MG: 5 INJECTION INTRAMUSCULAR; INTRAVENOUS at 23:51

## 2025-08-26 RX ADMIN — DIAZEPAM 5 MG: 5 INJECTION INTRAMUSCULAR; INTRAVENOUS at 19:54

## 2025-08-26 RX ADMIN — PANTOPRAZOLE SODIUM 40 MG: 40 INJECTION, POWDER, FOR SOLUTION INTRAVENOUS at 06:01

## 2025-08-26 RX ADMIN — HYDROMORPHONE HYDROCHLORIDE 0.4 MG: 1 INJECTION, SOLUTION INTRAMUSCULAR; INTRAVENOUS; SUBCUTANEOUS at 07:53

## 2025-08-26 RX ADMIN — ATORVASTATIN CALCIUM 20 MG: 20 TABLET, FILM COATED ORAL at 08:55

## 2025-08-26 RX ADMIN — POLYETHYLENE GLYCOL 3350 17 G: 17 POWDER, FOR SOLUTION ORAL at 11:37

## 2025-08-26 RX ADMIN — OXYCODONE HYDROCHLORIDE 15 MG: 5 TABLET ORAL at 03:05

## 2025-08-26 RX ADMIN — HYDROMORPHONE HYDROCHLORIDE 0.6 MG: 1 INJECTION, SOLUTION INTRAMUSCULAR; INTRAVENOUS; SUBCUTANEOUS at 23:14

## 2025-08-26 RX ADMIN — HYDROMORPHONE HYDROCHLORIDE 0.6 MG: 1 INJECTION, SOLUTION INTRAMUSCULAR; INTRAVENOUS; SUBCUTANEOUS at 19:35

## 2025-08-26 RX ADMIN — Medication 25 MCG: at 08:55

## 2025-08-26 RX ADMIN — LEVOTHYROXINE SODIUM 75 MCG: 0.07 TABLET ORAL at 08:55

## 2025-08-26 RX ADMIN — HYDROMORPHONE HYDROCHLORIDE 0.4 MG: 1 INJECTION, SOLUTION INTRAMUSCULAR; INTRAVENOUS; SUBCUTANEOUS at 17:56

## 2025-08-26 RX ADMIN — INSULIN LISPRO 3 UNITS: 100 INJECTION, SOLUTION INTRAVENOUS; SUBCUTANEOUS at 11:27

## 2025-08-26 RX ADMIN — METHOCARBAMOL 500 MG: 500 TABLET ORAL at 16:25

## 2025-08-26 RX ADMIN — CEFAZOLIN SODIUM 2 G: 2 INJECTION, SOLUTION INTRAVENOUS at 12:37

## 2025-08-26 RX ADMIN — CEFAZOLIN SODIUM 2 G: 2 INJECTION, SOLUTION INTRAVENOUS at 21:33

## 2025-08-26 RX ADMIN — HYDROMORPHONE HYDROCHLORIDE 0.4 MG: 1 INJECTION, SOLUTION INTRAMUSCULAR; INTRAVENOUS; SUBCUTANEOUS at 12:48

## 2025-08-26 RX ADMIN — OXYCODONE HYDROCHLORIDE 15 MG: 5 TABLET ORAL at 11:27

## 2025-08-26 ASSESSMENT — PAIN - FUNCTIONAL ASSESSMENT
PAIN_FUNCTIONAL_ASSESSMENT: PAINAD (PAIN ASSESSMENT IN ADVANCED DEMENTIA SCALE)
PAIN_FUNCTIONAL_ASSESSMENT: CPOT (CRITICAL CARE PAIN OBSERVATION TOOL)
PAIN_FUNCTIONAL_ASSESSMENT: PAINAD (PAIN ASSESSMENT IN ADVANCED DEMENTIA SCALE)
PAIN_FUNCTIONAL_ASSESSMENT: PAINAD (PAIN ASSESSMENT IN ADVANCED DEMENTIA SCALE)
PAIN_FUNCTIONAL_ASSESSMENT: UNABLE TO SELF-REPORT
PAIN_FUNCTIONAL_ASSESSMENT: PAINAD (PAIN ASSESSMENT IN ADVANCED DEMENTIA SCALE)
PAIN_FUNCTIONAL_ASSESSMENT: CPOT (CRITICAL CARE PAIN OBSERVATION TOOL)
PAIN_FUNCTIONAL_ASSESSMENT: PAINAD (PAIN ASSESSMENT IN ADVANCED DEMENTIA SCALE)

## 2025-08-26 ASSESSMENT — RESPIRATORY DISTRESS OBSERVATION SCALE (RDOS)
ACCESSORY MUSCLE RISE IN CLAVICLE DURING INSPIRATION: 1 - SLIGHT RISE
RESTLESS NONPURPOSEFUL MOVEMENTS: 2 - FREQUENT MOVEMENTS
RDOS TOTAL SCORE: 5
PARADOXICAL BREATHING PATTERN: 0 - NONE
HEART RATE PER MINUTE: 0 - <90 BEATS
INVOLUNTARY NASAL FLARING: 0 - NONE
LOOK OF FEAR: 2 - EYES WIDE OPEN, FACIAL MUSCLES TENSE, BROW FURROWED, MOUTH OPEN
ACCESSORY MUSCLE RISE IN CLAVICLE DURING INSPIRATION: 1 - SLIGHT RISE
INVOLUNTARY NASAL FLARING: 0 - NONE
INVOLUNTARY NASAL FLARING: 0 - NONE
HEART RATE PER MINUTE: 0 - <90 BEATS
RESPIRATORY RATE PER MINUTE: 0 - <19 BREATHS
GRUNTING AT END OF EXPIRATION: 0 - NONE
PARADOXICAL BREATHING PATTERN: 0 - NONE
GRUNTING AT END OF EXPIRATION: 0 - NONE
ACCESSORY MUSCLE RISE IN CLAVICLE DURING INSPIRATION: 1 - SLIGHT RISE
GRUNTING AT END OF EXPIRATION: 0 - NONE
RESTLESS NONPURPOSEFUL MOVEMENTS: 2 - FREQUENT MOVEMENTS
LOOK OF FEAR: 0 - NONE
ACCESSORY MUSCLE RISE IN CLAVICLE DURING INSPIRATION: 1 - SLIGHT RISE
LOOK OF FEAR: 2 - EYES WIDE OPEN, FACIAL MUSCLES TENSE, BROW FURROWED, MOUTH OPEN
INVOLUNTARY NASAL FLARING: 0 - NONE
GRUNTING AT END OF EXPIRATION: 0 - NONE
RESPIRATORY RATE PER MINUTE: 0 - <19 BREATHS
RESPIRATORY RATE PER MINUTE: 1 - 19-30 BREATHS
RESTLESS NONPURPOSEFUL MOVEMENTS: 1 - OCCASIONAL, SLIGHT MOVEMENTS
HEART RATE PER MINUTE: 0 - <90 BEATS
RDOS TOTAL SCORE: 6
RESTLESS NONPURPOSEFUL MOVEMENTS: 2 - FREQUENT MOVEMENTS
HEART RATE PER MINUTE: 0 - <90 BEATS
RDOS TOTAL SCORE: 5
GRUNTING AT END OF EXPIRATION: 0 - NONE
RDOS TOTAL SCORE: 4
RDOS TOTAL SCORE: 1
LOOK OF FEAR: 0 - NONE
HEART RATE PER MINUTE: 0 - <90 BEATS
LOOK OF FEAR: 2 - EYES WIDE OPEN, FACIAL MUSCLES TENSE, BROW FURROWED, MOUTH OPEN
RESTLESS NONPURPOSEFUL MOVEMENTS: 0 - NONE
RESPIRATORY RATE PER MINUTE: 1 - 19-30 BREATHS
INVOLUNTARY NASAL FLARING: 0 - NONE
RESPIRATORY RATE PER MINUTE: 1 - 19-30 BREATHS
PARADOXICAL BREATHING PATTERN: 0 - NONE
ACCESSORY MUSCLE RISE IN CLAVICLE DURING INSPIRATION: 1 - SLIGHT RISE

## 2025-08-26 ASSESSMENT — PAIN SCALES - PAIN ASSESSMENT IN ADVANCED DEMENTIA (PAINAD)
CONSOLABILITY: DISTRACTED OR REASSURED BY VOICE/TOUCH
BODYLANGUAGE: RELAXED
NEGVOCALIZATION: OCCASIONAL MOAN/GROAN, LOW SPEECH, NEGATIVE/DISAPPROVING QUALITY
BREATHING: OCCASIONAL LABORED BREATHING, SHORT PERIOD OF HYPERVENTILATION
FACIALEXPRESSION: FACIAL GRIMACING
TOTALSCORE: 0
BODYLANGUAGE: TENSE, DISTRESSED PACING, FIDGETING
TOTALSCORE: 6
TOTALSCORE: MEDICATION (SEE MAR);REPOSITIONED
BREATHING: NORMAL
NEGVOCALIZATION: REPEATED TROUBLED CALLING OUT, LOUD MOANING/GROANING, CRYING
CONSOLABILITY: UNABLE TO CONSOLE, DISTRACT OR REASSURE
CONSOLABILITY: NO NEED TO CONSOLE
FACIALEXPRESSION: FACIAL GRIMACING
TOTALSCORE: MEDICATION (SEE MAR);REPOSITIONED
CONSOLABILITY: NO NEED TO CONSOLE
CONSOLABILITY: DISTRACTED OR REASSURED BY VOICE/TOUCH
NEGVOCALIZATION: REPEATED TROUBLED CALLING OUT, LOUD MOANING/GROANING, CRYING
CONSOLABILITY: DISTRACTED OR REASSURED BY VOICE/TOUCH
NEGVOCALIZATION: OCCASIONAL MOAN/GROAN, LOW SPEECH, NEGATIVE/DISAPPROVING QUALITY
FACIALEXPRESSION: SAD, FRIGHTENED, FROWN
BODYLANGUAGE: TENSE, DISTRESSED PACING, FIDGETING
BREATHING: NORMAL
BREATHING: NORMAL
TOTALSCORE: 1
TOTALSCORE: MEDICATION (SEE MAR)
CONSOLABILITY: UNABLE TO CONSOLE, DISTRACT OR REASSURE
BODYLANGUAGE: RELAXED
TOTALSCORE: MEDICATION (SEE MAR)
NEGVOCALIZATION: REPEATED TROUBLED CALLING OUT, LOUD MOANING/GROANING, CRYING
TOTALSCORE: 4
BREATHING: OCCASIONAL LABORED BREATHING, SHORT PERIOD OF HYPERVENTILATION
CONSOLABILITY: DISTRACTED OR REASSURED BY VOICE/TOUCH
BREATHING: OCCASIONAL LABORED BREATHING, SHORT PERIOD OF HYPERVENTILATION
FACIALEXPRESSION: SMILING OR INEXPRESSIVE
FACIALEXPRESSION: SAD, FRIGHTENED, FROWN
BODYLANGUAGE: TENSE, DISTRESSED PACING, FIDGETING
FACIALEXPRESSION: SMILING OR INEXPRESSIVE
TOTALSCORE: 7
CONSOLABILITY: NO NEED TO CONSOLE
BODYLANGUAGE: TENSE, DISTRESSED PACING, FIDGETING
TOTALSCORE: 8
TOTALSCORE: 8
BREATHING: OCCASIONAL LABORED BREATHING, SHORT PERIOD OF HYPERVENTILATION
FACIALEXPRESSION: SAD, FRIGHTENED, FROWN
TOTALSCORE: 6
FACIALEXPRESSION: FACIAL GRIMACING
TOTALSCORE: 0
BREATHING: NORMAL
NEGVOCALIZATION: REPEATED TROUBLED CALLING OUT, LOUD MOANING/GROANING, CRYING
BODYLANGUAGE: TENSE, DISTRESSED PACING, FIDGETING
FACIALEXPRESSION: SMILING OR INEXPRESSIVE
BODYLANGUAGE: RELAXED
BREATHING: OCCASIONAL LABORED BREATHING, SHORT PERIOD OF HYPERVENTILATION
NEGVOCALIZATION: REPEATED TROUBLED CALLING OUT, LOUD MOANING/GROANING, CRYING
TOTALSCORE: REPOSITIONED
BODYLANGUAGE: TENSE, DISTRESSED PACING, FIDGETING

## 2025-08-26 ASSESSMENT — COGNITIVE AND FUNCTIONAL STATUS - GENERAL
MOVING FROM LYING ON BACK TO SITTING ON SIDE OF FLAT BED WITH BEDRAILS: A LITTLE
STANDING UP FROM CHAIR USING ARMS: A LOT
TOILETING: TOTAL
TURNING FROM BACK TO SIDE WHILE IN FLAT BAD: A LOT
TOILETING: A LOT
PERSONAL GROOMING: A LOT
DRESSING REGULAR UPPER BODY CLOTHING: A LOT
CLIMB 3 TO 5 STEPS WITH RAILING: TOTAL
MOVING TO AND FROM BED TO CHAIR: TOTAL
WALKING IN HOSPITAL ROOM: TOTAL
MOBILITY SCORE: 12
MOBILITY SCORE: 8
DRESSING REGULAR LOWER BODY CLOTHING: A LOT
MOVING FROM LYING ON BACK TO SITTING ON SIDE OF FLAT BED WITH BEDRAILS: A LOT
TURNING FROM BACK TO SIDE WHILE IN FLAT BAD: A LOT
WALKING IN HOSPITAL ROOM: A LOT
DAILY ACTIVITIY SCORE: 11
PERSONAL GROOMING: A LITTLE
EATING MEALS: A LOT
DRESSING REGULAR UPPER BODY CLOTHING: A LOT
HELP NEEDED FOR BATHING: A LOT
EATING MEALS: A LITTLE
STANDING UP FROM CHAIR USING ARMS: TOTAL
DAILY ACTIVITIY SCORE: 14
DRESSING REGULAR LOWER BODY CLOTHING: A LOT
HELP NEEDED FOR BATHING: A LOT
CLIMB 3 TO 5 STEPS WITH RAILING: TOTAL
MOVING TO AND FROM BED TO CHAIR: A LOT

## 2025-08-26 NOTE — PROGRESS NOTES
"Nutrition Assessment    Reason for Assessment: Enteral assessment/recommendation (TF)    Provider request for formula change (Glucerna) recs    Nutrition Intake Since Last RDN Visit:  Energy Intake: Poor < 50 % (Pt NPO)  Pain affecting nutrition status: N/A  Food and Nutrient History: Consulted via secure chat for glucerna recs. Pt currently on TwoCal HN at goal rate of 35 ml/hr x 22 hrs (holding 1 hour pre/post levo) and (provides 1540 kcal, 65g pro, 184 g CHO, 539 ml water). Pt is a newly diabetic with A1c of 7.9%. POC gluc fluctuating greatly and ranging from 152-365. Per provider, ISS changed to Q6, previously was 3x with meals. Discussed with providers that although glucerna provides less CHO, it does have more free water than TwoCal HN (pt still fluid overloaded).  TwoCal HN at 35 ml/hr provides 1540 kcal, 65g pro, 539 ml water, 184 g CHO. Per RN, pt is tolerating current TF rate well.       Anthropometrics:  Height: 165 cm (5' 4.96\")   Weight: 73 kg (161 lb)   BMI (Calculated): 26.82  IBW/kg (Dietitian Calculated): 56.8 kg  Percent of IBW: 151 %                      Weight Change % This Admission:     Wt Readings from Last 10 Encounters:   08/25/25 73 kg (161 lb)   08/08/25 80 kg (176 lb 5.9 oz)   07/22/25 74.4 kg (164 lb)   06/30/25 75.1 kg (165 lb 9.6 oz)   05/13/25 76.2 kg (168 lb)   05/06/25 74.2 kg (163 lb 9.3 oz)   04/16/25 77.1 kg (170 lb)   03/31/25 77.6 kg (171 lb)   12/18/24 76.2 kg (168 lb)   10/25/24 78.5 kg (173 lb)     7 kg decrease in wt since admission (8.8%) likely r/t decrease in fluids.     Nutrition Focused Physical Exam Findings:  from 8/20 visit    Subcutaneous Fat Loss:   Orbital Fat Pads: Mild-Moderate (slight dark circles and slight hollowing)  Buccal Fat Pads: Mild-Moderate (flat cheeks, minimal bounce)  Triceps: Defer  Ribs: Defer  Muscle Wasting:  Temporalis: Mild-Moderate (slight depression)  Pectoralis (Clavicular Region): Mild-Moderate (some protrusion of " clavicle)  Deltoid/Trapezius: Defer  Interosseous: Mild-Moderate (slightly depressed area between thumb and forefinger)  Trapezius/Infraspinatus/Supraspinatus (Scapular Region): Defer  Quadriceps: Defer  Gastrocnemius: Defer  Edema:  Edema: +1 trace  Edema Location: generalized  Physical Findings:  Hair: Negative  Eyes: Negative  Nails: Negative  Skin: Negative  Respiratory : Negative  Digestive System Findings: Nausea    Nutrition Significant Labs:  CBC Trend:   Results from last 7 days   Lab Units 08/26/25  0638 08/25/25  0307 08/24/25  0557 08/23/25  0501   WBC AUTO x10*3/uL 20.3* 27.0* 22.3* 21.3*   RBC AUTO x10*6/uL 3.60* 3.55* 3.39* 3.90*   HEMOGLOBIN g/dL 10.4* 9.9* 9.7* 10.9*   HEMATOCRIT % 35.0* 30.9* 29.4* 33.7*   MCV fL 97 87 87 86   PLATELETS AUTO x10*3/uL 171 157 122* 142*    , BMP Trend:   Results from last 7 days   Lab Units 08/26/25  0638 08/25/25  1613 08/25/25  0307 08/24/25  0557   GLUCOSE mg/dL 279* 144* 314* 184*   CALCIUM mg/dL 8.5* 8.5* 8.4* 6.8*   SODIUM mmol/L 140 143 140 142   POTASSIUM mmol/L 4.4 4.1 4.1 3.4*   CO2 mmol/L 33* 37* 32 30   CHLORIDE mmol/L 96* 98 97* 104   BUN mg/dL 47* 53* 53* 47*   CREATININE mg/dL 1.47* 1.50* 1.50* 1.30*    , A1C:  Lab Results   Component Value Date    HGBA1C 7.9 (H) 08/15/2025   , BG POCT trend:   Results from last 7 days   Lab Units 08/26/25  1106 08/26/25  0556 08/25/25  2134 08/25/25  1616 08/25/25  1134   POCT GLUCOSE mg/dL 198* 274* 152* 152* 174*    , Liver Function Trend:    , Renal Lab Trend:   Results from last 7 days   Lab Units 08/26/25  0638 08/25/25  1613 08/25/25  0307 08/24/25  0557   POTASSIUM mmol/L 4.4 4.1 4.1 3.4*   PHOSPHORUS mg/dL 3.2 2.8 2.4* 2.0*   SODIUM mmol/L 140 143 140 142   MAGNESIUM mg/dL 2.57* 2.58* 2.73* 1.76   EGFR mL/min/1.73m*2 35* 34* 34* 41*   BUN mg/dL 47* 53* 53* 47*   CREATININE mg/dL 1.47* 1.50* 1.50* 1.30*        Nutrition Specific Medications:  Scheduled medications  [Held by provider] amLODIPine, 5 mg, oral,  Daily  apixaban, 5 mg, oral, q12h  ascorbic acid, 500 mg, oral, Daily  atorvastatin, 20 mg, oral, Daily  [Held by provider] carvedilol, 6.25 mg, oral, BID  ceFAZolin, 2 g, intravenous, q8h  cholecalciferol, 25 mcg, oral, Daily  [Held by provider] ferrous sulfate, 1 tablet, oral, Daily  [Held by provider] gabapentin, 300 mg, oral, Daily  hydrALAZINE, 25 mg, oral, TID  insulin lispro, 0-15 Units, subcutaneous, q6h  levothyroxine, 75 mcg, oral, Daily  lidocaine, 1 patch, transdermal, Daily  pantoprazole, 40 mg, intravenous, Daily  PARoxetine, 20 mg, oral, Daily  polyethylene glycol, 17 g, oral, Daily      Continuous medications  Continuous Medications[1]    PRN medications  PRN medications: acetaminophen, calcium carbonate, dextrose, dextrose, diclofenac sodium, glucagon, glucagon, HYDROmorphone, OLANZapine, oxyCODONE, oxygen, oxygen, temazepam, trimethobenzamide      I/O:   Last BM Date: 08/24/25; Stool Appearance: Soft (08/24/25 1800)    Dietary Orders (From admission, onward)       Start     Ordered    08/26/25 1342  Enteral feeding with NPO Glucerna 1.5; NG (nasogastric tube); 45; 10:40 AM 8/21; No free water; 50; Water; Every 4 hours  Diet effective now        Comments: Glucerna 1.5 at goal of 45 ml/hr x 22 hrs provides 1485 kcal, 83g pro, 132g CHO, 751 ml water.    Additional Interventions: Current TF: TwoCal HN at 35 ml/hr provides 1540 kcal, 65g pro, 184 g CHO, 539 ml water   Question Answer Comment   Tube feeding formula: Glucerna 1.5    Feeding route: NG (nasogastric tube)    Tube feeding continuous rate (mL/hr): 45    Tube feeding cyclic (start / stop time): 10:40 AM 8/21    Free water restriction: No free water    Tube feeding flush (mL): 50    Flush type: Water    Flush frequency: Every 4 hours        08/26/25 1342    08/09/25 0333  May Participate in Room Service  ( ROOM SERVICE MAY PARTICIPATE)  Once        Question:  .  Answer:  Yes    08/09/25 0332                     Estimated Needs:   Total Energy  Estimated Needs in 24 hours (kCal): 1572 kCal (6102-5904)  Method for Estimating Needs: 25-30 kcal/kg IBW  Total Protein Estimated Needs in 24 Hours (g): 69 g  Method for Estimating 24 Hour Protein Needs: 1.2 g pro/kg IBW  Total Fluid Estimated Needs in 24 Hours (mL): 1572 mL  Method for Estimating 24 Hour Fluid Needs: 1 ml/kg or per md        Nutrition Diagnosis   Malnutrition Diagnosis  Patient has Malnutrition Diagnosis: Yes  Diagnosis Status: Active  Malnutrition Diagnosis: Moderate malnutrition related to acute disease or injury  As Evidenced by: <75% EER >7 days, mild muscle wasting/fat loss    Nutrition Diagnosis  Patient has Nutrition Diagnosis: Yes  Diagnosis Status (1): Active  Nutrition Diagnosis 1: Inadequate oral intake  Related to (1): decreased appetite  As Evidenced by (1): need for TF, PO intake <50%       Nutrition Interventions/Recommendations   Nutrition prescription for enteral nutrition    Nutrition Recommendations:  Individualized Nutrition Prescription Provided for : Patient currently NPO and receiving TwoCal HN at goal rate of 35 ml/hr x 22 hrs (hold 1 hr pre/post levothyroxine).   Per provider request for formula change, here are updated recs; Glucerna 1.5 at 45 ml/hr x 22 hrs (hold 1 hr pre/post levothyroxine). Free water flushes per provider discretion.    Nutrition Interventions/Goals:   Enteral Intake: Management of delivery rate of enteral nutrition  Goal: Glucerna 1.5 at goal of 45 ml/hr x 22 hrs provides 1485 kcal, 83g pro, 132g CHO, 751 ml water.           Nutrition Monitoring and Evaluation   Enteral and Parenteral Nutrition Intake Determination: Enteral nutrition intake - Tolerate TF at goal rate    Body Weight: Body weight - Weight reduction from fluids, as needed    Electrolyte and Renal Panel: Electrolytes within normal limits  Glucose/Endocrine Profile: Glucose within normal limits - ICU (140-180 mg/dL)         Goal Status: Some progress toward goal(s)    Time Spent (min): 45  minutes              [1]

## 2025-08-26 NOTE — PROGRESS NOTES
Spiritual Care Visit  Spiritual Care Request    Reason for Visit:  Routine Visit: Follow-up  Continue Visiting: Yes     Focus of Care:  Visited With: Patient not available (Pt sleeping and no family present at time of this visit.)

## 2025-08-26 NOTE — PROGRESS NOTES
Music Therapy Note    Radha Torres     Therapy Session  Referral Type: New referral this admission  Visit Type: Follow-up visit  Session Start Time: 1457  Session End Time: 1500  Intervention Delivery: In-person  Conflict of Service: Declined treatment  Number of family members present: 2  Family Participation: Supportive     Pre-assessment  Unable to Assess Reason: Outcomes not applicable         Treatment/Interventions       Post-assessment  Total Session Time (min): 3 minutes    Narrative  Assessment Detail: Pt was awake lying in bed with 2 family members visiting at bedside when this MT attempted a session. Pt declined and pt's family requested a f/u session at a later time with pt's preferred music '70s and 80s'. MT will f/u accordingly and also educated family members on how to reach out before MT follows up if needed    Education Documentation  No documentation found.

## 2025-08-26 NOTE — PROGRESS NOTES
I was approached by GISELE Shelby regarding Radha. She complains of persistent pain, shortness of breath, and has been moaning. Family at bedside was also concerned about continuing tube feeding.    She has been transitioned to DNR-Comfort Measures Only. Hospice has been consulted.    I counseled his spouse Caleb and son-in-law at bedside regarding increased risk of aspiration, vomiting, secretions, and discomfort with tube feeding at the end-of-life. They agreed to discontinue tube feeding and transition to comfort feeding. Radha is agreeable to this as well.    Her RDOS = 4.  She two consecutive doses of hydromorphone 0.2 mg IV without improvement of symptoms and discomfort.    Estimated Creatinine Clearance: 29 mL/min (A) (by C-G formula based on SCr of 1.47 mg/dL (H)).    Pain/Dyspnea Recommendations for comfort care:    She already received 2 consecutive doses of hydromorphone 0.2 mg IV. She was also receiving oxycodone 10 to 15 mg PO once to twice a day for the past few days.    Step 1: Increase hydromorphone to 0.4 mg IV every 15 minutes as needed for RDOS more than or equal to 3, OR pain.    Step 2: If symptoms are not controlled after 2 consecutive doses every 15 minutes, Increase hydromorphone to 0.6 mg IV every 15 minutes as needed for RDOS more than or equal to 3, OR pain.    Step 3: If symptoms are not controlled after 2 consecutive doses every 15 minutes, Increase hydromorphone to 0.8 mg IV every 15 minutes as needed for RDOS more than or equal to 3, OR pain.    Step 4: If her symptoms are not controlled after 2 consecutive doses every 15 minutes, consider initiating hydromorphone IV continuous infusion (PCA) with the following settings:   Basal Rate 0.3 mg/hour   Patient administered Bolus dose 0.3 mg for pain or dyspnea   Nursing administered Bolus dose 0.3 mg for RDOS more than or equal to 3, Or pain  Lockout interval 10 minutes   One-hour Limit: 2.1 mg    Consider Diazepam 5 mg IV every 4 hours as  needed if there is a component of anxiety.    We will re-assess patient response and symptoms with additional recommendations on follow up.      Discussed with the medical team and GISELE Shelby.    Shiva Kelly MD  Palliative Care Physician  Message: Epic Secure chat  Team pager [phone]

## 2025-08-26 NOTE — PROGRESS NOTES
"Radha Torres is a 85 y.o. female on day 17 of admission presenting with Chest pain.      Palliative Medicine following for:  Complex medical decision making, symptom management, patient/family support    History obtained from chart review including ED note, H&P, patient's daily progress notes, review of lab/test results, and discussion with primary team and bedside RN.    Subjective    History of Present Illness  Radha Torres is a 85 y.o. female with PMH HFrEF (EF 25-30%), aortic stenosis, Afib, s/p AV node ablation and leadless pacemaker in 2025, HTN, renal cell carcinoma who presented on 8/9/25 as a transfer from [facility] for epigastric pain, now transferred to CICU for possible flash pulmonary edema and hyponatremia. Suspect GI symptoms and hyponatremia due to severe fluid overload iso ADHF. 8/20 labs showing uti and bacteremia, ID following along with Structural Heart on candidacy for BAV.       Symptoms  Pain: chest (see below)  Dyspnea: not really  Fatigue: yes  Insomnia: yes  Drowsiness: yes  Constipation: yes, she thinks it's been a few days  Nausea: denies  Appetite: - (npo)  Anxiety: a little  Depression: -    Objective    Last Recorded Vitals  /77 (BP Location: Right arm, Patient Position: Lying)   Pulse 79   Temp 36.4 °C (97.5 °F) (Temporal)   Resp 17   Ht 1.65 m (5' 4.96\")   Wt 73 kg (161 lb)   SpO2 100%   BMI 26.82 kg/m²      Physical Exam   Constitutional:       Appearance: She is overweight. She is ill-appearing. Drowsy but awake, cooperative.  HENT:      Head: Normocephalic.      Mouth/Throat:      Mouth: Mucous membranes are dry.    Cardiovascular:      Rate and Rhythm: Normal rate.      Heart sounds: Murmur heard.   Pulmonary:      Effort: wnl     Breath sounds: Decreased breath sounds present.   Abdominal:      Palpations: Abdomen is soft.    Skin:     General: Skin is warm and dry.      Coloration: Skin is pale.   Neurological:      Mental Status: She is drowsy but awake, able to " provide details of her pain and answer ROS.     Relevant Results   Results for orders placed or performed during the hospital encounter of 08/09/25 (from the past 24 hours)   Renal function panel   Result Value Ref Range    Glucose 144 (H) 74 - 99 mg/dL    Sodium 143 136 - 145 mmol/L    Potassium 4.1 3.5 - 5.3 mmol/L    Chloride 98 98 - 107 mmol/L    Bicarbonate 37 (H) 21 - 32 mmol/L    Anion Gap 12 10 - 20 mmol/L    Urea Nitrogen 53 (H) 6 - 23 mg/dL    Creatinine 1.50 (H) 0.50 - 1.05 mg/dL    eGFR 34 (L) >60 mL/min/1.73m*2    Calcium 8.5 (L) 8.6 - 10.6 mg/dL    Phosphorus 2.8 2.5 - 4.9 mg/dL    Albumin 3.0 (L) 3.4 - 5.0 g/dL   Magnesium   Result Value Ref Range    Magnesium 2.58 (H) 1.60 - 2.40 mg/dL   POCT GLUCOSE   Result Value Ref Range    POCT Glucose 152 (H) 74 - 99 mg/dL   POCT GLUCOSE   Result Value Ref Range    POCT Glucose 152 (H) 74 - 99 mg/dL   ECG 12 lead   Result Value Ref Range    Ventricular Rate 81 BPM    Atrial Rate 166 BPM    QRS Duration 184 ms    QT Interval 470 ms    QTC Calculation(Bazett) 545 ms    R Axis -58 degrees    T Axis 96 degrees    QRS Count 13 beats    Q Onset 194 ms    T Offset 429 ms    QTC Fredericia 519 ms   POCT GLUCOSE   Result Value Ref Range    POCT Glucose 274 (H) 74 - 99 mg/dL   CBC and Auto Differential   Result Value Ref Range    WBC 20.3 (H) 4.4 - 11.3 x10*3/uL    nRBC 0.0 0.0 - 0.0 /100 WBCs    RBC 3.60 (L) 4.00 - 5.20 x10*6/uL    Hemoglobin 10.4 (L) 12.0 - 16.0 g/dL    Hematocrit 35.0 (L) 36.0 - 46.0 %    MCV 97 80 - 100 fL    MCH 28.9 26.0 - 34.0 pg    MCHC 29.7 (L) 32.0 - 36.0 g/dL    RDW 15.9 (H) 11.5 - 14.5 %    Platelets 171 150 - 450 x10*3/uL    Neutrophils % 88.5 40.0 - 80.0 %    Immature Granulocytes %, Automated 1.3 (H) 0.0 - 0.9 %    Lymphocytes % 5.6 13.0 - 44.0 %    Monocytes % 3.6 2.0 - 10.0 %    Eosinophils % 0.9 0.0 - 6.0 %    Basophils % 0.1 0.0 - 2.0 %    Neutrophils Absolute 17.97 (H) 1.60 - 5.50 x10*3/uL    Immature Granulocytes Absolute, Automated  0.26 0.00 - 0.50 x10*3/uL    Lymphocytes Absolute 1.14 0.80 - 3.00 x10*3/uL    Monocytes Absolute 0.73 0.05 - 0.80 x10*3/uL    Eosinophils Absolute 0.18 0.00 - 0.40 x10*3/uL    Basophils Absolute 0.03 0.00 - 0.10 x10*3/uL   Magnesium   Result Value Ref Range    Magnesium 2.57 (H) 1.60 - 2.40 mg/dL   Renal Function Panel   Result Value Ref Range    Glucose 279 (H) 74 - 99 mg/dL    Sodium 140 136 - 145 mmol/L    Potassium 4.4 3.5 - 5.3 mmol/L    Chloride 96 (L) 98 - 107 mmol/L    Bicarbonate 33 (H) 21 - 32 mmol/L    Anion Gap 15 10 - 20 mmol/L    Urea Nitrogen 47 (H) 6 - 23 mg/dL    Creatinine 1.47 (H) 0.50 - 1.05 mg/dL    eGFR 35 (L) >60 mL/min/1.73m*2    Calcium 8.5 (L) 8.6 - 10.6 mg/dL    Phosphorus 3.2 2.5 - 4.9 mg/dL    Albumin 3.1 (L) 3.4 - 5.0 g/dL   ECG 12 lead   Result Value Ref Range    Ventricular Rate 72 BPM    Atrial Rate 136 BPM    QRS Duration 176 ms    QT Interval 462 ms    QTC Calculation(Bazett) 505 ms    R Axis -68 degrees    T Axis 101 degrees    QRS Count 12 beats    Q Onset 196 ms    T Offset 427 ms    QTC Fredericia 491 ms   POCT GLUCOSE   Result Value Ref Range    POCT Glucose 198 (H) 74 - 99 mg/dL        Allergies  Patient has no known allergies.  Medications  Scheduled medications  Scheduled Medications[1]  Continuous medications  Continuous Medications[2]  PRN medications  PRN Medications[3]     Assessment/Plan    Radha Torres is a 85 y.o. female with PMH HFrEF (EF 25-30%), aortic stenosis, Afib, s/p AV node ablation and leadless pacemaker in 2025, HTN, renal cell carcinoma who presented on 8/9/25 as a transfer from [facility] for epigastric pain, now transferred to CICU for possible flash pulmonary edema and hyponatremia. Suspect GI symptoms and hyponatremia due to severe fluid overload iso ADHF. 8/20 labs showing uti and bacteremia, ID following along with Structural Heart on candidacy for BAV.      8/19: Palliative consultation. Met with spouse at bedside, spoke to dtr on the phone.  Agreeable to DNR/DNI to honor pt best and her previously stated health preferences.     8/20: Pt with pleural effusions, elevated lactate, UTI, and bacteremia. Plan to diurese further and started on Dopamine for cardiac support. No plan for balloon valvuloplasty until pt shows improvements. Supported family at bedside.     8/21: Pt seen s/p thoracentesis, 750ml removed. Pt on cpap, nad, lethargic, but per nursing, was very awake and cooperative for bedside procedure. Plan to continue diuresing and antibiotics at this time, day by day monitoring for progression/regression and ongoing GOC.     8/25: Not a candidate for BAV r/t ongoing risks. Pt still with mitts and sitter. Treating more aggressively for back pain. Met with family at bedside, c/f suffering, pt demanding water. Agreeable to transfer off the unit but no further ICU  if declines. If so, agreeable to other arrangements to try to get pt to hospice closer to home ([facility]).     8/26: Follow up with pt/symptoms, see below.    Palliative Performance Scale (PPS): 30-40    ----------------------------------------------------------------------------------------------------------------------------------------------------------------------------------------------------------------------------------------------------------------------------------------------------------------------------------------------------------------------      I spent  minutes in providing separately identifiable ACP services with the patient and/or surrogate decision maker in a voluntary conversation discussing the patient's wishes and goals as detailed in the above note.  "  ----------------------------------------------------------------------------------------------------------------------------------------------------------------------------------------------------------------------------------------------------------------------------------------------------------------------------------------------------------------------    #Complex Medical Decision Making  #Goals of Care  #Advanced Care Planning  - Code status: DNR/DNI no ICU 8/25  - Surrogate decision maker: Stefanie Roblero (Daughter)  [phone] ([place]) primary. Spouse secondary.  - Goals are mix of survival and time and improved quality of life  -8/19: Met with pt at bedside. Able to glean more information r/t pt's life, wishes, goals, worries, and health preferences. See above/consult note for additional supportive detail.      In short, discussed hopes to stabilize pt for tavr, but expressed worries of further decompensation in the interim. Discussed DNR/DNI in detail in which family supports and aligns with what pt would deem \"non beneficial\" care. Family states pt has said all along, she does not want to be kept alive by machines. Supported family and answered questions.      - 8/20: This NP, Ray DE LA CRUZ, Stefanie and Caleb discussed continuing to treat her and clearing reversible factors. We discussed that she still may be able to have the balloon provided things clear up and she can stabilize again. The uncertainty of whether or not pt is going to continue to decline is weighing on their decisions on what to do. They are trying to remain hopeful that she can make it to the balloon.      Pall team will reassess tomorrow as we may have a better understanding of how patient responds to new treatments.      - 8/25: Met spouse and dtr along with Ray Dyer, and Dr. Hunter. Discussed how pt is currently not a candidate for balloon r/t anesthesia and risk of perforation/worsening her valve and death. Discussed pt needs " "about 6 weeks of antibiotics and optimization to be a candidate. Aware pt now meeting criteria for floor level, which may also improve her delirium. Discussed their thought of pt's current state. Family expressed they feels she is suffering, lying in bed, losing weight, not eating or drinking. Discussed what else pt would be willing to go through and family agrees, no icu if she further declines. If pt decompensates, family wishes to attempt to get her closer to home ([place]) to R new facility.   Supported family and pt in this difficult time.        #Back pain  #Left chest tightness  - Sx history known, was on 1 percocet BID prn severe pain prior to admission. Likely exacerbated by bed bound state.   - Now on Oxy 15mg Q6H prn severe pain, Hydromorphone 0.4mg IV Q4H prn severe pain (second line), acetaminophen 975mg Q8H prn moderate pain, Lido patch Q12H, Voltaren gel 4g TID Prn mild pain.  - 8/26: Back pain is tolerable. Wants to get up to the chair, recliner recommended with 2 assist to pivot to chair. Pt is less agitated and more cooperative today.  - 8/26: Follow up with pt/symptoms, able to describe \"left chest, tight, deep, not painful, worsens with a deep breath\". States not her skin. Getting EKG. VS remain stable.  Denies worse sob, previously had left thor 8/21, recommend cxr. Recommend Ddimer, if elevated, consider rule out PE.         #ADHF  #HFrEF  #Severe AS  #AMS  #Psychosocial Support  - Ongoing pt/family support and care navigation. 8/19 Established DNR/DNI to honor pt's wishes. Family still hopeful for TAVR if/when pt can stabilize. Structural following to assist with candidacy for tavr vs balloon valvuloplasty.   - 8/25: Pt seen, on 6l nc, still with sitter and mitts. Pt makes eye contact momentarily, unable to participate in ROS. Apneic breathing pattern, RR about 14-17 per minute. Valvuloplasty not offered at this time r/t risks with likely general anesthesia and risk for AI during. Met with " "family, additional GOC discussed (above). Will transfer to the floor.     - 8/26: Follow up with pt/symptoms, able to describe \"left chest, tight, deep, not painful, worsens with a deep breath\". States not her skin. Getting EKG. VS remain stable. Pt is less agitated and more cooperative today. Back pain is tolerable. Wants to get up to the chair, recliner recommended with 2 assist to pivot to chair. Denies worse sob, previously had left thor 8/21, recommend cxr. Recommend Ddimer, if elevated, R/O PE.  - Family requesting daily updates. Please notify Stefanie first/primary contact.  - Music Therapy- added  - Spiritual Care Support  -  support     Plan of Care discussed with: Updated primary and bedside RN on goals of care decision, medication adjustments, and code status      Medical Decision Making was high level due to high complexity of problems, extensive data review, and high risk of management/treatment.     - ADHF, severe aortic stenosis, AMS, pleural effusions, bacteremia, posing threat to life and function.  - Reviewed external notes from   - Reviewed results from  which were used in decision making for   - Recommended the following tests:   - Assessment required independent historian: primary, nursing  - Independent interpretation of test: labs and imaging  - Discussion of management with: primary, nursing  - Drug therapy requiring intensive monitoring for toxicity: insulin  - Decision regarding elective major surgery with identified patient or procedure risk factors:   - Decision regarding emergency major surgery:   - Decision regarding hospitalization or escalation of hospital-level care:   - Decision not to resuscitate or to de-escalate care because of poor prognosis: no icu 8/25  - Parenteral controlled substances: Hydromorphone    Thank you for allowing us to care for this patient. Palliative Team will continue to follow as needed. Please contact team with any questions or concerns.   Team pager [phone] " (weekdays)      Rachel Rocha, APRN-CNP           [1] [Held by provider] amLODIPine, 5 mg, oral, Daily  apixaban, 5 mg, oral, q12h  ascorbic acid, 500 mg, oral, Daily  atorvastatin, 20 mg, oral, Daily  [Held by provider] carvedilol, 6.25 mg, oral, BID  ceFAZolin, 2 g, intravenous, q8h  cholecalciferol, 25 mcg, oral, Daily  [Held by provider] ferrous sulfate, 1 tablet, oral, Daily  [Held by provider] gabapentin, 300 mg, oral, Daily  [Held by provider] hydrALAZINE, 50 mg, oral, TID  insulin lispro, 0-15 Units, subcutaneous, q6h  levothyroxine, 75 mcg, oral, Daily  lidocaine, 1 patch, transdermal, Daily  pantoprazole, 40 mg, intravenous, Daily  PARoxetine, 20 mg, oral, Daily  polyethylene glycol, 17 g, oral, Daily  [2]    [3] PRN medications: acetaminophen, calcium carbonate, dextrose, dextrose, diclofenac sodium, glucagon, glucagon, HYDROmorphone, OLANZapine, oxyCODONE, oxygen, oxygen, temazepam, trimethobenzamide

## 2025-08-26 NOTE — CARE PLAN
The clinical goals for the shift include pt will have pain <5 on PAINAD scale by end of shift    Problem: Pain - Adult  Goal: Verbalizes/displays adequate comfort level or baseline comfort level  Outcome: Progressing     Problem: Safety - Adult  Goal: Free from fall injury  Outcome: Progressing     Problem: Discharge Planning  Goal: Discharge to home or other facility with appropriate resources  Outcome: Progressing     Problem: Chronic Conditions and Co-morbidities  Goal: Patient's chronic conditions and co-morbidity symptoms are monitored and maintained or improved  Outcome: Progressing     Problem: Nutrition  Goal: Nutrient intake appropriate for maintaining nutritional needs  Outcome: Progressing     Problem: Skin  Goal: Decreased wound size/increased tissue granulation at next dressing change  Outcome: Progressing  Goal: Participates in plan/prevention/treatment measures  Outcome: Progressing  Goal: Prevent/manage excess moisture  Outcome: Progressing  Goal: Prevent/minimize sheer/friction injuries  Outcome: Progressing  Goal: Promote/optimize nutrition  Outcome: Progressing  Goal: Promote skin healing  Outcome: Progressing     Problem: Safety - Medical Restraint  Goal: Remains free of injury from restraints (Restraint for Interference with Medical Device)  Outcome: Progressing  Goal: Free from restraint(s) (Restraint for Interference with Medical Device)  Outcome: Progressing

## 2025-08-26 NOTE — PROGRESS NOTES
Radha Torres is a 85 y.o. female on day 17 of admission presenting with Chest pain.      Subjective   Overnight, concern for afib/aflutter based on telemetry. Deemed normal by EKG, stable, in setting of afib s/p ablation and pacemaker. Patient not verbal on evaluation in AM, but later noted issues with breathing while stating well on 6 L NC. CXR showed improvement from prior, patient received diuresis.     Goals of care discussion took place with daughter, Stefanie, and  of patient, Caleb. Brayden, son-in-law, was also present. Patient transitioned to DNR comfort care only by family, given poor prognosis with contraindications to TAVR, bacteremia, AMS, and frailty. Family did not want patient to undergo increased interventions without high likelihood of complete return to baseline. Were concerned about patient's continued pain and decrease in quality of life. Patient will be continued on IV antibiotics and NG tube feeds as per NOK. Their concern with aspiration preventing transition to po feeding for comfort.     Family wants patient to go to hospice at this time, likely at facility rather than home given treatment needs. Have chosen a location in [place], close to home.     Objective     Last Recorded Vitals  /75 (BP Location: Left arm, Patient Position: Lying)   Pulse 76   Temp 36.3 °C (97.3 °F) (Temporal)   Resp 18   Wt 73 kg (161 lb)   SpO2 100%   Intake/Output last 3 Shifts:    Intake/Output Summary (Last 24 hours) at 8/26/2025 0700  Last data filed at 8/26/2025 0400  Gross per 24 hour   Intake 790 ml   Output 725 ml   Net 65 ml       Admission Weight  Weight: 79.4 kg (175 lb) (08/09/25 0330)    Daily Weight  08/25/25 : 73 kg (161 lb)    Image Results  XR chest 1 view  Narrative: Interpreted By:  Eric Reza and Stevens Alex   STUDY:  XR CHEST 1 VIEW;  8/23/2025 8:38 am      INDICATION:  Signs/Symptoms:NG tube.          COMPARISON:  XR chest 08/22/2025      ACCESSION  NUMBER(S):  [id number]      ORDERING CLINICIAN:  BRIANA JUSTICE      FINDINGS:  AP radiograph of the chest was provided.      Enteric tube courses past the diaphragm and terminates outside the  field of view. Leadless device projects over the heart.      CARDIOMEDIASTINAL SILHOUETTE:  Cardiomediastinal silhouette is unchanged in size and configuration.      LUNGS:  Stable opacities within the right mid to lower lung, likely a  combination of pulmonary edema and atelectasis. No pneumothorax.  Blunting of the bilateral costophrenic angles,  left-greater-than-right.      ABDOMEN:  No remarkable upper abdominal findings.      BONES:  Re-demonstration of multiple, displaced right posterolateral rib  fractures which appear old.      Impression: 1. Enteric tube courses inferiorly below the diaphragm and terminates  outside the field of view.  2. Otherwise, stable appearance of the chest with similar degree of  pulmonary edema and small bilateral pleural effusions.      I personally reviewed the images/study and I agree with the findings  as stated by Raymond Adam DO PGY-3. This study was interpreted at  [facility], [place], [place].      MACRO:  None      Signed by: Eric Reza 8/23/2025 1:20 PM  Dictation workstation:   [id number]      Physical Exam  General: No acute distress. Awake. Tracks person at times. Not verbal, nods to some questions. Follows  commands, like raise hand and wiggle toes. Use of all four extremities today, improved from yesterday.   Cardiac: Normal rate and rhythm.  Lungs: Clear to auscultation bilaterally  Extremities: Trace edema of lower extremities up to knee, non-pitting.       Scheduled medications  Scheduled Medications[1]  Continuous medications  Continuous Medications[2]  PRN medications  PRN Medications[3]     Results for orders placed or performed during the hospital encounter of 08/09/25 (from the past 24 hours)   POCT GLUCOSE   Result Value Ref Range     POCT Glucose 365 (H) 74 - 99 mg/dL   POCT GLUCOSE   Result Value Ref Range    POCT Glucose 174 (H) 74 - 99 mg/dL   Renal function panel   Result Value Ref Range    Glucose 144 (H) 74 - 99 mg/dL    Sodium 143 136 - 145 mmol/L    Potassium 4.1 3.5 - 5.3 mmol/L    Chloride 98 98 - 107 mmol/L    Bicarbonate 37 (H) 21 - 32 mmol/L    Anion Gap 12 10 - 20 mmol/L    Urea Nitrogen 53 (H) 6 - 23 mg/dL    Creatinine 1.50 (H) 0.50 - 1.05 mg/dL    eGFR 34 (L) >60 mL/min/1.73m*2    Calcium 8.5 (L) 8.6 - 10.6 mg/dL    Phosphorus 2.8 2.5 - 4.9 mg/dL    Albumin 3.0 (L) 3.4 - 5.0 g/dL   Magnesium   Result Value Ref Range    Magnesium 2.58 (H) 1.60 - 2.40 mg/dL   POCT GLUCOSE   Result Value Ref Range    POCT Glucose 152 (H) 74 - 99 mg/dL   POCT GLUCOSE   Result Value Ref Range    POCT Glucose 152 (H) 74 - 99 mg/dL   POCT GLUCOSE   Result Value Ref Range    POCT Glucose 274 (H) 74 - 99 mg/dL      Assessment & Plan  Chest pain    Radha Torres is a 85 y.o. female with PMH HFrEF (EF 25-30%), aortic stenosis, Afib, s/p AV node ablation and leadless pacemaker in 2025, HTN, renal cell carcinoma who presented on 8/9/25 as a transfer from [facility] for epigastric pain, transferred to CICU for possible flash pulmonary edema and hyponatremia. Now managing with aggressive diuresis, s/p thoracentesis, on IV Cefazolin for Staph A bacteremia, most recent cultures are negative. Structural not offering TAVR or BAV at this time due to complicating medical co-morbidities. Patient transferred to floor. Palliative consulted. cCode status now changed to DNR- Comfort Care Only from DNR DNI No ICU based on goals of care discussion with family. Patient's family elected for hospice.     Update 8/26:  - Goals of care discussion conducted on 8/26  - Changed to DNR Comfort Care Only, Patient's family elected for transition to hospice  - Satting appropriate on nasal cannula  - Last Blood cx negative  - Continue cefazolin per ID for six weeks total, ID signed  off  - Comfort care orders placed: will be stopping labs draws, telemetry, etc.     #Acute Encephalopathy  #agitation  - Likely toxic-metabolic in nature, in setting of infection, hyponatremia, with some contribution of delirium.  Plan  -1:1 sitter  -PRN zyprexa  - IV Cefazolin for 6 weeks for suspected endovascular infection  - Delirium precautions  - Soft restraints stopped     #Severe aortic stenosis  #HFrEF (EF 25-30%)  #HTN  :: Echo 7/11/25 - EF 25-30%, left ventricle mildly dilated, mild concentric LV hypertrophy, leadless pacemaker in right atria and RV, aortic valve is heavily calcified with appearance of severe aortic stenosis (peak gradient 52mmHg, mean gradient 25mmHg, aortic valve area 0.8cm^2), trace AR, mild MR and TR, RVSP 50-60mmHg,   ::Scheduled for TAVR 8/19/25 with Dr. Howell  ::EKG with no ischemic changes  ::Troponin 23>27  Plan:  - Diurese with IV Lasix as needed with fluid status, net neutral  -strict I/Os, redose as needed  -holding home coreg 6.25mg, amlodipine 5mg  - Watching BP closely, currently normotensive     #Atrial fibrillation  #S/p AV emiliano ablation and leadless pacemaker placement 5/6/25  :: Home regimen - coreg 6.25 BID, amlodipine 2.5 mg, Eliquis 5mg BID  Plan:  - Restarted eliquis  -holding home coreg     #Epigastric pain  #Nausea/GI upset  :: EGG performed 2018 at [facility] notable for PUD  ::Possibly due to volume overload/ADHF vs GERD  Plan:   - Tigan PRN ordered for nausea   - Continue pantoprazole 40 mg daily, tums PRN  - Multimodal pain regimen      #Acute severe hypo-osmolar hyponatremia, resolved  :: Na 134   :: Diuresis improving Na levels  ::serum osm 255, urine Na 70, urine osm 248  ::TSH, morning cortisol within normal limits  Plan:   - Continue with IV Lasix as needed for hypervolemia     #CKD 3b  :: Baseline Cr appears to be around 1.2  ::Cr 1.22 in ED  Plan:  - Daily RFP, Mg  - Avoid nephrotoxic agents  - Strict I/O     #MSSA bacteremia  #Ecoli UTI  -blood cultures  gotten from 8/19 for persistent leukocytosis were positive for MSSA  -repeat cultures from 8/20 also positive, but cultures 8/21 with no growth at four days  -unclear source, could be associated with current hardware or stenosis of aorta. Could be associated with cutaneous candidiasis as well.  Plan:   -continue cefazolin for six weeks until 9/10  -consider MAKSIM if w/in goals of care w/ family, however given tenuous resp status, will defer for now      #Hypothyroidism  ::TSH wnl  - Continue levothyroxine 75 mcg     #DM  -A1c 7.9  -ISS q6hrs, hypoglycemia protocol       F: PRN  E: K>4 Phos>3 Mag>2  N: NPO, NG tube feeds  A: PIV  O2: NC  Abx: None  DVT PPx: restarted on eliquis  GI PPx: PPI     CODE STATUS:   SURROGATE DECISION MAKER: Stefanie Roblero, [phone]   *Spouse, Caleb, deferred primary decision-making to daughter listed above. However, both family members making decisions together.     Patient discussed and seen with attending Yusuf Santana MD.    Frieda Bolivar MD  Neuro prelim, PGY-1      Cardiology Staff Addendum:    I saw and evaluated the patient on 8/26/2025.  I personally obtained the key and critical portions of the history and physical exam or was physically present for key and critical portions performed by the resident. I reviewed the resident’s documentation and discussed the patient with the resident.  I agree with the resident’s medical decision making as documented/amended in the note.    Yusuf Santana MD             [1] [Held by provider] amLODIPine, 5 mg, oral, Daily  apixaban, 5 mg, oral, q12h  ascorbic acid, 500 mg, oral, Daily  atorvastatin, 20 mg, oral, Daily  [Held by provider] carvedilol, 6.25 mg, oral, BID  ceFAZolin, 2 g, intravenous, q8h  cholecalciferol, 25 mcg, oral, Daily  [Held by provider] ferrous sulfate, 1 tablet, oral, Daily  [Held by provider] gabapentin, 300 mg, oral, Daily  [Held by provider] hydrALAZINE, 50 mg, oral, TID  insulin lispro, 0-15 Units,  subcutaneous, q6h  levothyroxine, 75 mcg, oral, Daily  lidocaine, 1 patch, transdermal, Daily  pantoprazole, 40 mg, intravenous, Daily  PARoxetine, 20 mg, oral, Daily  polyethylene glycol, 17 g, oral, Daily     [2]    [3] PRN medications: acetaminophen, calcium carbonate, dextrose, dextrose, diclofenac sodium, glucagon, glucagon, HYDROmorphone, OLANZapine, oxyCODONE, oxygen, oxygen, temazepam, trimethobenzamide

## 2025-08-26 NOTE — PROGRESS NOTES
08/26/25 1238   Rapid Rounds   Expected Discharge Disposition SNF   Today we still await: Clinical stability   Review at Escalation Rounds Clinically complex

## 2025-08-27 VITALS
HEART RATE: 77 BPM | OXYGEN SATURATION: 94 % | HEIGHT: 65 IN | BODY MASS INDEX: 26.61 KG/M2 | RESPIRATION RATE: 18 BRPM | WEIGHT: 159.7 LBS | SYSTOLIC BLOOD PRESSURE: 151 MMHG | DIASTOLIC BLOOD PRESSURE: 75 MMHG | TEMPERATURE: 97 F

## 2025-08-27 PROCEDURE — 2500000004 HC RX 250 GENERAL PHARMACY W/ HCPCS (ALT 636 FOR OP/ED)

## 2025-08-27 PROCEDURE — 99233 SBSQ HOSP IP/OBS HIGH 50: CPT

## 2025-08-27 PROCEDURE — 2500000004 HC RX 250 GENERAL PHARMACY W/ HCPCS (ALT 636 FOR OP/ED): Mod: TB

## 2025-08-27 PROCEDURE — 2500000001 HC RX 250 WO HCPCS SELF ADMINISTERED DRUGS (ALT 637 FOR MEDICARE OP)

## 2025-08-27 PROCEDURE — 2500000005 HC RX 250 GENERAL PHARMACY W/O HCPCS

## 2025-08-27 PROCEDURE — 99232 SBSQ HOSP IP/OBS MODERATE 35: CPT | Performed by: STUDENT IN AN ORGANIZED HEALTH CARE EDUCATION/TRAINING PROGRAM

## 2025-08-27 PROCEDURE — 2500000002 HC RX 250 W HCPCS SELF ADMINISTERED DRUGS (ALT 637 FOR MEDICARE OP, ALT 636 FOR OP/ED)

## 2025-08-27 RX ORDER — ACETAMINOPHEN 325 MG/1
975 TABLET ORAL EVERY 8 HOURS PRN
Start: 2025-08-27

## 2025-08-27 RX ORDER — DICLOFENAC SODIUM 10 MG/G
4 GEL TOPICAL 3 TIMES DAILY PRN
Start: 2025-08-27

## 2025-08-27 RX ORDER — LIDOCAINE 560 MG/1
1 PATCH PERCUTANEOUS; TOPICAL; TRANSDERMAL DAILY
Start: 2025-08-28

## 2025-08-27 RX ORDER — DIAZEPAM 5 MG/ML
5 INJECTION, SOLUTION INTRAMUSCULAR; INTRAVENOUS EVERY 4 HOURS PRN
Start: 2025-08-27

## 2025-08-27 RX ORDER — CEFAZOLIN SODIUM 2 G/100ML
2 INJECTION, SOLUTION INTRAVENOUS EVERY 8 HOURS
Start: 2025-08-27

## 2025-08-27 RX ORDER — FLUTICASONE PROPIONATE 50 MCG
2 SPRAY, SUSPENSION (ML) NASAL 2 TIMES DAILY PRN
Status: DISCONTINUED | OUTPATIENT
Start: 2025-08-27 | End: 2025-08-27

## 2025-08-27 RX ORDER — HYDROMORPHONE HYDROCHLORIDE 1 MG/ML
0.6 INJECTION, SOLUTION INTRAMUSCULAR; INTRAVENOUS; SUBCUTANEOUS
Start: 2025-08-27

## 2025-08-27 RX ORDER — FLUTICASONE PROPIONATE 50 MCG
1 SPRAY, SUSPENSION (ML) NASAL 2 TIMES DAILY PRN
Start: 2025-08-27

## 2025-08-27 RX ORDER — FLUTICASONE PROPIONATE 50 MCG
1 SPRAY, SUSPENSION (ML) NASAL 2 TIMES DAILY PRN
Status: DISCONTINUED | OUTPATIENT
Start: 2025-08-27 | End: 2025-08-27 | Stop reason: HOSPADM

## 2025-08-27 RX ORDER — METHOCARBAMOL 500 MG/1
500 TABLET, FILM COATED ORAL EVERY 8 HOURS PRN
Start: 2025-08-27

## 2025-08-27 RX ORDER — HALOPERIDOL LACTATE 5 MG/ML
1 INJECTION, SOLUTION INTRAMUSCULAR EVERY 4 HOURS PRN
Start: 2025-08-27

## 2025-08-27 RX ORDER — DIAZEPAM 5 MG/ML
5 INJECTION, SOLUTION INTRAMUSCULAR; INTRAVENOUS ONCE
Status: COMPLETED | OUTPATIENT
Start: 2025-08-27 | End: 2025-08-27

## 2025-08-27 RX ADMIN — HYDROMORPHONE HYDROCHLORIDE 0.6 MG: 1 INJECTION, SOLUTION INTRAMUSCULAR; INTRAVENOUS; SUBCUTANEOUS at 09:13

## 2025-08-27 RX ADMIN — DIAZEPAM 5 MG: 5 INJECTION INTRAMUSCULAR; INTRAVENOUS at 17:19

## 2025-08-27 RX ADMIN — DIAZEPAM 5 MG: 5 INJECTION INTRAMUSCULAR; INTRAVENOUS at 21:28

## 2025-08-27 RX ADMIN — HYDROMORPHONE HYDROCHLORIDE 0.6 MG: 1 INJECTION, SOLUTION INTRAMUSCULAR; INTRAVENOUS; SUBCUTANEOUS at 11:40

## 2025-08-27 RX ADMIN — CEFAZOLIN SODIUM 2 G: 2 INJECTION, SOLUTION INTRAVENOUS at 13:18

## 2025-08-27 RX ADMIN — HYDROMORPHONE HYDROCHLORIDE 0.6 MG: 1 INJECTION, SOLUTION INTRAMUSCULAR; INTRAVENOUS; SUBCUTANEOUS at 21:28

## 2025-08-27 RX ADMIN — CEFAZOLIN SODIUM 2 G: 2 INJECTION, SOLUTION INTRAVENOUS at 05:10

## 2025-08-27 RX ADMIN — DIAZEPAM 5 MG: 5 INJECTION INTRAMUSCULAR; INTRAVENOUS at 13:18

## 2025-08-27 RX ADMIN — HYDROMORPHONE HYDROCHLORIDE 0.6 MG: 1 INJECTION, SOLUTION INTRAMUSCULAR; INTRAVENOUS; SUBCUTANEOUS at 12:51

## 2025-08-27 RX ADMIN — HYDROMORPHONE HYDROCHLORIDE 0.6 MG: 1 INJECTION, SOLUTION INTRAMUSCULAR; INTRAVENOUS; SUBCUTANEOUS at 01:39

## 2025-08-27 RX ADMIN — HYDROMORPHONE HYDROCHLORIDE 0.6 MG: 1 INJECTION, SOLUTION INTRAMUSCULAR; INTRAVENOUS; SUBCUTANEOUS at 18:53

## 2025-08-27 RX ADMIN — HYDROMORPHONE HYDROCHLORIDE 0.6 MG: 1 INJECTION, SOLUTION INTRAMUSCULAR; INTRAVENOUS; SUBCUTANEOUS at 15:58

## 2025-08-27 RX ADMIN — DIAZEPAM 5 MG: 5 INJECTION INTRAMUSCULAR; INTRAVENOUS at 09:13

## 2025-08-27 RX ADMIN — LIDOCAINE 4% 1 PATCH: 40 PATCH TOPICAL at 09:13

## 2025-08-27 RX ADMIN — HYDROMORPHONE HYDROCHLORIDE 0.6 MG: 1 INJECTION, SOLUTION INTRAMUSCULAR; INTRAVENOUS; SUBCUTANEOUS at 03:13

## 2025-08-27 RX ADMIN — HYDROMORPHONE HYDROCHLORIDE 0.6 MG: 1 INJECTION, SOLUTION INTRAMUSCULAR; INTRAVENOUS; SUBCUTANEOUS at 16:58

## 2025-08-27 RX ADMIN — DIAZEPAM 5 MG: 5 INJECTION INTRAMUSCULAR; INTRAVENOUS at 04:59

## 2025-08-27 RX ADMIN — LEVOTHYROXINE SODIUM 75 MCG: 0.07 TABLET ORAL at 09:13

## 2025-08-27 ASSESSMENT — RESPIRATORY DISTRESS OBSERVATION SCALE (RDOS)
RDOS TOTAL SCORE: 3
RESTLESS NONPURPOSEFUL MOVEMENTS: 2 - FREQUENT MOVEMENTS
GRUNTING AT END OF EXPIRATION: 0 - NONE
ACCESSORY MUSCLE RISE IN CLAVICLE DURING INSPIRATION: 1 - SLIGHT RISE
INVOLUNTARY NASAL FLARING: 0 - NONE
RESTLESS NONPURPOSEFUL MOVEMENTS: 2 - FREQUENT MOVEMENTS
RESPIRATORY RATE PER MINUTE: 0 - <19 BREATHS
INVOLUNTARY NASAL FLARING: 0 - NONE
ACCESSORY MUSCLE RISE IN CLAVICLE DURING INSPIRATION: 2 - PRONOUNCED RISE
RESPIRATORY RATE PER MINUTE: 0 - <19 BREATHS
HEART RATE PER MINUTE: 0 - <90 BEATS
RESTLESS NONPURPOSEFUL MOVEMENTS: 2 - FREQUENT MOVEMENTS
RESPIRATORY RATE PER MINUTE: 0 - <19 BREATHS
PARADOXICAL BREATHING PATTERN: 0 - NONE
HEART RATE PER MINUTE: 0 - <90 BEATS
GRUNTING AT END OF EXPIRATION: 0 - NONE
GRUNTING AT END OF EXPIRATION: 0 - NONE
RDOS TOTAL SCORE: 3
PARADOXICAL BREATHING PATTERN: 0 - NONE
RESPIRATORY RATE PER MINUTE: 0 - <19 BREATHS
GRUNTING AT END OF EXPIRATION: 0 - NONE
INVOLUNTARY NASAL FLARING: 0 - NONE
RESPIRATORY RATE PER MINUTE: 0 - <19 BREATHS
HEART RATE PER MINUTE: 0 - <90 BEATS
LOOK OF FEAR: 0 - NONE
INVOLUNTARY NASAL FLARING: 0 - NONE
ACCESSORY MUSCLE RISE IN CLAVICLE DURING INSPIRATION: 2 - PRONOUNCED RISE
RESPIRATORY RATE PER MINUTE: 0 - <19 BREATHS
RESPIRATORY RATE PER MINUTE: 0 - <19 BREATHS
ACCESSORY MUSCLE RISE IN CLAVICLE DURING INSPIRATION: 1 - SLIGHT RISE
HEART RATE PER MINUTE: 0 - <90 BEATS
RDOS TOTAL SCORE: 3
HEART RATE PER MINUTE: 0 - <90 BEATS
INVOLUNTARY NASAL FLARING: 0 - NONE
PARADOXICAL BREATHING PATTERN: 0 - NONE
INVOLUNTARY NASAL FLARING: 0 - NONE
RESTLESS NONPURPOSEFUL MOVEMENTS: 1 - OCCASIONAL, SLIGHT MOVEMENTS
RESTLESS NONPURPOSEFUL MOVEMENTS: 1 - OCCASIONAL, SLIGHT MOVEMENTS
LOOK OF FEAR: 0 - NONE
INVOLUNTARY NASAL FLARING: 0 - NONE
INVOLUNTARY NASAL FLARING: 0 - NONE
GRUNTING AT END OF EXPIRATION: 0 - NONE
PARADOXICAL BREATHING PATTERN: 0 - NONE
PARADOXICAL BREATHING PATTERN: 0 - NONE
INVOLUNTARY NASAL FLARING: 0 - NONE
ACCESSORY MUSCLE RISE IN CLAVICLE DURING INSPIRATION: 1 - SLIGHT RISE
GRUNTING AT END OF EXPIRATION: 0 - NONE
LOOK OF FEAR: 0 - NONE
LOOK OF FEAR: 2 - EYES WIDE OPEN, FACIAL MUSCLES TENSE, BROW FURROWED, MOUTH OPEN
PARADOXICAL BREATHING PATTERN: 0 - NONE
RDOS TOTAL SCORE: 3
PARADOXICAL BREATHING PATTERN: 0 - NONE
RDOS TOTAL SCORE: 2
HEART RATE PER MINUTE: 0 - <90 BEATS
RDOS TOTAL SCORE: 3
LOOK OF FEAR: 0 - NONE
HEART RATE PER MINUTE: 0 - <90 BEATS
RESPIRATORY RATE PER MINUTE: 0 - <19 BREATHS
GRUNTING AT END OF EXPIRATION: 0 - NONE
GRUNTING AT END OF EXPIRATION: 0 - NONE
PARADOXICAL BREATHING PATTERN: 0 - NONE
HEART RATE PER MINUTE: 0 - <90 BEATS
HEART RATE PER MINUTE: 0 - <90 BEATS
LOOK OF FEAR: 0 - NONE
GRUNTING AT END OF EXPIRATION: 0 - NONE
INVOLUNTARY NASAL FLARING: 0 - NONE
PARADOXICAL BREATHING PATTERN: 0 - NONE
RESPIRATORY RATE PER MINUTE: 0 - <19 BREATHS
ACCESSORY MUSCLE RISE IN CLAVICLE DURING INSPIRATION: 1 - SLIGHT RISE
RESTLESS NONPURPOSEFUL MOVEMENTS: 1 - OCCASIONAL, SLIGHT MOVEMENTS
RDOS TOTAL SCORE: 3
RDOS TOTAL SCORE: 4
LOOK OF FEAR: 0 - NONE
RDOS TOTAL SCORE: 2
LOOK OF FEAR: 0 - NONE
RESTLESS NONPURPOSEFUL MOVEMENTS: 2 - FREQUENT MOVEMENTS
ACCESSORY MUSCLE RISE IN CLAVICLE DURING INSPIRATION: 2 - PRONOUNCED RISE
RESTLESS NONPURPOSEFUL MOVEMENTS: 2 - FREQUENT MOVEMENTS
ACCESSORY MUSCLE RISE IN CLAVICLE DURING INSPIRATION: 2 - PRONOUNCED RISE
RESPIRATORY RATE PER MINUTE: 0 - <19 BREATHS
GRUNTING AT END OF EXPIRATION: 0 - NONE
RESTLESS NONPURPOSEFUL MOVEMENTS: 2 - FREQUENT MOVEMENTS
PARADOXICAL BREATHING PATTERN: 0 - NONE
RESTLESS NONPURPOSEFUL MOVEMENTS: 1 - OCCASIONAL, SLIGHT MOVEMENTS
RDOS TOTAL SCORE: 6
LOOK OF FEAR: 0 - NONE
HEART RATE PER MINUTE: 0 - <90 BEATS
LOOK OF FEAR: 0 - NONE
ACCESSORY MUSCLE RISE IN CLAVICLE DURING INSPIRATION: 1 - SLIGHT RISE
ACCESSORY MUSCLE RISE IN CLAVICLE DURING INSPIRATION: 1 - SLIGHT RISE

## 2025-08-27 ASSESSMENT — PAIN SCALES - PAIN ASSESSMENT IN ADVANCED DEMENTIA (PAINAD)
TOTALSCORE: MEDICATION (SEE MAR)
FACIALEXPRESSION: SAD, FRIGHTENED, FROWN
BODYLANGUAGE: RIGID, FISTS CLENCHED, KNEES UP, PUSHING/PULLING AWAY, STRIKES OUT
NEGVOCALIZATION: REPEATED TROUBLED CALLING OUT, LOUD MOANING/GROANING, CRYING
NEGVOCALIZATION: REPEATED TROUBLED CALLING OUT, LOUD MOANING/GROANING, CRYING
CONSOLABILITY: DISTRACTED OR REASSURED BY VOICE/TOUCH
FACIALEXPRESSION: FACIAL GRIMACING
BODYLANGUAGE: TENSE, DISTRESSED PACING, FIDGETING
TOTALSCORE: MEDICATION (SEE MAR)
TOTALSCORE: MEDICATION (SEE MAR)
BODYLANGUAGE: TENSE, DISTRESSED PACING, FIDGETING
CONSOLABILITY: DISTRACTED OR REASSURED BY VOICE/TOUCH
TOTALSCORE: 6
FACIALEXPRESSION: SAD, FRIGHTENED, FROWN
BREATHING: OCCASIONAL LABORED BREATHING, SHORT PERIOD OF HYPERVENTILATION
TOTALSCORE: 5
CONSOLABILITY: DISTRACTED OR REASSURED BY VOICE/TOUCH
NEGVOCALIZATION: REPEATED TROUBLED CALLING OUT, LOUD MOANING/GROANING, CRYING
FACIALEXPRESSION: SAD, FRIGHTENED, FROWN
BREATHING: OCCASIONAL LABORED BREATHING, SHORT PERIOD OF HYPERVENTILATION
CONSOLABILITY: DISTRACTED OR REASSURED BY VOICE/TOUCH
CONSOLABILITY: DISTRACTED OR REASSURED BY VOICE/TOUCH
BREATHING: OCCASIONAL LABORED BREATHING, SHORT PERIOD OF HYPERVENTILATION
FACIALEXPRESSION: SAD, FRIGHTENED, FROWN
CONSOLABILITY: DISTRACTED OR REASSURED BY VOICE/TOUCH
BREATHING: OCCASIONAL LABORED BREATHING, SHORT PERIOD OF HYPERVENTILATION
TOTALSCORE: 8
TOTALSCORE: MEDICATION (SEE MAR)
BREATHING: OCCASIONAL LABORED BREATHING, SHORT PERIOD OF HYPERVENTILATION
BODYLANGUAGE: TENSE, DISTRESSED PACING, FIDGETING
TOTALSCORE: 5
NEGVOCALIZATION: OCCASIONAL MOAN/GROAN, LOW SPEECH, NEGATIVE/DISAPPROVING QUALITY
TOTALSCORE: 6
BODYLANGUAGE: TENSE, DISTRESSED PACING, FIDGETING
TOTALSCORE: MEDICATION (SEE MAR)
BREATHING: OCCASIONAL LABORED BREATHING, SHORT PERIOD OF HYPERVENTILATION
BODYLANGUAGE: TENSE, DISTRESSED PACING, FIDGETING
NEGVOCALIZATION: REPEATED TROUBLED CALLING OUT, LOUD MOANING/GROANING, CRYING
TOTALSCORE: MEDICATION (SEE MAR)
FACIALEXPRESSION: SAD, FRIGHTENED, FROWN
NEGVOCALIZATION: OCCASIONAL MOAN/GROAN, LOW SPEECH, NEGATIVE/DISAPPROVING QUALITY
TOTALSCORE: 6

## 2025-08-27 ASSESSMENT — PAIN - FUNCTIONAL ASSESSMENT
PAIN_FUNCTIONAL_ASSESSMENT: CPOT (CRITICAL CARE PAIN OBSERVATION TOOL)

## 2025-08-27 ASSESSMENT — COGNITIVE AND FUNCTIONAL STATUS - GENERAL
STANDING UP FROM CHAIR USING ARMS: TOTAL
MOVING TO AND FROM BED TO CHAIR: TOTAL
PERSONAL GROOMING: A LOT
WALKING IN HOSPITAL ROOM: TOTAL
DRESSING REGULAR LOWER BODY CLOTHING: A LOT
DRESSING REGULAR UPPER BODY CLOTHING: A LOT
DRESSING REGULAR LOWER BODY CLOTHING: A LOT
STANDING UP FROM CHAIR USING ARMS: TOTAL
HELP NEEDED FOR BATHING: A LOT
HELP NEEDED FOR BATHING: A LOT
EATING MEALS: A LOT
DAILY ACTIVITIY SCORE: 11
DRESSING REGULAR UPPER BODY CLOTHING: A LOT
PERSONAL GROOMING: A LOT
MOBILITY SCORE: 8
TOILETING: TOTAL
MOVING TO AND FROM BED TO CHAIR: TOTAL
CLIMB 3 TO 5 STEPS WITH RAILING: TOTAL
MOVING FROM LYING ON BACK TO SITTING ON SIDE OF FLAT BED WITH BEDRAILS: A LOT
TURNING FROM BACK TO SIDE WHILE IN FLAT BAD: A LOT
EATING MEALS: A LOT
TOILETING: TOTAL
CLIMB 3 TO 5 STEPS WITH RAILING: TOTAL
TURNING FROM BACK TO SIDE WHILE IN FLAT BAD: A LOT
WALKING IN HOSPITAL ROOM: TOTAL
MOVING FROM LYING ON BACK TO SITTING ON SIDE OF FLAT BED WITH BEDRAILS: A LOT

## 2025-08-27 NOTE — CARE PLAN
Problem: Pain - Adult  Goal: Verbalizes/displays adequate comfort level or baseline comfort level  Outcome: Progressing     Problem: Safety - Adult  Goal: Free from fall injury  Outcome: Progressing     Problem: Discharge Planning  Goal: Discharge to home or other facility with appropriate resources  Outcome: Progressing     Problem: Chronic Conditions and Co-morbidities  Goal: Patient's chronic conditions and co-morbidity symptoms are monitored and maintained or improved  Outcome: Progressing     Problem: Nutrition  Goal: Nutrient intake appropriate for maintaining nutritional needs  Outcome: Progressing     Problem: Skin  Goal: Decreased wound size/increased tissue granulation at next dressing change  Outcome: Progressing  Goal: Participates in plan/prevention/treatment measures  Outcome: Progressing  Goal: Prevent/manage excess moisture  Outcome: Progressing  Goal: Prevent/minimize sheer/friction injuries  Outcome: Progressing  Goal: Promote/optimize nutrition  Outcome: Progressing  Goal: Promote skin healing  Outcome: Progressing     Problem: Safety - Medical Restraint  Goal: Remains free of injury from restraints (Restraint for Interference with Medical Device)  Outcome: Progressing  Goal: Free from restraint(s) (Restraint for Interference with Medical Device)  Outcome: Progressing

## 2025-08-27 NOTE — PROGRESS NOTES
Radha Torres is a 85 y.o. female on day 18 of admission presenting with Chest pain.      Subjective   Yesterday, patient's family decided to stop tube feeds in conversation with palliative care, and transition to comfort feeds. Pain regimen was also changed to dilaudid 0.6 mg IV q15 minutes. Also diazepam IV to q4 PRN from q6. This morning, patient states she has no pain and is doing okay overall. Relayed to patient to let us know if she feels discomfort.     Objective     Last Recorded Vitals  BP (!) 97/49 (BP Location: Left arm, Patient Position: Lying)   Pulse 75   Temp 36.2 °C (97.2 °F) (Temporal)   Resp 16   Wt 72.4 kg (159 lb 11.2 oz)   SpO2 95%   Intake/Output last 3 Shifts:    Intake/Output Summary (Last 24 hours) at 8/27/2025 0656  Last data filed at 8/27/2025 0510  Gross per 24 hour   Intake 770 ml   Output 200 ml   Net 570 ml       Admission Weight  Weight: 79.4 kg (175 lb) (08/09/25 0330)    Daily Weight  08/27/25 : 72.4 kg (159 lb 11.2 oz)    Image Results  XR chest 1 view  Narrative: Interpreted By:  Levon Robledo,   STUDY:  XR CHEST 1 VIEW; 8/26/2025 12:11 pm      INDICATION:  Signs/Symptoms:shortness of breath concern for worsening edema.      COMPARISON:  CT dated 08/20/2025      ACCESSION NUMBER(S):  [id number]      ORDERING CLINICIAN:  AMBROSIO KERN      FINDINGS:  Enteric tube is in place with the tip outside the field of view. Cord  less pacer device projecting over the right ventricle.      The cardiac silhouette size is enlarged, unchanged. Dense  calcification of the aortic knob.      Left more than right bibasilar opacity with blunting of the  costophrenic angles. Increased tissue prominence. No sizable  pneumothorax.      Multiple right-sided rib fracture deformities again seen.      Impression: 1. Moderate left and small right pleural effusion with bibasilar  atelectasis/consolidation component of interstitial pulmonary edema,  not significantly different from  prior study.  2. Additional unchanged findings as described              Signed by: Levon Capps 8/26/2025 12:14 PM  Dictation workstation:   [id number]  ECG 12 lead  Ventricular-paced rhythm  Abnormal ECG  When compared with ECG of 26-AUG-2025 10:31,  No significant change was found  ECG 12 lead  Ventricular-paced rhythm  Abnormal ECG  When compared with ECG of 16-AUG-2025 03:49,  No significant change was found      Physical Exam  General: No acute distress. Awake and oriented to person. Able to respond to questions and express her condition.  Cardiac: Normal rate and rhythm.  Lungs: Clear to auscultation bilaterally  Extremities: Trace edema of lower extremities up to knee, non-pitting.       Scheduled medications  Scheduled Medications[1]  Continuous medications  Continuous Medications[2]  PRN medications  PRN Medications[3]     Results for orders placed or performed during the hospital encounter of 08/09/25 (from the past 24 hours)   ECG 12 lead   Result Value Ref Range    Ventricular Rate 72 BPM    Atrial Rate 136 BPM    QRS Duration 176 ms    QT Interval 462 ms    QTC Calculation(Bazett) 505 ms    R Axis -68 degrees    T Axis 101 degrees    QRS Count 12 beats    Q Onset 196 ms    T Offset 427 ms    QTC Fredericia 491 ms   POCT GLUCOSE   Result Value Ref Range    POCT Glucose 198 (H) 74 - 99 mg/dL   POCT GLUCOSE   Result Value Ref Range    POCT Glucose 278 (H) 74 - 99 mg/dL      Assessment & Plan  Chest pain    Radha Torres is a 85 y.o. female with PMH HFrEF (EF 25-30%), aortic stenosis, Afib, s/p AV node ablation and leadless pacemaker in 2025, HTN, renal cell carcinoma who presented on 8/9/25 as a transfer from [facility] for epigastric pain, transferred to CICU for possible flash pulmonary edema and hyponatremia. Now managing with aggressive diuresis, s/p thoracentesis, on IV Cefazolin for Staph A bacteremia, most recent cultures are negative. Structural not offering TAVR or BAV at this time due  to complicating medical co-morbidities. Patient transferred to floor. Palliative consulted. Code status now changed to DNR- Comfort Care Only from DNR DNI No ICU based on goals of care discussion with family. Patient's family elected for hospice.     Update 8/27:  - Changed to DNR Comfort Measures Only yesterday, Patient's family elected for transition to hospice  - HWR meeting today at 2:30 pm   - Pain regimen changed with palliative: can escalate dilaudid using recs on last note if needed  - Last Blood cx negative  - Continue cefazolin per ID for six weeks total, ID signed off  - Started flonase prn for nasal congestion    #Acute Encephalopathy  #agitation  - Likely toxic-metabolic in nature, in setting of infection, hyponatremia, with some contribution of delirium.  Plan  -1:1 sitter  -PRN haldol  - IV Cefazolin for 6 weeks for suspected endovascular infection  - Delirium precautions  - Soft restraints stopped     #Severe aortic stenosis  #HFrEF (EF 25-30%)  #HTN  :: Echo 7/11/25 - EF 25-30%, left ventricle mildly dilated, mild concentric LV hypertrophy, leadless pacemaker in right atria and RV, aortic valve is heavily calcified with appearance of severe aortic stenosis (peak gradient 52mmHg, mean gradient 25mmHg, aortic valve area 0.8cm^2), trace AR, mild MR and TR, RVSP 50-60mmHg,   ::Scheduled for TAVR 8/19/25 with Dr. Howell  ::EKG with no ischemic changes  ::Troponin 23>27  Plan:  - Diurese with IV Lasix if needed  -strict I/Os, redose as needed  -holding home coreg 6.25mg, amlodipine 5mg     #Atrial fibrillation  #S/p AV emiliano ablation and leadless pacemaker placement 5/6/25  :: Home regimen - coreg 6.25 BID, amlodipine 2.5 mg, Eliquis 5mg BID  Plan:  - Continue eliquis  -holding home coreg     #Epigastric pain  #Nausea/GI upset  :: EGG performed 2018 at [facility] notable for PUD  ::Possibly due to volume overload/ADHF vs GERD  Plan:   - Tigan PRN ordered for nausea   - Continue pantoprazole 40 mg daily, tums  PRN  - Multimodal pain regimen      #Acute severe hypo-osmolar hyponatremia, resolved  :: Na 134   :: Diuresis improving Na levels  ::serum osm 255, urine Na 70, urine osm 248  ::TSH, morning cortisol within normal limits  Plan:   - Continue with IV Lasix as needed for hypervolemia     #CKD 3b  :: Baseline Cr appears to be around 1.2  ::Cr 1.22 in ED  Plan:  - Avoid nephrotoxic agents     #MSSA bacteremia  #Ecoli UTI  -blood cultures gotten from 8/19 for persistent leukocytosis were positive for MSSA  -repeat cultures from 8/20 also positive, but cultures 8/21 with no growth at four days  -unclear source, could be associated with current hardware or stenosis of aorta. Could be associated with cutaneous candidiasis as well.  Plan:   -continue cefazolin for six weeks until 9/10     #Hypothyroidism  ::TSH wnl  - Continue levothyroxine 75 mcg     #DM  -A1c 7.9    F: PRN  E: no labs  N: Comfort feeds  A: PIV  O2: NC  Abx: None  DVT PPx: eliquis  GI PPx: PPI     CODE STATUS: DNR Comfort Measures Only  SURROGATE DECISION MAKER: Stefanie Roblero, [phone]   *Spouse, Caleb, deferred primary decision-making to daughter listed above. However, both family members making decisions together.     Patient discussed and seen with attending Yusuf Santana MD.    Frieda Bolivar MD  Neuro prelim, PGY-1       [1] apixaban, 5 mg, oral, q12h  ceFAZolin, 2 g, intravenous, q8h  gabapentin, 300 mg, oral, Nightly  levothyroxine, 75 mcg, oral, Daily  lidocaine, 1 patch, transdermal, Daily  PARoxetine, 20 mg, oral, Daily     [2]    [3] PRN medications: acetaminophen, diazePAM, diclofenac sodium, haloperidol lactate, HYDROmorphone, methocarbamol, oxygen, oxygen, temazepam

## 2025-08-27 NOTE — PROGRESS NOTES
08/27/25 1350   Rapid Rounds   Attendance Provider;Nurse;Care Transitions;Pharmacist   Expected Discharge Disposition SNF   Today we still await: Clinical stability   Review at Escalation Rounds Clinically complex

## 2025-08-27 NOTE — DISCHARGE INSTRUCTIONS
Dear Ms. Torres,     You were admitted to the hospital due to stomach pain. You were then treated for infection and build up of fluid in your body due to your heart in the setting of having a valve in your heart that does not work very well. Given your condition and that surgery on your heart valve would not be a good choice at this time, your family helped transition you to more comfort based care. You will be transported to a hospice facility in [place] to be close to family. More details of changes to your medications are listed above as a list.     Thank you for allowing us to be a part of your care.   - Your [facility] Team

## 2025-08-27 NOTE — PROGRESS NOTES
"Radha Torres is a 85 y.o. female on day 18 of admission presenting with Chest pain.    Palliative Medicine following for:  Complex medical decision making, symptom management, patient/family support    History obtained from chart review including ED note, H&P, patient's daily progress notes, review of lab/test results, and discussion with primary team and bedside RN.    Subjective    History of Present Illness  Radha Torres is a 85 y.o. female with PMH HFrEF (EF 25-30%), aortic stenosis, Afib, s/p AV node ablation and leadless pacemaker in 2025, HTN, renal cell carcinoma who presented on 8/9/25 as a transfer from [facility] for epigastric pain, now transferred to CICU for possible flash pulmonary edema and hyponatremia. Suspect GI symptoms and hyponatremia due to severe fluid overload iso ADHF. 8/20 labs showing uti and bacteremia, ID following along with Structural Heart on candidacy for BAV-no longer a candidate d/t severity of illness and co morbidities. Pt made comfort care 8/26.       Symptoms  Pt unable to contribute    Objective    Last Recorded Vitals  /65 (BP Location: Right arm, Patient Position: Lying)   Pulse 72   Temp 36.4 °C (97.5 °F) (Temporal)   Resp 16   Ht 1.65 m (5' 4.96\")   Wt 72.4 kg (159 lb 11.2 oz)   SpO2 97%   BMI 26.61 kg/m²      Physical Exam   Constitutional:       Appearance:  She is ill-appearing. Unresponsive to verbal stimuli or touch following iv medications. Later, pt more awake.  HENT:      Head: Normocephalic.      Mouth/Throat:      Mouth: Mucous membranes are dry.    Cardiovascular:      Rate and Rhythm: Normal rate.      Heart sounds: Murmur heard.   Pulmonary:      Breath sounds: Decreased breath sounds present.   Abdominal:      Palpations: Abdomen is soft.    Skin:     General: Skin is warm and dry.      Coloration: Skin is pale.   Neurological:      Mental Status: She awakens and is verbal when IV medications \"wear down\" then is restless, " uncooperative.    Relevant Results   Results for orders placed or performed during the hospital encounter of 08/09/25 (from the past 24 hours)   POCT GLUCOSE   Result Value Ref Range    POCT Glucose 278 (H) 74 - 99 mg/dL        Allergies  Patient has no known allergies.  Medications  Scheduled medications  Scheduled Medications[1]  Continuous medications  Continuous Medications[2]  PRN medications  PRN Medications[3]     Assessment/Plan    Radha Torres is a 85 y.o. female with PMH HFrEF (EF 25-30%), aortic stenosis, Afib, s/p AV node ablation and leadless pacemaker in 2025, HTN, renal cell carcinoma who presented on 8/9/25 as a transfer from [facility] for epigastric pain, now transferred to CICU for possible flash pulmonary edema and hyponatremia. Suspect GI symptoms and hyponatremia due to severe fluid overload iso ADHF. 8/20 labs showing uti and bacteremia, ID following along with Structural Heart on candidacy for BAV-no longer a candidate d/t severity of illness and co morbidities. Pt made comfort care 8/26.       8/19: Palliative consultation. Met with spouse at bedside, spoke to dtr on the phone. Agreeable to DNR/DNI to honor pt best and her previously stated health preferences.     8/20: Pt with pleural effusions, elevated lactate, UTI, and bacteremia. Plan to diurese further and started on Dopamine for cardiac support. No plan for balloon valvuloplasty until pt shows improvements. Supported family at bedside.     8/21: Pt seen s/p thoracentesis, 750ml removed. Pt on cpap, nad, lethargic, but per nursing, was very awake and cooperative for bedside procedure. Plan to continue diuresing and antibiotics at this time, day by day monitoring for progression/regression and ongoing GOC.     8/25: Not a candidate for BAV r/t ongoing risks. Pt still with mitts and sitter. Treating more aggressively for back pain. Met with family at bedside, c/f suffering, pt demanding water. Agreeable to transfer off the unit but no  further ICU  if declines. If so, agreeable to other arrangements to try to get pt to hospice closer to home ([facility]).      8/26: Follow up with pt/symptoms, see below.    8/27: Pt transitioned to comfort care last evening, recs by Dr. Robin clemente. Follow up today, see recs below. [facility] meeting today at 2:30pm.     Palliative Performance Scale (PPS): 30    ----------------------------------------------------------------------------------------------------------------------------------------------------------------------------------------------------------------------------------------------------------------------------------------------------------------------------------------------------------------------      I spent  minutes in providing separately identifiable ACP services with the patient and/or surrogate decision maker in a voluntary conversation discussing the patient's wishes and goals as detailed in the above note.   ----------------------------------------------------------------------------------------------------------------------------------------------------------------------------------------------------------------------------------------------------------------------------------------------------------------------------------------------------------------------    #Complex Medical Decision Making  #Goals of Care  #Advanced Care Planning  - Code status: Comfort care 8/25  - Surrogate decision maker: Stefanie Roblero (Daughter)  [phone] ([place]) primary. Spouse secondary.  - Goals are mix of survival and time and improved quality of life  -8/19: Met with pt at bedside. Able to glean more information r/t pt's life, wishes, goals, worries, and health preferences. See above/consult note for additional supportive detail.      In short, discussed hopes to stabilize pt for tavr, but expressed worries of further decompensation in the interim. Discussed DNR/DNI in detail in which family supports and aligns  "with what pt would deem \"non beneficial\" care. Family states pt has said all along, she does not want to be kept alive by machines. Supported family and answered questions.      - 8/20: This NP, Ray DE LA CRUZ, Stefanie and Caleb discussed continuing to treat her and clearing reversible factors. We discussed that she still may be able to have the balloon provided things clear up and she can stabilize again. The uncertainty of whether or not pt is going to continue to decline is weighing on their decisions on what to do. They are trying to remain hopeful that she can make it to the balloon.      Pall team will reassess tomorrow as we may have a better understanding of how patient responds to new treatments.      - 8/25: Met spouse and dtr along with Ray Dyer, and Dr. Hunter. Discussed how pt is currently not a candidate for balloon r/t anesthesia and risk of perforation/worsening her valve and death. Discussed pt needs about 6 weeks of antibiotics and optimization to be a candidate. Aware pt now meeting criteria for floor level, which may also improve her delirium. Discussed their thought of pt's current state. Family expressed they feels she is suffering, lying in bed, losing weight, not eating or drinking. Discussed what else pt would be willing to go through and family agrees, no icu if she further declines. If pt decompensates, family wishes to attempt to get her closer to home ([place]) to R new facility.   Supported family and pt in this difficult time.        #Back pain  #Left chest tightness  - Sx history known, was on 1 percocet BID prn severe pain prior to admission. Likely exacerbated by bed bound state.   - Now on Oxy 15mg Q6H prn severe pain, Hydromorphone 0.4mg IV Q4H prn severe pain (second line), acetaminophen 975mg Q8H prn moderate pain, Lido patch Q12H, Voltaren gel 4g TID Prn mild pain.  - 8/27: Pt seen twice, first visit pt unresponsive, NAD. Had just received HM and Diazepam. On second " visit, pt much more awake, states she is warm, can't breathe out of her nose, and is restless. Comforts provided, RN notified. Pt does not endorse chest or back pain, rather states left hip pain (she is turned to that side).            #ADHF  #HFrEF  #Severe AS  #AMS  #Psychosocial Support  - Ongoing pt/family support and care navigation. 8/19 Established DNR/DNI to honor pt's wishes. Family still hopeful for TAVR if/when pt can stabilize. Structural following to assist with candidacy for tavr vs balloon valvuloplasty.   - 8/25: Pt seen, on 6l nc, still with sitter and mitts. Pt makes eye contact momentarily, unable to participate in ROS. Apneic breathing pattern, RR about 14-17 per minute. Valvuloplasty not offered at this time r/t risks with likely general anesthesia and risk for AI during. Met with family, additional GOC discussed (above). Will transfer to the floor.     - 8/27: Pt/family electing transitioning to comfort care last evening. Pt seen at 0940 today and very comfortable with current regimen. Pt on Hydromorphone 0.6mg IV [id number] prn RDOS, (9 doses since ordered, and Diazepam 5mg IV Q4H prn anxiety (4 doses since ordered). Has not received any PRN Haldol. Per nursing, pt spits out Methocarbamol. Followed up again 1:10pm, pt more awake and restless, nursing notified. Pt states she can not breathe out of her nose/congestion. Recommend Flonase BID prn nasal congestion. [facility] meeting today at 2:30pm. Pt appears stable enough at this time for transportation to [place]. Recommend premedicating prior to transport.  - Recommend if pt's pain/dyspnea symptoms are becoming more frequent or difficult to treat on current hydromorphone regimen, consider IV Hydromorphone basal drip/basal starting at 10% of the summation of all Hydromorphone doses within the last 24 hours with continued Hydromorphone 0.6mg IV [id number] prn per RDOS.   - Music Therapy- added  - Spiritual Care Support  -  support     Plan of Care  discussed with: Updated primary and bedside RN on goals of care decision, medication adjustments, and code status      Medical Decision Making was high level due to high complexity of problems, extensive data review, and high risk of management/treatment.     - ADHF, severe aortic stenosis, AMS, pleural effusions, bacteremia, posing threat to life and function.  - Reviewed external notes from   - Reviewed results from  which were used in decision making for   - Recommended the following tests:   - Assessment required independent historian: primary, nursing  - Independent interpretation of test:   - Discussion of management with: primary, nursing  - Drug therapy requiring intensive monitoring for toxicity:   - Decision regarding elective major surgery with identified patient or procedure risk factors:   - Decision regarding emergency major surgery:   - Decision regarding hospitalization or escalation of hospital-level care:   - Decision not to resuscitate or to de-escalate care because of poor prognosis:   - Parenteral controlled substances: Hydromorphone, diazepam    Thank you for allowing us to care for this patient. Palliative Team will continue to follow as needed. Please contact team with any questions or concerns.   Team pager [phone] (weekdays)    LIDNSEY Cole-CNP           [1] apixaban, 5 mg, oral, q12h  ceFAZolin, 2 g, intravenous, q8h  gabapentin, 300 mg, oral, Nightly  levothyroxine, 75 mcg, oral, Daily  lidocaine, 1 patch, transdermal, Daily  PARoxetine, 20 mg, oral, Daily  [2]    [3] PRN medications: acetaminophen, diazePAM, diclofenac sodium, haloperidol lactate, HYDROmorphone, methocarbamol, oxygen, oxygen, temazepam

## 2025-08-27 NOTE — CARE PLAN
Problem: Skin  Goal: Prevent/manage excess moisture  8/27/2025 0547 by Gigi Nova, RN  Flowsheets (Taken 8/27/2025 0547)  Prevent/manage excess moisture:   Cleanse incontinence/protect with barrier cream   Monitor for/manage infection if present  8/27/2025 0547 by Gigi Nova RN  Outcome: Progressing  Flowsheets (Taken 8/27/2025 0547)  Prevent/manage excess moisture:   Cleanse incontinence/protect with barrier cream   Monitor for/manage infection if present

## 2025-08-27 NOTE — PROGRESS NOTES
Speech-Language Pathology             Therapy Communication Note     Patient Name: Radha Torres  MRN:  [id number]  Today's Date:  08/27/25  Missed Time: 12:45 PM     Pt comfort measures only and placed on diet. SLP sign off at this time. Re consult if change in status.     Lorraine Wilde MA, CCC/SLP  Inpatient Speech-Language Pathologist  [facility] Secure Chat Preferred

## 2025-08-28 LAB — BACTERIA BLD CULT: NORMAL

## 2025-08-28 NOTE — CARE PLAN
Problem: Skin  Goal: Promote skin healing  8/27/2025 2147 by Gigi Nova RN  Flowsheets (Taken 8/27/2025 2147)  Promote skin healing:   Assess skin/pad under line(s)/device(s)   Protective dressings over bony prominences   Turn/reposition every 2 hours/use positioning/transfer devices  8/27/2025 2146 by Gigi Nova, RN  Outcome: Progressing

## 2025-08-28 NOTE — NURSING NOTE
Nursing Discharge Note 8/27/2025 2158:  Patient discharged to [facility] via [facility]. Family at bedside took patient belongings with them. Patient not on telemetry monitor. Patient has a 18g IV in the left upper arm. All paper work sent with paramedics.   Gigi Nova RN

## 2025-08-29 ENCOUNTER — APPOINTMENT (OUTPATIENT)
Dept: CARDIOLOGY | Facility: HOSPITAL | Age: 85
End: 2025-08-29
Payer: MEDICARE

## 2025-08-29 LAB
ATRIAL RATE: 136 BPM
ATRIAL RATE: 166 BPM
Q ONSET: 194 MS
Q ONSET: 196 MS
QRS COUNT: 12 BEATS
QRS COUNT: 13 BEATS
QRS DURATION: 176 MS
QRS DURATION: 184 MS
QT INTERVAL: 462 MS
QT INTERVAL: 470 MS
QTC CALCULATION(BAZETT): 505 MS
QTC CALCULATION(BAZETT): 545 MS
QTC FREDERICIA: 491 MS
QTC FREDERICIA: 519 MS
R AXIS: -58 DEGREES
R AXIS: -68 DEGREES
T AXIS: 101 DEGREES
T AXIS: 96 DEGREES
T OFFSET: 427 MS
T OFFSET: 429 MS
VENTRICULAR RATE: 72 BPM
VENTRICULAR RATE: 81 BPM

## 2025-08-29 NOTE — DISCHARGE SUMMARY
Discharge Diagnoses  #Acute encephalothy  #Severe aortic stenosis  #HFrEF (EF 25-30%)  #MSSA bbacteremia  #Ecoli UTI  #Hyponatremia      Malnutrition Diagnosis Status: Active  Malnutrition Diagnosis: Moderate malnutrition related to acute disease or injury     As Evidenced by: <75% EER >7 days, mild muscle wasting/fat loss  I agree with the dietitian's malnutrition diagnosis.    Issues Requiring Follow-Up  - Continuation of IV cefazolin 6 weeks (8/21-10/1) in setting of recovering MSSA bacteremia and E. Coli UTI (desired by NOK/family for comfort as well)  - Not candidate for TAVR or BAV at this time, if condition were to improve and bacteremia were to clear up, could consider reevaluation     Discharge Meds     Medication List      START taking these medications     acetaminophen 325 mg tablet; Commonly known as: Tylenol; Take 3 tablets   (975 mg) by mouth every 8 hours if needed for mild pain (1 - 3) or   moderate pain (4 - 6).   ceFAZolin IVPB; Commonly known as: Ancef; Infuse 100 mL (2 g) at 200   mL/hr over 30 minutes into a venous catheter every 8 hours. Continued for   patient comfort in the setting of UTI and bacteremia with unclear source.   Can be discontinued at the discretion of the hospice team in discussions   with family.   diazePAM 5 mg/mL injection; Commonly known as: Valium; Infuse 1 mL (5   mg) over 3 minutes into a venous catheter every 4 hours if needed for   anxiety.   diclofenac sodium 1 % gel; Commonly known as: Voltaren; Apply 4.5 inches   (4 g) topically 3 times a day as needed (for pain).   fluticasone 50 mcg/actuation nasal spray; Commonly known as: Flonase;   Administer 1 spray into each nostril 2 times a day as needed for rhinitis   (Nasal congestion/issues breathing out of nose). Shake gently. Before   first use, prime pump. After use, clean tip and replace cap.   haloperidol lactate 5 mg/mL injection; Commonly known as: Haldol; Infuse   0.2 mL (1 mg) into a venous catheter every 4  hours if needed for agitation   (delirium).   HYDROmorphone 1 mg/mL injection; Commonly known as: Dilaudid; Infuse 0.6   mL (0.6 mg) into a venous catheter every 15 minutes if needed for moderate   pain (4 - 6) or severe pain (7 - 10).   lidocaine 4 % patch; Place 1 patch over 12 hours on the skin once daily.   Remove & discard patch within 12 hours or as directed by MD.   methocarbamol 500 mg tablet; Commonly known as: Robaxin; Take 1 tablet   (500 mg) by mouth every 8 hours if needed for muscle spasms.   oxygen gas therapy; Commonly known as: O2; Inhale 8 L/min at 480,000   mL/hr once every 24 hours.     CHANGE how you take these medications     furosemide 20 mg tablet; Commonly known as: Lasix; Take 1 tablet (20 mg)   by mouth once daily.; What changed: how much to take     CONTINUE taking these medications     gabapentin 300 mg capsule; Commonly known as: Neurontin   levothyroxine 75 mcg tablet; Commonly known as: Synthroid, Levoxyl   NexIUM 20 mg DR capsule; Generic drug: esomeprazole   PARoxetine 20 mg tablet; Commonly known as: Paxil   temazepam 7.5 mg capsule; Commonly known as: Restoril     STOP taking these medications     amLODIPine 2.5 mg tablet; Commonly known as: Norvasc   apixaban 2.5 mg tablet; Commonly known as: Eliquis   ascorbic acid 500 mg tablet; Commonly known as: Vitamin C   atorvastatin 20 mg tablet; Commonly known as: Lipitor   calcium citrate-vitamin D3 315 mg-5 mcg (200 unit) tablet; Commonly   known as: Citracal+D   carvedilol 6.25 mg tablet; Commonly known as: Coreg   cholecalciferol 25 mcg (1,000 units) capsule; Commonly known as: Vitamin   D-3   ferrous sulfate 325 mg (65 mg elemental) tablet   oxyCODONE-acetaminophen 5-325 mg tablet; Commonly known as: Percocet   potassium chloride CR 10 mEq ER tablet; Commonly known as: Klor-Con       Test Results Pending At Discharge  Pending Labs       No current pending labs.          Hospital Course  85 y.o. female with history significant for  HFrEF (EF 25-30%), aortic stenosis, Afib, s/p AV node ablation and leadless pacemaker in 2025, HTN, renal cell carcinoma who presented on 8/9/25 as a transfer from [facility] for epigastric pain, nonradiating, not associated with eating. Hemodynamically stable, afebrile on arrival in no acute distress with some mild epigastric burning. Trop 23>27, lipase within normal limits. EKG with no ischemic changes. CTAP negative for inflammatory process or bowel obstruction. She was scheduled for upcoming TAVR on 8/19/25 with Dr. Howell at [facility].    Labs were significant for BNP of 2422 on 8/9/25, and patient was diuresed with IV lasix 40 mg. Pacemaker was interrogated which showed no notable events. Dr. Howell coordination team was contacted regarding the possibility of expediting TAVR to an earlier date. TAVR was unable to be moved to an earlier date and remained scheduled for 8/19. On 8/11, patient was hyponatremic at 125, and lasix was held. Urine osm 250, urine sodium 29, and Na increased to 127 by 8/12. Patient continued to endorse epigastric pain and nausea. KUB was unremarkable, ECG remained unchanged. Patient experienced some relief with Tigan, Tums and home pain medication; epigastric pain most likely due to reflux/GERD, as it was burning in nature, worse after eating, worse with lying down, and improvement with Tums.  However, by 8/13, serum sodium resulted severely low at 120; repeat was also 120. Serum osm, morning cortisol, and 500 ml NS was ordered. Likely hypervolemia hyponatremia.     Per recommendations of nephrology, 15 mg tolvaptan was given on 8/15 and 8/18. Lasix and urea also held. Hyponatremia continued to improve. On 8/18, nipride was discontinued and hydralazine was increased to 50 mg TID. In discussion with cardiology- structural heart, TAVR will not be done on 8/19. WBCs mildly elevated and will continue to monitor when transferred to the floor.On 8/19 WBC vonda and mental status not improved,  with UA pending and CXR concerning for atelectasis vs. Pneumonia, started on empiric IV Ceftriaxone 1mg q24h. Concern for aspiration so holding PO meds, started IV Hydralazine 5mg TID. Infectious workup for AMS pending.   8/20 blood cx positive for staph aureus. Started on Vanc/Zosyn. CT chest abdomen and pelvis obtained and shows large left sided pleural effusion concerning for fluid overload with superimposed pneumonia vs. Atelectasis. Starting Dopamine gtt at 2.5mcg/kg/min and additional diuresis with Lasix 100mg and Diamox 1000mg.   8/21- Thoracentesis performed early in AM. Exchanged Vanc/Zosyn for IV Cefazolin. Additional diuresis with 160mg Lasix. 8/22-8/23 - diuresis to goal net -2L, continuing feeds through NG tube, managing back pain. 8/24- repeat blood cx, diuresis, pain medication for back pain    Patient transferred to floor after becoming DNR DNI No ICU. Given patient's frailty, recent bacteremia with current IV antibiotics, and altered mental status, patient not a good candidate for TAVR at this time, could possibly reevaluate after IV abx complete.  Based on structural heart, there are multiple risk factors. For BAV risks also outweigh the benefits. Patient's family do not believe a valve procedure that does not bring patient completely back to baseline is aligned with what the patient would want. Instead, given patient's current condition and family's concerns of pain, discomfort, and suffering associated, patient was changed after goals of care discussion to DNR Comfort Measures Only. However, patient's family did want to continue IV cefazolin (six weeks, 8/21 - 10/1) as treating infection would be more comfortable to patient. Hospice was consulted and palliative was following. Family wanted patient to transition to hospice and elected for a hospice facility in [place] close to family. Transport now planned for PM 8/27 as per hospice.    Outpatient Follow-Up  Future Appointments   Date Time  Provider Department [place]   9/25/2025 10:00 AM Edd[id number]   9/26/2025 10:00 AM Love Moreno MD [id number] [place]   10/24/2025  9:30 AM Nilesh Howell MD [id number] [place]   11/4/2025  9:20 AM [facility] [address]   11/4/2025 10:00 AM Marija Ortiz MD [id number] [place]   12/19/2025 11:00 AM Love Moreno MD [id number] [place]       Bernadine Barillas MD  Internal Medicine, PGY1      Cardiology Staff Addendum:    I saw and evaluated the patient on 8/27/2025.  I personally obtained the key and critical portions of the history and physical exam or was physically present for key and critical portions performed by the resident. I reviewed the resident’s documentation and discussed the patient with the resident.  I agree with the resident’s medical decision making as documented/amended in the note.    Yusuf Santana MD

## 2025-08-29 NOTE — DOCUMENTATION CLARIFICATION NOTE
"    PATIENT:               CHARISSE DOUGHERTY  ACCT #:                  [id number]  MRN:                       [id number]  :                       1940  ADMIT DATE:       2025 3:04 AM  DISCH DATE:        2025 9:52 PM  RESPONDING PROVIDER #:        [id number]          PROVIDER RESPONSE TEXT:    I agree with dietician diagnosis of Moderate malnutrition on 2025    CDI QUERY TEXT:    Clarification      Instruction:    Based on your assessment of the patient and the clinical information, please provide the requested documentation by clicking on the appropriate radio button and enter any additional information if prompted.    Question: Please further clarify this patient nutritional status as    When answering this query, please exercise your independent professional judgment. The fact that a question is being asked, does not imply that any particular answer is desired or expected.    The patient's clinical indicators include:  Clinical Information: 84y/o female with acute on chronic heart failure and severe aortic stenosis    Clinical Indicators:      - 8/15, ,  Nutrition Therapy Consult: \"Moderate malnutrition related to acute disease or injury - As Evidenced by: <75% EER >7 days, mild muscle wasting/fat loss\"      Treatment: Nutrition Consult, Tube feed recommendation, Monitor daily weight    Risk Factors: NPO status, Age, CKD  Options provided:  -- I agree with dietician diagnosis of Moderate malnutrition on 2025  -- Other - I will add my own diagnosis  -- Refer to Clinical Documentation Reviewer    Query created by: Kathryn Sellers on 2025 5:04 PM      Electronically signed by:  GONZALO NAIR MD 2025 8:37 AM          "

## 2025-09-02 LAB
ATRIAL RATE: 81 BPM
Q ONSET: 191 MS
QRS COUNT: 13 BEATS
QRS DURATION: 168 MS
QT INTERVAL: 462 MS
QTC CALCULATION(BAZETT): 536 MS
QTC FREDERICIA: 510 MS
R AXIS: -60 DEGREES
T AXIS: 106 DEGREES
T OFFSET: 422 MS
VENTRICULAR RATE: 81 BPM

## 2025-09-25 ENCOUNTER — APPOINTMENT (OUTPATIENT)
Dept: INFECTIOUS DISEASES | Facility: CLINIC | Age: 85
End: 2025-09-25
Payer: MEDICARE

## 2025-09-25 ENCOUNTER — APPOINTMENT (OUTPATIENT)
Dept: CARDIOLOGY | Facility: CLINIC | Age: 85
End: 2025-09-25
Payer: MEDICARE

## 2025-09-26 ENCOUNTER — APPOINTMENT (OUTPATIENT)
Dept: CARDIOLOGY | Facility: CLINIC | Age: 85
End: 2025-09-26
Payer: MEDICARE

## 2025-10-24 ENCOUNTER — APPOINTMENT (OUTPATIENT)
Dept: CARDIOLOGY | Facility: CLINIC | Age: 85
End: 2025-10-24
Payer: MEDICARE

## 2025-11-04 ENCOUNTER — APPOINTMENT (OUTPATIENT)
Dept: CARDIOLOGY | Facility: CLINIC | Age: 85
End: 2025-11-04
Payer: MEDICARE

## 2025-11-26 ENCOUNTER — APPOINTMENT (OUTPATIENT)
Dept: CARDIOLOGY | Facility: CLINIC | Age: 85
End: 2025-11-26
Payer: MEDICARE

## 2025-12-19 ENCOUNTER — APPOINTMENT (OUTPATIENT)
Dept: CARDIOLOGY | Facility: CLINIC | Age: 85
End: 2025-12-19
Payer: MEDICARE

## 2026-01-22 NOTE — PROGRESS NOTES
Music Therapy Note    Radha Torres     Therapy Session  Referral Type: New referral this admission  Visit Type: New visit  Session Start Time: 1355  Session End Time: 1411  Intervention Delivery: In-person  Conflict of Service:  (no conflict - pt participated in session by singing along for part of a song - confirmed she enjoys music)  Family Present for Session: None  Number of staff members present: 2               Treatment/Interventions       Post-assessment  Total Session Time (min): 16 minutes         Education Documentation  No documentation found.

## 2026-08-20 ENCOUNTER — APPOINTMENT (OUTPATIENT)
Dept: CARDIOLOGY | Facility: CLINIC | Age: 86
End: 2026-08-20
Payer: MEDICARE

## (undated) DEVICE — INTRODUCER, AVEIR 25F, 50 CM

## (undated) DEVICE — SHIELD, PERIPHERAL, RADPAD, 11 X 34IN, W/ ABSORBENT, ORANGE, STERILE

## (undated) DEVICE — GUIDEWIRE, ANGLE TIP,  .035 DIA, 180 CM, 3 CM TIP"

## (undated) DEVICE — Device

## (undated) DEVICE — CABLE, 34 HYP, 34 LEMO, 10FT, SMART TOUCH

## (undated) DEVICE — PATCHES, EXTERNAL REFERENCE, CARTO3

## (undated) DEVICE — TUBING SET, IRRIGATION, SMARTABLATE

## (undated) DEVICE — DILATOR, VESSEL, COONS, 20FR X 20CM

## (undated) DEVICE — SHEATH, PINNACLE, W/.038 GUIDEWIRE, 10 CM,  6FR INTRODUCER, 6FR DIA, 2.5 CM DIALATOR

## (undated) DEVICE — SHEATH, PINNACLE, W/.038 GUIDEWIRE, 10 CM,  8FR INTRODUCER, 8FR DIA, 2.5 CM DIALATOR

## (undated) DEVICE — CATHETER, THERMOCOOL SMART TOUCH, SF, D-F CURVE

## (undated) DEVICE — CATHETER, QUADPOLAR, VIKING, 6FR 115CM, JOS, 4E,2,5,2MM

## (undated) DEVICE — GUIDEWIRE, STRAIGHT, AMPLATZ SUPER STIFF, 0.035 IN X 180 CM

## (undated) DEVICE — CATHETER, ANGIO, INFINITI, MPA2, 5 FR X 100 CM

## (undated) DEVICE — DEVICE, CLOSURE, PERCLOSE, PROSTYLE